# Patient Record
Sex: FEMALE | Race: WHITE | NOT HISPANIC OR LATINO | Employment: OTHER | URBAN - METROPOLITAN AREA
[De-identification: names, ages, dates, MRNs, and addresses within clinical notes are randomized per-mention and may not be internally consistent; named-entity substitution may affect disease eponyms.]

---

## 2017-01-10 ENCOUNTER — OFFICE VISIT (OUTPATIENT)
Dept: NEUROSURGERY | Facility: CLINIC | Age: 72
End: 2017-01-10

## 2017-01-10 VITALS
HEART RATE: 88 BPM | RESPIRATION RATE: 16 BRPM | BODY MASS INDEX: 31.18 KG/M2 | WEIGHT: 176 LBS | SYSTOLIC BLOOD PRESSURE: 160 MMHG | DIASTOLIC BLOOD PRESSURE: 80 MMHG

## 2017-01-10 DIAGNOSIS — M48.04 THORACIC SPINAL STENOSIS: Primary | ICD-10-CM

## 2017-01-10 PROCEDURE — 99213 OFFICE O/P EST LOW 20 MIN: CPT | Performed by: NEUROLOGICAL SURGERY

## 2017-01-10 NOTE — PROGRESS NOTES
Subjective   Patient ID: Michelle Espinoza is a 71 y.o. female is here today for follow-up of lower extremity weakness. She underwent further evaluation by neurology (Dr Eliezer Crow) and is scheduled for EMG/NCV on 1-19-17. She is accompanied by her .    History of Present Illness   Patient presents for follow- up of thoracic stenosis, gait disturbance and weakness. Since her last visit, she has been evaluated with neurology at , Dr. Crow. A EMG/NCV and labs were ordered. Her EMG is on 1/19/17. She feels that her imbalance may be worsening as she finds more reliance on her walker. She denies any associated falls. She denies neck, back, and leg pain. No swallowing issues.    The following portions of the patient's history were reviewed and updated as appropriate: allergies, current medications and problem list.    Review of Systems   HENT: Negative for trouble swallowing.    Genitourinary: Negative for difficulty urinating and enuresis.   Musculoskeletal: Positive for arthralgias and gait problem.   Neurological: Positive for weakness and numbness.       Objective   Physical Exam   Constitutional: She is oriented to person, place, and time. She appears well-developed and well-nourished.   Pulmonary/Chest: Effort normal.   Musculoskeletal:        Cervical back: She exhibits decreased range of motion (rotation).   Neurological: She is alert and oriented to person, place, and time. She has an abnormal Romberg Test. Tandem gait test: not tested due to safety concerns.   Reflex Scores:       Tricep reflexes are 1+ on the right side and 1+ on the left side.       Bicep reflexes are 2+ on the right side and 2+ on the left side.       Brachioradialis reflexes are 3+ on the right side and 2+ on the left side.       Patellar reflexes are 2+ on the right side and 3+ on the left side.       Achilles reflexes are 2+ on the right side and 2+ on the left side.  Skin: Skin is warm and dry.   Psychiatric: She has a normal mood  and affect. Her speech is normal and behavior is normal. Judgment and thought content normal.   Vitals reviewed.    Neurologic Exam     Mental Status   Oriented to person, place, and time.   Follows 3 step commands.   Attention: normal. Concentration: normal.   Speech: speech is normal   Level of consciousness: alert  Knowledge: good.   Normal comprehension.     Motor Exam   Muscle bulk: decreased (thenar/hypothenar bilaterally; intraosseous bilaterally)  Overall muscle tone: normal    Strength   Right deltoid: 5/5  Left deltoid: 5/5  Right biceps: 5/5  Left biceps: 5/5  Right triceps: 5/5  Left triceps: 5/5  Right wrist flexion: 5/5  Left wrist flexion: 5/5  Right wrist extension: 5/5  Left wrist extension: 5/5  Right interossei: 5/5  Left interossei: 4/5  Right iliopsoas: 4/5  Left iliopsoas: 4/5  Right quadriceps: 4/5  Left quadriceps: 4/5  Right hamstrin/5  Left hamstrin/5  Right anterior tibial: 5/5  Left anterior tibial: 5/5  Right gastroc: 5/5  Left gastroc: 5/5    Gait, Coordination, and Reflexes     Gait  Gait: wide-based (uses rolling walker; slow steps with prolonged heel strike and seems to drag right foot)    Coordination   Romberg: positive  Tandem gait test: not tested due to safety concerns.    Tremor   Resting tremor: absent  Intention tremor: absent  Action tremor: absent    Reflexes   Right brachioradialis: 3+  Left brachioradialis: 2+  Right biceps: 2+  Left biceps: 2+  Right triceps: 1+  Left triceps: 1+  Right patellar: 2+  Left patellar: 3+  Right achilles: 2+  Left achilles: 2+  Right Haley: present  Left Haley: absent  Right ankle clonus: absent  Left ankle clonus: absent      Assessment/Plan   Independent Review of Radiographic Studies:    none new  Medical Decision Making:    The patient returns to the office today.  She was evaluated at the [facility] by Dr. Crow.  There is some question of motor neuron disease and he has ordered further testing.    Interim she  is walking with a walker which she was not using routinely previously.  She has difficulty in getting her foot to raise as described above.    The patient has a T7 8 thoracic disc protrusion with spinal cord compression.  Distally there is disc osteophyte complex and facet hypertrophy at T10 11 with compression of the spinal cord.  The primary question from a surgical standpoint is whether or not she would improve if we were to go ahead and decompress her thoracic spinal cord.    This unfortunate woman has been through a lot have spinal surgery.  Until recently she has stabilized.  With a new progressive problem that appears to me to be somewhat myelopathic and without evidence of compression of the nerve roots in the lower spine the only surgical option would be to decompress this area.    The risks, benefits, and alternatives of decompression and fusion were explained in detail to the patient. The alternative is not to perform an operative procedure. The benefit should be, though is not guaranteed, primarily a potential reduction in the neurosensory and/or motor dysfunction; secondarily, a possible reduction in back pain. The risks include, but are not limited to, the possibility of death, infection, stroke, bleeding, blindness, paralysis, blindness, lack of improvement in the patient's pain syndrome, worsening of the pain syndrome, meningitis, osteomyelitis, recurrent disc herniation, need for further operative intervention, recurrence of the pain syndrome, sexual dysfunction, incontinence, inability to urinate without catheterization, inability to have a normal bowel movement, epidural scarring resulting in chronic back pain, leakage of cerebral spinal fluid at the time of surgery or after recovery from surgery requiring further operative intervention, lack of bony fusion requiring further surgery, instrumentation breakdown or pull out, adjacent level spinal degeneration, spinal instability. I also explained the  realistic expectations as they pertain to the procedure. The patient voiced understanding of these risks, benefits, alternatives, and realistic expectations.    After a complete physical exam, the patient has been informed of the consequences, benefits, appropriate us, and office policies regarding the medication being prescribed. A BETITO check will be made on-line, and will be repeated if prescription is renewed after a 90 day period. The patient agrees to adhering to the medication regimen as prescribed.    The patient has been advised that we will manage post-operative pain for 1 month. If further narcotic medication is needed beyond that period, a referral back to the primary care physician or to a pain management specialist will be made. If the patient cancels or fails to show for scheduled follow-up visits or the pain management referral,  further narcotic prescriptions from this practice may cease.    Plan: If the patient decides to proceed with surgical intervention this would be comprised of T7 8 transpedicular approach and removal of the T7 central disc protrusion compressive of the thoracic spinal cord with postoperative thoracic instrumentation and a T10 11 simple significant decompression.      Michelle was seen today for extremity weakness.    Diagnoses and all orders for this visit:    T7 and T10-11 Thoracic spinal stenosis    Return if symptoms worsen or fail to improve.

## 2017-01-10 NOTE — MR AVS SNAPSHOT
Michelle Espinoza   1/10/2017 2:30 PM   Office Visit    Dept Phone:  [phone]   Encounter #:  [id number]    Provider:  Clement Tierney MD   Department:  [facility] ADV NEUROSURGERY                Your Full Care Plan              Your Updated Medication List          This list is accurate as of: 1/10/17  3:27 PM.  Always use your most recent med list.                aspirin 81 MG EC tablet       baclofen 20 MG tablet   Commonly known as:  LIORESAL   Take 1 tablet by mouth Daily.       BILBERRY PLUS LUTEIN 5-1000 MCG-MG capsule       CALCIUM 1200 PO       naproxen sodium 220 MG tablet   Commonly known as:  ALEVE       oxybutynin 5 MG tablet   Commonly known as:  DITROPAN   Take 1 tablet by mouth 3 (Three) Times a Day.       PA FISH OIL/KRILL PO       PROBIOTIC PO       vitamin C 500 MG tablet   Commonly known as:  ASCORBIC ACID       vitamin D3 5000 UNITS capsule capsule               You Were Diagnosed With        Codes Comments    Thoracic spinal stenosis    -  Primary ICD-10-CM: M48.04  ICD-9-CM: 724.01       Instructions     None    Patient Instructions History      Upcoming Appointments     Visit Type Date Time Department    OFFICE VISIT 1/10/2017  2:30 PM [facility]UR[facility]    NEW PATIENT 1/31/2017  2:00 PM [facility]    WELLNESS 10/26/2017 11:15 AM [facility] ALEJANDRO    MAMMO NUSZ 10/26/2017 11:00 AM [facility] SHERMAN      [employer] Signup     Our records indicate that you have an active [employer] account.    You can view your After Visit Summary by going to [employer] and logging in with your [employer] username and password.  If you don't have a [employer] username and password but a parent or guardian has access to your record, the parent or guardian should login with their own [employer] username and password and access your record to view the After Visit Summary.    If you have questions, you can email [email or web address] or call  [phone] to talk to our MyChart staff.  Remember, MyChart is NOT to be used for urgent needs.  For medical emergencies, dial 911.               Other Info from Your Visit           Your Appointments     Jan 31, 2017  2:00 PM EST   New Patient with Johnnie Soni MD   [facility] NEUROLOGY (--)    [address]  [facility] [address] [phone]           Bring all previous medical records and films, along with current medications and insurance information.            Oct 26, 2017 11:00 AM EDT   MAMMO SCREEN PIWH with DAVIDSON PIWH DU MAMMO   [facility] OB GYN (--)    [address] [facility] [address]              No lotion, deodorant, or powders, under the arms or on the breast the day of exam            Oct 26, 2017 11:15 AM EDT   Wellness with Mahamed Massey MD   [facility] (--)    [address] [facility] [address] [phone]              Allergies     Other  Nausea And Vomiting    The mercury in Scallops    Penicillins Allergy Hives    Latex Allergy Rash    Nickel Allergy Rash      Reason for Visit     Extremity Weakness           Vital Signs     Blood Pressure Pulse Respirations Weight Last Menstrual Period Body Mass Index    160/80 (BP Location: Right arm, Patient Position: Sitting) 88 16 176 lb (79.8 kg) (LMP Unknown) 31.18 kg/m2    Smoking Status                   Former Smoker           Problems and Diagnoses Noted     Thoracic spinal stenosis

## 2017-01-10 NOTE — LETTER
January 10, 2017     BHAKTI Rasheed  [address] [facility] [address]    Patient: Michelle Espinoza   YOB: 1945   Date of Visit: 1/10/2017       Dear BHAKTI Tyler:    Michelle Espinoza was in my office today. Below is a copy of my note.    If you have questions, please do not hesitate to call me. I look forward to following Michelle along with you.         Sincerely,        Clement Tierney MD        CC: Jose Luis Nelson Jr., MD iMlly Barton MD    Subjective   Patient ID: Michelle Espinoza is a 71 y.o. female is here today for follow-up of lower extremity weakness. She underwent further evaluation by neurology (Dr Eliezer Crow) and is scheduled for EMG/NCV on 1-19-17. She is accompanied by her .    History of Present Illness   Patient presents for follow- up of thoracic stenosis, gait disturbance and weakness. Since her last visit, she has been evaluated with neurology at , Dr. Crow. A EMG/NCV and labs were ordered. Her EMG is on 1/19/17. She feels that her imbalance may be worsening as she finds more reliance on her walker. She denies any associated falls. She denies neck, back, and leg pain. No swallowing issues.    The following portions of the patient's history were reviewed and updated as appropriate: allergies, current medications and problem list.    Review of Systems   HENT: Negative for trouble swallowing.    Genitourinary: Negative for difficulty urinating and enuresis.   Musculoskeletal: Positive for arthralgias and gait problem.   Neurological: Positive for weakness and numbness.       Objective   Physical Exam   Constitutional: She is oriented to person, place, and time. She appears well-developed and well-nourished.   Pulmonary/Chest: Effort normal.   Musculoskeletal:        Cervical back: She exhibits decreased range of motion (rotation).   Neurological: She is alert and oriented to person, place, and time. She has an abnormal Romberg Test. Tandem gait  test: not tested due to safety concerns.   Reflex Scores:       Tricep reflexes are 1+ on the right side and 1+ on the left side.       Bicep reflexes are 2+ on the right side and 2+ on the left side.       Brachioradialis reflexes are 3+ on the right side and 2+ on the left side.       Patellar reflexes are 2+ on the right side and 3+ on the left side.       Achilles reflexes are 2+ on the right side and 2+ on the left side.  Skin: Skin is warm and dry.   Psychiatric: She has a normal mood and affect. Her speech is normal and behavior is normal. Judgment and thought content normal.   Vitals reviewed.    Neurologic Exam     Mental Status   Oriented to person, place, and time.   Follows 3 step commands.   Attention: normal. Concentration: normal.   Speech: speech is normal   Level of consciousness: alert  Knowledge: good.   Normal comprehension.     Motor Exam   Muscle bulk: decreased (thenar/hypothenar bilaterally; intraosseous bilaterally)  Overall muscle tone: normal    Strength   Right deltoid: 5/5  Left deltoid: 5/5  Right biceps: 5/5  Left biceps: 5/5  Right triceps: 5/5  Left triceps: 5/5  Right wrist flexion: 5/5  Left wrist flexion: 5/5  Right wrist extension: 5/5  Left wrist extension: 5/5  Right interossei: 5/5  Left interossei: 4/5  Right iliopsoas: 4/5  Left iliopsoas: 4/5  Right quadriceps: 4/5  Left quadriceps: 4/5  Right hamstrin/5  Left hamstrin/5  Right anterior tibial: 5/5  Left anterior tibial: 5/5  Right gastroc: 5/5  Left gastroc: 5/5    Gait, Coordination, and Reflexes     Gait  Gait: wide-based (uses rolling walker; slow steps with prolonged heel strike and seems to drag right foot)    Coordination   Romberg: positive  Tandem gait test: not tested due to safety concerns.    Tremor   Resting tremor: absent  Intention tremor: absent  Action tremor: absent    Reflexes   Right brachioradialis: 3+  Left brachioradialis: 2+  Right biceps: 2+  Left biceps: 2+  Right triceps: 1+  Left triceps:  1+  Right patellar: 2+  Left patellar: 3+  Right achilles: 2+  Left achilles: 2+  Right Haley: present  Left Haley: absent  Right ankle clonus: absent  Left ankle clonus: absent      Assessment/Plan   Independent Review of Radiographic Studies:    none new  Medical Decision Making:    The patient returns to the office today.  She was evaluated at the [facility] by Dr. Crow.  There is some question of motor neuron disease and he has ordered further testing.    Interim she is walking with a walker which she was not using routinely previously.  She has difficulty in getting her foot to raise as described above.    The patient has a T7 8 thoracic disc protrusion with spinal cord compression.  Distally there is disc osteophyte complex and facet hypertrophy at T10 11 with compression of the spinal cord.  The primary question from a surgical standpoint is whether or not she would improve if we were to go ahead and decompress her thoracic spinal cord.    This unfortunate woman has been through a lot have spinal surgery.  Until recently she has stabilized.  With a new progressive problem that appears to me to be somewhat myelopathic and without evidence of compression of the nerve roots in the lower spine the only surgical option would be to decompress this area.    The risks, benefits, and alternatives of decompression and fusion were explained in detail to the patient. The alternative is not to perform an operative procedure. The benefit should be, though is not guaranteed, primarily a potential reduction in the neurosensory and/or motor dysfunction; secondarily, a possible reduction in back pain. The risks include, but are not limited to, the possibility of death, infection, stroke, bleeding, blindness, paralysis, blindness, lack of improvement in the patient's pain syndrome, worsening of the pain syndrome, meningitis, osteomyelitis, recurrent disc herniation, need for further operative intervention,  recurrence of the pain syndrome, sexual dysfunction, incontinence, inability to urinate without catheterization, inability to have a normal bowel movement, epidural scarring resulting in chronic back pain, leakage of cerebral spinal fluid at the time of surgery or after recovery from surgery requiring further operative intervention, lack of bony fusion requiring further surgery, instrumentation breakdown or pull out, adjacent level spinal degeneration, spinal instability. I also explained the realistic expectations as they pertain to the procedure. The patient voiced understanding of these risks, benefits, alternatives, and realistic expectations.    After a complete physical exam, the patient has been informed of the consequences, benefits, appropriate us, and office policies regarding the medication being prescribed. A BETITO check will be made on-line, and will be repeated if prescription is renewed after a 90 day period. The patient agrees to adhering to the medication regimen as prescribed.    The patient has been advised that we will manage post-operative pain for 1 month. If further narcotic medication is needed beyond that period, a referral back to the primary care physician or to a pain management specialist will be made. If the patient cancels or fails to show for scheduled follow-up visits or the pain management referral,  further narcotic prescriptions from this practice may cease.    Plan: If the patient decides to proceed with surgical intervention this would be comprised of T7 8 transpedicular approach and removal of the T7 central disc protrusion compressive of the thoracic spinal cord with postoperative thoracic instrumentation and a T10 11 simple significant decompression.      Michelle was seen today for extremity weakness.    Diagnoses and all orders for this visit:    T7 and T10-11 Thoracic spinal stenosis    Return if symptoms worsen or fail to improve.

## 2017-01-18 ENCOUNTER — TELEPHONE (OUTPATIENT)
Dept: NEUROSURGERY | Facility: CLINIC | Age: 72
End: 2017-01-18

## 2017-01-18 NOTE — TELEPHONE ENCOUNTER
----- Message from Patricia Gale sent at 1/18/2017 10:18 AM EST -----  Please turn this into a note: patient called and has decided for now she is going to wait rather than proceed with surgery. Thanks.

## 2017-01-30 RX ORDER — OXYBUTYNIN CHLORIDE 5 MG/1
5 TABLET ORAL 3 TIMES DAILY
Qty: 90 TABLET | Refills: 0 | Status: SHIPPED | OUTPATIENT
Start: 2017-01-30 | End: 2017-03-04 | Stop reason: SDUPTHER

## 2017-01-31 ENCOUNTER — LAB (OUTPATIENT)
Dept: LAB | Facility: HOSPITAL | Age: 72
End: 2017-01-31
Attending: PSYCHIATRY & NEUROLOGY

## 2017-01-31 ENCOUNTER — OFFICE VISIT (OUTPATIENT)
Dept: NEUROLOGY | Facility: CLINIC | Age: 72
End: 2017-01-31

## 2017-01-31 ENCOUNTER — TRANSCRIBE ORDERS (OUTPATIENT)
Dept: NEUROLOGY | Facility: CLINIC | Age: 72
End: 2017-01-31

## 2017-01-31 VITALS
DIASTOLIC BLOOD PRESSURE: 92 MMHG | BODY MASS INDEX: 31.18 KG/M2 | SYSTOLIC BLOOD PRESSURE: 130 MMHG | WEIGHT: 176 LBS | HEIGHT: 63 IN

## 2017-01-31 DIAGNOSIS — M51.34 DEGENERATIVE DISC DISEASE, THORACIC: ICD-10-CM

## 2017-01-31 DIAGNOSIS — G62.89 OTHER POLYNEUROPATHY: Primary | ICD-10-CM

## 2017-01-31 DIAGNOSIS — M51.36 DEGENERATIVE DISC DISEASE, LUMBAR: ICD-10-CM

## 2017-01-31 DIAGNOSIS — E08.40 DIABETES MELLITUS DUE TO UNDERLYING CONDITION WITH DIABETIC NEUROPATHY, WITHOUT LONG-TERM CURRENT USE OF INSULIN (HCC): ICD-10-CM

## 2017-01-31 DIAGNOSIS — M62.81 MUSCLE WEAKNESS (GENERALIZED): ICD-10-CM

## 2017-01-31 DIAGNOSIS — M62.81 MUSCLE WEAKNESS (GENERALIZED): Primary | ICD-10-CM

## 2017-01-31 DIAGNOSIS — M50.30 DEGENERATIVE DISC DISEASE, CERVICAL: ICD-10-CM

## 2017-01-31 LAB
ERYTHROCYTE [SEDIMENTATION RATE] IN BLOOD: 9 MM/HR (ref 0–30)
FOLATE SERPL-MCNC: 16.77 NG/ML (ref 4.78–24.2)
HBA1C MFR BLD: 6.26 % (ref 4.8–5.6)
T4 FREE SERPL-MCNC: 1.04 NG/DL (ref 0.93–1.7)
TSH SERPL DL<=0.05 MIU/L-ACNC: 1.29 MIU/ML (ref 0.27–4.2)
VIT B12 BLD-MCNC: 634 PG/ML (ref 211–946)

## 2017-01-31 PROCEDURE — 83655 ASSAY OF LEAD: CPT

## 2017-01-31 PROCEDURE — 99205 OFFICE O/P NEW HI 60 MIN: CPT | Performed by: PSYCHIATRY & NEUROLOGY

## 2017-01-31 PROCEDURE — 82595 ASSAY OF CRYOGLOBULIN: CPT

## 2017-01-31 PROCEDURE — 36415 COLL VENOUS BLD VENIPUNCTURE: CPT

## 2017-01-31 PROCEDURE — 84155 ASSAY OF PROTEIN SERUM: CPT

## 2017-01-31 PROCEDURE — 86592 SYPHILIS TEST NON-TREP QUAL: CPT

## 2017-01-31 PROCEDURE — 84439 ASSAY OF FREE THYROXINE: CPT

## 2017-01-31 PROCEDURE — 83825 ASSAY OF MERCURY: CPT

## 2017-01-31 PROCEDURE — 83036 HEMOGLOBIN GLYCOSYLATED A1C: CPT

## 2017-01-31 PROCEDURE — 82746 ASSAY OF FOLIC ACID SERUM: CPT

## 2017-01-31 PROCEDURE — 82175 ASSAY OF ARSENIC: CPT

## 2017-01-31 PROCEDURE — 82607 VITAMIN B-12: CPT

## 2017-01-31 PROCEDURE — 86334 IMMUNOFIX E-PHORESIS SERUM: CPT

## 2017-01-31 PROCEDURE — 84443 ASSAY THYROID STIM HORMONE: CPT

## 2017-01-31 PROCEDURE — 84165 PROTEIN E-PHORESIS SERUM: CPT

## 2017-01-31 PROCEDURE — 85652 RBC SED RATE AUTOMATED: CPT
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
## 2017-01-31 NOTE — MR AVS SNAPSHOT
[facility] NEUROLOGY  [phone]                    Michelle Espinoza   1/31/2017 2:00 PM   Office Visit    Dept Phone:  [phone]   Encounter #:  [id number]    Provider:  Johnnie Soni MD   Department:  [facility] NEUROLOGY                Your Full Care Plan              Your Updated Medication List          This list is accurate as of: 1/31/17  3:28 PM.  Always use your most recent med list.                aspirin 81 MG EC tablet       baclofen 20 MG tablet   Commonly known as:  LIORESAL   Take 1 tablet by mouth Daily.       BILBERRY PLUS LUTEIN 5-1000 MCG-MG capsule       CALCIUM 1200 PO       naproxen sodium 220 MG tablet   Commonly known as:  ALEVE       oxybutynin 5 MG tablet   Commonly known as:  DITROPAN   Take 1 tablet by mouth 3 (Three) Times a Day.       PA FISH OIL/KRILL PO       PROBIOTIC PO       vitamin C 500 MG tablet   Commonly known as:  ASCORBIC ACID       vitamin D3 5000 UNITS capsule capsule               Instructions     None    Patient Instructions History      Upcoming Appointments     Visit Type Date Time Department    NEW PATIENT 1/31/2017  2:00 PM [facility] [place] [facility]    OFFICE VISIT 2/3/2017 10:30 AM [facility]    FOLLOW UP 3/13/2017  3:00 PM [facility] [place]    WELLNESS 10/26/2017 11:15 AM [facility] ALEJANDRO    MAMMO NUSZ 10/26/2017 11:00 AM [facility] SHERMAN [place] Signup     Our records indicate that you have an active [employer] account.    You can view your After Visit Summary by going to [employer] and logging in with your [employer] username and password.  If you don't have a [employer] username and password but a parent or guardian has access to your record, the parent or guardian should login with their own [employer] username and password and access your record to view the After Visit Summary.    If you have questions, you can email [email or web address] [email or web address] or call [phone] to talk to our  "MyChart staff.  Remember, MyChart is NOT to be used for urgent needs.  For medical emergencies, dial 911.               Other Info from Your Visit           Your Appointments     Feb 03, 2017 10:30 AM EST   Office Visit with BHAKTI Rasheed   [facility] FAMILY MEDICINE (--)    [address] [id number] [phone]           Arrive 15 minutes prior to appointment.            Mar 13, 2017  3:00 PM EDT   Follow Up with RATNA Candelario   [facility] NEUROLOGY (--)    [address]  [facility] [address] [phone]           Arrive 15 minutes prior to appointment.            Oct 26, 2017 11:00 AM EDT   MAMMO SCREEN PIWH with DAVIDSON DHILLON MAMMO   [facility] OB GYN (--)    [address] [facility] [address]              No lotion, deodorant, or powders, under the arms or on the breast the day of exam            Oct 26, 2017 11:15 AM EDT   Wellness with Mahamed Massey MD   [facility] (--)    [address] [facility] [address] [phone]              Allergies     Other  Nausea And Vomiting    The mercury in Scallops    Penicillins Allergy Hives    Latex Allergy Rash    Nickel Allergy Rash      Reason for Visit     Extremity Weakness           Vital Signs     Blood Pressure Height Weight Last Menstrual Period Body Mass Index Smoking Status    130/92 63\" (160 cm) 176 lb (79.8 kg) (LMP Unknown) 31.18 kg/m2 Former Smoker        "
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
## 2017-02-01 LAB
ALBUMIN SERPL-MCNC: 3.5 G/DL (ref 2.9–4.4)
ALBUMIN/GLOB SERPL: 1 {RATIO} (ref 0.7–1.7)
ALPHA1 GLOB FLD ELPH-MCNC: 0.2 G/DL (ref 0–0.4)
ALPHA2 GLOB SERPL ELPH-MCNC: 0.9 G/DL (ref 0.4–1)
B-GLOBULIN SERPL ELPH-MCNC: 1.2 G/DL (ref 0.7–1.3)
GAMMA GLOB SERPL ELPH-MCNC: 1.2 G/DL (ref 0.4–1.8)
GLOBULIN SER CALC-MCNC: 3.6 G/DL (ref 2.2–3.9)
IGA SERPL-MCNC: 184 MG/DL (ref 87–352)
IGG SERPL-MCNC: 1061 MG/DL (ref 700–1600)
IGM SERPL-MCNC: 62 MG/DL (ref 26–217)
Lab: NORMAL
M-SPIKE: NORMAL G/DL
PROT PATTERN SERPL ELPH-IMP: NORMAL
PROT PATTERN SERPL IFE-IMP: NORMAL
PROT SERPL-MCNC: 7.1 G/DL (ref 6–8.5)
RPR SER QL: NORMAL

## 2017-02-02 LAB
ARSENIC BLD-MCNC: 5 UG/L (ref 2–23)
LEAD BLD-MCNC: 1 UG/DL (ref 0–19)
MERCURY BLD-MCNC: NORMAL UG/L (ref 0–14.9)

## 2017-02-03 ENCOUNTER — OFFICE VISIT (OUTPATIENT)
Dept: FAMILY MEDICINE CLINIC | Facility: CLINIC | Age: 72
End: 2017-02-03

## 2017-02-03 VITALS
HEART RATE: 74 BPM | TEMPERATURE: 98 F | DIASTOLIC BLOOD PRESSURE: 64 MMHG | SYSTOLIC BLOOD PRESSURE: 120 MMHG | HEIGHT: 63 IN | BODY MASS INDEX: 31.25 KG/M2 | WEIGHT: 176.4 LBS | OXYGEN SATURATION: 98 %

## 2017-02-03 DIAGNOSIS — E11.8 TYPE 2 DIABETES MELLITUS WITH COMPLICATION, WITHOUT LONG-TERM CURRENT USE OF INSULIN (HCC): Primary | ICD-10-CM

## 2017-02-03 PROCEDURE — 99213 OFFICE O/P EST LOW 20 MIN: CPT | Performed by: PHYSICIAN ASSISTANT

## 2017-02-03 NOTE — PROGRESS NOTES
Subjective   Michelle Espinoza is a 71 y.o. female here today to discuss elevated fasting blood sugars     History of Present Illness     PATIENT WAS SEEN BY [facility], [place], AND NEUROLOGY. [facility] DID EMG- TOLD CTS LEFT NEEDS REVISION- TOLD IT COULD BE DIABETES CAUSING NEUROPATHY. PATIENT WAS SEEN BY DR PALOMINO- TOLD SURGERY MAY BE NECESSARY AT SOME POINT TO PREVENT WORSENING. HE ADVISED WILL NOT GET BETTER BUT WILL OVER TIME GET WORSE.     INCREASED SYMPTOMS 1 YEAR AGO 12/2016. NEUROLOGY ADVISED THAT COULD BE RELATED TO MEDICATIONS OR MEDICALLY. MOST RECENT A1C 6.26      The following portions of the patient's history were reviewed and updated as appropriate: allergies, current medications, past family history, past medical history, past social history, past surgical history and problem list.    Review of Systems   Constitutional: Negative for diaphoresis, fatigue and fever.   Respiratory: Negative for shortness of breath.    Cardiovascular: Negative for chest pain and palpitations.   Gastrointestinal: Negative for abdominal pain, nausea and vomiting.   Musculoskeletal: Positive for back pain and neck pain.   Neurological: Positive for weakness and light-headedness. Negative for dizziness, syncope and headaches.   Psychiatric/Behavioral: Negative for confusion.       Objective   Physical Exam   Constitutional: She is oriented to person, place, and time. She appears well-developed and well-nourished.   HENT:   Head: Normocephalic and atraumatic.   Right Ear: External ear normal.   Left Ear: External ear normal.   Eyes: Conjunctivae are normal.   Neck: Neck supple.   Cardiovascular: Normal rate, regular rhythm, normal heart sounds and intact distal pulses.  Exam reveals no gallop and no friction rub.    No murmur heard.  Pulmonary/Chest: Effort normal and breath sounds normal. No respiratory distress. She has no wheezes. She has no rales.   Musculoskeletal: She exhibits no edema or deformity.   Neurological: She is alert  and oriented to person, place, and time. Gait (DIFFICULTY RISING FROM CHAIR, AMBULATING- USING WALKER) abnormal.   Skin: Skin is warm and dry.   Psychiatric: She has a normal mood and affect. Her speech is normal and behavior is normal. Judgment and thought content normal. Cognition and memory are normal.   Nursing note and vitals reviewed.      Assessment/Plan   Michelle was seen today for elevated fasting blood sugar and scheduled for carpal tunnel surgery left hand on 2-16-17.    Diagnoses and all orders for this visit:    Type 2 diabetes mellitus with complication, without long-term current use of insulin  -     metFORMIN (GLUCOPHAGE) 500 MG tablet; Take 1 tablet by mouth Every Night.      Patient Instructions   71 YEAR OLD FEMALE WHO PRESENTS TODAY IN FOLLOW UP OF ELEVATED GLUCOSE AND PREDIABETES VS EARLY TYPE 2  DIABETES MELLITUS. I REVIEWED [facility], DR GARDNER, NEUROLOGY, AND DR PALOMINO, NEUROSURGERY, NOTES, TESTING AND LABS. PER [facility], THEY THOUGHT HER NEUROPATHY WAS DIABETES RELATED. HER A1C WAS 6.4% THEN RECHECKED AT 6.26%. I DO NOT FEEL THIS WOULD BE A LIKELY SOURCE FOR NEUROPATHY WITH VIRTUALLY CONTROLLED LEVELS. WHILE IT IS POSSIBLE, I WILL START LOW DOSE METFORMIN AT NIGHT. PATIENT IS AGREEABLE. SHE HAS CONTINUED WEAKNESS IN BILATERAL HANDS AND BILATERAL LEGS AS WELL AS NEUROGENIC BLADDER. SHE WILL KEEP FOLLOW UP WITH SPECIALISTS AS DIRECTED FOR FURTHER RECOMMENDATIONS. FOLLOW UP WITH ME IN 3 MONTHS OR SOONER IF NEEDED.                Answers for HPI/ROS submitted by the patient on 1/27/2017   Neurologic complaint  altered mental status: No  clumsiness: No  focal sensory loss: Yes  focal weakness: Yes  loss of balance: No  memory loss: No  near-syncope: No  slurred speech: No  syncope: No  visual change: No  weakness: Yes  Chronicity: chronic  Onset: more than 1 year ago  Onset quality: gradually  Progression since onset: gradually worsening  Focality: lower extremity  abdominal pain: No  auditory  change: No  aura: No  back pain: Yes  bladder incontinence: Yes  bowel incontinence: No  chest pain: No  confusion: No  diaphoresis: No  dizziness: No  fatigue: No  fever: No  headaches: No  light-headedness: Yes  nausea: No  neck pain: Yes  palpitations: No  shortness of breath: No  vertigo: No  vomiting: No  Treatments tried: nothing

## 2017-02-06 LAB — CRYOGLOB SER QL 1D COLD INC: NORMAL

## 2017-02-11 NOTE — PATIENT INSTRUCTIONS
71 YEAR OLD FEMALE WHO PRESENTS TODAY IN FOLLOW UP OF ELEVATED GLUCOSE AND PREDIABETES VS EARLY TYPE 2  DIABETES MELLITUS. I REVIEWED [facility] NEUR[facility], DR GARDNER, NEUROLOGY, AND DR PALOMINO, NEUROSURGERY, NOTES, TESTING AND LABS. PER [facility], THEY THOUGHT HER NEUROPATHY WAS DIABETES RELATED. HER A1C WAS 6.4% THEN RECHECKED AT 6.26%. I DO NOT FEEL THIS WOULD BE A LIKELY SOURCE FOR NEUROPATHY WITH VIRTUALLY CONTROLLED LEVELS. WHILE IT IS POSSIBLE, I WILL START LOW DOSE METFORMIN AT NIGHT. PATIENT IS AGREEABLE. SHE HAS CONTINUED WEAKNESS IN BILATERAL HANDS AND BILATERAL LEGS AS WELL AS NEUROGENIC BLADDER. SHE WILL KEEP FOLLOW UP WITH SPECIALISTS AS DIRECTED FOR FURTHER RECOMMENDATIONS. FOLLOW UP WITH ME IN 3 MONTHS OR SOONER IF NEEDED.

## 2017-03-06 RX ORDER — OXYBUTYNIN CHLORIDE 5 MG/1
TABLET ORAL
Qty: 90 TABLET | Refills: 0 | Status: SHIPPED | OUTPATIENT
Start: 2017-03-06 | End: 2017-04-21

## 2017-03-13 ENCOUNTER — OFFICE VISIT (OUTPATIENT)
Dept: NEUROLOGY | Facility: CLINIC | Age: 72
End: 2017-03-13

## 2017-03-13 VITALS
SYSTOLIC BLOOD PRESSURE: 138 MMHG | WEIGHT: 176 LBS | OXYGEN SATURATION: 99 % | HEART RATE: 95 BPM | DIASTOLIC BLOOD PRESSURE: 86 MMHG | BODY MASS INDEX: 31.18 KG/M2 | HEIGHT: 63 IN

## 2017-03-13 DIAGNOSIS — G62.9 POLYNEUROPATHY: Primary | ICD-10-CM

## 2017-03-13 DIAGNOSIS — M54.16 LUMBAR RADICULOPATHY: ICD-10-CM

## 2017-03-13 PROCEDURE — 99213 OFFICE O/P EST LOW 20 MIN: CPT | Performed by: NURSE PRACTITIONER

## 2017-03-13 NOTE — PROGRESS NOTES
Subjective:     Patient ID: Michelle Espinoza is a 71 y.o. female presenting for follow up for neuropathy pain and bilateral leg weakness. The patient saw Dr. Soni on January 31, 2017. Details of her history can be found in Dr. Soni's office note. Dr. Soni ordered labs including B12, folate, hemoglobin A1c, ESR, RPR, cryoglobulins, heavy metals, SPE, immunofixation, and thyroid panel. All labs were normal except elevated hemoglobin A1c of 6.26. We obtained her EMG records form Dr. Crow at [facility] and those results showed a moderate to severe radiculopathy at L5 and S1 and a mild to moderate peripheral polyneuropathy in the lower extremities. She denies any tingling or burning pain and states the numbness is most bothersome to her.     Peripheral Neuropathy   Pertinent negatives include no abdominal pain, chest pain, fatigue, headaches, nausea, neck pain, numbness, rash, vomiting or weakness.     The following portions of the patient's history were reviewed and updated as appropriate: allergies, current medications, past family history, past medical history, past social history, past surgical history and problem list.    Review of Systems   Constitutional: Negative for activity change, appetite change and fatigue.   HENT: Negative for facial swelling, hearing loss and trouble swallowing.    Eyes: Negative for photophobia, pain and visual disturbance.   Respiratory: Negative for choking, chest tightness and shortness of breath.    Cardiovascular: Negative for chest pain and leg swelling.   Gastrointestinal: Negative for abdominal pain, nausea and vomiting.   Endocrine: Negative for polydipsia and polyphagia.   Genitourinary: Negative for difficulty urinating, frequency and urgency.   Musculoskeletal: Negative for back pain, gait problem and neck pain.   Skin: Negative for color change, rash and wound.   Allergic/Immunologic: Positive for food allergies. Negative for environmental allergies and immunocompromised state.    Neurological: Negative for dizziness, tremors, syncope, facial asymmetry, speech difficulty, weakness, light-headedness, numbness and headaches.   Hematological: Negative for adenopathy. Does not bruise/bleed easily.   Psychiatric/Behavioral: Negative for agitation, behavioral problems, confusion, decreased concentration, dysphoric mood, hallucinations, self-injury, sleep disturbance and suicidal ideas. The patient is not nervous/anxious and is not hyperactive.         Objective:    Neurologic Exam  Mental status: Awake alert oriented conversant provides a good history without evidence of an affective disorder thought disorder delusions or hallucinations.  HCF: No aphasia or apraxia or dysarthria. Recent and remote memory intact. Knowledge of recent events intact.  Cranial nerves: 2-12 intact  Motor: Tone appears essentially normal. There is significant bilateral intrinsic hand atrophy. There is intrinsic foot atrophy. Power testing reveals prominent weakness of the abductor pollicis brevis, first dorsal interosseous and abductor digiti quinti muscles bilaterally. All other muscles tested in the upper extremities were strong except the left triceps was mildly weak. In the lower extremities there was pretty good power proximally with toe extensor and flexor weakness.  Reflexes: Upper extremities are brisk with positive Haley's bilaterally. In the lower extremities only moderately brisk with downgoing toe signs.  Sensory: Reduced light touch and pinprick median nerve distributions bilaterally. In the lower extremities there is distal reduced sensation. Vibration sense is questionably reduced at the ankles position sense intact in the great toes.  Cerebellar: Finger to nose rapid movements are essentially normal. Heel shin is a little slowed but probably normal.  Gait and Station: The patient ambulates with her rolling walker. It is effortful. There is some steppage with the left foot. Romberg was not  attempted    Physical Exam  Gen.: Obese white female no acute distress  HEENT: Normocephalic no evidence of trauma.Throat negative.  Neck: Supple but reduced range of motion. No thyromegaly. No cervical bruits. Radial pulses were strong and simultaneous.  Heart: Regular rate and rhythm no murmurs    Assessment/Plan:     Michelle was seen today for peripheral neuropathy.    Diagnoses and all orders for this visit:    Polyneuropathy    Lumbar radiculopathy       Results reviewed with the patient. Encouraged blood sugar control to help prevent worsening of neuropathy. She has an appointment to discuss EMG results with Dr. Crow on Thursday. Discussed medication options but at this point only the numbness is bothersome to her. Also discussed physical therapy, specifically aquatic therapy. She will think about this and will call if she would like to pursue this option.     F/u prn.

## 2017-04-21 RX ORDER — OXYBUTYNIN CHLORIDE 5 MG/1
TABLET ORAL
Qty: 90 TABLET | Refills: 0 | Status: SHIPPED | OUTPATIENT
Start: 2017-04-21 | End: 2017-05-30

## 2017-05-01 ENCOUNTER — OFFICE VISIT (OUTPATIENT)
Dept: FAMILY MEDICINE CLINIC | Facility: CLINIC | Age: 72
End: 2017-05-01

## 2017-05-01 VITALS
TEMPERATURE: 98.6 F | SYSTOLIC BLOOD PRESSURE: 138 MMHG | HEIGHT: 63 IN | OXYGEN SATURATION: 97 % | WEIGHT: 173 LBS | HEART RATE: 64 BPM | DIASTOLIC BLOOD PRESSURE: 80 MMHG | BODY MASS INDEX: 30.65 KG/M2

## 2017-05-01 DIAGNOSIS — M85.80 OSTEOPENIA: ICD-10-CM

## 2017-05-01 DIAGNOSIS — R73.01 IMPAIRED FASTING GLUCOSE: ICD-10-CM

## 2017-05-01 DIAGNOSIS — I65.22 CAROTID ATHEROSCLEROSIS, LEFT: ICD-10-CM

## 2017-05-01 DIAGNOSIS — E78.00 HYPERCHOLESTEROLEMIA: ICD-10-CM

## 2017-05-01 DIAGNOSIS — R82.90 ABNORMAL URINALYSIS: ICD-10-CM

## 2017-05-01 DIAGNOSIS — E55.9 VITAMIN D DEFICIENCY: ICD-10-CM

## 2017-05-01 DIAGNOSIS — I87.2 CHRONIC VENOUS INSUFFICIENCY: ICD-10-CM

## 2017-05-01 DIAGNOSIS — H91.90 HEARING LOSS, UNSPECIFIED LATERALITY: ICD-10-CM

## 2017-05-01 DIAGNOSIS — K63.5 BENIGN COLONIC POLYP: ICD-10-CM

## 2017-05-01 DIAGNOSIS — Z00.00 MEDICARE ANNUAL WELLNESS VISIT, SUBSEQUENT: Primary | ICD-10-CM

## 2017-05-01 DIAGNOSIS — E11.8 TYPE 2 DIABETES MELLITUS WITH COMPLICATION, WITHOUT LONG-TERM CURRENT USE OF INSULIN (HCC): ICD-10-CM

## 2017-05-01 DIAGNOSIS — E53.8 B12 DEFICIENCY: ICD-10-CM

## 2017-05-01 LAB
BILIRUB BLD-MCNC: NEGATIVE MG/DL
CLARITY, POC: ABNORMAL
COLOR UR: YELLOW
EXPIRATION DATE: ABNORMAL
GLUCOSE UR STRIP-MCNC: NEGATIVE MG/DL
HBA1C MFR BLD: 6.4 %
KETONES UR QL: NEGATIVE
LEUKOCYTE EST, POC: ABNORMAL
Lab: 706
NITRITE UR-MCNC: POSITIVE MG/ML
PH UR: 7 [PH] (ref 5–8)
PROT UR STRIP-MCNC: ABNORMAL MG/DL
RBC # UR STRIP: ABNORMAL /UL
SP GR UR: 1.02 (ref 1–1.03)
UROBILINOGEN UR QL: NORMAL

## 2017-05-01 PROCEDURE — 81003 URINALYSIS AUTO W/O SCOPE: CPT | Performed by: PHYSICIAN ASSISTANT

## 2017-05-01 PROCEDURE — G0439 PPPS, SUBSEQ VISIT: HCPCS | Performed by: PHYSICIAN ASSISTANT

## 2017-05-01 PROCEDURE — 83036 HEMOGLOBIN GLYCOSYLATED A1C: CPT | Performed by: PHYSICIAN ASSISTANT

## 2017-05-01 PROCEDURE — 99214 OFFICE O/P EST MOD 30 MIN: CPT | Performed by: PHYSICIAN ASSISTANT

## 2017-05-03 LAB
25(OH)D3+25(OH)D2 SERPL-MCNC: 43.6 NG/ML (ref 30–100)
ALBUMIN SERPL-MCNC: 4.4 G/DL (ref 3.6–4.8)
ALBUMIN/GLOB SERPL: 1.4 {RATIO} (ref 1.2–2.2)
ALP SERPL-CCNC: 99 IU/L (ref 39–117)
ALT SERPL-CCNC: 24 IU/L (ref 0–32)
APPEARANCE UR: (no result)
AST SERPL-CCNC: 22 IU/L (ref 0–40)
BACTERIA #/AREA URNS HPF: ABNORMAL /[HPF]
BACTERIA UR CULT: NORMAL
BACTERIA UR CULT: NORMAL
BASOPHILS # BLD AUTO: 0 X10E3/UL (ref 0–0.2)
BASOPHILS NFR BLD AUTO: 0 %
BILIRUB SERPL-MCNC: 0.4 MG/DL (ref 0–1.2)
BILIRUB UR QL STRIP: NEGATIVE
BUN SERPL-MCNC: 27 MG/DL (ref 8–27)
BUN/CREAT SERPL: 41 (ref 12–28)
CALCIUM SERPL-MCNC: 10.5 MG/DL (ref 8.7–10.3)
CHLORIDE SERPL-SCNC: 102 MMOL/L (ref 96–106)
CHOLEST SERPL-MCNC: 242 MG/DL (ref 100–199)
CO2 SERPL-SCNC: 22 MMOL/L (ref 18–29)
COLOR UR: YELLOW
CREAT SERPL-MCNC: 0.66 MG/DL (ref 0.57–1)
EOSINOPHIL # BLD AUTO: 0.2 X10E3/UL (ref 0–0.4)
EOSINOPHIL NFR BLD AUTO: 2 %
EPI CELLS #/AREA URNS HPF: ABNORMAL /HPF
ERYTHROCYTE [DISTWIDTH] IN BLOOD BY AUTOMATED COUNT: 13.2 % (ref 12.3–15.4)
FOLATE SERPL-MCNC: 10.7 NG/ML
GLOBULIN SER CALC-MCNC: 3.2 G/DL (ref 1.5–4.5)
GLUCOSE SERPL-MCNC: 110 MG/DL (ref 65–99)
GLUCOSE UR QL: NEGATIVE
HCT VFR BLD AUTO: 45.6 % (ref 34–46.6)
HDLC SERPL-MCNC: 50 MG/DL
HGB BLD-MCNC: 15.7 G/DL (ref 11.1–15.9)
HGB UR QL STRIP: (no result)
IMM GRANULOCYTES # BLD: 0 X10E3/UL (ref 0–0.1)
IMM GRANULOCYTES NFR BLD: 0 %
KETONES UR QL STRIP: NEGATIVE
LDLC SERPL CALC-MCNC: 135 MG/DL (ref 0–99)
LDLC/HDLC SERPL: 2.7 RATIO UNITS (ref 0–3.2)
LEUKOCYTE ESTERASE UR QL STRIP: (no result)
LYMPHOCYTES # BLD AUTO: 2.3 X10E3/UL (ref 0.7–3.1)
LYMPHOCYTES NFR BLD AUTO: 22 %
MCH RBC QN AUTO: 30.5 PG (ref 26.6–33)
MCHC RBC AUTO-ENTMCNC: 34.4 G/DL (ref 31.5–35.7)
MCV RBC AUTO: 89 FL (ref 79–97)
MICRO URNS: (no result)
MONOCYTES # BLD AUTO: 1 X10E3/UL (ref 0.1–0.9)
MONOCYTES NFR BLD AUTO: 10 %
MUCOUS THREADS URNS QL MICRO: PRESENT
NEUTROPHILS # BLD AUTO: 6.7 X10E3/UL (ref 1.4–7)
NEUTROPHILS NFR BLD AUTO: 66 %
NITRITE UR QL STRIP: NEGATIVE
PH UR STRIP: 7 [PH] (ref 5–7.5)
PLATELET # BLD AUTO: 274 X10E3/UL (ref 150–379)
POTASSIUM SERPL-SCNC: 4.7 MMOL/L (ref 3.5–5.2)
PROT SERPL-MCNC: 7.6 G/DL (ref 6–8.5)
PROT UR QL STRIP: (no result)
RBC # BLD AUTO: 5.15 X10E6/UL (ref 3.77–5.28)
RBC #/AREA URNS HPF: ABNORMAL /HPF
SODIUM SERPL-SCNC: 143 MMOL/L (ref 134–144)
SP GR UR: 1.02 (ref 1–1.03)
TRIGL SERPL-MCNC: 283 MG/DL (ref 0–149)
UROBILINOGEN UR STRIP-MCNC: 0.2 MG/DL (ref 0.2–1)
VIT B12 SERPL-MCNC: 1228 PG/ML (ref 211–946)
VLDLC SERPL CALC-MCNC: 57 MG/DL (ref 5–40)
WBC # BLD AUTO: 10.3 X10E3/UL (ref 3.4–10.8)
WBC #/AREA URNS HPF: >30 /HPF

## 2017-05-09 DIAGNOSIS — E83.52 HYPERCALCEMIA: Primary | ICD-10-CM

## 2017-05-18 LAB
ALBUMIN SERPL-MCNC: 4.5 G/DL (ref 3.6–4.8)
ALBUMIN/GLOB SERPL: 1.6 {RATIO} (ref 1.2–2.2)
ALP SERPL-CCNC: 94 IU/L (ref 39–117)
ALT SERPL-CCNC: 21 IU/L (ref 0–32)
AST SERPL-CCNC: 23 IU/L (ref 0–40)
BILIRUB SERPL-MCNC: 0.5 MG/DL (ref 0–1.2)
BUN SERPL-MCNC: 29 MG/DL (ref 8–27)
BUN/CREAT SERPL: 35 (ref 12–28)
CALCIUM SERPL-MCNC: 10.9 MG/DL (ref 8.7–10.3)
CHLORIDE SERPL-SCNC: 100 MMOL/L (ref 96–106)
CO2 SERPL-SCNC: 23 MMOL/L (ref 18–29)
CREAT SERPL-MCNC: 0.83 MG/DL (ref 0.57–1)
GLOBULIN SER CALC-MCNC: 2.9 G/DL (ref 1.5–4.5)
GLUCOSE SERPL-MCNC: 119 MG/DL (ref 65–99)
POTASSIUM SERPL-SCNC: 4.8 MMOL/L (ref 3.5–5.2)
PROT SERPL-MCNC: 7.4 G/DL (ref 6–8.5)
PTH-INTACT SERPL-MCNC: 43 PG/ML (ref 15–65)
SODIUM SERPL-SCNC: 141 MMOL/L (ref 134–144)

## 2017-05-19 DIAGNOSIS — E83.52 HYPERCALCEMIA: Primary | ICD-10-CM

## 2017-05-22 RX ORDER — BACLOFEN 20 MG/1
TABLET ORAL
Qty: 30 TABLET | Refills: 5 | Status: SHIPPED | OUTPATIENT
Start: 2017-05-22 | End: 2019-03-05

## 2017-05-30 RX ORDER — OXYBUTYNIN CHLORIDE 5 MG/1
TABLET ORAL
Qty: 90 TABLET | Refills: 0 | Status: SHIPPED | OUTPATIENT
Start: 2017-05-30 | End: 2017-07-31 | Stop reason: SDUPTHER

## 2017-05-30 RX ORDER — NYSTATIN 100000 [USP'U]/G
POWDER TOPICAL
Qty: 30 G | Refills: 0 | Status: SHIPPED | OUTPATIENT
Start: 2017-05-30 | End: 2017-09-25 | Stop reason: SDUPTHER

## 2017-06-20 ENCOUNTER — OFFICE VISIT (OUTPATIENT)
Dept: ENDOCRINOLOGY | Age: 72
End: 2017-06-20

## 2017-06-20 VITALS
HEART RATE: 83 BPM | WEIGHT: 174 LBS | OXYGEN SATURATION: 96 % | BODY MASS INDEX: 30.83 KG/M2 | SYSTOLIC BLOOD PRESSURE: 140 MMHG | HEIGHT: 63 IN | DIASTOLIC BLOOD PRESSURE: 82 MMHG

## 2017-06-20 DIAGNOSIS — E83.52 HYPERCALCEMIA: ICD-10-CM

## 2017-06-20 DIAGNOSIS — M85.80 OSTEOPENIA: ICD-10-CM

## 2017-06-20 DIAGNOSIS — N20.0 KIDNEY STONES: ICD-10-CM

## 2017-06-20 DIAGNOSIS — R73.03 PRE-DIABETES: Primary | ICD-10-CM

## 2017-06-20 PROCEDURE — 99205 OFFICE O/P NEW HI 60 MIN: CPT | Performed by: INTERNAL MEDICINE

## 2017-06-20 NOTE — PROGRESS NOTES
Chief Complaint   Patient presents with   • Abnormal Calcium     Hypercalcemia   New Patient Appointment / Hypercalcemia    Michelle Espinoza,72 y.o.  is here for elevated Ca levels. Consulted by Miss. Ji.   Pt ca was noted to be elevated on routine blood work up in May 2017 which was 10.9 mg/dl with normal PTH levels - 43 pg/ml.   hx of kidney stones about 3 years ago and it was her first and last episode, she underwent lithotripsy and stent placement, no hx of fragility fractures, hx of osteopenia based on her DEXA scan - 10/25/2016. No family hx of kidney stones and no family hx of osteoporosis.   Currently on Ca 1000 mg takes 4 tabs per day and on Vitamin D supplementation   C/o occasional abdominal pain in her lower abd, c/o bone pains and joint pain, no increased urination and no increased thirst.   Pt not on HCTZ. Currently on no steroids. Drinks a lot of milk but now drinks about 10 ounces only due to Ca levels being high, eats cheese some time and eats yogurt.     Pre - Dm - On metformin 500 mg po daily at bed time.      I have reviewed the patient's allergies, medicines, past medical hx, family hx and social hx in detail.    Past Medical History:   Diagnosis Date   • Benign colonic polyp    • Cataract     bilateral   • Degenerative disc disease, lumbar    • Hypercholesteremia    • Leg cramping    • Lumbar pain    • Neck pain    • Osteoarthritis    • Pre-diabetes    • Pyelonephritis 06/2014   • Renal stone 06/2014   • Sepsis    • Urinary incontinence    • Vaginal delivery        Family History   Problem Relation Age of Onset   • Stroke Mother    • Heart disease Mother    • Hypertension Mother    • Cervical cancer Mother    • Clotting disorder Father    • Hypertension Father    • Diabetes Father    • Aneurysm Father    • Hypertension Sister    • Diabetes Sister    • Diabetes Sister    • Cervical cancer Maternal Grandmother    • Diabetes Grandchild        Social History     Social History   • Marital status:       Spouse name: N/A   • Number of children: N/A   • Years of education: N/A     Occupational History   • retired      Social History Main Topics   • Smoking status: Former Smoker     Packs/day: 0.50     Years: 15.00     Types: Cigarettes     Quit date: 1992   • Smokeless tobacco: Never Used   • Alcohol use Yes      Comment: Socially.   • Drug use: No   • Sexual activity: Defer     Other Topics Concern   • Not on file     Social History Narrative       Allergies   Allergen Reactions   • Other Nausea And Vomiting     The mercury in [place]   • Penicillins Hives   • Latex Rash   • Nickel Rash         Current Outpatient Prescriptions:   •  baclofen (LIORESAL) 20 MG tablet, TAKE 1 TABLET BY MOUTH EVERY DAY, Disp: 30 tablet, Rfl: 5  •  Calcium Carbonate-Vit D-Min (CALCIUM 1200 PO), Take  by mouth Daily., Disp: , Rfl:   •  Cholecalciferol (VITAMIN D3) 5000 UNITS capsule capsule, Take 5,000 Units by mouth Daily., Disp: , Rfl:   •  Fish Oil-Krill Oil (PA FISH OIL/KRILL PO), Take  by mouth Daily., Disp: , Rfl:   •  Lutein-Bilberry (BILBERRY PLUS LUTEIN) 5-1000 MCG-MG capsule, Take  by mouth Daily., Disp: , Rfl:   •  metFORMIN (GLUCOPHAGE) 500 MG tablet, Take 1 tablet by mouth Every Night., Disp: 30 tablet, Rfl: 2  •  naproxen sodium (ALEVE) 220 MG tablet, Take 220 mg by mouth 2 (Two) Times a Day As Needed for mild pain (1-3)., Disp: , Rfl:   •  nystatin (MYCOSTATIN) 883346 UNIT/GM powder, APPLY TO AFFECTED AREA 2 TO 3 TIMES PER DAY AS NEEDED, Disp: 30 g, Rfl: 0  •  oxybutynin (DITROPAN) 5 MG tablet, TAKE 1 TABLET BY MOUTH THREE TIMES DAILY, Disp: 90 tablet, Rfl: 0  •  Probiotic Product (PROBIOTIC PO), Take  by mouth Daily., Disp: , Rfl:   •  vitamin C (ASCORBIC ACID) 500 MG tablet, Take 500 mg by mouth Daily. With D3, Disp: , Rfl:     Review of Systems   Constitutional: Negative for fever.   HENT: Negative for facial swelling, nosebleeds, trouble swallowing and voice change.    Eyes: Negative for pain and redness.  "  Respiratory: Negative for shortness of breath and wheezing.    Cardiovascular: Negative for palpitations and leg swelling.   Gastrointestinal: Negative for abdominal pain, diarrhea and vomiting.   Endocrine: Negative for polydipsia and polyuria.   Genitourinary: Negative for decreased urine volume and frequency.   Musculoskeletal: Negative for joint swelling and neck pain.   Skin: Negative for rash.   Allergic/Immunologic: Negative for immunocompromised state.   Neurological: Negative for seizures and facial asymmetry.   Hematological: Does not bruise/bleed easily.   Psychiatric/Behavioral: Negative for agitation and confusion.       Objective:    /82  Pulse 83  Ht 63\" (160 cm)  Wt 174 lb (78.9 kg)  LMP  (LMP Unknown)  SpO2 96%  BMI 30.82 kg/m2    Physical Exam   Constitutional: She is oriented to person, place, and time. She appears well-nourished.   HENT:   Head: Normocephalic and atraumatic.   Good dental hygiene   Eyes: Conjunctivae and EOM are normal. No scleral icterus.   Neck: Normal range of motion. Neck supple. No thyromegaly present.   No nodules felt   Cardiovascular: Normal rate and normal heart sounds.  Exam reveals no friction rub.    No murmur heard.  Pulmonary/Chest: Effort normal and breath sounds normal. No stridor. She has no wheezes. She has no rales.   Abdominal: Soft. Bowel sounds are normal. She exhibits no distension. There is no tenderness.   Central obesity   Musculoskeletal: She exhibits edema and deformity. She exhibits no tenderness.   Arthritic changes   Lymphadenopathy:     She has no cervical adenopathy.   Neurological: She is alert and oriented to person, place, and time.   Uses walker   Skin: Skin is warm and dry. She is not diaphoretic.   Psychiatric: She has a normal mood and affect.   Vitals reviewed.      Results Review:    I reviewed the patient's new clinical results.    Orders Only on 05/09/2017   Component Date Value Ref Range Status   • Glucose 05/17/2017 119* " 65 - 99 mg/dL Final   • BUN 05/17/2017 29* 8 - 27 mg/dL Final   • Creatinine 05/17/2017 0.83  0.57 - 1.00 mg/dL Final   • eGFR Non  Am 05/17/2017 72  >59 mL/min/1.73 Final   • eGFR African Am 05/17/2017 83  >59 mL/min/1.73 Final   • BUN/Creatinine Ratio 05/17/2017 35* 12 - 28 Final   • Sodium 05/17/2017 141  134 - 144 mmol/L Final   • Potassium 05/17/2017 4.8  3.5 - 5.2 mmol/L Final   • Chloride 05/17/2017 100  96 - 106 mmol/L Final   • Total CO2 05/17/2017 23  18 - 29 mmol/L Final   • Calcium 05/17/2017 10.9* 8.7 - 10.3 mg/dL Final   • Total Protein 05/17/2017 7.4  6.0 - 8.5 g/dL Final   • Albumin 05/17/2017 4.5  3.6 - 4.8 g/dL Final   • Globulin 05/17/2017 2.9  1.5 - 4.5 g/dL Final   • A/G Ratio 05/17/2017 1.6  1.2 - 2.2 Final   • Total Bilirubin 05/17/2017 0.5  0.0 - 1.2 mg/dL Final   • Alkaline Phosphatase 05/17/2017 94  39 - 117 IU/L Final   • AST (SGOT) 05/17/2017 23  0 - 40 IU/L Final   • ALT (SGPT) 05/17/2017 21  0 - 32 IU/L Final   • PTH, Intact 05/17/2017 43  15 - 65 pg/mL Final       Michelle was seen today for abnormal calcium.    Diagnoses and all orders for this visit:    Pre-diabetes  -     Calcium  -     Magnesium  -     Vitamin D 25 Hydroxy  -     PTH, Intact & Calcium  -     Protein Electrophoresis, 24 Hr Urine  -     Protein Elec + Interp, Serum  -     Phosphorus  -     Hemoglobin A1c; Future  -     Comprehensive Metabolic Panel; Future  -     Phosphorus; Future  -     PTH, Intact & Calcium; Future  -     Vitamin D 25 Hydroxy; Future  -     Calcium; Future  -     Calcium, Ionized; Future    Hypercalcemia  -     Calcium  -     Magnesium  -     Vitamin D 25 Hydroxy  -     PTH, Intact & Calcium  -     Protein Electrophoresis, 24 Hr Urine  -     Protein Elec + Interp, Serum  -     Phosphorus  -     Hemoglobin A1c; Future  -     Comprehensive Metabolic Panel; Future  -     Phosphorus; Future  -     PTH, Intact & Calcium; Future  -     Vitamin D 25 Hydroxy; Future  -     Calcium; Future  -      Calcium, Ionized; Future    Kidney stones  -     Calcium  -     Magnesium  -     Vitamin D 25 Hydroxy  -     PTH, Intact & Calcium  -     Protein Electrophoresis, 24 Hr Urine  -     Protein Elec + Interp, Serum  -     Phosphorus  -     Hemoglobin A1c; Future  -     Comprehensive Metabolic Panel; Future  -     Phosphorus; Future  -     PTH, Intact & Calcium; Future  -     Vitamin D 25 Hydroxy; Future  -     Calcium; Future  -     Calcium, Ionized; Future    Osteopenia  -     Calcium  -     Magnesium  -     Vitamin D 25 Hydroxy  -     PTH, Intact & Calcium  -     Protein Electrophoresis, 24 Hr Urine  -     Protein Elec + Interp, Serum  -     Phosphorus  -     Hemoglobin A1c; Future  -     Comprehensive Metabolic Panel; Future  -     Phosphorus; Future  -     PTH, Intact & Calcium; Future  -     Vitamin D 25 Hydroxy; Future  -     Calcium; Future  -     Calcium, Ionized; Future    Hypercalcemia  Will repeat calcium, PTH intact, vitamin D 25-hydroxy, phosphorus levels.  Discussed with the patient that her elevated calcium level could be due to her increased Ca intake.  Advised the patient to decrease the calcium intake to one tablet at bedtime.  Not doing 24 hr urine calcium level due to her history of self catheterizing herself.  Discussed with the patient that based on her age and the fact that she does not have abnormal kidney function, no recurrent kidney stones, no history of osteoporosis, abnormality of calcium is not 1 above normal value she would not be a candidate for parathyroidectomy.  For now we will continue to monitor her calcium levels.    Borderline diabetes  Based on the current HbA1c patient is right at the cutoff to become a diabetic.  Discussed with the patient about increasing the dose of metformin.  She was not willing to do that in this visit.  Advised the patient that during next visit if her HbA1c gets any worse would definitely have to increase the dose of her medication.    If any of this  blood workup is abnormal would consider performing 24 hour urine collection.     Thank you for asking me to see your patient, Michelle Espinoza in consultation.    31 minutes out of 60 minutes face to face spent in counseling the patient extensively on treatment plan and pathogenesis of hypercalcemia    Bethanie Montano MD  06/20/17

## 2017-06-21 LAB
25(OH)D3+25(OH)D2 SERPL-MCNC: 50 NG/ML (ref 30–100)
ALBUMIN SERPL ELPH-MCNC: 3.7 G/DL (ref 2.9–4.4)
ALBUMIN/GLOB SERPL: 1.1 {RATIO} (ref 0.7–1.7)
ALPHA1 GLOB SERPL ELPH-MCNC: 0.2 G/DL (ref 0–0.4)
ALPHA2 GLOB SERPL ELPH-MCNC: 0.9 G/DL (ref 0.4–1)
B-GLOBULIN SERPL ELPH-MCNC: 1.1 G/DL (ref 0.7–1.3)
CALCIUM SERPL-MCNC: 10.1 MG/DL (ref 8.7–10.3)
GAMMA GLOB SERPL ELPH-MCNC: 1.2 G/DL (ref 0.4–1.8)
GLOBULIN SER CALC-MCNC: 3.4 G/DL (ref 2.2–3.9)
INTACT PTH: NORMAL
Lab: NORMAL
M PROTEIN SERPL ELPH-MCNC: NORMAL G/DL
MAGNESIUM SERPL-MCNC: 2.3 MG/DL (ref 1.6–2.4)
PHOSPHATE SERPL-MCNC: 3 MG/DL (ref 2.5–4.5)
PROT PATTERN SERPL ELPH-IMP: NORMAL
PROT SERPL-MCNC: 7.1 G/DL (ref 6–8.5)
PTH-INTACT SERPL-MCNC: 38 PG/ML (ref 15–65)

## 2017-07-17 ENCOUNTER — TELEPHONE (OUTPATIENT)
Dept: FAMILY MEDICINE CLINIC | Facility: CLINIC | Age: 72
End: 2017-07-17

## 2017-07-17 RX ORDER — OXYBUTYNIN CHLORIDE 5 MG/1
TABLET ORAL
Qty: 90 TABLET | Refills: 0 | Status: SHIPPED | OUTPATIENT
Start: 2017-07-17 | End: 2017-07-31 | Stop reason: SDUPTHER

## 2017-07-17 NOTE — TELEPHONE ENCOUNTER
Patient states she needs the One Touch Ultra Strips refilled that she tests once a week okay to refill?

## 2017-07-17 NOTE — TELEPHONE ENCOUNTER
PLEASE CALL [phone] PATIENT IN REGARDS TO MEDICATIONS. PATIENT REQUESTED A REFILL FROM THE PHARMACY AND HAS NOT HEARD ANYTHING BACK AND NEEDS TEST STRIPS.    THANKS

## 2017-07-28 DIAGNOSIS — E11.8 TYPE 2 DIABETES MELLITUS WITH COMPLICATION, WITHOUT LONG-TERM CURRENT USE OF INSULIN (HCC): ICD-10-CM

## 2017-07-31 ENCOUNTER — OFFICE VISIT (OUTPATIENT)
Dept: FAMILY MEDICINE CLINIC | Facility: CLINIC | Age: 72
End: 2017-07-31

## 2017-07-31 VITALS
OXYGEN SATURATION: 98 % | WEIGHT: 174 LBS | SYSTOLIC BLOOD PRESSURE: 132 MMHG | HEART RATE: 79 BPM | TEMPERATURE: 98 F | BODY MASS INDEX: 30.83 KG/M2 | DIASTOLIC BLOOD PRESSURE: 78 MMHG | HEIGHT: 63 IN

## 2017-07-31 DIAGNOSIS — R73.01 IMPAIRED FASTING GLUCOSE: ICD-10-CM

## 2017-07-31 DIAGNOSIS — E78.00 HYPERCHOLESTEROLEMIA: ICD-10-CM

## 2017-07-31 DIAGNOSIS — N31.2 ATONIC NEUROGENIC BLADDER: Primary | ICD-10-CM

## 2017-07-31 DIAGNOSIS — E11.8 TYPE 2 DIABETES MELLITUS WITH COMPLICATION, WITHOUT LONG-TERM CURRENT USE OF INSULIN (HCC): ICD-10-CM

## 2017-07-31 LAB
ALBUMIN SERPL-MCNC: 4.2 G/DL (ref 3.5–5.2)
ALBUMIN/GLOB SERPL: 1.4 G/DL
ALP SERPL-CCNC: 98 U/L (ref 39–117)
ALT SERPL-CCNC: 33 U/L (ref 1–33)
AST SERPL-CCNC: 22 U/L (ref 1–32)
BILIRUB SERPL-MCNC: 0.4 MG/DL (ref 0.1–1.2)
BUN SERPL-MCNC: 23 MG/DL (ref 8–23)
BUN/CREAT SERPL: 28.4 (ref 7–25)
CALCIUM SERPL-MCNC: 10 MG/DL (ref 8.6–10.5)
CHLORIDE SERPL-SCNC: 102 MMOL/L (ref 98–107)
CHOLEST SERPL-MCNC: 243 MG/DL (ref 0–200)
CK SERPL-CCNC: 232 U/L (ref 20–180)
CO2 SERPL-SCNC: 22.9 MMOL/L (ref 22–29)
CREAT SERPL-MCNC: 0.81 MG/DL (ref 0.57–1)
GLOBULIN SER CALC-MCNC: 3.1 GM/DL
GLUCOSE SERPL-MCNC: 123 MG/DL (ref 65–99)
HDLC SERPL-MCNC: 54 MG/DL (ref 40–60)
LDLC SERPL CALC-MCNC: 149 MG/DL (ref 0–100)
LDLC/HDLC SERPL: 2.75 {RATIO}
POTASSIUM SERPL-SCNC: 5.1 MMOL/L (ref 3.5–5.2)
PROT SERPL-MCNC: 7.3 G/DL (ref 6–8.5)
SODIUM SERPL-SCNC: 141 MMOL/L (ref 136–145)
TRIGL SERPL-MCNC: 202 MG/DL (ref 0–150)
VLDLC SERPL CALC-MCNC: 40.4 MG/DL (ref 5–40)

## 2017-07-31 PROCEDURE — 99214 OFFICE O/P EST MOD 30 MIN: CPT | Performed by: PHYSICIAN ASSISTANT

## 2017-07-31 RX ORDER — OXYBUTYNIN CHLORIDE 5 MG/1
5 TABLET ORAL 3 TIMES DAILY
Qty: 270 TABLET | Refills: 1 | Status: SHIPPED | OUTPATIENT
Start: 2017-07-31 | End: 2017-12-08 | Stop reason: HOSPADM

## 2017-07-31 NOTE — PATIENT INSTRUCTIONS
"72 YEAR OLD FEMALE WHO PRESENTS TODAY IN FOLLOW UP OF DIABETES/ PREDIABETES, HYPERCALCEMIA, HYPERLIPIDEMIA, SPINAL STENOSIS AND WEAKNESS, AND ALL SPECIALIST APPTS. PER ENDOCRINOLOGY, NO CHANGES WITH METFORMIN. HYPERCALCEMIA MAY HAVE BEEN DUE TO EXCESSIVE SUPPLEMENTATION. SHE HAS DECREASED SUPPLEMENT. PATIENT HAS FOLLOW UP WITH ENDOCRINOLOGY IN September AND WILL HAVE A1C WITH FOLLOW UP. SHE HAD RECHECK LABS FOR CALCIUM AT June APPT. PER NOTE, NO 24 HOUR URINE CA NEEDED BUT SHE REPORTS SHE WAS ADVISED TO RETURN 24 HOUR URINE CA WITH NEXT APPT. SHE REPORTS FAILURE OF STATINS IN THE PAST AND FEAR OF REGRESSION OF STRENGTH, :MUSCLE DAMAGE\", WITH STATIN. I DISCUSSED BENEFITS AND RISKS OF STATINS VS ZETIA AND GUIDELINES FOR TREATMENT. I WILL RECHECK CHOLESTEROL TODAY AND MAKE FURTHER RECOMMENDATIONS REGARDING MEDICATIONS PENDING RESULTS.     PATIENT ALSO CONTINUES WITH PHYSICAL THERAPY FOR STRENGTHENING AND HAS MADE SLOW PROGRESS. SHE HAD RECENT FOLLOW UP WITH NEUROMUSCULAR WHO WILL SEE HER IN FOLLOW UP IN January. PATIENT TO CONTINUE PHYSICAL THERAPY AND HOME EXERCISES.     FOLLOW UP IN 3-6 MONTHS PENDING LABS AND MEDICATIONS.    "

## 2017-07-31 NOTE — PROGRESS NOTES
Subjective   Michelle Espinoza is a 72 y.o. female seen today for medication check for diabetes     History of Present Illness     SEEN BY ENDOCRINOLOGY AND WAS TOLD THAT THOUGHT WAS FROM CALCIUM SUPPLEMENTATION. DID ADDITIONAL LABS AND CALCIUM WAS NORMAL. REPORTS THEY DID ADVISE 24 HOUR URINE CALCIUM- NOTE REPORTS DIDN'T BUT STATES SHE WAS TOLD AFTER APPT.     METFORMIN- TAKING ONLY ONCE DAILY. REPORTS HAS BEEN CHECKING ONCE WEEKLY- STATES FASTING 115-130. THIS AM, . REPORTS WAS ALSO VERY CAREFUL OVER THE WEEKEND. NOT EATING MUCH. DIDN'T EAT MUCH YESTERDAY. [facility] [employer] [employer] [facility] [place] DIDN'T GET TO EAT AS REGULARLY. NORMALLY EATING TWICE DAILY WITH SNACK BETWEEN. STATES CHANGED TO PROTEIN SHAKE IN THE AM- VEGETABLE AND WHEY PROTEIN SHAKES.     FASTING THIS AM- WATER.     NEUROMUSCULAR- SEEN 10 DAYS AGO AND HE REVIEWED PHYSICAL THERAPY NOTES. SLOW BUT IS SEEING IMPROVEMENTS. GOING TO PT ONCE WEEKLY UNTIL January AND INCREASE AGAIN IN January. WILL FOLLOW UP WITH NEUROMUSCULAR IN January TO REWRITE ORDERS.     The following portions of the patient's history were reviewed and updated as appropriate: allergies, current medications, past family history, past medical history, past social history, past surgical history and problem list.    Review of Systems   All other systems reviewed and are negative.      Objective   Physical Exam   Constitutional: She is oriented to person, place, and time. She appears well-developed and well-nourished.   HENT:   Head: Normocephalic and atraumatic.   Right Ear: External ear normal.   Left Ear: External ear normal.   Eyes: Conjunctivae are normal.   Neck: Neck supple.   Cardiovascular: Normal rate, regular rhythm, normal heart sounds and intact distal pulses.  Exam reveals no gallop and no friction rub.    No murmur heard.  Pulmonary/Chest: Effort normal and breath sounds normal. No respiratory distress. She has no wheezes. She has no rales.   Musculoskeletal: She  "exhibits no edema or deformity.   Neurological: She is alert and oriented to person, place, and time. Gait (AMBULATES WITH WALKER. DIFFICULTY RISING FROM CHAIR. GAIT APPEARS STRONGER WITH WALKER. ) abnormal.   Skin: Skin is warm and dry.   Psychiatric: She has a normal mood and affect. Her speech is normal and behavior is normal. Judgment and thought content normal. Cognition and memory are normal.   Nursing note and vitals reviewed.      Assessment/Plan   Michelle was seen today for medication check.    Diagnoses and all orders for this visit:    Atonic neurogenic bladder  -     oxybutynin (DITROPAN) 5 MG tablet; Take 1 tablet by mouth 3 (Three) Times a Day.  -     Comprehensive Metabolic Panel  -     CK  -     Lipid Panel With LDL / HDL Ratio    Type 2 diabetes mellitus with complication, without long-term current use of insulin  -     metFORMIN (GLUCOPHAGE) 500 MG tablet; Take 1 tablet by mouth Every Night.  -     Comprehensive Metabolic Panel  -     CK  -     Lipid Panel With LDL / HDL Ratio    Hypercholesterolemia  -     Comprehensive Metabolic Panel  -     CK  -     Lipid Panel With LDL / HDL Ratio    Impaired fasting glucose  -     Comprehensive Metabolic Panel  -     CK  -     Lipid Panel With LDL / HDL Ratio      Patient Instructions   72 YEAR OLD FEMALE WHO PRESENTS TODAY IN FOLLOW UP OF DIABETES/ PREDIABETES, HYPERCALCEMIA, HYPERLIPIDEMIA, SPINAL STENOSIS AND WEAKNESS, AND ALL SPECIALIST APPTS. PER ENDOCRINOLOGY, NO CHANGES WITH METFORMIN. HYPERCALCEMIA MAY HAVE BEEN DUE TO EXCESSIVE SUPPLEMENTATION. SHE HAS DECREASED SUPPLEMENT. PATIENT HAS FOLLOW UP WITH ENDOCRINOLOGY IN September AND WILL HAVE A1C WITH FOLLOW UP. SHE HAD RECHECK LABS FOR CALCIUM AT June APPT. PER NOTE, NO 24 HOUR URINE CA NEEDED BUT SHE REPORTS SHE WAS ADVISED TO RETURN 24 HOUR URINE CA WITH NEXT APPT. SHE REPORTS FAILURE OF STATINS IN THE PAST AND FEAR OF REGRESSION OF STRENGTH, :MUSCLE DAMAGE\", WITH STATIN. I DISCUSSED BENEFITS AND " RISKS OF STATINS VS ZETIA AND GUIDELINES FOR TREATMENT. I WILL RECHECK CHOLESTEROL TODAY AND MAKE FURTHER RECOMMENDATIONS REGARDING MEDICATIONS PENDING RESULTS.     PATIENT ALSO CONTINUES WITH PHYSICAL THERAPY FOR STRENGTHENING AND HAS MADE SLOW PROGRESS. SHE HAD RECENT FOLLOW UP WITH NEUROMUSCULAR WHO WILL SEE HER IN FOLLOW UP IN January. PATIENT TO CONTINUE PHYSICAL THERAPY AND HOME EXERCISES.     FOLLOW UP IN 3-6 MONTHS PENDING LABS AND MEDICATIONS.

## 2017-08-03 RX ORDER — EZETIMIBE 10 MG/1
10 TABLET ORAL DAILY
Qty: 30 TABLET | Refills: 2 | Status: SHIPPED | OUTPATIENT
Start: 2017-08-03 | End: 2018-04-19

## 2017-08-07 ENCOUNTER — TELEPHONE (OUTPATIENT)
Dept: FAMILY MEDICINE CLINIC | Facility: CLINIC | Age: 72
End: 2017-08-07

## 2017-08-07 NOTE — TELEPHONE ENCOUNTER
Patient states she doesn't want to start a statin wants to wait till her appointment in 2 months that she is aware of the risks

## 2017-08-07 NOTE — TELEPHONE ENCOUNTER
Patient called today in regards to her Zetia 10 mg tablet. She was supposed to be taking 1 tablet daily. She went to the pharmacy to pick this up and it was going to cost $159. She would like to know if there is something else that you could send in for her that would be less expensive?

## 2017-08-07 NOTE — TELEPHONE ENCOUNTER
THERE IS NOTHING ELSE IN THIS CATEGORY. SHE HAS BEEN RESISTANT TO STARTING STATIN DUE TO MUSCLE CONCERNS. STATINS WOULD BE CHEAPER.

## 2017-08-28 DIAGNOSIS — E11.8 TYPE 2 DIABETES MELLITUS WITH COMPLICATION, WITHOUT LONG-TERM CURRENT USE OF INSULIN (HCC): ICD-10-CM

## 2017-08-28 RX ORDER — OXYBUTYNIN CHLORIDE 5 MG/1
TABLET ORAL
Qty: 270 TABLET | Refills: 0 | Status: SHIPPED | OUTPATIENT
Start: 2017-08-28 | End: 2017-10-20 | Stop reason: SDUPTHER

## 2017-09-08 ENCOUNTER — LAB (OUTPATIENT)
Dept: ENDOCRINOLOGY | Age: 72
End: 2017-09-08

## 2017-09-08 DIAGNOSIS — N20.0 KIDNEY STONES: ICD-10-CM

## 2017-09-08 DIAGNOSIS — R73.03 PRE-DIABETES: ICD-10-CM

## 2017-09-08 DIAGNOSIS — M85.80 OSTEOPENIA: ICD-10-CM

## 2017-09-08 DIAGNOSIS — E83.52 HYPERCALCEMIA: ICD-10-CM

## 2017-09-09 LAB
25(OH)D3+25(OH)D2 SERPL-MCNC: 53.7 NG/ML (ref 30–100)
ALBUMIN SERPL-MCNC: 4.6 G/DL (ref 3.5–5.2)
ALBUMIN/GLOB SERPL: 1.5 G/DL
ALP SERPL-CCNC: 93 U/L (ref 39–117)
ALT SERPL-CCNC: 30 U/L (ref 1–33)
AST SERPL-CCNC: 27 U/L (ref 1–32)
BILIRUB SERPL-MCNC: 0.5 MG/DL (ref 0.1–1.2)
BUN SERPL-MCNC: 23 MG/DL (ref 8–23)
BUN/CREAT SERPL: 32.9 (ref 7–25)
CA-I SERPL ISE-MCNC: 5.8 MG/DL (ref 4.5–5.6)
CALCIUM SERPL-MCNC: 10.4 MG/DL (ref 8.6–10.5)
CHLORIDE SERPL-SCNC: 101 MMOL/L (ref 98–107)
CO2 SERPL-SCNC: 25.1 MMOL/L (ref 22–29)
CREAT SERPL-MCNC: 0.7 MG/DL (ref 0.57–1)
GLOBULIN SER CALC-MCNC: 3.1 GM/DL
GLUCOSE SERPL-MCNC: 127 MG/DL (ref 65–99)
HBA1C MFR BLD: 6.23 % (ref 4.8–5.6)
INTACT PTH: NORMAL
PHOSPHATE SERPL-MCNC: 3 MG/DL (ref 2.5–4.5)
POTASSIUM SERPL-SCNC: 5.1 MMOL/L (ref 3.5–5.2)
PROT SERPL-MCNC: 7.7 G/DL (ref 6–8.5)
PTH-INTACT SERPL-MCNC: 50 PG/ML (ref 15–65)
SODIUM SERPL-SCNC: 141 MMOL/L (ref 136–145)

## 2017-09-25 RX ORDER — NYSTATIN 100000 [USP'U]/G
POWDER TOPICAL
Qty: 30 G | Refills: 0 | Status: SHIPPED | OUTPATIENT
Start: 2017-09-25 | End: 2021-04-01 | Stop reason: HOSPADM

## 2017-10-20 ENCOUNTER — OFFICE VISIT (OUTPATIENT)
Dept: ENDOCRINOLOGY | Age: 72
End: 2017-10-20

## 2017-10-20 VITALS
SYSTOLIC BLOOD PRESSURE: 128 MMHG | WEIGHT: 175 LBS | BODY MASS INDEX: 31.01 KG/M2 | HEART RATE: 98 BPM | OXYGEN SATURATION: 97 % | DIASTOLIC BLOOD PRESSURE: 78 MMHG | HEIGHT: 63 IN

## 2017-10-20 DIAGNOSIS — E11.8 TYPE 2 DIABETES MELLITUS WITH COMPLICATION, WITHOUT LONG-TERM CURRENT USE OF INSULIN (HCC): ICD-10-CM

## 2017-10-20 DIAGNOSIS — M85.80 OSTEOPENIA, UNSPECIFIED LOCATION: ICD-10-CM

## 2017-10-20 DIAGNOSIS — E83.52 HYPERCALCEMIA: ICD-10-CM

## 2017-10-20 DIAGNOSIS — R73.03 PRE-DIABETES: Primary | ICD-10-CM

## 2017-10-20 PROCEDURE — 99213 OFFICE O/P EST LOW 20 MIN: CPT | Performed by: INTERNAL MEDICINE

## 2017-10-20 NOTE — PROGRESS NOTES
72 y.o.    Patient Care Team:  BHAKTI Rasheed as PCP - General (Family Medicine)  Jeffry Bhagat MD as PCP - Claims Attributed  Jose Luis Nelson Jr., MD as Consulting Physician (Urology)  Bon Gardiner MD as Consulting Physician (Hand Surgery)  Milly Crow MD as Consulting Physician (Neurology)  RATNA Candelario as Nurse Practitioner (Neurology)  Johnnie Soni MD as Consulting Physician (Neurology)  Mahamed Massey MD as Consulting Physician (Obstetrics and Gynecology)  Clement Tierney MD as Surgeon (Neurosurgery)  Donna Olsen MA as Medical Assistant    Chief Complaint:    3 Month Follow Up/ Pre Diabetes  Subjective     HPI  Michelle Alexis,72 y.o.  is here for elevated Ca levels. Consulted by Do Garrison.   Pt ca was noted to be elevated on routine blood work up in May 2017 which was 10.9 mg/dl with normal PTH levels - 43 pg/ml. the patient was noted to have elevated calcium levels only in May 2017 and her repeat calcium levels since then have been within normal limits.  Patient also has been noted to have normal parathyroid levels consistently.  She had only one episode of kidney stones (that was 3 years ago and that was her only 1 episode book for that she had to undergo lithotripsy and stent placement, she has history of osteopenia based on her DEXA scan from 10/25/2016.  No family history of kidney stones or family history of osteoporosis.  She is currently on calcium 500 milligrams takes 2 tablet a day and on vitamin D supplementation.  During last visit we decreased the dose of her calcium supplementation to 2 tablet a day from 4 tablets a day.   No complaints of abdominal pain, no complaints of bone or joint pain, no increased urination or thirst.  She has not and hydrochlorothiazide.  Not on any steroids.  She drinks about 10 ounces of milk daily, eats cheese and yogurt occasionally.    Pre diabetes  On metformin 500 mg daily at bedtime.    During last visit pt didn't want to do her 24 hr  urine calcium levels due to her self catheterizing but she got done today and brought urine collection with her.     Interval History      The following portions of the patient's history were reviewed and updated as appropriate: allergies, current medications, past family history, past medical history, past social history, past surgical history and problem list.    Past Medical History:   Diagnosis Date   • Benign colonic polyp    • Cataract     bilateral   • Degenerative disc disease, lumbar    • Hypercholesteremia    • Leg cramping    • Lumbar pain    • Neck pain    • Osteoarthritis    • Pre-diabetes    • Pyelonephritis 06/2014   • Renal stone 06/2014   • Sepsis    • Urinary incontinence    • Vaginal delivery      Family History   Problem Relation Age of Onset   • Stroke Mother    • Heart disease Mother    • Hypertension Mother    • Cervical cancer Mother    • Clotting disorder Father    • Hypertension Father    • Diabetes Father    • Aneurysm Father    • Hypertension Sister    • Diabetes Sister    • Diabetes Sister    • Cervical cancer Maternal Grandmother    • Diabetes Grandchild      Social History     Social History   • Marital status:      Spouse name: N/A   • Number of children: N/A   • Years of education: N/A     Occupational History   • retired      Social History Main Topics   • Smoking status: Former Smoker     Packs/day: 0.50     Years: 15.00     Types: Cigarettes     Quit date: 1992   • Smokeless tobacco: Never Used   • Alcohol use Yes      Comment: Socially.   • Drug use: No   • Sexual activity: Defer     Other Topics Concern   • Not on file     Social History Narrative     Allergies   Allergen Reactions   • Other Nausea And Vomiting     The mercury in [place]   • Penicillins Hives   • Latex Rash   • Nickel Rash       Current Outpatient Prescriptions:   •  baclofen (LIORESAL) 20 MG tablet, TAKE 1 TABLET BY MOUTH EVERY DAY, Disp: 30 tablet, Rfl: 5  •  Calcium Carbonate-Vit D-Min (CALCIUM 1200  PO), Take  by mouth Daily., Disp: , Rfl:   •  Cholecalciferol (VITAMIN D3) 5000 UNITS capsule capsule, Take 5,000 Units by mouth Daily., Disp: , Rfl:   •  Fish Oil-Krill Oil (PA FISH OIL/KRILL PO), Take  by mouth Daily., Disp: , Rfl:   •  glucose blood test strip, Test once weekly DX: R 73.03 One Touch Ultra Test Strips, Disp: 50 each, Rfl: 12  •  Lutein-Bilberry (BILBERRY PLUS LUTEIN) 5-1000 MCG-MG capsule, Take  by mouth Daily., Disp: , Rfl:   •  metFORMIN (GLUCOPHAGE) 500 MG tablet, Take 1 tablet by mouth 2 (Two) Times a Day With Meals., Disp: 30 tablet, Rfl: 2  •  naproxen sodium (ALEVE) 220 MG tablet, Take 220 mg by mouth 2 (Two) Times a Day As Needed for mild pain (1-3)., Disp: , Rfl:   •  NYSTATIN 722406 UNIT/GM powder, APPLY TO AFFECTED AREA 2 TO 3 TIMES PER DAY AS NEEDED, Disp: 30 g, Rfl: 0  •  oxybutynin (DITROPAN) 5 MG tablet, Take 1 tablet by mouth 3 (Three) Times a Day., Disp: 270 tablet, Rfl: 1  •  Probiotic Product (PROBIOTIC PO), Take  by mouth Daily., Disp: , Rfl:   •  TURMERIC PO, Take  by mouth., Disp: , Rfl:   •  vitamin C (ASCORBIC ACID) 500 MG tablet, Take 500 mg by mouth Daily. With D3, Disp: , Rfl:   •  ezetimibe (ZETIA) 10 MG tablet, Take 1 tablet by mouth Daily., Disp: 30 tablet, Rfl: 2        Review of Systems   Constitutional: Negative for fever.   HENT: Negative for facial swelling, nosebleeds, trouble swallowing and voice change.    Eyes: Negative for pain and redness.   Respiratory: Negative for shortness of breath and wheezing.    Cardiovascular: Negative for palpitations and leg swelling.   Gastrointestinal: Negative for abdominal pain, diarrhea and vomiting.   Endocrine: Negative for polydipsia and polyuria.   Genitourinary: Negative for decreased urine volume and frequency.   Musculoskeletal: Positive for joint swelling. Negative for neck pain.   Skin: Negative for rash.   Allergic/Immunologic: Negative for immunocompromised state.   Neurological: Negative for dizziness, seizures,  "facial asymmetry, light-headedness and headaches.   Hematological: Does not bruise/bleed easily.   Psychiatric/Behavioral: Negative for agitation and confusion.       Objective       Vitals:    10/20/17 1108   BP: 128/78   Pulse: 98   SpO2: 97%   Weight: 175 lb (79.4 kg)   Height: 63\" (160 cm)     Body mass index is 31 kg/(m^2).      Physical Exam   Gen exam - alert and oriented x 3,  not in distress.   HEENT - Mild acanthosis nigricans. Thyroid palpable. Some skin tags  Resp - Clear to auscultation.   CVS - S1,S2 heard and no murmurs.   Abd - Non tender, BS heard.   Ext - No edema and intact pin prick and proprioception. Some stupor    Results Review:     I reviewed the patient's new clinical results.    Medical records reviewed  Summary: done      Lab on 09/08/2017   Component Date Value Ref Range Status   • Hemoglobin A1C 09/08/2017 6.23* 4.80 - 5.60 % Final    Comment: Hemoglobin A1C Ranges:  Increased Risk for Diabetes  5.7% to 6.4%  Diabetes                     >= 6.5%  Diabetic Goal                < 7.0%     • Glucose 09/08/2017 127* 65 - 99 mg/dL Final   • BUN 09/08/2017 23  8 - 23 mg/dL Final   • Creatinine 09/08/2017 0.70  0.57 - 1.00 mg/dL Final   • eGFR Non  Am 09/08/2017 83  >60 mL/min/1.73 Final   • eGFR African Am 09/08/2017 100  >60 mL/min/1.73 Final   • BUN/Creatinine Ratio 09/08/2017 32.9* 7.0 - 25.0 Final   • Sodium 09/08/2017 141  136 - 145 mmol/L Final   • Potassium 09/08/2017 5.1  3.5 - 5.2 mmol/L Final   • Chloride 09/08/2017 101  98 - 107 mmol/L Final   • Total CO2 09/08/2017 25.1  22.0 - 29.0 mmol/L Final   • Calcium 09/08/2017 10.4  8.6 - 10.5 mg/dL Final   • Total Protein 09/08/2017 7.7  6.0 - 8.5 g/dL Final   • Albumin 09/08/2017 4.60  3.50 - 5.20 g/dL Final   • Globulin 09/08/2017 3.1  gm/dL Final   • A/G Ratio 09/08/2017 1.5  g/dL Final   • Total Bilirubin 09/08/2017 0.5  0.1 - 1.2 mg/dL Final   • Alkaline Phosphatase 09/08/2017 93  39 - 117 U/L Final   • AST (SGOT) 09/08/2017 27 "  1 - 32 U/L Final   • ALT (SGPT) 09/08/2017 30  1 - 33 U/L Final   • Phosphorus 09/08/2017 3.0  2.5 - 4.5 mg/dL Final   • PTH, Intact 09/08/2017 50  15 - 65 pg/mL Final   • PTH, Intact 09/08/2017 Comment   Final    Comment: Interpretation                 Intact PTH    Calcium                                  (pg/mL)      (mg/dL)  Normal                          15 - 65     8.6 - 10.2  Primary Hyperparathyroidism         >65          >10.2  Secondary Hyperparathyroidism       >65          <10.2  Non-Parathyroid Hypercalcemia       <65          >10.2  Hypoparathyroidism                  <15          < 8.6  Non-Parathyroid Hypocalcemia    15 - 65          < 8.6     • 25 Hydroxy, Vitamin D 09/08/2017 53.7  30.0 - 100.0 ng/mL Final    Comment: Reference Range for Total Vitamin D 25(OH)  Deficiency    <20.0 ng/mL  Insufficiency 21-29 ng/mL  Sufficiency    ng/mL  Toxicity      >100 ng/ml        • Ionized Calcium 09/08/2017 5.8* 4.5 - 5.6 mg/dL Final     Lab Results   Component Value Date    HGBA1C 6.23 (H) 09/08/2017    HGBA1C 6.4 05/01/2017    HGBA1C 6.26 (H) 01/31/2017     Lab Results   Component Value Date    CREATININE 0.70 09/08/2017     Imaging Results (most recent)     None                Assessment and Plan:    Michelle was seen today for diabetes.    Diagnoses and all orders for this visit:    Pre-diabetes    Type 2 diabetes mellitus with complication, without long-term current use of insulin  -     metFORMIN (GLUCOPHAGE) 500 MG tablet; Take 1 tablet by mouth 2 (Two) Times a Day With Meals.  -     Comprehensive Metabolic Panel; Future  -     Hemoglobin A1c; Future  -     Lipid Panel; Future  -     TSH; Future    Hypercalcemia    Osteopenia, unspecified location      Osteopenia  Continue calcium and vitamin D supplementation.  Managed by OB/GYN    Hypercalcemia could be related to her calcium supplementation.  Calcium levels have stabilized at this point.  Pending 24-hour urine calcium levels.    Pre -  diabetes  Increase metformin to 500 mg twice daily.    13 minutes out of 20 minutes face to face spent in counseling the patient extensively on the above clinical conditions and treatment

## 2017-10-23 LAB
ALBUMIN 24H MFR UR ELPH: 49.9 %
ALPHA1 GLOB 24H MFR UR ELPH: 2.3 %
ALPHA2 GLOB 24H MFR UR ELPH: 10.3 %
B-GLOBULIN 24H MFR UR ELPH: 23 %
GAMMA GLOB 24H MFR UR ELPH: 14.5 %
Lab: ABNORMAL
M PROTEIN 24H MFR UR ELPH: ABNORMAL %
PROT 24H UR-MRATE: 612 MG/24 HR (ref 30–150)
PROT UR-MCNC: 20.4 MG/DL

## 2017-11-07 ENCOUNTER — OFFICE VISIT (OUTPATIENT)
Dept: FAMILY MEDICINE CLINIC | Facility: CLINIC | Age: 72
End: 2017-11-07

## 2017-11-07 VITALS
SYSTOLIC BLOOD PRESSURE: 140 MMHG | HEIGHT: 63 IN | WEIGHT: 174.6 LBS | HEART RATE: 92 BPM | OXYGEN SATURATION: 98 % | TEMPERATURE: 98.3 F | DIASTOLIC BLOOD PRESSURE: 76 MMHG | BODY MASS INDEX: 30.94 KG/M2

## 2017-11-07 DIAGNOSIS — E78.00 HYPERCHOLESTEROLEMIA: Primary | ICD-10-CM

## 2017-11-07 LAB
CHOLEST SERPL-MCNC: 227 MG/DL (ref 0–200)
HDLC SERPL-MCNC: 55 MG/DL (ref 40–60)
LDLC SERPL CALC-MCNC: 133 MG/DL (ref 0–100)
LDLC/HDLC SERPL: 2.43 {RATIO}
TRIGL SERPL-MCNC: 193 MG/DL (ref 0–150)
VLDLC SERPL CALC-MCNC: 38.6 MG/DL (ref 5–40)

## 2017-11-07 PROCEDURE — 99213 OFFICE O/P EST LOW 20 MIN: CPT | Performed by: PHYSICIAN ASSISTANT

## 2017-11-07 NOTE — PROGRESS NOTES
Subjective   Michelle Espinoza is a 72 y.o. female seen today for medication check for hyperlipidemia, Vitamin B 12 and D deficiency     History of Present Illness     HYPERCALCEMIA - HAD ADDITIONAL TESTING AND DETERMINED THAT WAS TAKING TOO MUCH CALCIUM. SHE INCREASED METFORMIN TO TWICE DAILY- PATIENT DID NOT INCREASE TO BID AS DISCUSSED. PATIENT INCREASED TO BID AFTER SEEING ENDOCRINOLOGY. FOLLOW UP WITH ENDOCRINOLOGY IN 4/2018.     DID NOT TAKE ZETIA DUE TO COST.     PT ENDED A COUPLE WEEKS AGO. HAD SEEN DR ARMENDARIZ AT [facility] AND ASKED TO RESTART IN 1/2018. SEEING HIM BACK 1/8/18 AND HE WILL WRITE ORDER FOR PT AT THAT VISIT. HAS HOME EXERCISES BUT CANNOT DO AS EFFECTIVELY AND NOT AS INTENSE. IS UNABLE TO DO THE SAME EXERCISES AT HOME THAT SHE DID AT PT FACILITY. STATES HAS MADE PROGRESS BUT HAS HAD SOME REGRESSION SINCE RUNNING OUT OF PT VISITS.     NO NEW ISSUES.     The following portions of the patient's history were reviewed and updated as appropriate: allergies, current medications, past family history, past medical history, past social history, past surgical history and problem list.    Review of Systems   All other systems reviewed and are negative.      Objective   Physical Exam   Constitutional: She is oriented to person, place, and time. She appears well-developed and well-nourished.   HENT:   Head: Normocephalic and atraumatic.   Right Ear: External ear normal.   Left Ear: External ear normal.   Nose: Nose normal.   Eyes: Conjunctivae and lids are normal.   Neck: Neck supple. Carotid bruit is not present.   Cardiovascular: Normal rate, regular rhythm, normal heart sounds and intact distal pulses.  Exam reveals no gallop and no friction rub.    No murmur heard.  Pulmonary/Chest: Effort normal and breath sounds normal. No respiratory distress. She has no wheezes. She has no rhonchi. She has no rales.   Musculoskeletal: She exhibits no edema or deformity.   Neurological: She is alert and oriented to person,  place, and time. Gait (AMBULATES WITH WALKER) abnormal.   Skin: Skin is warm and dry.   Psychiatric: She has a normal mood and affect. Her speech is normal and behavior is normal. Judgment and thought content normal. Cognition and memory are normal.   Nursing note and vitals reviewed.      Assessment/Plan   Michelle was seen today for medication check.    Diagnoses and all orders for this visit:    Hypercholesterolemia  -     Lipid Panel With LDL / HDL Ratio        Patient Instructions   72 YEAR OLD FEMALE WHO PRESENTS TODAY IN FOLLOW UP OF HYPERLIPIDEMIA, HYPERCALCEMIA WITH VITAMIN D DEFICIENCY, DIABETES, AND B12 DEFICIENCY. PATIENT HAS BEEN RESISTANT TO STATIN DUE TO CONCERNS FOR MUSCLE WEAKNESS OR PAIN. SHE DID NOT TAKE ZETIA DUE TO COST. I WILL CHECK LABS TODAY AND MAKE FURTHER RECOMMENDATIONS FOR LIPIDS. PATIENT WAS SEEN BY ENDOCRINOLOGY AND DETERMINED ELEVATED CALCIUM LEVELS WERE FROM TAKING TOO MUCH CALCIUM SUPPLEMENT. CALCIUM LEVELS RETURNED TO NORMAL. PATIENT HAD LABS WITH ENDOCRINOLOGY. SHE WAS TO INCREASE METFORMIN TO BID DOSING AFTER LAST LABS WITH ME. SHE DID NOT INCREASE METFORMIN, HOWEVER, ENDOCRINOLOGY INCREASED AT [facility]. PATIENT IS NOW TAKING TWICE DAILY. THEY WILL CONTINUE TO MONITOR AND TREAT. PATIENT HAS HAD B12 DEFICIENCY, HOWEVER, LAST LABS FOR B12 WERE OK. I WILL RECHECK WITH NEXT LABS IF NOT ALREADY RECHECKED WITH NEUROLOGY. PATIENT HAS FOLLOW UP WITH NEUROMUSCULAR AND WILL BE REFERRED BACK TO PHYSICAL THERAPY AT THAT POINT. SHE IS OUT OF VISITS FOR THIS YEAR AND PT ENDED LAST WEEK. SHE HAS ALREADY SEEN SOME REGRESSION OF PROGRESS. SHE WILL WORK HARD AT HOME PROGRAM. FOLLOW UP PENDING LIPID RESULTS.

## 2017-11-14 NOTE — PATIENT INSTRUCTIONS
72 YEAR OLD FEMALE WHO PRESENTS TODAY IN FOLLOW UP OF HYPERLIPIDEMIA, HYPERCALCEMIA WITH VITAMIN D DEFICIENCY, DIABETES, AND B12 DEFICIENCY. PATIENT HAS BEEN RESISTANT TO STATIN DUE TO CONCERNS FOR MUSCLE WEAKNESS OR PAIN. SHE DID NOT TAKE ZETIA DUE TO COST. I WILL CHECK LABS TODAY AND MAKE FURTHER RECOMMENDATIONS FOR LIPIDS. PATIENT WAS SEEN BY ENDOCRINOLOGY AND DETERMINED ELEVATED CALCIUM LEVELS WERE FROM TAKING TOO MUCH CALCIUM SUPPLEMENT. CALCIUM LEVELS RETURNED TO NORMAL. PATIENT HAD LABS WITH ENDOCRINOLOGY. SHE WAS TO INCREASE METFORMIN TO BID DOSING AFTER LAST LABS WITH ME. SHE DID NOT INCREASE METFORMIN, HOWEVER, ENDOCRINOLOGY INCREASED AT [facility]. PATIENT IS NOW TAKING TWICE DAILY. THEY WILL CONTINUE TO MONITOR AND TREAT. PATIENT HAS HAD B12 DEFICIENCY, HOWEVER, LAST LABS FOR B12 WERE OK. I WILL RECHECK WITH NEXT LABS IF NOT ALREADY RECHECKED WITH NEUROLOGY. PATIENT HAS FOLLOW UP WITH NEUROMUSCULAR AND WILL BE REFERRED BACK TO PHYSICAL THERAPY AT THAT POINT. SHE IS OUT OF VISITS FOR THIS YEAR AND PT ENDED LAST WEEK. SHE HAS ALREADY SEEN SOME REGRESSION OF PROGRESS. SHE WILL WORK HARD AT HOME PROGRAM. FOLLOW UP PENDING LIPID RESULTS.

## 2017-11-20 ENCOUNTER — OFFICE VISIT (OUTPATIENT)
Dept: FAMILY MEDICINE CLINIC | Facility: CLINIC | Age: 72
End: 2017-11-20

## 2017-11-20 VITALS
HEART RATE: 102 BPM | DIASTOLIC BLOOD PRESSURE: 72 MMHG | WEIGHT: 176 LBS | OXYGEN SATURATION: 95 % | TEMPERATURE: 98.6 F | HEIGHT: 63 IN | BODY MASS INDEX: 31.18 KG/M2 | SYSTOLIC BLOOD PRESSURE: 128 MMHG

## 2017-11-20 DIAGNOSIS — H10.89 OTHER CONJUNCTIVITIS OF LEFT EYE: ICD-10-CM

## 2017-11-20 DIAGNOSIS — J06.9 ACUTE URI: Primary | ICD-10-CM

## 2017-11-20 DIAGNOSIS — J40 BRONCHITIS: ICD-10-CM

## 2017-11-20 PROCEDURE — 99213 OFFICE O/P EST LOW 20 MIN: CPT | Performed by: PHYSICIAN ASSISTANT

## 2017-11-20 RX ORDER — LORATADINE 10 MG/1
10 TABLET ORAL DAILY
Qty: 30 TABLET | Refills: 2 | Status: SHIPPED | OUTPATIENT
Start: 2017-11-20 | End: 2018-03-12

## 2017-11-20 RX ORDER — AZITHROMYCIN 250 MG/1
TABLET, FILM COATED ORAL
Qty: 6 TABLET | Refills: 0 | Status: SHIPPED | OUTPATIENT
Start: 2017-11-20 | End: 2017-12-08 | Stop reason: HOSPADM

## 2017-11-20 RX ORDER — GUAIFENESIN AND DEXTROMETHORPHAN HYDROBROMIDE 600; 30 MG/1; MG/1
1 TABLET, EXTENDED RELEASE ORAL 2 TIMES DAILY
Qty: 45 TABLET | Refills: 0 | Status: SHIPPED | OUTPATIENT
Start: 2017-11-20 | End: 2018-03-12

## 2017-11-20 RX ORDER — CIPROFLOXACIN HYDROCHLORIDE 3.5 MG/ML
2 SOLUTION/ DROPS TOPICAL 4 TIMES DAILY
Qty: 2.5 ML | Refills: 0 | Status: SHIPPED | OUTPATIENT
Start: 2017-11-20 | End: 2017-12-08 | Stop reason: HOSPADM

## 2017-11-20 RX ORDER — TRIAMCINOLONE ACETONIDE 55 UG/1
SPRAY, METERED NASAL
Qty: 1 BOTTLE | Refills: 11 | Status: SHIPPED | OUTPATIENT
Start: 2017-11-20 | End: 2018-03-12

## 2017-11-20 NOTE — PROGRESS NOTES
Subjective   Michelle Espinoza is a 72 y.o. female seen today due to left eye drainage started on Sunday, coughing for 1 week     History of Present Illness     STARTED 11/13 WITH HOARSENESS AND COUGHING. DRY COUGH AT FIRST THEN DEVELOPED INTO PRODUCTIVE - GRAYISH IN COLOR. PATIENT DOES HAVE SOME NASAL DRAINAGE. LEFT EYE STARTED DRAINING ON Saturday, WORSENED Sunday. ITCHY, PURULENT DRAINAGE, PAIN TO THE TOUCH, BLURRY VISION ONLY WITH D/C - NO BLURRED OR VISION ABNORMALITIES OTHERWISE. FELT FEVERISH STARTING 11/14 - DIDN'T CHECK TEMPERATURE. LAST NIGHT, RAN OUT OF DELSYM COUGH/COLD, AND WHEN TOOK DEEP BREATH, WOULD TRIGGER COUGHING SPELLS. PATIENT DOES HAVE SORE THROAT BUT ATTRIBUTES IT TO COUGHING. ALSO HAS HEADACHE. NO CP/SOA. NO EAR PAIN, RIGHT EYE DRAINAGE/PAIN, RASH. DENIES N/V/D, ABDOMINAL PAIN.    The following portions of the patient's history were reviewed and updated as appropriate: allergies, current medications, past family history, past medical history, past social history, past surgical history and problem list.    Review of Systems   HENT: Negative for sinus pain and sinus pressure.    Eyes: Positive for discharge, redness (left side ) and itching.   Respiratory: Positive for cough.    All other systems reviewed and are negative.      Objective   Physical Exam   Constitutional: She is oriented to person, place, and time. Vital signs are normal. She appears well-developed and well-nourished.   HENT:   Head: Normocephalic and atraumatic.   Right Ear: Tympanic membrane, external ear and ear canal normal.   Left Ear: Tympanic membrane, external ear and ear canal normal.   Nose: Nose normal.   Mouth/Throat: Uvula is midline, oropharynx is clear and moist and mucous membranes are normal.   Eyes: Conjunctivae are normal.   Neck: Neck supple.   Cardiovascular: Normal rate, regular rhythm and normal heart sounds.  Exam reveals no gallop and no friction rub.    No murmur heard.  Pulmonary/Chest: Effort normal and  breath sounds normal. She has no wheezes. She has no rhonchi. She has no rales.   Neurological: She is alert and oriented to person, place, and time.   Skin: Skin is warm and dry.   Psychiatric: She has a normal mood and affect. Her speech is normal and behavior is normal. Judgment and thought content normal. Cognition and memory are normal.   Nursing note and vitals reviewed.      Assessment/Plan   Michelle was seen today for left eye drainage and cough.    Diagnoses and all orders for this visit:    Acute URI  -     azithromycin (ZITHROMAX Z-ROSIE) 250 MG tablet; Take 2 tablets the first day, then 1 tablet daily for 4 days.  -     guaifenesin-dextromethorphan (MUCINEX DM)  MG tablet sustained-release 12 hour tablet; Take 1 tablet by mouth 2 (Two) Times a Day.  -     loratadine (CLARITIN) 10 MG tablet; Take 1 tablet by mouth Daily.  -     Triamcinolone Acetonide (NASACORT AQ) 55 MCG/ACT nasal inhaler; 2 SPRAYS IN EACH NOSTRIL ONCE DAILY  -     ciprofloxacin (CILOXAN) 0.3 % ophthalmic solution; Administer 2 drops into the left eye 4 (Four) Times a Day.    Bronchitis  -     azithromycin (ZITHROMAX Z-ROSIE) 250 MG tablet; Take 2 tablets the first day, then 1 tablet daily for 4 days.  -     guaifenesin-dextromethorphan (MUCINEX DM)  MG tablet sustained-release 12 hour tablet; Take 1 tablet by mouth 2 (Two) Times a Day.  -     loratadine (CLARITIN) 10 MG tablet; Take 1 tablet by mouth Daily.  -     Triamcinolone Acetonide (NASACORT AQ) 55 MCG/ACT nasal inhaler; 2 SPRAYS IN EACH NOSTRIL ONCE DAILY  -     ciprofloxacin (CILOXAN) 0.3 % ophthalmic solution; Administer 2 drops into the left eye 4 (Four) Times a Day.    Other conjunctivitis of left eye  -     azithromycin (ZITHROMAX Z-ROSIE) 250 MG tablet; Take 2 tablets the first day, then 1 tablet daily for 4 days.  -     guaifenesin-dextromethorphan (MUCINEX DM)  MG tablet sustained-release 12 hour tablet; Take 1 tablet by mouth 2 (Two) Times a Day.  -      loratadine (CLARITIN) 10 MG tablet; Take 1 tablet by mouth Daily.  -     Triamcinolone Acetonide (NASACORT AQ) 55 MCG/ACT nasal inhaler; 2 SPRAYS IN EACH NOSTRIL ONCE DAILY  -     ciprofloxacin (CILOXAN) 0.3 % ophthalmic solution; Administer 2 drops into the left eye 4 (Four) Times a Day.      Patient Instructions   72 YEAR OLD FEMALE WHO PRESENTS TODAY WITH 1 WEEK HISTORY OF URI WITH COUGH, CONGESTION, SORE THROAT, AND THE LAST COUPLE DAYS WITH CONJUNCTIVITIS. PATIENT WITH BENIGN EXAM. SHE DOES HAVE SOME DECREASED COUGH. I WILL HAVE HER TAKE MUCINEX DM TWICE DAILY, CLARITIN 10 MG ONCE DAILY, NASACORT 2 SPRAYS IN EACH NOSTRIL ONCE DAILY, AND WILL COVER WITH ZPACK AND CIPRO OP DROPS. TO BE SEEN ASAP IF WORSENING, NEW SYMPTOMS, OR NO RESOLUTION OF SYMPTOMS. OTHERWISE, TO BE SEEN IF NO IMPROVEMENT.

## 2017-11-27 NOTE — PATIENT INSTRUCTIONS
72 YEAR OLD FEMALE WHO PRESENTS TODAY WITH 1 WEEK HISTORY OF URI WITH COUGH, CONGESTION, SORE THROAT, AND THE LAST COUPLE DAYS WITH CONJUNCTIVITIS. PATIENT WITH BENIGN EXAM. SHE DOES HAVE SOME DECREASED COUGH. I WILL HAVE HER TAKE MUCINEX DM TWICE DAILY, CLARITIN 10 MG ONCE DAILY, NASACORT 2 SPRAYS IN EACH NOSTRIL ONCE DAILY, AND WILL COVER WITH ZPACK AND CIPRO OP DROPS. TO BE SEEN ASAP IF WORSENING, NEW SYMPTOMS, OR NO RESOLUTION OF SYMPTOMS. OTHERWISE, TO BE SEEN IF NO IMPROVEMENT.

## 2017-12-05 ENCOUNTER — HOSPITAL ENCOUNTER (INPATIENT)
Facility: HOSPITAL | Age: 72
LOS: 3 days | Discharge: NURSING, REHAB OR LONG-TERM CARE | End: 2017-12-08
Attending: EMERGENCY MEDICINE | Admitting: INTERNAL MEDICINE

## 2017-12-05 ENCOUNTER — APPOINTMENT (OUTPATIENT)
Dept: GENERAL RADIOLOGY | Facility: HOSPITAL | Age: 72
End: 2017-12-05

## 2017-12-05 DIAGNOSIS — N28.9 RENAL INSUFFICIENCY: ICD-10-CM

## 2017-12-05 DIAGNOSIS — R53.1 WEAKNESS: Primary | ICD-10-CM
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
## 2017-12-05 DIAGNOSIS — N39.0 ACUTE UTI: ICD-10-CM

## 2017-12-05 LAB
ALBUMIN SERPL-MCNC: 3 G/DL (ref 3.5–5.2)
ALBUMIN/GLOB SERPL: 0.7 G/DL
ALP SERPL-CCNC: 220 U/L (ref 39–117)
ALT SERPL W P-5'-P-CCNC: 35 U/L (ref 1–33)
ANION GAP SERPL CALCULATED.3IONS-SCNC: 12.2 MMOL/L
AST SERPL-CCNC: 32 U/L (ref 1–32)
BACTERIA UR QL AUTO: ABNORMAL /HPF
BASOPHILS # BLD AUTO: 0.01 10*3/MM3 (ref 0–0.2)
BASOPHILS NFR BLD AUTO: 0.1 % (ref 0–1.5)
BILIRUB SERPL-MCNC: 0.5 MG/DL (ref 0.1–1.2)
BILIRUB UR QL STRIP: NEGATIVE
BUN BLD-MCNC: 41 MG/DL (ref 8–23)
BUN/CREAT SERPL: 28.3 (ref 7–25)
CALCIUM SPEC-SCNC: 11.1 MG/DL (ref 8.6–10.5)
CHLORIDE SERPL-SCNC: 95 MMOL/L (ref 98–107)
CLARITY UR: ABNORMAL
CO2 SERPL-SCNC: 24.8 MMOL/L (ref 22–29)
COLOR UR: YELLOW
CREAT BLD-MCNC: 1.45 MG/DL (ref 0.57–1)
DEPRECATED RDW RBC AUTO: 40.3 FL (ref 37–54)
EOSINOPHIL # BLD AUTO: 0.04 10*3/MM3 (ref 0–0.7)
EOSINOPHIL NFR BLD AUTO: 0.3 % (ref 0.3–6.2)
ERYTHROCYTE [DISTWIDTH] IN BLOOD BY AUTOMATED COUNT: 12.5 % (ref 11.7–13)
GFR SERPL CREATININE-BSD FRML MDRD: 36 ML/MIN/1.73
GLOBULIN UR ELPH-MCNC: 4.4 GM/DL
GLUCOSE BLD-MCNC: 149 MG/DL (ref 65–99)
GLUCOSE UR STRIP-MCNC: NEGATIVE MG/DL
HCT VFR BLD AUTO: 39.7 % (ref 35.6–45.5)
HGB BLD-MCNC: 13.2 G/DL (ref 11.9–15.5)
HGB UR QL STRIP.AUTO: ABNORMAL
HYALINE CASTS UR QL AUTO: ABNORMAL /LPF
IMM GRANULOCYTES # BLD: 0.04 10*3/MM3 (ref 0–0.03)
IMM GRANULOCYTES NFR BLD: 0.3 % (ref 0–0.5)
KETONES UR QL STRIP: NEGATIVE
LEUKOCYTE ESTERASE UR QL STRIP.AUTO: ABNORMAL
LYMPHOCYTES # BLD AUTO: 0.56 10*3/MM3 (ref 0.9–4.8)
LYMPHOCYTES NFR BLD AUTO: 4.2 % (ref 19.6–45.3)
MCH RBC QN AUTO: 29.7 PG (ref 26.9–32)
MCHC RBC AUTO-ENTMCNC: 33.2 G/DL (ref 32.4–36.3)
MCV RBC AUTO: 89.4 FL (ref 80.5–98.2)
MONOCYTES # BLD AUTO: 1.19 10*3/MM3 (ref 0.2–1.2)
MONOCYTES NFR BLD AUTO: 9 % (ref 5–12)
NEUTROPHILS # BLD AUTO: 11.44 10*3/MM3 (ref 1.9–8.1)
NEUTROPHILS NFR BLD AUTO: 86.1 % (ref 42.7–76)
NITRITE UR QL STRIP: POSITIVE
PH UR STRIP.AUTO: 5.5 [PH] (ref 5–8)
PLATELET # BLD AUTO: 236 10*3/MM3 (ref 140–500)
PMV BLD AUTO: 10.9 FL (ref 6–12)
POTASSIUM BLD-SCNC: 3.9 MMOL/L (ref 3.5–5.2)
PROT SERPL-MCNC: 7.4 G/DL (ref 6–8.5)
PROT UR QL STRIP: ABNORMAL
RBC # BLD AUTO: 4.44 10*6/MM3 (ref 3.9–5.2)
RBC # UR: ABNORMAL /HPF
REF LAB TEST METHOD: ABNORMAL
SODIUM BLD-SCNC: 132 MMOL/L (ref 136–145)
SP GR UR STRIP: 1.01 (ref 1–1.03)
SQUAMOUS #/AREA URNS HPF: ABNORMAL /HPF
UROBILINOGEN UR QL STRIP: ABNORMAL
WBC NRBC COR # BLD: 13.28 10*3/MM3 (ref 4.5–10.7)
WBC UR QL AUTO: ABNORMAL /HPF

## 2017-12-05 PROCEDURE — 25010000002 CEFTRIAXONE PER 250 MG: Performed by: EMERGENCY MEDICINE

## 2017-12-05 PROCEDURE — 85025 COMPLETE CBC W/AUTO DIFF WBC: CPT | Performed by: EMERGENCY MEDICINE

## 2017-12-05 PROCEDURE — 80053 COMPREHEN METABOLIC PANEL: CPT | Performed by: EMERGENCY MEDICINE

## 2017-12-05 PROCEDURE — 99284 EMERGENCY DEPT VISIT MOD MDM: CPT

## 2017-12-05 PROCEDURE — 87086 URINE CULTURE/COLONY COUNT: CPT | Performed by: EMERGENCY MEDICINE

## 2017-12-05 PROCEDURE — 36415 COLL VENOUS BLD VENIPUNCTURE: CPT | Performed by: EMERGENCY MEDICINE

## 2017-12-05 PROCEDURE — 87186 SC STD MICRODIL/AGAR DIL: CPT | Performed by: EMERGENCY MEDICINE

## 2017-12-05 PROCEDURE — 71020 HC CHEST PA AND LATERAL: CPT

## 2017-12-05 PROCEDURE — 81001 URINALYSIS AUTO W/SCOPE: CPT | Performed by: EMERGENCY MEDICINE

## 2017-12-05 RX ORDER — SODIUM CHLORIDE 9 MG/ML
75 INJECTION, SOLUTION INTRAVENOUS CONTINUOUS
Status: DISCONTINUED | OUTPATIENT
Start: 2017-12-05 | End: 2017-12-08 | Stop reason: HOSPADM

## 2017-12-05 RX ORDER — SODIUM CHLORIDE 0.9 % (FLUSH) 0.9 %
10 SYRINGE (ML) INJECTION AS NEEDED
Status: DISCONTINUED | OUTPATIENT
Start: 2017-12-05 | End: 2017-12-08 | Stop reason: HOSPADM

## 2017-12-05 RX ORDER — CEFTRIAXONE SODIUM 1 G/50ML
1 INJECTION, SOLUTION INTRAVENOUS ONCE
Status: COMPLETED | OUTPATIENT
Start: 2017-12-05 | End: 2017-12-05

## 2017-12-05 RX ADMIN — SODIUM CHLORIDE 1000 ML: 9 INJECTION, SOLUTION INTRAVENOUS at 23:00

## 2017-12-05 RX ADMIN — CEFTRIAXONE SODIUM 1 G: 1 INJECTION, SOLUTION INTRAVENOUS at 23:02

## 2017-12-05 RX ADMIN — SODIUM CHLORIDE 125 ML/HR: 9 INJECTION, SOLUTION INTRAVENOUS at 23:02

## 2017-12-06 PROBLEM — N17.9 AKI (ACUTE KIDNEY INJURY) (HCC): Status: ACTIVE | Noted: 2017-12-06

## 2017-12-06 PROBLEM — N18.9 CKD (CHRONIC KIDNEY DISEASE): Status: ACTIVE | Noted: 2017-12-06

## 2017-12-06 LAB
ALBUMIN SERPL-MCNC: 2.5 G/DL (ref 3.5–5.2)
ALBUMIN/GLOB SERPL: 0.6 G/DL
ALP SERPL-CCNC: 223 U/L (ref 39–117)
ALT SERPL W P-5'-P-CCNC: 32 U/L (ref 1–33)
ANION GAP SERPL CALCULATED.3IONS-SCNC: 10.5 MMOL/L
AST SERPL-CCNC: 34 U/L (ref 1–32)
BILIRUB SERPL-MCNC: 0.4 MG/DL (ref 0.1–1.2)
BUN BLD-MCNC: 38 MG/DL (ref 8–23)
BUN/CREAT SERPL: 28.8 (ref 7–25)
CALCIUM SPEC-SCNC: 9.9 MG/DL (ref 8.6–10.5)
CHLORIDE SERPL-SCNC: 99 MMOL/L (ref 98–107)
CO2 SERPL-SCNC: 25.5 MMOL/L (ref 22–29)
CREAT BLD-MCNC: 1.32 MG/DL (ref 0.57–1)
DEPRECATED RDW RBC AUTO: 41.4 FL (ref 37–54)
ERYTHROCYTE [DISTWIDTH] IN BLOOD BY AUTOMATED COUNT: 12.8 % (ref 11.7–13)
GFR SERPL CREATININE-BSD FRML MDRD: 40 ML/MIN/1.73
GLOBULIN UR ELPH-MCNC: 4.2 GM/DL
GLUCOSE BLD-MCNC: 147 MG/DL (ref 65–99)
GLUCOSE BLDC GLUCOMTR-MCNC: 138 MG/DL (ref 70–130)
GLUCOSE BLDC GLUCOMTR-MCNC: 142 MG/DL (ref 70–130)
GLUCOSE BLDC GLUCOMTR-MCNC: 174 MG/DL (ref 70–130)
GLUCOSE BLDC GLUCOMTR-MCNC: 174 MG/DL (ref 70–130)
HCT VFR BLD AUTO: 36.5 % (ref 35.6–45.5)
HGB BLD-MCNC: 12.1 G/DL (ref 11.9–15.5)
MCH RBC QN AUTO: 29.6 PG (ref 26.9–32)
MCHC RBC AUTO-ENTMCNC: 33.2 G/DL (ref 32.4–36.3)
MCV RBC AUTO: 89.2 FL (ref 80.5–98.2)
PLATELET # BLD AUTO: 237 10*3/MM3 (ref 140–500)
PMV BLD AUTO: 11.3 FL (ref 6–12)
POTASSIUM BLD-SCNC: 3.5 MMOL/L (ref 3.5–5.2)
PROT SERPL-MCNC: 6.7 G/DL (ref 6–8.5)
RBC # BLD AUTO: 4.09 10*6/MM3 (ref 3.9–5.2)
SODIUM BLD-SCNC: 135 MMOL/L (ref 136–145)
WBC NRBC COR # BLD: 11.69 10*3/MM3 (ref 4.5–10.7)

## 2017-12-06 PROCEDURE — 25010000002 HEPARIN (PORCINE) PER 1000 UNITS: Performed by: INTERNAL MEDICINE

## 2017-12-06 PROCEDURE — 82962 GLUCOSE BLOOD TEST: CPT

## 2017-12-06 PROCEDURE — 25010000002 CEFTRIAXONE PER 250 MG: Performed by: INTERNAL MEDICINE

## 2017-12-06 PROCEDURE — 80053 COMPREHEN METABOLIC PANEL: CPT | Performed by: INTERNAL MEDICINE

## 2017-12-06 PROCEDURE — 85027 COMPLETE CBC AUTOMATED: CPT | Performed by: INTERNAL MEDICINE

## 2017-12-06 PROCEDURE — 97162 PT EVAL MOD COMPLEX 30 MIN: CPT

## 2017-12-06 PROCEDURE — 63710000001 INSULIN ASPART PER 5 UNITS: Performed by: INTERNAL MEDICINE

## 2017-12-06 PROCEDURE — 97110 THERAPEUTIC EXERCISES: CPT

## 2017-12-06 RX ORDER — DEXTROSE MONOHYDRATE 25 G/50ML
25 INJECTION, SOLUTION INTRAVENOUS
Status: DISCONTINUED | OUTPATIENT
Start: 2017-12-06 | End: 2017-12-08 | Stop reason: HOSPADM

## 2017-12-06 RX ORDER — ONDANSETRON 2 MG/ML
4 INJECTION INTRAMUSCULAR; INTRAVENOUS EVERY 6 HOURS PRN
Status: DISCONTINUED | OUTPATIENT
Start: 2017-12-06 | End: 2017-12-08 | Stop reason: HOSPADM

## 2017-12-06 RX ORDER — SODIUM CHLORIDE 0.9 % (FLUSH) 0.9 %
1-10 SYRINGE (ML) INJECTION AS NEEDED
Status: DISCONTINUED | OUTPATIENT
Start: 2017-12-06 | End: 2017-12-08 | Stop reason: HOSPADM

## 2017-12-06 RX ORDER — CEFTRIAXONE SODIUM 1 G/50ML
1 INJECTION, SOLUTION INTRAVENOUS EVERY 24 HOURS
Status: DISCONTINUED | OUTPATIENT
Start: 2017-12-06 | End: 2017-12-08 | Stop reason: HOSPADM

## 2017-12-06 RX ORDER — ASCORBIC ACID 500 MG
500 TABLET ORAL DAILY
Status: DISCONTINUED | OUTPATIENT
Start: 2017-12-06 | End: 2017-12-08 | Stop reason: HOSPADM

## 2017-12-06 RX ORDER — NYSTATIN 100000 [USP'U]/G
POWDER TOPICAL EVERY 8 HOURS SCHEDULED
Status: DISCONTINUED | OUTPATIENT
Start: 2017-12-06 | End: 2017-12-08 | Stop reason: HOSPADM

## 2017-12-06 RX ORDER — BACLOFEN 10 MG/1
20 TABLET ORAL NIGHTLY
Status: DISCONTINUED | OUTPATIENT
Start: 2017-12-06 | End: 2017-12-08 | Stop reason: HOSPADM

## 2017-12-06 RX ORDER — L.ACID,PARA/B.BIFIDUM/S.THERM 8B CELL
1 CAPSULE ORAL DAILY
Status: DISCONTINUED | OUTPATIENT
Start: 2017-12-06 | End: 2017-12-08 | Stop reason: HOSPADM

## 2017-12-06 RX ORDER — BACLOFEN 10 MG/1
20 TABLET ORAL DAILY
Status: DISCONTINUED | OUTPATIENT
Start: 2017-12-06 | End: 2017-12-06

## 2017-12-06 RX ORDER — OXYBUTYNIN CHLORIDE 5 MG/1
5 TABLET ORAL 3 TIMES DAILY
Status: DISCONTINUED | OUTPATIENT
Start: 2017-12-06 | End: 2017-12-07

## 2017-12-06 RX ORDER — HEPARIN SODIUM 5000 [USP'U]/ML
5000 INJECTION, SOLUTION INTRAVENOUS; SUBCUTANEOUS EVERY 12 HOURS SCHEDULED
Status: DISCONTINUED | OUTPATIENT
Start: 2017-12-06 | End: 2017-12-06

## 2017-12-06 RX ORDER — ACETAMINOPHEN 325 MG/1
650 TABLET ORAL EVERY 4 HOURS PRN
Status: DISCONTINUED | OUTPATIENT
Start: 2017-12-06 | End: 2017-12-08 | Stop reason: HOSPADM

## 2017-12-06 RX ORDER — NICOTINE POLACRILEX 4 MG
15 LOZENGE BUCCAL
Status: DISCONTINUED | OUTPATIENT
Start: 2017-12-06 | End: 2017-12-08 | Stop reason: HOSPADM

## 2017-12-06 RX ADMIN — ENOXAPARIN SODIUM 40 MG: 40 INJECTION SUBCUTANEOUS at 23:28

## 2017-12-06 RX ADMIN — BACLOFEN 20 MG: 10 TABLET ORAL at 23:25

## 2017-12-06 RX ADMIN — BACLOFEN 20 MG: 10 TABLET ORAL at 03:54

## 2017-12-06 RX ADMIN — OXYCODONE HYDROCHLORIDE AND ACETAMINOPHEN 500 MG: 500 TABLET ORAL at 12:00

## 2017-12-06 RX ADMIN — NYSTATIN: 100000 POWDER TOPICAL at 22:11

## 2017-12-06 RX ADMIN — OXYBUTYNIN CHLORIDE 5 MG: 5 TABLET ORAL at 23:25

## 2017-12-06 RX ADMIN — NYSTATIN: 100000 POWDER TOPICAL at 06:16

## 2017-12-06 RX ADMIN — HEPARIN SODIUM 5000 UNITS: 5000 INJECTION, SOLUTION INTRAVENOUS; SUBCUTANEOUS at 08:37

## 2017-12-06 RX ADMIN — SODIUM CHLORIDE 75 ML/HR: 9 INJECTION, SOLUTION INTRAVENOUS at 23:48

## 2017-12-06 RX ADMIN — SODIUM CHLORIDE 125 ML/HR: 9 INJECTION, SOLUTION INTRAVENOUS at 07:31

## 2017-12-06 RX ADMIN — Medication 1 CAPSULE: at 12:00

## 2017-12-06 RX ADMIN — CEFTRIAXONE SODIUM 1 G: 1 INJECTION, SOLUTION INTRAVENOUS at 23:28

## 2017-12-06 RX ADMIN — ACETAMINOPHEN 650 MG: 325 TABLET ORAL at 02:51

## 2017-12-06 RX ADMIN — OXYBUTYNIN CHLORIDE 5 MG: 5 TABLET ORAL at 11:59

## 2017-12-06 RX ADMIN — INSULIN ASPART 2 UNITS: 100 INJECTION, SOLUTION INTRAVENOUS; SUBCUTANEOUS at 22:11

## 2017-12-06 RX ADMIN — INSULIN ASPART 2 UNITS: 100 INJECTION, SOLUTION INTRAVENOUS; SUBCUTANEOUS at 17:40

## 2017-12-06 RX ADMIN — SODIUM CHLORIDE 125 ML/HR: 9 INJECTION, SOLUTION INTRAVENOUS at 15:42

## 2017-12-06 NOTE — PROGRESS NOTES
"Continued Stay Note  [facility]     Patient Name: Michelle Espinoza  MRN: [id number]  Today's Date: 12/6/2017    Admit Date: 12/5/2017          Discharge Plan       12/06/17 1230    Case Management/Social Work Plan    Plan Pt plans home w/o needs    Additional Comments After PT did evaluation I met with pt, I advised the therapist told me she is going to recommend rehab, pt said \"she did not tell me that\", pt again declines sub-acute rehab, she said she thinks she will be fine at home, she said the biggest problem she has is going up and down the steps at home, she said she has 3 steps to get into her home and the bed and bath is on the 2nd level of her home and her  assist her in getting to the 2nd level. Pt said she drives and provides transportaion for her grandson and plans to return home at discharge and does not anticipate any needs.               Discharge Codes     None            Dominique Vinson RN    "

## 2017-12-06 NOTE — THERAPY EVALUATION
Acute Care - Physical Therapy Initial Evaluation  [facility]     Patient Name: Michelle Espinoza  : 1945  MRN: [id number]  Today's Date: 2017   Onset of Illness/Injury or Date of Surgery Date: 17            Admit Date: 2017     Visit Dx:    ICD-10-CM ICD-9-CM   1. Weakness R53.1 780.79   2. Fall, initial encounter W19.XXXA E888.9   3. Acute UTI N39.0 599.0   4. Renal insufficiency N28.9 593.9     Patient Active Problem List   Diagnosis   • Abnormal weight gain   • Arm pain   • Arthritis   • Atonic neurogenic bladder   • Benign colonic polyp   • Carotid atherosclerosis   • Fatigue   • Hypercalcemia   • Hypercholesterolemia   • Impaired fasting glucose   • Weakness of lower extremity   • Nephrolithiasis   • Spinal stenosis   • Urinary tract infection   • Chronic venous insufficiency   • B12 deficiency   • Vitamin D deficiency   • Degenerative disc disease, lumbar   • Osteopenia   • T7 and T10-11 Thoracic spinal stenosis   • Weakness   • CKD (chronic kidney disease)   • MINERVA (acute kidney injury)     Past Medical History:   Diagnosis Date   • Benign colonic polyp    • Cataract     bilateral   • Degenerative disc disease, lumbar    • Hypercholesteremia    • Leg cramping    • Lumbar pain    • Neck pain    • Osteoarthritis    • Pre-diabetes    • Pyelonephritis 2014   • Renal stone 2014   • Sepsis    • Urinary incontinence    • Vaginal delivery      Past Surgical History:   Procedure Laterality Date   • APPENDECTOMY     • BACK SURGERY     1998            2004 inés placement     Dr Weston   • CARPAL TUNNEL RELEASE Bilateral    • CERVICAL SPINE SURGERY  1986 1994 2005    C3-4  C6-7    Dr Tierney   •  SECTION      x 2   • ORTHOPEDIC SURGERY      cervical X3 fusion 6-7 and lumbar X4          PT ASSESSMENT (last 72 hours)      PT Evaluation       17 1136 17 0851    Rehab Evaluation    Document Type evaluation  -AR     Subjective Information agree to therapy  -AR      Patient Effort, Rehab Treatment good  -AR     Symptoms Noted During/After Treatment fatigue  -AR     General Information    Patient Profile Review yes  -AR     Onset of Illness/Injury or Date of Surgery Date 12/05/17  -AR     Precautions/Limitations fall precautions  -AR     Prior Level of Function min assist:   RW in home, power scooter for longer distances  -AR     Equipment Currently Used at Home walker, rolling;cane, straight  -AR cane, straight;walker, rolling;wheelchair, motorized  -AW    Barriers to Rehab none identified  -AR     Living Environment    Lives With spouse  -AR spouse  -AW    Living Arrangements house  -AR house  -AW    Home Accessibility stairs to enter home  -AR no concerns  -AW    Number of Stairs to Enter Home 2  -AR     Stair Railings at Home  inside, present at both sides  -AW    Type of Financial/Environmental Concern  none  -AW    Transportation Available  car;family or friend will provide  -AW    Clinical Impression    Patient/Family Goals Statement DChome  -AR     Criteria for Skilled Therapeutic Interventions Met yes  -AR     Pathology/Pathophysiology Noted (Describe Specifically for Each System) musculoskeletal  -AR     Impairments Found (describe specific impairments) aerobic capacity/endurance;gait, locomotion, and balance  -AR     Rehab Potential good, to achieve stated therapy goals  -AR     Pain Assessment    Pain Assessment No/denies pain  -AR     Cognitive Assessment/Intervention    Current Cognitive/Communication Assessment functional  -AR     Orientation Status oriented x 4  -AR     Follows Commands/Answers Questions 100% of the time  -AR     Personal Safety Interventions fall prevention program maintained;gait belt;muscle strengthening facilitated  -AR     ROM (Range of Motion)    General ROM --   B LE WFL  -AR     MMT (Manual Muscle Testing)    General MMT Assessment --   R LE grossly +3/5, LLE 3/5  -AR     Bed Mobility, Assessment/Treatment    Bed Mobility, Assistive  Device bed rails;head of bed elevated  -AR     Bed Mob, Supine to Sit, [place] minimum assist (75% patient effort)  -AR     Bed Mob, Sit to Supine, [place] not tested  -AR     Transfer Assessment/Treatment    Transfers, Sit-Stand [place] minimum assist (75% patient effort);moderate assist (50% patient effort);2 person assist required  -AR     Transfers, Stand-Sit [place] minimum assist (75% patient effort);2 person assist required  -AR     Transfers, Sit-Stand-Sit, Assist Device rolling walker  -AR     Gait Assessment/Treatment    Gait, [place] Level minimum assist (75% patient effort);2 person assist required  -AR     Gait, Assistive Device rolling walker  -AR     Gait, Distance (Feet) 2  -AR     Gait, Gait Deviations bryn decreased;decreased heel strike;step length decreased  -AR     Gait, Safety Issues step length decreased;weight-shifting ability decreased  -AR     Gait, Impairments strength decreased  -AR     Gait, Comment no knee buckling episode  -AR     Therapy Exercises    Bilateral Lower Extremities ankle pumps/circles;heel slides;hip abduction/adduction;LAQ  -AR     Positioning and Restraints    Pre-Treatment Position in bed   no alarm upon arrival  -AR     Post Treatment Position chair  -AR     In Chair notified nsg;reclined;sitting;call light within reach;encouraged to call for assist  -AR       12/06/17 0027       General Information    Equipment Currently Used at Home cane, straight;walker, rolling  -SG     Living Environment    Lives With spouse  -SG     Living Arrangements house  -SG     Home Accessibility no concerns  -SG     Stair Railings at Home inside, present at both sides  -SG     Type of Financial/Environmental Concern none  -SG     Transportation Available car;family or friend will provide  -SG       User Key  (r) = Recorded By, (t) = Taken By, (c) = Cosigned By    Initials Name Provider Type    SG Julisa Sears, GRAHAM Registered Nurse    AR Peg Quintana, PT  Physical Therapist    DEEPA Vinson, RN Case Manager          Physical Therapy Education     Title: PT OT SLP Therapies (Active)     Topic: Physical Therapy (Active)     Point: Mobility training (Active)    Learning Progress Summary    Learner Readiness Method Response Comment Documented by Status   Patient Acceptance E NR  AR 12/06/17 1144 Active               Point: Home exercise program (Active)    Learning Progress Summary    Learner Readiness Method Response Comment Documented by Status   Patient Acceptance E NR  AR 12/06/17 1144 Active               Point: Body mechanics (Active)    Learning Progress Summary    Learner Readiness Method Response Comment Documented by Status   Patient Acceptance E NR  AR 12/06/17 1144 Active               Point: Precautions (Active)    Learning Progress Summary    Learner Readiness Method Response Comment Documented by Status   Patient Acceptance E NR  AR 12/06/17 1144 Active                      User Key     Initials Effective Dates Name Provider Type Discipline    AR 06/27/16 -  Peg Quintana PT Physical Therapist PT                PT Recommendation and Plan  Anticipated Equipment Needs At Discharge:  (no equipment needs )  Anticipated Discharge Disposition: skilled nursing facility (currently recommend DC to [facility])  Planned Therapy Interventions: balance training, bed mobility training, gait training, home exercise program, patient/family education, ROM (Range of Motion), stair training, strengthening  PT Frequency: daily  Plan of Care Review  Plan Of Care Reviewed With: patient  Outcome Summary/Follow up Plan: Pt admitted 12/5 w/ increased B LE weakness and several falls at home.  Pt reports normally using cane but last 2 wks started using RW in home, power scooter for longer distances. Today pt demonstrates declined sitting balance, strength-especially in LLE but able to take few steps in room w/ min A x2 and RW.  No knee buckling but distance limited by B  LE weakness.  Pt may benefit from DC to [facility] -pending progress.            IP PT Goals       12/06/17 1145          Bed Mobility PT LTG    Bed Mobility PT LTG, Date Established 12/06/17  -AR      Bed Mobility PT LTG, Time to Achieve 1 wk  -AR      Bed Mobility PT LTG, Activity Type supine to sit/sit to supine  -AR      Bed Mobility PT LTG, [place] Level supervision required  -AR      Transfer Training PT LTG    Transfer Training PT LTG, Date Established 12/06/17  -AR      Transfer Training PT LTG, Time to Achieve 1 wk  -AR      Transfer Training PT LTG, Activity Type sit to stand/stand to sit;bed to chair /chair to bed  -AR      Transfer Training PT LTG, [place] Level contact guard assist  -AR      Transfer Training PT LTG, Assist Device walker, rolling  -AR      Gait Training PT LTG    Gait Training Goal PT LTG, Date Established 12/06/17  -AR      Gait Training Goal PT LTG, Time to Achieve 1 wk  -AR      Gait Training Goal PT LTG, [place] Level contact guard assist  -AR      Gait Training Goal PT LTG, Assist Device walker, rolling  -AR      Gait Training Goal PT LTG, Distance to Achieve 50  -AR        User Key  (r) = Recorded By, (t) = Taken By, (c) = Cosigned By    Initials Name Provider Type    AR Peg Quintana PT Physical Therapist                Outcome Measures       12/06/17 1100          How much help from another person do you currently need...    Turning from your back to your side while in flat bed without using bedrails? 3  -AR      Moving from lying on back to sitting on the side of a flat bed without bedrails? 3  -AR      Moving to and from a bed to a chair (including a wheelchair)? 2  -AR      Standing up from a chair using your arms (e.g., wheelchair, bedside chair)? 2  -AR      Climbing 3-5 steps with a railing? 1  -AR      To walk in hospital room? 2  -AR      AM-PAC 6 Clicks Score 13  -AR      Functional Assessment    Outcome Measure Options AM-PAC 6 Clicks Basic Mobility (PT)   -AR        User Key  (r) = Recorded By, (t) = Taken By, (c) = Cosigned By    Initials Name Provider Type    AR Peg Quintana PT Physical Therapist           Time Calculation:         PT Charges       12/06/17 1148          Time Calculation    Start Time 1121  -AR      Stop Time 1148  -AR      Time Calculation (min) 27 min  -AR      PT Received On 12/06/17  -AR      PT - Next Appointment 12/07/17  -AR      PT Goal Re-Cert Due Date 12/13/17  -AR        User Key  (r) = Recorded By, (t) = Taken By, (c) = Cosigned By    Initials Name Provider Type    DARRIN Quintana PT Physical Therapist          Therapy Charges for Today     Code Description Service Date Service Provider Modifiers Qty    65927366890 HC PT EVAL MOD COMPLEXITY 2 12/6/2017 Peg Quintana, PT GP 1    01113639829 HC PT THER PROC EA 15 MIN 12/6/2017 Peg Quintana, PT GP 1    22630480516 HC PT THER SUPP EA 15 MIN 12/6/2017 Peg Quintana PT GP 1          PT G-Codes  Outcome Measure Options: AM-PAC 6 Clicks Basic Mobility (PT)      Peg Quintana PT  12/6/2017

## 2017-12-06 NOTE — PROGRESS NOTES
"Discharge Planning Assessment  [facility]     Patient Name: Michelle Espinoza  MRN: [id number]  Today's Date: 12/6/2017    Admit Date: 12/5/2017          Discharge Needs Assessment       12/06/17 0851    Living Environment    Lives With spouse    Living Arrangements house    Home Accessibility no concerns    Stair Railings at Home inside, present at both sides    Type of Financial/Environmental Concern none    Transportation Available car;family or friend will provide    Living Environment    Provides Primary Care For no one, unable/limited ability to care for self    Quality Of Family Relationships supportive    Able to Return to Prior Living Arrangements other (see comments)   Pt said she plans home w/o needs, PT eval is pending    Discharge Needs Assessment    Concerns To Be Addressed denies needs/concerns at this time    Readmission Within The Last 30 Days no previous admission in last 30 days    Anticipated Changes Related to Illness none    Equipment Currently Used at Home cane, straight;walker, rolling;wheelchair, motorized    Equipment Needed After Discharge none            Discharge Plan       12/06/17 0846    Case Management/Social Work Plan    Additional Comments Pt confirmed the address and PCP are correct, she said she has changed her pharmacy from [facility] to [facility] in [place] (EPIC updated), pt said she lives with her  Singh but he works for [employer] so she list her dtr Janelle as emergency contact due to she is easier to reach. Pt said she is IADL, ambulates with a cane but also has a walker and scooter she uses when needed.  Pt said she has been to a SNF for rehab years ago and can't recall the facility and also had home health in 2012 and can't recall the agency. Pt said she can afford her Rx. Pt said she plans to return home at [place]ar[place] and does not think she will have any discharge needs, she said \"things just got out of hand the last few days\".  I advised I will check back after she " works with PT to see if home w/o needs remains her plan.       12/06/17 0805    Case Management/Social Work Plan    Plan Pt plans home w/o needs        Discharge Placement     No information found                Demographic Summary       12/06/17 0850    Referral Information    Admission Type inpatient    Arrived From admitted as an inpatient;home or self-care    Referral Source admission list    Record Reviewed medical record    Contact Information    Permission Granted to Share Information With family/designee            Functional Status       12/06/17 0850    Functional Status Current    Ambulation 3-->assistive equipment and person    Transferring 3-->assistive equipment and person    Toileting 3-->assistive equipment and person    Bathing 0-->independent    Dressing 0-->independent    Eating 0-->independent    Communication 0-->understands/communicates without difficulty    Swallowing (if score 2 or more for any item, consult Rehab Services) 0-->swallows foods/liquids without difficulty    Change in Functional Status Since Onset of Current Illness/Injury yes    Functional Status Prior    Ambulation 1-->assistive equipment    Transferring 1-->assistive equipment    Toileting 1-->assistive equipment    Bathing 0-->independent    Dressing 0-->independent    Eating 0-->independent    Communication 0-->understands/communicates without difficulty    Swallowing 0-->swallows foods/liquids without difficulty         Patient Forms       12/06/17 0852    Patient Forms    Provider Choice List Delivered    Delivered to Patient    Method of delivery In person          Dominique Vinson RN

## 2017-12-06 NOTE — ED NOTES
Pt  states pt fell 3 days ago - this information was not relayed to this RN or the MD by the patient.  Dr Mcclendon updated.     Hazel Rivas, GRAHAM  12/05/17 4101

## 2017-12-06 NOTE — ED PROVIDER NOTES
EMERGENCY DEPARTMENT ENCOUNTER    CHIEF COMPLAINT  Chief Complaint: bilateral leg weakness  History given by: pt  History limited by: nothing  Room Number: 15/15  PMD: BHAKTI Rasheed  Neurosurgeon; Dr. Chapman    HPI:  Pt is a 72 y.o. female who presents complaining of bilateral leg weakness which began one year ago but significantly worsened 4 days ago. The pt states that she has had trouble supporting her own weight. The pt states that 4 days ago she slipped out of her rocking chair b/c she was unable to support her weight and was unable to pick herself up. The pt's  was able to help her up. The pt denies injury as a result of this slip. The pt states that over the last year she has had multiple test performed to ascertain the root cause of her leg weakness. The pt states that all autoimmune deficiencies and focal muscular problems have been ruled out, including MS, ALS, states she has had MRI and myelogram with no surgical problems. The pt went to physical therapy for strength training which helped her. The pt states that she quit going to PT after her benefits ran out 2 months ago.The pt states that she has previously had weakness in her legs after having back surgery. The pt states that she has had incontinence since her back surgery. The pt currently self caths BID to empty her bladder but does go to urinate several times daily and only has stress incontinence. The pt normally ambulates using a walker or cane. The pt also c/o chills and states that she had some dischare from her left eye and cough about a week ago which have improved. The pt denies CP, SOA, N/V, abdominal pain, fever, diarrhea, wounds, fever, and rash.        Duration:  One year but worsening over the last 4 days  Onset: gradual  Timing: constant  Location: BLE  Radiation: none  Quality: weakness  Intensity/Severity: moderate  Progression: unchanged  Associated Symptoms: none  Aggravating Factors: none  Alleviating Factors:  none  Previous Episodes: The pt has a history of BLE weakness  Treatment before arrival: none    PAST MEDICAL HISTORY  Active Ambulatory Problems     Diagnosis Date Noted   • Abnormal weight gain 2016   • Arm pain 2016   • Arthritis 2016   • Atonic neurogenic bladder 2016   • Benign colonic polyp 2016   • Carotid atherosclerosis 2016   • Fatigue 2016   • Hypercalcemia 2016   • Hypercholesterolemia 2016   • Impaired fasting glucose 2016   • Weakness of lower extremity 2016   • Nephrolithiasis 2016   • Spinal stenosis 2016   • Urinary tract infection 2016   • Chronic venous insufficiency 2016   • B12 deficiency 2016   • Vitamin D deficiency 2016   • Degenerative disc disease, lumbar 2016   • Osteopenia 10/25/2016   • T7 and T10-11 Thoracic spinal stenosis 2016     Resolved Ambulatory Problems     Diagnosis Date Noted   • No Resolved Ambulatory Problems     Past Medical History:   Diagnosis Date   • Benign colonic polyp    • Cataract    • Degenerative disc disease, lumbar    • Hypercholesteremia    • Leg cramping    • Lumbar pain    • Neck pain    • Osteoarthritis    • Pre-diabetes    • Pyelonephritis 2014   • Renal stone 2014   • Sepsis    • Urinary incontinence    • Vaginal delivery        PAST SURGICAL HISTORY  Past Surgical History:   Procedure Laterality Date   • APPENDECTOMY     • BACK SURGERY     1998     2003 inés placement     Dr Weston   • CARPAL TUNNEL RELEASE Bilateral    • CERVICAL SPINE SURGERY  1986    C3-4  C6-7    Dr Tierney   •  SECTION      x 2   • ORTHOPEDIC SURGERY      cervical X3 fusion 6-7 and lumbar X4       FAMILY HISTORY  Family History   Problem Relation Age of Onset   • Stroke Mother    • Heart disease Mother    • Hypertension Mother    • Cervical cancer Mother    • Clotting disorder Father    • Hypertension Father    • Diabetes Father    •  Aneurysm Father    • Hypertension Sister    • Diabetes Sister    • Diabetes Sister    • Cervical cancer Maternal Grandmother    • Diabetes Grandchild        SOCIAL HISTORY  Social History     Social History   • Marital status:      Spouse name: N/A   • Number of children: N/A   • Years of education: N/A     Occupational History   • retired      Social History Main Topics   • Smoking status: Former Smoker     Packs/day: 0.50     Years: 15.00     Types: Cigarettes     Quit date: 1992   • Smokeless tobacco: Never Used   • Alcohol use Yes      Comment: Socially.   • Drug use: No   • Sexual activity: Defer     Other Topics Concern   • Not on file     Social History Narrative       ALLERGIES  Other; Penicillins; Latex; and Nickel    REVIEW OF SYSTEMS  Review of Systems   Constitutional: Negative for chills and fever.   HENT: Negative for rhinorrhea and sore throat.    Eyes: Negative for visual disturbance.   Respiratory: Negative for cough and shortness of breath.    Cardiovascular: Negative for chest pain, palpitations and leg swelling.   Gastrointestinal: Negative for abdominal pain, diarrhea and vomiting.   Endocrine: Negative.    Genitourinary: Negative for decreased urine volume, dysuria and frequency.   Musculoskeletal: Negative for neck pain.   Skin: Negative for rash.   Neurological: Positive for weakness (BLE). Negative for syncope and headaches.   Psychiatric/Behavioral: Negative.    All other systems reviewed and are negative.      PHYSICAL EXAM  ED Triage Vitals   Temp Heart Rate Resp BP SpO2   12/05/17 1923 12/05/17 1923 12/05/17 1927 12/05/17 1927 12/05/17 1923   97.6 °F (36.4 °C) 103 15 127/66 97 %      Temp src Heart Rate Source Patient Position BP Location FiO2 (%)   12/05/17 1923 12/05/17 1923 -- -- --   Tympanic Monitor          Physical Exam   Constitutional: She is oriented to person, place, and time.  Non-toxic appearance. She appears distressed (mild).   Nontoxic appearing   HENT:   Head:  Normocephalic and atraumatic.   Eyes: EOM are normal.   Neck: Normal range of motion. Neck supple.   Cardiovascular: Normal rate, regular rhythm, normal heart sounds and intact distal pulses.    No murmur heard.  Pulses:       Posterior tibial pulses are 2+ on the right side, and 2+ on the left side.   Pulmonary/Chest: Effort normal and breath sounds normal. No respiratory distress. She has no wheezes. She has no rales.   Abdominal: Soft. Bowel sounds are normal. There is no tenderness. There is no rebound, no guarding and no CVA tenderness.   Musculoskeletal: Normal range of motion. She exhibits no edema.   No vertebral tenderness   Neurological: She is alert and oriented to person, place, and time.   Reflex Scores:       Patellar reflexes are 1+ on the right side and 1+ on the left side.       Achilles reflexes are 1+ on the right side and 1+ on the left side.  Weakness to BLE, altered sensation BLE equal   Skin: Skin is warm and dry.   Psychiatric: Affect normal.   Nursing note and vitals reviewed.      LAB RESULTS  Lab Results (last 24 hours)     Procedure Component Value Units Date/Time    CBC & Differential [[id number]] Collected:  12/05/17 2027    Specimen:  Blood Updated:  12/05/17 2038    Narrative:       The following orders were created for panel order CBC & Differential.  Procedure                               Abnormality         Status                     ---------                               -----------         ------                     CBC Auto Differential[[id number]]        Abnormal            Final result                 Please view results for these tests on the individual orders.    Comprehensive Metabolic Panel [[id number]]  (Abnormal) Collected:  12/05/17 2027    Specimen:  Blood Updated:  12/05/17 2106     Glucose 149 (H) mg/dL      BUN 41 (H) mg/dL      Creatinine 1.45 (H) mg/dL      Sodium 132 (L) mmol/L      Potassium 3.9 mmol/L      Chloride 95 (L) mmol/L      CO2 24.8 mmol/L      Calcium  11.1 (H) mg/dL      Total Protein 7.4 g/dL      Albumin 3.00 (L) g/dL      ALT (SGPT) 35 (H) U/L      AST (SGOT) 32 U/L      Alkaline Phosphatase 220 (H) U/L      Total Bilirubin 0.5 mg/dL      eGFR Non African Amer 36 (L) mL/min/1.73      Globulin 4.4 gm/dL      A/G Ratio 0.7 g/dL      BUN/Creatinine Ratio 28.3 (H)     Anion Gap 12.2 mmol/L     CBC Auto Differential [[id number]]  (Abnormal) Collected:  12/05/17 2027    Specimen:  Blood Updated:  12/05/17 2038     WBC 13.28 (H) 10*3/mm3      RBC 4.44 10*6/mm3      Hemoglobin 13.2 g/dL      Hematocrit 39.7 %      MCV 89.4 fL      MCH 29.7 pg      MCHC 33.2 g/dL      RDW 12.5 %      RDW-SD 40.3 fl      MPV 10.9 fL      Platelets 236 10*3/mm3      Neutrophil % 86.1 (H) %      Lymphocyte % 4.2 (L) %      Monocyte % 9.0 %      Eosinophil % 0.3 %      Basophil % 0.1 %      Immature Grans % 0.3 %      Neutrophils, Absolute 11.44 (H) 10*3/mm3      Lymphocytes, Absolute 0.56 (L) 10*3/mm3      Monocytes, Absolute 1.19 10*3/mm3      Eosinophils, Absolute 0.04 10*3/mm3      Basophils, Absolute 0.01 10*3/mm3      Immature Grans, Absolute 0.04 (H) 10*3/mm3     Urinalysis With / Culture If Indicated - Urine, Clean Catch [[id number]]  (Abnormal) Collected:  12/05/17 2218    Specimen:  Urine from Urine, Clean Catch Updated:  12/05/17 2229     Color, UA Yellow     Appearance, UA Cloudy (A)     pH, UA 5.5     Specific Gravity, UA 1.014     Glucose, UA Negative     Ketones, UA Negative     Bilirubin, UA Negative     Blood, UA Large (3+) (A)     Protein,  mg/dL (2+) (A)     Leuk Esterase, UA Large (3+) (A)     Nitrite, UA Positive (A)     Urobilinogen, UA 0.2 E.U./dL    Urinalysis, Microscopic Only - Urine, Clean Catch [[id number]]  (Abnormal) Collected:  12/05/17 2218    Specimen:  Urine from Urine, Clean Catch Updated:  12/05/17 2231     RBC, UA 6-12 (A) /HPF      WBC, UA Too Numerous to Count (A) /HPF      Bacteria, UA 4+ (A) /HPF      Squamous Epithelial Cells, UA 0-2 /HPF       Hyaline Casts, UA 0-2 /LPF      Methodology Automated Microscopy    Urine Culture - Urine, Urine, Clean Catch [[id number]] Collected:  12/05/17 2218    Specimen:  Urine from Urine, Clean Catch Updated:  12/05/17 2228          I ordered the above labs and reviewed the results    RADIOLOGY  XR Chest 2 View    (Results Pending)  1. Evidence of prior granulomatous disease, no active airspace disease  appreciated.     This report was finalized on 12/5/2017 10:57 PM by Avery Ramos MD.        I ordered the above noted radiological studies. Interpreted by radiologist.Reviewed by me in PACS.       PROCEDURES  Procedures      PROGRESS AND CONSULTS  ED Course     1941: Ordered labs for further evaluation.     2200: Ordered XR chest for further evaluation.     2237: Rechecked pt, she was resting. Discussed lab results which showed an UTI. Discussed signs of renal insufficiencies. The pt states that penicillins give her hives. Discussed plan to give the pt IVF and antibiotics. Discussed plan to admit pt. She agrees with and understands plan to admit. All questions were addressed at this time.     2244: Placed call to A. Ordered IVF. Ordered rocephin for UTI.     2302: Discussed pt's case with Dr. Auguste ([facility]).    MEDICAL DECISION MAKING  Results were reviewed/discussed with the patient and they were also made aware of online access. Pt also made aware that some labs, such as cultures, will not be resulted during ER visit and follow up with PMD is necessary.     MDM  Number of Diagnoses or Management Options  Acute UTI:   Fall, initial encounter:   Renal insufficiency:   Weakness:      Amount and/or Complexity of Data Reviewed  Clinical lab tests: ordered and reviewed (BUN: 41, Creatinine: 1.45, RBC, UA: 6-12, WBC UA: to numerous to count, bacteria, UA: 4+)  Tests in the radiology section of CPT®: ordered and reviewed (XR chest: Evidence of prior granulomatous disease, no active airspace disease  appreciated.)  Decide to  obtain previous medical records or to obtain history from someone other than the patient: yes  Review and summarize past medical records: yes  Discuss the patient with other providers: yes (Dr. Auguste ([facility]    Patient Progress  Patient progress: stable         DIAGNOSIS  Final diagnoses:   Weakness   Fall, initial encounter   Acute UTI   Renal insufficiency       DISPOSITION  ADMISSION    Discussed treatment plan and reason for admission with pt/family and admitting physician.  Pt/family voiced understanding of the plan for admission for further testing/treatment as needed.         Latest Documented Vital Signs:  As of 11:04 PM  BP- 130/78 HR- 66 Temp- 97.6 °F (36.4 °C) (Tympanic) O2 sat- 96%    --  Documentation assistance provided by scribSylvie Lugo for Dr. Mcclendon.  Information recorded by the scribe was done at my direction and has been verified and validated by me.       Kristen Lugo  12/05/17 [id number]       Misty Mcclendon MD  12/06/17 1296

## 2017-12-06 NOTE — H&P
Internal medicine history and physical    INTERNAL MEDICINE   [facility]       Patient Identification:  Name: Michelle Espinoza  Age: 72 y.o.  Sex: female  :  1945  MRN: [id number]                   Primary Care Physician: BHAKTI Rasheed                                   Chief Complaint:  Progressive weakness of lower extremities to the point it unable to take steps to get in the house after a visit to ophthalmologist today.    History of Present Illness:   Patient is a 72-year-old female who has chronic degenerative disc disease of the lumbar spine and has had surgeries on it for the last procedure performed bilateral mid spine Center in .  According to her she did well for 4 years afterwards and then last year started having progressive weakness of the lower extremities.  She has been extensively evaluated by neurology and neurosurgery service and eventually the recommendation was made for physical therapy to try to preserve her declining function.  Patient says that she was participating in physical therapy and also has issues with neurogenic bladder for which she performs straight catheterization was on a needed basis.  About a month or so ago she started to decline progressively despite doing the physical therapy and had to use walker and assistance to move around in the house.  About a week ago she started feeling poorly decreased appetite fever and chills and lack of energy.  She was somehow managing  her mobility with the help of walker but can tell that she is declining.  Today she went for an ophthalmology visit was barely able to get into the car and after the visit she came home and then she try to get out of the car to take a step to get inside the house she couldn't do it this resulted in her coming to the emergency room for further evaluation.  Patient denies any new changes in her medications.  Workup in the emergency room didn't reveal abnormal urinalysis consistent  with urinary tract infection.  Patient was also noted to have slightly elevated creatinine and elevated calcium.  Patient has received IV Rocephin in the emergency room and is being admitted for further care.      Past Medical History:  Past Medical History:   Diagnosis Date   • Benign colonic polyp    • Cataract     bilateral   • Degenerative disc disease, lumbar    • Hypercholesteremia    • Leg cramping    • Lumbar pain    • Neck pain    • Osteoarthritis    • Pre-diabetes    • Pyelonephritis 2014   • Renal stone 2014   • Sepsis    • Urinary incontinence    • Vaginal delivery      Past Surgical History:  Past Surgical History:   Procedure Laterality Date   • APPENDECTOMY     • BACK SURGERY  1988 inés placement     Dr Weston   • CARPAL TUNNEL RELEASE Bilateral    • CERVICAL SPINE SURGERY  1986    C3-4  C6-7    Dr Tierney   •  SECTION      x 2   • ORTHOPEDIC SURGERY      cervical X3 fusion 6-7 and lumbar X4      Home Meds:  Prescriptions Prior to Admission   Medication Sig Dispense Refill Last Dose   • baclofen (LIORESAL) 20 MG tablet TAKE 1 TABLET BY MOUTH EVERY DAY 30 tablet 5 Taking   • Calcium Carbonate-Vit D-Min (CALCIUM 1200 PO) Take  by mouth Daily.   Taking   • Cholecalciferol (VITAMIN D3) 5000 UNITS capsule capsule Take 5,000 Units by mouth Daily.   Taking   • Fish Oil-Krill Oil (PA FISH OIL/KRILL PO) Take  by mouth Daily.   Taking   • glucose blood test strip Test once weekly DX: R 73.03 One Touch Ultra Test Strips 50 each 12 Taking   • Lutein-Bilberry (BILBERRY PLUS LUTEIN) 5-1000 MCG-MG capsule Take  by mouth Daily.   Taking   • metFORMIN (GLUCOPHAGE) 500 MG tablet Take 1 tablet by mouth 2 (Two) Times a Day With Meals. 30 tablet 2 Taking   • naproxen sodium (ALEVE) 220 MG tablet Take 220 mg by mouth 2 (Two) Times a Day As Needed for mild pain (1-3).   Taking   • NYSTATIN 208055 UNIT/GM powder APPLY TO AFFECTED AREA 2 TO 3 TIMES PER DAY AS NEEDED 30 g 0  Taking   • oxybutynin (DITROPAN) 5 MG tablet Take 1 tablet by mouth 3 (Three) Times a Day. 270 tablet 1 Taking   • Probiotic Product (PROBIOTIC PO) Take  by mouth Daily.   Taking   • TURMERIC PO Take  by mouth.   Taking   • vitamin C (ASCORBIC ACID) 500 MG tablet Take 500 mg by mouth Daily. With D3   Taking   • azithromycin (ZITHROMAX Z-ROSIE) 250 MG tablet Take 2 tablets the first day, then 1 tablet daily for 4 days. 6 tablet 0    • ciprofloxacin (CILOXAN) 0.3 % ophthalmic solution Administer 2 drops into the left eye 4 (Four) Times a Day. 2.5 mL 0    • ezetimibe (ZETIA) 10 MG tablet Take 1 tablet by mouth Daily. 30 tablet 2 Not Taking   • guaifenesin-dextromethorphan (MUCINEX DM)  MG tablet sustained-release 12 hour tablet Take 1 tablet by mouth 2 (Two) Times a Day. 45 tablet 0    • loratadine (CLARITIN) 10 MG tablet Take 1 tablet by mouth Daily. 30 tablet 2    • Triamcinolone Acetonide (NASACORT AQ) 55 MCG/ACT nasal inhaler 2 SPRAYS IN EACH NOSTRIL ONCE DAILY 1 bottle 11      Current Meds:     Current Facility-Administered Medications:   •  Insert peripheral IV, , , Once **AND** sodium chloride 0.9 % flush 10 mL, 10 mL, Intravenous, PRN, Misty Mcclendon MD  •  sodium chloride 0.9 % infusion, 125 mL/hr, Intravenous, Continuous, Misty Mcclendon MD, Last Rate: 125 mL/hr at 12/05/17 2302, 125 mL/hr at 12/05/17 2302  Allergies:  Allergies   Allergen Reactions   • Other Nausea And Vomiting     The mercury in [place]   • Penicillins Hives   • Latex Rash   • Nickel Rash     Social History:   Social History   Substance Use Topics   • Smoking status: Former Smoker     Packs/day: 0.50     Years: 15.00     Types: Cigarettes     Quit date: 1992   • Smokeless tobacco: Never Used   • Alcohol use Yes      Comment: Socially.      Family History:  Family History   Problem Relation Age of Onset   • Stroke Mother    • Heart disease Mother    • Hypertension Mother    • Cervical cancer Mother    • Clotting disorder Father    •  "Hypertension Father    • Diabetes Father    • Aneurysm Father    • Hypertension Sister    • Diabetes Sister    • Diabetes Sister    • Cervical cancer Maternal Grandmother    • Diabetes Grandchild           Review of Systems  See history of present illness and past medical history.  Patient denies headache, dizziness, syncope, falls, trauma, change in vision, change in hearing, change in taste, changes in weight, changes in appetite, focal weakness, numbness, or paresthesia.  Patient denies chest pain, palpitations, dyspnea, orthopnea, PND, cough, sinus pressure, rhinorrhea, epistaxis, hemoptysis, nausea, vomiting,hematemesis, diarrhea, constipation or hematchezia.  Denies cold or heat intolerance, polydipsia, polyuria, polyphagia. Denies hematuria, pyuria, dysuria, hesitancy, frequency or urgency. Denies consumption of raw and under cooked meats foods or change in water source.  Denies fever, chills, sweats, night sweats.  Denies missing any routine medications. Remainder of ROS is negative.      Vitals:   /85 (BP Location: Right arm, Patient Position: Lying)  Pulse 86  Temp 97 °F (36.1 °C) (Oral)   Resp 16  Ht 160 cm (63\")  Wt 83.6 kg (184 lb 4.8 oz)  LMP  (LMP Unknown)  SpO2 98%  BMI 32.65 kg/m2  I/O:   Intake/Output Summary (Last 24 hours) at 12/06/17 0226  Last data filed at 12/05/17 2330   Gross per 24 hour   Intake               50 ml   Output                0 ml   Net               50 ml     Exam:  General Appearance:    Alert, cooperative, no distress, appears stated ageDoes not appear to be toxic or septic.     Head:    Normocephalic, without obvious abnormality, atraumatic   Eyes:    PERRL, conjunctiva/corneas clear, EOM's intact, both eyes   Ears:    Normal external ear canals, both ears   Nose:   Nares normal, septum midline, mucosa normal, no drainage    or sinus tenderness   Throat:   Lips, tongue, gums normal; oral mucosa pink and moist   Neck:   Supple, symmetrical, trachea midline, no " adenopathy;     thyroid:  no enlargement/tenderness/nodules; no carotid    bruit or JVD   Back:     Symmetric, no curvature, ROM normal, no CVA tenderness   Lungs:     Clear to auscultation bilaterally, respirations unlabored   Chest Wall:    No tenderness or deformity    Heart:    Regular rate and rhythm, S1 and S2 normal, no murmur, rub   or gallop   Abdomen:     Soft, non-tender, bowel sounds active all four quadrants,     no masses, no hepatomegaly, no splenomegaly   Extremities:   Extremities normal, atraumatic, no cyanosis or edema   Pulses:   Pulses palpable in all extremities; symmetric all extremities   Skin:   Skin color normal, Skin is warm and dry,  no rashes or palpable lesions   Neurologic:   CNII-XII intact,Hyperreflexic lower extremities with myoclonus.  Significant weakness of the lower extremities.       Data Review:      I reviewed the patient's new clinical results.    Results from last 7 days  Lab Units 12/05/17 2027   WBC 10*3/mm3 13.28*   HEMOGLOBIN g/dL 13.2   PLATELETS 10*3/mm3 236       Results from last 7 days  Lab Units 12/05/17 2027   SODIUM mmol/L 132*   POTASSIUM mmol/L 3.9   CHLORIDE mmol/L 95*   CO2 mmol/L 24.8   BUN mg/dL 41*   CREATININE mg/dL 1.45*   CALCIUM mg/dL 11.1*   GLUCOSE mg/dL 149*     Urinalysis noted  Chest x-ray did not show any acute disease.  Assessment:  Active Hospital Problems (** Indicates Principal Problem)    Diagnosis Date Noted   • **Weakness of lower extremity [R29.898] 07/19/2016   • CKD (chronic kidney disease) [N18.9] 12/06/2017   • MINERVA (acute kidney injury) [N17.9] 12/06/2017   • Weakness [R53.1] 12/05/2017   • Urinary tract infection [N39.0] 07/19/2016     Description: Prophylactic nitrofurantoin for recurrent urinary tract infections since 2011. 2013 discontinued secondary to potential lung issues. Patient does self-catheterization each evening.  UTI February 2015, 6/15, 7/15, 8/15.     • Nephrolithiasis [N20.0] 07/19/2016     Description: 2014  nephrolithiasis treatment with lithotrypsy and stents, Dr. Nelson. Patient also tells me that she has septic.     • Fatigue [R53.83] 07/19/2016   • Atonic neurogenic bladder [N31.2] 07/19/2016     Description: Sterile self-catheterization each evening.     • Arthritis [M19.90] 07/19/2016     Description: degeneratve arthritis multiple joints.     • Hypercalcemia [E83.52] 07/19/2016     Description: June 2015 ionized calcium 5.8 with inappropriate parathyroid hormone at 21. 34 hour urine calcium 232. Prior history of kidney stones. Difficult bone density screening because of severe degenerative joint disease. Recommend repeat parathyroid hormone and ionized calcium 12/15.        Resolved Hospital Problems    Diagnosis Date Noted Date Resolved   No resolved problems to display.       Plan:  1-progressive weakness and deterioration in functional capacity and lack of strength significantly worse over the period of 1 week with associated chills decreased appetite and abnormal urinalysis in the context of self catheterization for urinary bladder - this is likely due to urinary tract infection.  Plan continue with bladder scan and as needed straight catheter, follow up on the urine culture and empiric Rocephin with plans to adjust antibiotics based on the culture results.  2-hypercalcemia multifactorial plan hydration, hold her calcium replacement and vitamin D supplementation, and repeat BMP to see how these interventions translate into normalization of calcium and if the hypercalcemia persists check PTH with calcium to rule out hyperparathyroidism - she has been ongoing since June 2015  3-dehydration plan IV fluids  4-acute kidney injury on chronic kidney disease with history of nephrolithiasis - continue with IV hydration and monitor progression of renal function.  If her renal function does not improve consider CT scan of the chest without contrast renal protocol or ultrasound the kidneys to rule out obstructive  uropathy contributing to renal insufficiency that she is manifesting.  5-hyperglycemia.  Check hemoglobin A1c, Accu-Cheks and sliding scale coverage if needed  6-progressive weakness in lower extremities since last year despite extensive workup and treatment plan including physical therapy with symptoms started 4 years after her extensive back surgery at [facility] likely due to cord compression and radiculopathy and cauda equina process.  7-see orders    Ben Auguste MD   12/6/2017  2:26 AM  Much of this encounter note is an electronic transcription/translation of spoken language to printed text. The electronic translation of spoken language may permit erroneous, or at times, nonsensical words or phrases to be inadvertently transcribed; Although I have reviewed the note for such errors, some may still exist

## 2017-12-06 NOTE — ED NOTES
Pt to room 15 via wheelchair for c/o numbness to bilateral legs since Saturday.  Pt has history of multiple back surgeries.  Pt has neurogenic bladder as a result of her back issues and self-caths twice daily.  Pt has good distal pulses, feet are cold, but pt was wearing slip-on shoes with no socks.  Pt able to move all extremities with no difficulty, but states she is unable to stand on her own because of the weakness and numbness. Pt is alert and oriented X4, PERRLA, respirations are even and unlabored, chest rise and fall is equal in expansion.  Pt does not appear to be in distress at this time.  Bed in low position, call light within reach, side rails up X2.      Hazel Rivas RN  12/05/17 2050

## 2017-12-06 NOTE — PLAN OF CARE
Problem: Patient Care Overview (Adult)  Goal: Plan of Care Review    12/06/17 1145   Coping/Psychosocial Response Interventions   Plan Of Care Reviewed With patient   Outcome Evaluation   Outcome Summary/Follow up Plan Pt admitted 12/5 w/ increased B LE weakness and several falls at home. Pt reports normally using cane but last 2 wks started using RW in home, power scooter for longer distances. Today pt demonstrates declined sitting balance, strength-especially in LLE but able to take few steps in room w/ min A x2 and RW. No knee buckling but distance limited by B LE weakness. Pt may benefit from DC to [facility] -pending progress.          Problem: Inpatient Physical Therapy  Goal: Bed Mobility Goal LTG- PT    12/06/17 1145   Bed Mobility PT LTG   Bed Mobility PT LTG, Date Established 12/06/17   Bed Mobility PT LTG, Time to Achieve 1 wk   Bed Mobility PT LTG, Activity Type supine to sit/sit to supine   Bed Mobility PT LTG, [place] Level supervision required       Goal: Transfer Training Goal 1 LTG- PT    12/06/17 1145   Transfer Training PT LTG   Transfer Training PT LTG, Date Established 12/06/17   Transfer Training PT LTG, Time to Achieve 1 wk   Transfer Training PT LTG, Activity Type sit to stand/stand to sit;bed to chair /chair to bed   Transfer Training PT LTG, [place] Level contact guard assist   Transfer Training PT LTG, Assist Device walker, rolling       Goal: Gait Training Goal LTG- PT    12/06/17 1145   Gait Training PT LTG   Gait Training Goal PT LTG, Date Established 12/06/17   Gait Training Goal PT LTG, Time to Achieve 1 wk   Gait Training Goal PT LTG, [place] Level contact guard assist   Gait Training Goal PT LTG, Assist Device walker, rolling   Gait Training Goal PT LTG, Distance to Achieve 50

## 2017-12-06 NOTE — PLAN OF CARE
Problem: Patient Care Overview (Adult)  Goal: Plan of Care Review  Outcome: Ongoing (interventions implemented as appropriate)    12/06/17 0444   Coping/Psychosocial Response Interventions   Plan Of Care Reviewed With patient   Patient Care Overview   Progress no change   Outcome Evaluation   Outcome Summary/Follow up Plan admitted from ER c/o increasing weakness of BLE, unable to bear weight Hx of falling 3 days ago, seen by Dr Auguste care plan initiated, on falls precaution, medicated for back pain, PT consulted, assisted to bedpan, may straight cath if bladder scan is more than 200 ml, for further care         12/06/17 0444   Coping/Psychosocial Response Interventions   Plan Of Care Reviewed With patient   Patient Care Overview   Progress no change   Outcome Evaluation   Outcome Summary/Follow up Plan admitted from ER c/o increasing weakness of BLE, unable to bear weight Hx of falling 3 days ago, seen by Dr Auguste care plan initiated, on falls precaution, medicated for back pain, PT consulted, assisted to bedpan, hx of neurogenic bladder, self caths at home, may straight cath if bladder scan is more than 200 ml, for further care       Goal: Adult Individualization and Mutuality  Outcome: Ongoing (interventions implemented as appropriate)  Goal: Discharge Needs Assessment  Outcome: Ongoing (interventions implemented as appropriate)    Problem: Fall Risk (Adult)  Goal: Identify Related Risk Factors and Signs and Symptoms  Outcome: Outcome(s) achieved Date Met:  12/06/17  Goal: Absence of Falls  Outcome: Ongoing (interventions implemented as appropriate)    Problem: Pain, Acute (Adult)  Goal: Identify Related Risk Factors and Signs and Symptoms  Outcome: Outcome(s) achieved Date Met:  12/06/17  Goal: Acceptable Pain Control/Comfort Level  Outcome: Ongoing (interventions implemented as appropriate)

## 2017-12-07 LAB
ANION GAP SERPL CALCULATED.3IONS-SCNC: 12 MMOL/L
BASOPHILS # BLD AUTO: 0.01 10*3/MM3 (ref 0–0.2)
BASOPHILS NFR BLD AUTO: 0.1 % (ref 0–1.5)
BUN BLD-MCNC: 29 MG/DL (ref 8–23)
BUN/CREAT SERPL: 24.2 (ref 7–25)
CALCIUM SPEC-SCNC: 10.2 MG/DL (ref 8.6–10.5)
CHLORIDE SERPL-SCNC: 102 MMOL/L (ref 98–107)
CO2 SERPL-SCNC: 24 MMOL/L (ref 22–29)
CREAT BLD-MCNC: 1.2 MG/DL (ref 0.57–1)
DEPRECATED RDW RBC AUTO: 43.4 FL (ref 37–54)
EOSINOPHIL # BLD AUTO: 0.12 10*3/MM3 (ref 0–0.7)
EOSINOPHIL NFR BLD AUTO: 1 % (ref 0.3–6.2)
ERYTHROCYTE [DISTWIDTH] IN BLOOD BY AUTOMATED COUNT: 13.2 % (ref 11.7–13)
GFR SERPL CREATININE-BSD FRML MDRD: 44 ML/MIN/1.73
GLUCOSE BLD-MCNC: 129 MG/DL (ref 65–99)
GLUCOSE BLDC GLUCOMTR-MCNC: 128 MG/DL (ref 70–130)
GLUCOSE BLDC GLUCOMTR-MCNC: 134 MG/DL (ref 70–130)
GLUCOSE BLDC GLUCOMTR-MCNC: 152 MG/DL (ref 70–130)
GLUCOSE BLDC GLUCOMTR-MCNC: 171 MG/DL (ref 70–130)
HCT VFR BLD AUTO: 38.8 % (ref 35.6–45.5)
HGB BLD-MCNC: 12.9 G/DL (ref 11.9–15.5)
IMM GRANULOCYTES # BLD: 0.07 10*3/MM3 (ref 0–0.03)
IMM GRANULOCYTES NFR BLD: 0.6 % (ref 0–0.5)
LYMPHOCYTES # BLD AUTO: 0.93 10*3/MM3 (ref 0.9–4.8)
LYMPHOCYTES NFR BLD AUTO: 7.7 % (ref 19.6–45.3)
MCH RBC QN AUTO: 29.7 PG (ref 26.9–32)
MCHC RBC AUTO-ENTMCNC: 33.2 G/DL (ref 32.4–36.3)
MCV RBC AUTO: 89.4 FL (ref 80.5–98.2)
MONOCYTES # BLD AUTO: 1.64 10*3/MM3 (ref 0.2–1.2)
MONOCYTES NFR BLD AUTO: 13.5 % (ref 5–12)
NEUTROPHILS # BLD AUTO: 9.37 10*3/MM3 (ref 1.9–8.1)
NEUTROPHILS NFR BLD AUTO: 77.1 % (ref 42.7–76)
PLATELET # BLD AUTO: 263 10*3/MM3 (ref 140–500)
PMV BLD AUTO: 10.6 FL (ref 6–12)
POTASSIUM BLD-SCNC: 4.2 MMOL/L (ref 3.5–5.2)
RBC # BLD AUTO: 4.34 10*6/MM3 (ref 3.9–5.2)
SODIUM BLD-SCNC: 138 MMOL/L (ref 136–145)
WBC NRBC COR # BLD: 12.14 10*3/MM3 (ref 4.5–10.7)

## 2017-12-07 PROCEDURE — 85025 COMPLETE CBC W/AUTO DIFF WBC: CPT | Performed by: INTERNAL MEDICINE

## 2017-12-07 PROCEDURE — 80048 BASIC METABOLIC PNL TOTAL CA: CPT | Performed by: INTERNAL MEDICINE

## 2017-12-07 PROCEDURE — 82962 GLUCOSE BLOOD TEST: CPT

## 2017-12-07 PROCEDURE — 25010000002 ENOXAPARIN PER 10 MG: Performed by: INTERNAL MEDICINE

## 2017-12-07 PROCEDURE — 25010000002 CEFTRIAXONE PER 250 MG: Performed by: INTERNAL MEDICINE

## 2017-12-07 PROCEDURE — 97110 THERAPEUTIC EXERCISES: CPT

## 2017-12-07 RX ORDER — OXYBUTYNIN CHLORIDE 5 MG/1
10 TABLET ORAL 3 TIMES DAILY
Status: DISCONTINUED | OUTPATIENT
Start: 2017-12-07 | End: 2017-12-08 | Stop reason: HOSPADM

## 2017-12-07 RX ADMIN — OXYBUTYNIN CHLORIDE 10 MG: 5 TABLET ORAL at 20:25

## 2017-12-07 RX ADMIN — NYSTATIN: 100000 POWDER TOPICAL at 06:34

## 2017-12-07 RX ADMIN — NYSTATIN: 100000 POWDER TOPICAL at 13:10

## 2017-12-07 RX ADMIN — ENOXAPARIN SODIUM 40 MG: 40 INJECTION SUBCUTANEOUS at 23:15

## 2017-12-07 RX ADMIN — OXYBUTYNIN CHLORIDE 5 MG: 5 TABLET ORAL at 09:21

## 2017-12-07 RX ADMIN — OXYBUTYNIN CHLORIDE 10 MG: 5 TABLET ORAL at 15:41

## 2017-12-07 RX ADMIN — SODIUM CHLORIDE 75 ML/HR: 9 INJECTION, SOLUTION INTRAVENOUS at 12:26

## 2017-12-07 RX ADMIN — BACLOFEN 20 MG: 10 TABLET ORAL at 20:25

## 2017-12-07 RX ADMIN — Medication 1 CAPSULE: at 09:21

## 2017-12-07 RX ADMIN — NYSTATIN: 100000 POWDER TOPICAL at 21:30

## 2017-12-07 RX ADMIN — CEFTRIAXONE SODIUM 1 G: 1 INJECTION, SOLUTION INTRAVENOUS at 23:15

## 2017-12-07 RX ADMIN — OXYCODONE HYDROCHLORIDE AND ACETAMINOPHEN 500 MG: 500 TABLET ORAL at 09:21

## 2017-12-07 NOTE — PLAN OF CARE
Problem: Patient Care Overview (Adult)  Goal: Plan of Care Review  Outcome: Ongoing (interventions implemented as appropriate)    12/07/17 0502   Coping/Psychosocial Response Interventions   Plan Of Care Reviewed With patient   Patient Care Overview   Progress no change   Outcome Evaluation   Outcome Summary/Follow up Plan afebrile, has not c/o back pains, was able to pivot from bed to bedside commode with help, c/o frequency in urination, in and out cath done as eeded       Goal: Adult Individualization and Mutuality  Outcome: Ongoing (interventions implemented as appropriate)  Goal: Discharge Needs Assessment  Outcome: Ongoing (interventions implemented as appropriate)    Problem: Fall Risk (Adult)  Goal: Absence of Falls  Outcome: Ongoing (interventions implemented as appropriate)    Problem: Pain, Acute (Adult)  Goal: Acceptable Pain Control/Comfort Level  Outcome: Ongoing (interventions implemented as appropriate)    Problem: Pressure Ulcer Risk (Govind Scale) (Adult,Obstetrics,Pediatric)  Goal: Identify Related Risk Factors and Signs and Symptoms  Outcome: Outcome(s) achieved Date Met:  12/07/17  Goal: Skin Integrity  Outcome: Ongoing (interventions implemented as appropriate)

## 2017-12-07 NOTE — DISCHARGE PLACEMENT REQUEST
"Michelle Espinoza (72 y.o. Female)     Date of Birth Social Security Number Address Home Phone MRN    1945  [id number] [facility] [phone] [id number]    [place] Marital Status          None        Admission Date Admission Type Admitting Provider Attending Provider Department, Room/Bed    12/5/17 Emergency Ben Auguste MD Schmitt, Christopher E, MD [address], 90/[address]    Discharge Date Discharge Disposition Discharge Destination                      Attending Provider: Edin Rutherford MD     Allergies:  Other, Penicillins, Latex, Nickel    Isolation:  None   Infection:  None   Code Status:  FULL    Ht:  160 cm (63\")   Wt:  83.6 kg (184 lb 4.8 oz)    Admission Cmt:  None   Principal Problem:  Weakness of lower extremity [R29.898]                 Active Insurance as of 12/5/2017     Primary Coverage     Payor Plan Insurance Group Employer/Plan Group    MEDICARE MEDICARE A & B      Payor Plan Address Payor Plan Phone Number Effective From Effective To    [address] [phone] 10/1/2007     [place] [address]       Subscriber Name Subscriber Birth Date Member ID       MICHELLE ESPINOZA 1945 [id number]           Secondary Coverage     Payor Plan Insurance Group Employer/Plan Group    [employer] C[employer]NA MEDICARE SUPPLEMENT SOLUTIONS PLAN G     Payor Plan Address Payor Plan Phone Number Effective From Effective To    [address] [address]  12/5/2017     [place] [address]       Subscriber Name Subscriber Birth Date Member ID       MICHELLE ESPINOZA 1945 [id number]                 Emergency Contacts      (Rel.) Home Phone Work Phone Mobile Phone    Janelle Espinoza (Daughter) -- -- [phone]              "

## 2017-12-07 NOTE — PROGRESS NOTES
"  Name: Michelle Espionza  ADMIT: 2017   Age/Sex: 72 y.o.female LOS:  LOS: 2 days    :    1945     ROOM: [facility]   MRN:    [id number]    PCP:    BHAKTI Rasheed     Subjective   Feels a little better but still weak and requiring assistance to get out of bed    Objective   Vital Signs  Temp:  [97.3 °F (36.3 °C)-101 °F (38.3 °C)] 97.3 °F (36.3 °C)  Heart Rate:  [77-94] 77  Resp:  [16-18] 18  BP: (115-165)/(63-94) 115/63  Body mass index is 32.65 kg/(m^2).    Objective:  General Appearance:  Comfortable and well-appearing.    Vital signs: (most recent): Blood pressure 115/63, pulse 77, temperature 97.3 °F (36.3 °C), temperature source Oral, resp. rate 18, height 160 cm (63\"), weight 83.6 kg (184 lb 4.8 oz), SpO2 94 %.  Vital signs are normal.    HEENT: Normal HEENT exam.    Lungs:  Normal effort.  Breath sounds clear to auscultation.    Heart: Normal rate.  Regular rhythm.    Abdomen: Abdomen is soft and non-distended.  Bowel sounds are normal.   There is no abdominal tenderness.     Extremities: Normal range of motion.  There is no dependent edema.    Neurological: Patient is alert and oriented to person, place and time.    Skin:  Warm and dry.  No rash.             Results Review:       I reviewed the patient's new clinical results.    Results from last 7 days  Lab Units 17  0549 1744 17   WBC 10*3/mm3 12.14* 11.69* 13.28*   HEMOGLOBIN g/dL 12.9 12.1 13.2   PLATELETS 10*3/mm3 263 237 236     Results from last 7 days  Lab Units 17  0548 1744 17   SODIUM mmol/L 138 135* 132*   POTASSIUM mmol/L 4.2 3.5 3.9   CHLORIDE mmol/L 102 99 95*   CO2 mmol/L 24.0 25.5 24.8   BUN mg/dL 29* 38* 41*   CREATININE mg/dL 1.20* 1.32* 1.45*   GLUCOSE mg/dL 129* 147* 149*   Estimated Creatinine Clearance: 44.7 mL/min (by C-G formula based on Cr of 1.2).  Results from last 7 days  Lab Units 17  0548 17  0644 17   CALCIUM mg/dL 10.2 9.9 11.1* "   ALBUMIN g/dL  --  2.50* 3.00*       RADIOLOGY  12/7/2017  Pending      baclofen 20 mg Oral Nightly   ceftriaxone 1 g Intravenous Q24H   enoxaparin 40 mg Subcutaneous Q24H   insulin aspart 0-9 Units Subcutaneous 4x Daily With Meals & Nightly   lactobacillus acidophilus 1 capsule Oral Daily   nystatin  Topical Q8H   oxybutynin 5 mg Oral TID   vitamin C 500 mg Oral Daily       sodium chloride 75 mL/hr Last Rate: 75 mL/hr (12/06/17 2423)   Diet Regular; Cardiac, Consistent Carbohydrate, Renal      Assessment/Plan   Assessment:   Principal Problem:    Weakness of lower extremity  Active Problems:    Arthritis    Atonic neurogenic bladder    Fatigue    Hypercalcemia    Nephrolithiasis    Urinary tract infection    Weakness    CKD (chronic kidney disease)    MINERVA (acute kidney injury)        Plan:   1. UTI  - cont rocephin for now until UCx finalizes  - sepsis present on admission  - febrile overnight  - BCx not drawn on admission. If spike another fever will get BCx    2. MINERVA  - baseline looks to be around 0.8   - coming down with IVF  - recheck in am    3. LE Weakness  - could be worsened by UTI but she will likely need rehab at discharge    4. DM2  - metformin held for #2  - ok right now on only SSI    5. Hypercalcemia  - came down with IVF alone but with an Albumin of 2.5 would still be elevated  - holding home Ca supplements  - check PTH      Disposition  1-2 days.      Edin Rutherford MD  [facility]  12/07/17  8:31 AM

## 2017-12-07 NOTE — PROGRESS NOTES
Continued Stay Note  [facility]     Patient Name: Michelle Espinoza  MRN: [id number]  Today's Date: 12/7/2017    Admit Date: 12/5/2017          Discharge Plan       12/07/17 1025    Case Management/Social Work Plan    Plan Short term rehab at [facility]    Additional Comments Pt updated that per Monica at [facility] they have a rehab bed available (private room / private bath).    Coco with [facility] updated that bed available at another facility.      12/07/17 0852    Case Management/Social Work Plan    Plan     Additional Comments               Discharge Codes     None        Expected Discharge Date and Time     Expected Discharge Date Expected Discharge Time    Dec 8, 2017             Dominique Vinson RN

## 2017-12-07 NOTE — THERAPY TREATMENT NOTE
Acute Care - Physical Therapy Treatment Note  [facility]     Patient Name: Michelle Espinoza  : 1945  MRN: [id number]  Today's Date: 2017  Onset of Illness/Injury or Date of Surgery Date: 17          Admit Date: 2017    Visit Dx:    ICD-10-CM ICD-9-CM   1. Weakness R53.1 780.79   2. Fall, initial encounter W19.XXXA E888.9   3. Acute UTI N39.0 599.0   4. Renal insufficiency N28.9 593.9     Patient Active Problem List   Diagnosis   • Abnormal weight gain   • Arm pain   • Arthritis   • Atonic neurogenic bladder   • Benign colonic polyp   • Carotid atherosclerosis   • Fatigue   • Hypercalcemia   • Hypercholesterolemia   • Impaired fasting glucose   • Weakness of lower extremity   • Nephrolithiasis   • Spinal stenosis   • Urinary tract infection   • Chronic venous insufficiency   • B12 deficiency   • Vitamin D deficiency   • Degenerative disc disease, lumbar   • Osteopenia   • T7 and T10-11 Thoracic spinal stenosis   • Weakness   • CKD (chronic kidney disease)   • MINERVA (acute kidney injury)               Adult Rehabilitation Note       17 1146          Rehab Assessment/Intervention    Discipline physical therapist  -AR      Document Type therapy note (daily note)  -AR      Subjective Information agree to therapy  -AR      Patient Effort, Rehab Treatment good  -AR      Symptoms Noted During/After Treatment fatigue  -AR      Precautions/Limitations fall precautions  -AR      Recorded by [AR] Peg Quintana, PT      Pain Assessment    Pain Assessment No/denies pain  -AR      Recorded by [AR] Peg Quintana PT      Cognitive Assessment/Intervention    Current Cognitive/Communication Assessment functional  -AR      Orientation Status oriented x 4  -AR      Recorded by [AR] Peg Quintana PT      Bed Mobility, Assessment/Treatment    Bed Mob, Supine to Sit, [place] not tested  -AR      Bed Mob, Sit to Supine, [place] not tested  -AR      Recorded by [AR] Peg Quintana, PT       Transfer Assessment/Treatment    Transfers, Sit-Stand [place] minimum assist (75% patient effort)  -AR      Transfers, Stand-Sit [place] minimum assist (75% patient effort)  -AR      Transfers, Sit-Stand-Sit, Assist Device rolling walker  -AR      Recorded by [AR] Peg Quintana PT      Gait Assessment/Treatment    Gait, [place] Level minimum assist (75% patient effort)  -AR      Gait, Assistive Device rolling walker  -AR      Gait, Distance (Feet) 10  -AR      Gait, Gait Pattern Analysis swing-through gait  -AR      Gait, Gait Deviations bryn decreased;decreased heel strike;forward flexed posture  -AR      Gait, Safety Issues step length decreased;weight-shifting ability decreased  -AR      Gait, Impairments strength decreased;impaired balance  -AR      Gait, Comment limited by fatigue and weakness  -AR      Recorded by [AR] Peg Quintaan PT      Therapy Exercises    Bilateral Lower Extremities ankle pumps/circles;LAQ;10 reps;AROM:  -AR      Recorded by [AR] Peg Quintana PT      Positioning and Restraints    Pre-Treatment Position sitting in chair/recliner  -AR      Post Treatment Position chair  -AR      In Chair sitting;call light within reach;encouraged to call for assist;exit alarm on;with family/caregiver  -AR      Recorded by [AR] Peg Quintana PT        User Key  (r) = Recorded By, (t) = Taken By, (c) = Cosigned By    Initials Name Effective Dates    DARRIN Quintana PT 06/27/16 -                 IP PT Goals       12/06/17 1145          Bed Mobility PT LTG    Bed Mobility PT LTG, Date Established 12/06/17  -AR      Bed Mobility PT LTG, Time to Achieve 1 wk  -AR      Bed Mobility PT LTG, Activity Type supine to sit/sit to supine  -AR      Bed Mobility PT LTG, [place] Level supervision required  -AR      Transfer Training PT LTG    Transfer Training PT LTG, Date Established 12/06/17  -AR      Transfer Training PT LTG, Time to Achieve 1 wk  -AR      Transfer Training PT  LTG, Activity Type sit to stand/stand to sit;bed to chair /chair to bed  -AR      Transfer Training PT LTG, [place] Level contact guard assist  -AR      Transfer Training PT LTG, Assist Device walker, rolling  -AR      Gait Training PT LTG    Gait Training Goal PT LTG, Date Established 12/06/17  -AR      Gait Training Goal PT LTG, Time to Achieve 1 wk  -AR      Gait Training Goal PT LTG, [place] Level contact guard assist  -AR      Gait Training Goal PT LTG, Assist Device walker, rolling  -AR      Gait Training Goal PT LTG, Distance to Achieve 50  -AR        User Key  (r) = Recorded By, (t) = Taken By, (c) = Cosigned By    Initials Name Provider Type    AR Peg Quintana PT Physical Therapist          Physical Therapy Education     Title: PT OT SLP Therapies (Active)     Topic: Physical Therapy (Active)     Point: Mobility training (Active)    Learning Progress Summary    Learner Readiness Method Response Comment Documented by Status   Patient Acceptance E NR  AR 12/07/17 1314 Active    Acceptance E NR  AR 12/06/17 1144 Active               Point: Home exercise program (Active)    Learning Progress Summary    Learner Readiness Method Response Comment Documented by Status   Patient Acceptance E NR  AR 12/07/17 1314 Active    Acceptance E NR  AR 12/06/17 1144 Active               Point: Body mechanics (Active)    Learning Progress Summary    Learner Readiness Method Response Comment Documented by Status   Patient Acceptance E NR  AR 12/07/17 1314 Active    Acceptance E NR  AR 12/06/17 1144 Active               Point: Precautions (Active)    Learning Progress Summary    Learner Readiness Method Response Comment Documented by Status   Patient Acceptance E NR  AR 12/07/17 1314 Active    Acceptance E NR  AR 12/06/17 1144 Active                      User Key     Initials Effective Dates Name Provider Type Discipline    AR 06/27/16 -  Peg Quintana PT Physical Therapist PT                    PT Recommendation  and Plan  Anticipated Equipment Needs At Discharge:  (no equipment needs )  Anticipated Discharge Disposition: skilled nursing facility (currently recommend DC to [facility])  Planned Therapy Interventions: balance training, bed mobility training, gait training, home exercise program, patient/family education, ROM (Range of Motion), stair training, strengthening  PT Frequency: daily  Plan of Care Review  Plan Of Care Reviewed With: patient  Outcome Summary/Follow up Plan: Improved activity tolerance and independence w/ mobility during PT.  Able to ambulate 10' w/ min A and RW, gait pattern slow shuffling limited by fatigue and weakness.  Discussed recommendation for DC to [facility].             Outcome Measures       12/07/17 1300 12/06/17 1100       How much help from another person do you currently need...    Turning from your back to your side while in flat bed without using bedrails? 3  -AR 3  -AR     Moving from lying on back to sitting on the side of a flat bed without bedrails? 3  -AR 3  -AR     Moving to and from a bed to a chair (including a wheelchair)? 3  -AR 2  -AR     Standing up from a chair using your arms (e.g., wheelchair, bedside chair)? 3  -AR 2  -AR     Climbing 3-5 steps with a railing? 1  -AR 1  -AR     To walk in hospital room? 3  -AR 2  -AR     AM-PAC 6 Clicks Score 16  -AR 13  -AR     Functional Assessment    Outcome Measure Options  AM-PAC 6 Clicks Basic Mobility (PT)  -AR       User Key  (r) = Recorded By, (t) = Taken By, (c) = Cosigned By    Initials Name Provider Type    DARRIN Quintana PT Physical Therapist           Time Calculation:         PT Charges       12/07/17 1315          Time Calculation    Start Time 1146  -AR      Stop Time 1201  -AR      Time Calculation (min) 15 min  -AR      PT Received On 12/07/17  -AR      PT - Next Appointment 12/08/17  -AR        User Key  (r) = Recorded By, (t) = Taken By, (c) = Cosigned By    Initials Name Provider Type    DARRIN Quintana PT Physical  Therapist          Therapy Charges for Today     Code Description Service Date Service Provider Modifiers Qty    71899218954 HC PT EVAL MOD COMPLEXITY 2 12/6/2017 Peg Quintana, PT GP 1    96919235966 HC PT THER PROC EA 15 MIN 12/6/2017 Peg Quintana, PT GP 1    15517297077 HC PT THER SUPP EA 15 MIN 12/6/2017 Peg Quintana, PT GP 1    68181540626 HC PT THER PROC EA 15 MIN 12/7/2017 Peg Quintana, PT GP 1    64455987764 HC PT THER SUPP EA 15 MIN 12/7/2017 Peg Quintana, PT GP 1          PT G-Codes  Outcome Measure Options: AM-PAC 6 Clicks Basic Mobility (PT)    Peg Quintana PT  12/7/2017

## 2017-12-07 NOTE — PROGRESS NOTES
Continued Stay Note  [facility]     Patient Name: Michelle Espinoza  MRN: [id number]  Today's Date: 12/7/2017    Admit Date: 12/5/2017          Discharge Plan       12/07/17 0852    Case Management/Social Work Plan    Plan Short term rehab at [facility], [facility] is backup    Additional Comments Pt now agreeable to rehab, she said it is all she can do to get up to the chair with help. Pt selected [facility] with [facility] as backup, pt said she wants a private room / private bath.   Monica with [facility] said bed is available.               Discharge Codes     None            Dominique Vinson RN

## 2017-12-07 NOTE — PLAN OF CARE
Problem: Patient Care Overview (Adult)  Goal: Plan of Care Review  Outcome: Ongoing (interventions implemented as appropriate)    12/07/17 7778   Coping/Psychosocial Response Interventions   Plan Of Care Reviewed With patient   Patient Care Overview   Progress no change   Outcome Evaluation   Outcome Summary/Follow up Plan BP elevated throughout shift. Up with assist to BSC. Fall precautions maintained. Voiding large amounts of urine w/o straight caths. No c/o pain. No c/o nausea. Tolerating regular diet. Plan is to be D/C'd to rehab in AM.  at bedside. Accuchecks completed ACHS. Pt refusing insulin. Ranging from 128-132.        Goal: Adult Individualization and Mutuality  Outcome: Ongoing (interventions implemented as appropriate)  Goal: Discharge Needs Assessment  Outcome: Ongoing (interventions implemented as appropriate)    Problem: Fall Risk (Adult)  Goal: Absence of Falls  Outcome: Ongoing (interventions implemented as appropriate)    Problem: Pain, Acute (Adult)  Goal: Acceptable Pain Control/Comfort Level  Outcome: Ongoing (interventions implemented as appropriate)    Problem: Pressure Ulcer Risk (Govind Scale) (Adult,Obstetrics,Pediatric)  Goal: Skin Integrity  Outcome: Ongoing (interventions implemented as appropriate)

## 2017-12-07 NOTE — PLAN OF CARE
Problem: Patient Care Overview (Adult)  Goal: Plan of Care Review    12/07/17 1314   Coping/Psychosocial Response Interventions   Plan Of Care Reviewed With patient   Outcome Evaluation   Outcome Summary/Follow up Plan Improved activity tolerance and independence w/ mobility during PT. Able to ambulate 10' w/ min A and RW, gait pattern slow shuffling limited by fatigue and weakness. Discussed recommendation for DC to [facility].

## 2017-12-08 VITALS
DIASTOLIC BLOOD PRESSURE: 98 MMHG | HEIGHT: 63 IN | TEMPERATURE: 97.6 F | BODY MASS INDEX: 32.66 KG/M2 | OXYGEN SATURATION: 94 % | RESPIRATION RATE: 18 BRPM | WEIGHT: 184.3 LBS | HEART RATE: 75 BPM | SYSTOLIC BLOOD PRESSURE: 182 MMHG

## 2017-12-08 LAB
ANION GAP SERPL CALCULATED.3IONS-SCNC: 11.3 MMOL/L
BACTERIA SPEC AEROBE CULT: ABNORMAL
BACTERIA SPEC AEROBE CULT: ABNORMAL
BASOPHILS # BLD AUTO: 0.02 10*3/MM3 (ref 0–0.2)
BASOPHILS NFR BLD AUTO: 0.2 % (ref 0–1.5)
BUN BLD-MCNC: 20 MG/DL (ref 8–23)
BUN/CREAT SERPL: 23 (ref 7–25)
CALCIUM SPEC-SCNC: 10.3 MG/DL (ref 8.6–10.5)
CHLORIDE SERPL-SCNC: 107 MMOL/L (ref 98–107)
CO2 SERPL-SCNC: 23.7 MMOL/L (ref 22–29)
CREAT BLD-MCNC: 0.87 MG/DL (ref 0.57–1)
DEPRECATED RDW RBC AUTO: 43.3 FL (ref 37–54)
EOSINOPHIL # BLD AUTO: 0.17 10*3/MM3 (ref 0–0.7)
EOSINOPHIL NFR BLD AUTO: 1.6 % (ref 0.3–6.2)
ERYTHROCYTE [DISTWIDTH] IN BLOOD BY AUTOMATED COUNT: 13.2 % (ref 11.7–13)
GFR SERPL CREATININE-BSD FRML MDRD: 64 ML/MIN/1.73
GLUCOSE BLD-MCNC: 135 MG/DL (ref 65–99)
GLUCOSE BLDC GLUCOMTR-MCNC: 124 MG/DL (ref 70–130)
GLUCOSE BLDC GLUCOMTR-MCNC: 146 MG/DL (ref 70–130)
HCT VFR BLD AUTO: 39.4 % (ref 35.6–45.5)
HGB BLD-MCNC: 13.1 G/DL (ref 11.9–15.5)
IMM GRANULOCYTES # BLD: 0.11 10*3/MM3 (ref 0–0.03)
IMM GRANULOCYTES NFR BLD: 1 % (ref 0–0.5)
LYMPHOCYTES # BLD AUTO: 1.28 10*3/MM3 (ref 0.9–4.8)
LYMPHOCYTES NFR BLD AUTO: 12.1 % (ref 19.6–45.3)
MCH RBC QN AUTO: 29.6 PG (ref 26.9–32)
MCHC RBC AUTO-ENTMCNC: 33.2 G/DL (ref 32.4–36.3)
MCV RBC AUTO: 88.9 FL (ref 80.5–98.2)
MONOCYTES # BLD AUTO: 1.74 10*3/MM3 (ref 0.2–1.2)
MONOCYTES NFR BLD AUTO: 16.4 % (ref 5–12)
NEUTROPHILS # BLD AUTO: 7.28 10*3/MM3 (ref 1.9–8.1)
NEUTROPHILS NFR BLD AUTO: 68.7 % (ref 42.7–76)
PLATELET # BLD AUTO: 283 10*3/MM3 (ref 140–500)
PMV BLD AUTO: 10.8 FL (ref 6–12)
POTASSIUM BLD-SCNC: 3.8 MMOL/L (ref 3.5–5.2)
PTH-INTACT SERPL-MCNC: 40.3 PG/ML (ref 15–65)
RBC # BLD AUTO: 4.43 10*6/MM3 (ref 3.9–5.2)
SODIUM BLD-SCNC: 142 MMOL/L (ref 136–145)
WBC NRBC COR # BLD: 10.6 10*3/MM3 (ref 4.5–10.7)

## 2017-12-08 PROCEDURE — 82962 GLUCOSE BLOOD TEST: CPT

## 2017-12-08 PROCEDURE — 83970 ASSAY OF PARATHORMONE: CPT | Performed by: INTERNAL MEDICINE

## 2017-12-08 PROCEDURE — 80048 BASIC METABOLIC PNL TOTAL CA: CPT | Performed by: INTERNAL MEDICINE

## 2017-12-08 PROCEDURE — 85025 COMPLETE CBC W/AUTO DIFF WBC: CPT | Performed by: INTERNAL MEDICINE

## 2017-12-08 RX ORDER — AMLODIPINE BESYLATE 5 MG/1
5 TABLET ORAL
Status: DISCONTINUED | OUTPATIENT
Start: 2017-12-08 | End: 2017-12-08 | Stop reason: HOSPADM

## 2017-12-08 RX ORDER — CEPHALEXIN 500 MG/1
500 CAPSULE ORAL 2 TIMES DAILY
Qty: 14 CAPSULE | Refills: 0
Start: 2017-12-08 | End: 2017-12-15

## 2017-12-08 RX ORDER — AMLODIPINE BESYLATE 5 MG/1
5 TABLET ORAL
Start: 2017-12-08 | End: 2018-11-27

## 2017-12-08 RX ADMIN — NYSTATIN: 100000 POWDER TOPICAL at 06:17

## 2017-12-08 RX ADMIN — AMLODIPINE BESYLATE 5 MG: 5 TABLET ORAL at 13:03

## 2017-12-08 RX ADMIN — OXYCODONE HYDROCHLORIDE AND ACETAMINOPHEN 500 MG: 500 TABLET ORAL at 08:32

## 2017-12-08 RX ADMIN — Medication 1 CAPSULE: at 08:32

## 2017-12-08 RX ADMIN — SODIUM CHLORIDE 75 ML/HR: 9 INJECTION, SOLUTION INTRAVENOUS at 10:15

## 2017-12-08 RX ADMIN — OXYBUTYNIN CHLORIDE 10 MG: 5 TABLET ORAL at 08:32

## 2017-12-08 RX ADMIN — SODIUM CHLORIDE 75 ML/HR: 9 INJECTION, SOLUTION INTRAVENOUS at 00:34

## 2017-12-08 NOTE — DISCHARGE SUMMARY
PHYSICIAN DISCHARGE SUMMARY                                                                        [facility]    Patient Identification:  Name: Michelle Espinoza  Age: 72 y.o.  Sex: female  :  1945  MRN: [id number]  Primary Care Physician: BHAKTI Rasheed    Admit date: 2017  Discharge date and time:2017  Discharged Condition: good    Discharge Diagnoses:  Active Hospital Problems (** Indicates Principal Problem)    Diagnosis Date Noted   • **Weakness of lower extremity [R29.898] 2016   • CKD (chronic kidney disease) [N18.9] 2017   • MINERVA (acute kidney injury) [N17.9] 2017   • Weakness [R53.1] 2017   • Urinary tract infection [N39.0] 2016   • Nephrolithiasis [N20.0] 2016   • Fatigue [R53.83] 2016   • Atonic neurogenic bladder [N31.2] 2016   • Arthritis [M19.90] 2016   • Hypercalcemia [E83.52] 2016      Resolved Hospital Problems    Diagnosis Date Noted Date Resolved   No resolved problems to display.      Patient Active Problem List   Diagnosis Code   • Abnormal weight gain R63.5   • Arm pain M79.603   • Arthritis M19.90   • Atonic neurogenic bladder N31.2   • Benign colonic polyp K63.5   • Carotid atherosclerosis I65.29   • Fatigue R53.83   • Hypercalcemia E83.52   • Hypercholesterolemia E78.00   • Impaired fasting glucose R73.01   • Weakness of lower extremity R29.898   • Nephrolithiasis N20.0   • Spinal stenosis M48.00   • Urinary tract infection N39.0   • Chronic venous insufficiency I87.2   • B12 deficiency E53.8   • Vitamin D deficiency E55.9   • Degenerative disc disease, lumbar M51.36   • Osteopenia M85.80   • T7 and T10-11 Thoracic spinal stenosis M48.04   • Weakness R53.1   • CKD (chronic kidney disease) N18.9   • MINERVA (acute kidney injury) N17.9       PMHX:   Past Medical History:   Diagnosis Date   • Benign colonic polyp    • Cataract      bilateral   • Degenerative disc disease, lumbar    • Hypercholesteremia    • Leg cramping    • Lumbar pain    • Neck pain    • Osteoarthritis    • Pre-diabetes    • Pyelonephritis 2014   • Renal stone 2014   • Sepsis    • Urinary incontinence    • Vaginal delivery      PSHX:   Past Surgical History:   Procedure Laterality Date   • APPENDECTOMY     • BACK SURGERY  1988       2004 inés placement     Dr Weston   • CARPAL TUNNEL RELEASE Bilateral    • CERVICAL SPINE SURGERY  1986 2005    C3-4  C6-7    Dr Tierney   •  SECTION      x 2   • ORTHOPEDIC SURGERY      cervical X3 fusion 6-7 and lumbar X4       Hospital Course: Michelle Espinoza  is a 72-year-old female who has chronic degenerative disc disease of the lumbar spine and has had surgeries on it for the last procedure performed bilateral mid spine Center in . According to her she did well for 4 years afterwards and then last year started having progressive weakness of the lower extremities. She has been extensively evaluated by neurology and neurosurgery service and eventually the recommendation was made for physical therapy to try to preserve her declining function. Patient says that she was participating in physical therapy and also has issues with neurogenic bladder for which she performs straight catheterization was on a needed basis. About a month or so ago she started to decline progressively despite doing the physical therapy and had to use walker and assistance to move around in the house. About a week ago she started feeling poorly decreased appetite fever and chills and lack of energy. She was somehow managing her mobility with the help of walker but can tell that she is declining. Today she went for an ophthalmology visit was barely able to get into the car and after the visit she came home and then she try to get out of the car to take a step to get inside the house she couldn't do it this resulted in her coming to  the emergency room for further evaluation. Patient denies any new changes in her medications. Workup in the emergency room didn't reveal abnormal urinalysis consistent with urinary tract infection. Patient was also noted to have slightly elevated creatinine and elevated calcium. Patient has received IV Rocephin in the emergency room and was  admitted for further care.        The patient was treated with IV antibiotics for urinary tract infection was still very weak.  The patient is going to go to skilled nursing facility for some rehabilitation for strengthening prior to going home.  She'll continue to do in and out catheter twice daily as needed for urine retention and finish another 7 days of oral antibiotics for UTI.  She'll follow-up with her primary care provider after released from rehabilitation.    Consults:     Consults     Date and Time Order Name Status Description    12/5/2017 2244 [facility] (on-call MD unless specified) Completed           Results from last 7 days  Lab Units 12/08/17  0545   WBC 10*3/mm3 10.60   HEMOGLOBIN g/dL 13.1   HEMATOCRIT % 39.4   PLATELETS 10*3/mm3 283       Results from last 7 days  Lab Units 12/08/17  0545   SODIUM mmol/L 142   POTASSIUM mmol/L 3.8   CHLORIDE mmol/L 107   CO2 mmol/L 23.7   BUN mg/dL 20   CREATININE mg/dL 0.87   GLUCOSE mg/dL 135*   CALCIUM mg/dL 10.3     Significant Diagnostic Studies:   Lab Results   Component Value Date    WBC 10.60 12/08/2017    HGB 13.1 12/08/2017    HCT 39.4 12/08/2017     12/08/2017     Lab Results   Component Value Date     12/08/2017    K 3.8 12/08/2017     12/08/2017    CO2 23.7 12/08/2017    BUN 20 12/08/2017    CREATININE 0.87 12/08/2017    GLUCOSE 135 (H) 12/08/2017     Lab Results   Component Value Date    CALCIUM 10.3 12/08/2017     Lab Results   Component Value Date    AST 34 (H) 12/06/2017    ALT 32 12/06/2017    ALKPHOS 223 (H) 12/06/2017     No results found for: APTT, INR  Lab Results   Component Value Date     COLORU Yellow 12/05/2017    CLARITYU Cloudy (A) 12/05/2017    PHUR 5.5 12/05/2017    GLUCOSEU Negative 12/05/2017    KETONESU Negative 12/05/2017    BLOODU Large (3+) (A) 12/05/2017    LEUKOCYTESUR Large (3+) (A) 12/05/2017    BILIRUBINUR Negative 12/05/2017    UROBILINOGEN 0.2 E.U./dL 12/05/2017    RBCUA 6-12 (A) 12/05/2017    WBCUA Too Numerous to Count (A) 12/05/2017    BACTERIA 4+ (A) 12/05/2017     No results found for: TROPONINT, TROPONINI, BNP  No components found for: HGBA1C;2  No components found for: TSH;2  Imaging Results (all)     Procedure Component Value Units Date/Time    XR Chest 2 View [[id number]] Collected:  12/05/17 2256     Updated:  12/05/17 2300    Narrative:       TWO-VIEW CHEST X-RAY     CLINICAL HISTORY: eval for pneumonia     COMPARISON: 06/10/2014     FINDINGS: PA and lateral views of the chest were obtained. The lungs are  well expanded and appear clear. There is evidence of prior granulomatous  disease. Heart size and pulmonary vascularity are normal. There are no  pleural effusions.  There is moderate mid thoracic dextroscoliosis with  multilevel spurring. Arthritic changes are present in the shoulders,  right greater than left. Prominent loop of colon noted beneath the right  hemidiaphragm. There is mild thoracic aortic ectasia. Generalized  osteopenia           Impression:       1. Evidence of prior granulomatous disease, no active airspace disease  appreciated.     This report was finalized on 12/5/2017 10:57 PM by Avery Ramos MD.           Lab Results (last 7 days)     Procedure Component Value Units Date/Time    CBC & Differential [[id number]] Collected:  12/05/17 2027    Specimen:  Blood Updated:  12/05/17 2038    Narrative:       The following orders were created for panel order CBC & Differential.  Procedure                               Abnormality         Status                     ---------                               -----------         ------                     CBC  Auto Differential[[id number]]        Abnormal            Final result                 Please view results for these tests on the individual orders.    CBC Auto Differential [[id number]]  (Abnormal) Collected:  12/05/17 2027    Specimen:  Blood Updated:  12/05/17 2038     WBC 13.28 (H) 10*3/mm3      RBC 4.44 10*6/mm3      Hemoglobin 13.2 g/dL      Hematocrit 39.7 %      MCV 89.4 fL      MCH 29.7 pg      MCHC 33.2 g/dL      RDW 12.5 %      RDW-SD 40.3 fl      MPV 10.9 fL      Platelets 236 10*3/mm3      Neutrophil % 86.1 (H) %      Lymphocyte % 4.2 (L) %      Monocyte % 9.0 %      Eosinophil % 0.3 %      Basophil % 0.1 %      Immature Grans % 0.3 %      Neutrophils, Absolute 11.44 (H) 10*3/mm3      Lymphocytes, Absolute 0.56 (L) 10*3/mm3      Monocytes, Absolute 1.19 10*3/mm3      Eosinophils, Absolute 0.04 10*3/mm3      Basophils, Absolute 0.01 10*3/mm3      Immature Grans, Absolute 0.04 (H) 10*3/mm3     Comprehensive Metabolic Panel [[id number]]  (Abnormal) Collected:  12/05/17 2027    Specimen:  Blood Updated:  12/05/17 2106     Glucose 149 (H) mg/dL      BUN 41 (H) mg/dL      Creatinine 1.45 (H) mg/dL      Sodium 132 (L) mmol/L      Potassium 3.9 mmol/L      Chloride 95 (L) mmol/L      CO2 24.8 mmol/L      Calcium 11.1 (H) mg/dL      Total Protein 7.4 g/dL      Albumin 3.00 (L) g/dL      ALT (SGPT) 35 (H) U/L      AST (SGOT) 32 U/L      Alkaline Phosphatase 220 (H) U/L      Total Bilirubin 0.5 mg/dL      eGFR Non African Amer 36 (L) mL/min/1.73      Globulin 4.4 gm/dL      A/G Ratio 0.7 g/dL      BUN/Creatinine Ratio 28.3 (H)     Anion Gap 12.2 mmol/L     Urinalysis With / Culture If Indicated - Urine, Clean Catch [[id number]]  (Abnormal) Collected:  12/05/17 2218    Specimen:  Urine from Urine, Clean Catch Updated:  12/05/17 2229     Color, UA Yellow     Appearance, UA Cloudy (A)     pH, UA 5.5     Specific Gravity, UA 1.014     Glucose, UA Negative     Ketones, UA Negative     Bilirubin, UA Negative     Blood, UA  Large (3+) (A)     Protein,  mg/dL (2+) (A)     Leuk Esterase, UA Large (3+) (A)     Nitrite, UA Positive (A)     Urobilinogen, UA 0.2 E.U./dL    Urinalysis, Microscopic Only - Urine, Clean Catch [[id number]]  (Abnormal) Collected:  12/05/17 2218    Specimen:  Urine from Urine, Clean Catch Updated:  12/05/17 2231     RBC, UA 6-12 (A) /HPF      WBC, UA Too Numerous to Count (A) /HPF      Bacteria, UA 4+ (A) /HPF      Squamous Epithelial Cells, UA 0-2 /HPF      Hyaline Casts, UA 0-2 /LPF      Methodology Automated Microscopy    POC Glucose Fingerstick [[id number]]  (Abnormal) Collected:  12/06/17 0557    Specimen:  Blood Updated:  12/06/17 0607     Glucose 138 (H) mg/dL     Narrative:       Meter: [id number] : [id number] Donna BARROS    CBC (No Diff) [[id number]]  (Abnormal) Collected:  12/06/17 0644    Specimen:  Blood Updated:  12/06/17 0739     WBC 11.69 (H) 10*3/mm3      RBC 4.09 10*6/mm3      Hemoglobin 12.1 g/dL      Hematocrit 36.5 %      MCV 89.2 fL      MCH 29.6 pg      MCHC 33.2 g/dL      RDW 12.8 %      RDW-SD 41.4 fl      MPV 11.3 fL      Platelets 237 10*3/mm3     Comprehensive Metabolic Panel [[id number]]  (Abnormal) Collected:  12/06/17 0644    Specimen:  Blood Updated:  12/06/17 0804     Glucose 147 (H) mg/dL      BUN 38 (H) mg/dL      Creatinine 1.32 (H) mg/dL      Sodium 135 (L) mmol/L      Potassium 3.5 mmol/L      Chloride 99 mmol/L      CO2 25.5 mmol/L      Calcium 9.9 mg/dL      Total Protein 6.7 g/dL      Albumin 2.50 (L) g/dL      ALT (SGPT) 32 U/L      AST (SGOT) 34 (H) U/L      Alkaline Phosphatase 223 (H) U/L      Total Bilirubin 0.4 mg/dL      eGFR Non African Amer 40 (L) mL/min/1.73      Globulin 4.2 gm/dL      A/G Ratio 0.6 g/dL      BUN/Creatinine Ratio 28.8 (H)     Anion Gap 10.5 mmol/L     POC Glucose Fingerstick [[id number]]  (Abnormal) Collected:  12/06/17 1133    Specimen:  Blood Updated:  12/06/17 1135     Glucose 142 (H) mg/dL     Narrative:       Meter: [id number]  : [id number] Hari Ayoubinda    POC Glucose Fingerstick [[id number]]  (Abnormal) Collected:  12/06/17 1608    Specimen:  Blood Updated:  12/06/17 1610     Glucose 174 (H) mg/dL     Narrative:       Meter: [id number] : [id number] Hari Patricia    POC Glucose Fingerstick [[id number]]  (Abnormal) Collected:  12/06/17 2108    Specimen:  Blood Updated:  12/06/17 2109     Glucose 174 (H) mg/dL     Narrative:       Meter: [id number] : [id number] Explorys    POC Glucose Fingerstick [[id number]]  (Normal) Collected:  12/07/17 0554    Specimen:  Blood Updated:  12/07/17 0556     Glucose 128 mg/dL     Narrative:       Meter: [id number] : [id number] MedGRCya    CBC & Differential [[id number]] Collected:  12/07/17 0549    Specimen:  Blood Updated:  12/07/17 0642    Narrative:       The following orders were created for panel order CBC & Differential.  Procedure                               Abnormality         Status                     ---------                               -----------         ------                     CBC Auto Differential[[id number]]        Abnormal            Final result                 Please view results for these tests on the individual orders.    CBC Auto Differential [[id number]]  (Abnormal) Collected:  12/07/17 0549    Specimen:  Blood Updated:  12/07/17 0642     WBC 12.14 (H) 10*3/mm3      RBC 4.34 10*6/mm3      Hemoglobin 12.9 g/dL      Hematocrit 38.8 %      MCV 89.4 fL      MCH 29.7 pg      MCHC 33.2 g/dL      RDW 13.2 (H) %      RDW-SD 43.4 fl      MPV 10.6 fL      Platelets 263 10*3/mm3      Neutrophil % 77.1 (H) %      Lymphocyte % 7.7 (L) %      Monocyte % 13.5 (H) %      Eosinophil % 1.0 %      Basophil % 0.1 %      Immature Grans % 0.6 (H) %      Neutrophils, Absolute 9.37 (H) 10*3/mm3      Lymphocytes, Absolute 0.93 10*3/mm3      Monocytes, Absolute 1.64 (H) 10*3/mm3      Eosinophils, Absolute 0.12 10*3/mm3      Basophils, Absolute 0.01 10*3/mm3      Immature  Grans, Absolute 0.07 (H) 10*3/mm3     Basic Metabolic Panel [[id number]]  (Abnormal) Collected:  12/07/17 0548    Specimen:  Blood Updated:  12/07/17 0701     Glucose 129 (H) mg/dL      BUN 29 (H) mg/dL      Creatinine 1.20 (H) mg/dL      Sodium 138 mmol/L      Potassium 4.2 mmol/L      Chloride 102 mmol/L      CO2 24.0 mmol/L      Calcium 10.2 mg/dL      eGFR Non African Amer 44 (L) mL/min/1.73      BUN/Creatinine Ratio 24.2     Anion Gap 12.0 mmol/L     Narrative:       GFR Normal >60  Chronic Kidney Disease <60  Kidney Failure <15    POC Glucose Fingerstick [[id number]]  (Abnormal) Collected:  12/07/17 1116    Specimen:  Blood Updated:  12/07/17 1127     Glucose 152 (H) mg/dL     Narrative:       Meter: [id number] : [id number] Gayla Marilou C.    POC Glucose Fingerstick [[id number]]  (Abnormal) Collected:  12/07/17 1630    Specimen:  Blood Updated:  12/07/17 1649     Glucose 134 (H) mg/dL     Narrative:       Meter: [id number] : [id number] Gayla Marilou C.    POC Glucose Fingerstick [[id number]]  (Abnormal) Collected:  12/07/17 2047    Specimen:  Blood Updated:  12/07/17 2049     Glucose 171 (H) mg/dL     Narrative:       Meter: [id number] : [id number] Rivera Cecca CNA    POC Glucose Fingerstick [[id number]]  (Normal) Collected:  12/08/17 0552    Specimen:  Blood Updated:  12/08/17 0556     Glucose 124 mg/dL     Narrative:       Meter: [id number] : [id number] Rivera Cecca CNA    CBC & Differential [[id number]] Collected:  12/08/17 0545    Specimen:  Blood Updated:  12/08/17 0637    Narrative:       The following orders were created for panel order CBC & Differential.  Procedure                               Abnormality         Status                     ---------                               -----------         ------                     CBC Auto Differential[[id number]]        Abnormal            Final result                 Please view results for these tests on the individual orders.    CBC  Auto Differential [[id number]]  (Abnormal) Collected:  12/08/17 0545    Specimen:  Blood Updated:  12/08/17 0637     WBC 10.60 10*3/mm3      RBC 4.43 10*6/mm3      Hemoglobin 13.1 g/dL      Hematocrit 39.4 %      MCV 88.9 fL      MCH 29.6 pg      MCHC 33.2 g/dL      RDW 13.2 (H) %      RDW-SD 43.3 fl      MPV 10.8 fL      Platelets 283 10*3/mm3      Neutrophil % 68.7 %      Lymphocyte % 12.1 (L) %      Monocyte % 16.4 (H) %      Eosinophil % 1.6 %      Basophil % 0.2 %      Immature Grans % 1.0 (H) %      Neutrophils, Absolute 7.28 10*3/mm3      Lymphocytes, Absolute 1.28 10*3/mm3      Monocytes, Absolute 1.74 (H) 10*3/mm3      Eosinophils, Absolute 0.17 10*3/mm3      Basophils, Absolute 0.02 10*3/mm3      Immature Grans, Absolute 0.11 (H) 10*3/mm3     Basic Metabolic Panel [[id number]]  (Abnormal) Collected:  12/08/17 0545    Specimen:  Blood Updated:  12/08/17 0654     Glucose 135 (H) mg/dL      BUN 20 mg/dL      Creatinine 0.87 mg/dL      Sodium 142 mmol/L      Potassium 3.8 mmol/L      Chloride 107 mmol/L      CO2 23.7 mmol/L      Calcium 10.3 mg/dL      eGFR Non African Amer 64 mL/min/1.73      BUN/Creatinine Ratio 23.0     Anion Gap 11.3 mmol/L     Narrative:       GFR Normal >60  Chronic Kidney Disease <60  Kidney Failure <15    Urine Culture - Urine, Urine, Clean Catch [[id number]]  (Abnormal)  (Susceptibility) Collected:  12/05/17 2218    Specimen:  Urine from Urine, Clean Catch Updated:  12/08/17 0858     Urine Culture --      >100,000 CFU/mL Escherichia coli (A)    Susceptibility      Escherichia coli     ZACKERY     Ampicillin <=2 ug/ml Susceptible     Ampicillin + Sulbactam <=2 ug/ml Susceptible     Cefazolin <=4 ug/ml Susceptible     Cefepime <=1 ug/ml Susceptible     Ceftriaxone <=1 ug/ml Susceptible     Ciprofloxacin <=0.25 ug/ml Susceptible     Ertapenem <=0.5 ug/ml Susceptible     Gentamicin <=1 ug/ml Susceptible     Levofloxacin <=0.12 ug/ml Susceptible     Nitrofurantoin <=16 ug/ml Susceptible      "Piperacillin + Tazobactam <=4 ug/ml Susceptible     Tetracycline <=1 ug/ml Susceptible     Trimethoprim + Sulfamethoxazole <=20 ug/ml Susceptible                    PTH, Intact [[id number]]  (Normal) Collected:  12/08/17 0545    Specimen:  Blood Updated:  12/08/17 0938     PTH, Intact 40.3 pg/mL         BP (!) 182/98 (BP Location: Right arm, Patient Position: Lying)  Pulse 75  Temp 97.6 °F (36.4 °C) (Oral)   Resp 18  Ht 160 cm (63\")  Wt 83.6 kg (184 lb 4.8 oz)  LMP  (LMP Unknown)  SpO2 94%  BMI 32.65 kg/m2    Discharge Exam:  General Appearance:    Alert, cooperative, no distress                          Head:    Normocephalic, without obvious abnormality, atraumatic                          Eyes:                            Throat:   Lips, tongue, gums normal                          Neck:   Supple, symmetrical, trachea midline, no JVD                        Lungs:     Clear to auscultation bilaterally, respirations unlabored                Chest Wall:    No tenderness or deformity                        Heart:    Regular rate and rhythm, S1 and S2 normal, no murmur,no  Rub or gallop                  Abdomen:     Soft, non-tender, bowel sounds active, no masses, no  organomegaly                  Extremities:   Extremities normal, atraumatic, no cyanosis or edema                             Skin:   Skin is warm and dry,  no rashes or palpable lesions                  Neurologic:   no focal deficits noted     Disposition:  Skilled nursing facility    Patient Instructions:    Michelle Espinoza   Home Medication Instructions ROMARIO:[id number]    Printed on:12/08/17 1231   Medication Information                      amLODIPine (NORVASC) 5 MG tablet  Take 1 tablet by mouth Daily.             baclofen (LIORESAL) 20 MG tablet  TAKE 1 TABLET BY MOUTH EVERY DAY             Calcium Carbonate-Vit D-Min (CALCIUM 1200 PO)  Take  by mouth Daily.             cephalexin (KEFLEX) 500 MG capsule  Take 1 capsule by mouth 2 (Two) " Times a Day for 7 days.             Cholecalciferol (VITAMIN D3) 5000 UNITS capsule capsule  Take 5,000 Units by mouth Daily.             ezetimibe (ZETIA) 10 MG tablet  Take 1 tablet by mouth Daily.             Fish Oil-Krill Oil (PA FISH OIL/KRILL PO)  Take  by mouth Daily.             glucose blood test strip  Test once weekly DX: R 73.03 One Touch Ultra Test Strips             guaifenesin-dextromethorphan (MUCINEX DM)  MG tablet sustained-release 12 hour tablet  Take 1 tablet by mouth 2 (Two) Times a Day.             loratadine (CLARITIN) 10 MG tablet  Take 1 tablet by mouth Daily.             Lutein-Bilberry (BILBERRY PLUS LUTEIN) 5-1000 MCG-MG capsule  Take  by mouth Daily.             metFORMIN (GLUCOPHAGE) 500 MG tablet  Take 1 tablet by mouth 2 (Two) Times a Day With Meals.             NYSTATIN 253536 UNIT/GM powder  APPLY TO AFFECTED AREA 2 TO 3 TIMES PER DAY AS NEEDED             Probiotic Product (PROBIOTIC PO)  Take  by mouth Daily.             Triamcinolone Acetonide (NASACORT AQ) 55 MCG/ACT nasal inhaler  2 SPRAYS IN EACH NOSTRIL ONCE DAILY             TURMERIC PO  Take  by mouth.             vitamin C (ASCORBIC ACID) 500 MG tablet  Take 500 mg by mouth Daily. With D3               Future Appointments  Date Time Provider Department Center   12/14/2017 10:15 AM Mahamed Massey MD [facility] None   12/14/2017 10:45 AM DAVID[facility] JACQUIE MARRUFOO [facility] None   4/6/2018 10:00 AM [facility] None   4/20/2018 10:40 AM Bethanie Montano MD [facility] None   5/16/2018 10:00 AM BHAKTI Rasheed [facility] None     Follow-up Information     Follow up with BHAKTI Rasheed .    Specialty:  Family Medicine    Why:  after released from rehab    Contact information:    [address] [facility] [address] [phone]          Follow up with Jeffry Bhagat MD .    Specialty:  Ophthalmology    Contact information:    [address]  [facility] [id number] [phone]           Follow up with [facility] .    Specialty:  Skilled Nursing Facility    Contact information:    [address]  [facility] [address] [phone]        Discharge Order     Start     Ordered    12/08/17 1058  Discharge patient  Once     Expected Discharge Date:  12/08/17    Discharge Disposition:  Skilled Nursing Facility (DC - External)        12/08/17 1058          Total time spent discharging patient including evaluation,post hospitalization follow up,  medication and post hospitalization instructions and education total time exceeds 30 minutes.    Signed:  Jose Maria Singh MD  12/8/2017  12:31 PM

## 2017-12-08 NOTE — PROGRESS NOTES
Case Management/Social Work    Patient Name:  Michelle Espinoza  YOB: 1945  MRN: [id number]  Admit Date:  12/5/2017    IMM Letter reviewed with pt, she signed and a copy was provided to her.  I advised pt I was not able to reach her dtr due to recording said I had called a non working number. Pt said that is true due to she is having some financial trouble but I can notify her via email.  I advised I have notified her  and ask if he could let their dtr know.    Electronically signed by:  Dominique Vinson RN  12/08/17 11:55 AM

## 2017-12-08 NOTE — PLAN OF CARE
Problem: Patient Care Overview (Adult)  Goal: Plan of Care Review  Outcome: Ongoing (interventions implemented as appropriate)    12/08/17 0349   Coping/Psychosocial Response Interventions   Plan Of Care Reviewed With patient   Patient Care Overview   Progress no change   Outcome Evaluation   Outcome Summary/Follow up Plan Elevated BP. Up with assist to BSC with poor weight bearing. St. Catherization done for urinary retention. -Insulin refused.        Goal: Adult Individualization and Mutuality  Outcome: Ongoing (interventions implemented as appropriate)  Goal: Discharge Needs Assessment  Outcome: Ongoing (interventions implemented as appropriate)    Problem: Fall Risk (Adult)  Goal: Absence of Falls  Outcome: Ongoing (interventions implemented as appropriate)    Problem: Pain, Acute (Adult)  Goal: Acceptable Pain Control/Comfort Level  Outcome: Ongoing (interventions implemented as appropriate)    Problem: Pressure Ulcer Risk (Govind Scale) (Adult,Obstetrics,Pediatric)  Goal: Skin Integrity  Outcome: Ongoing (interventions implemented as appropriate)

## 2017-12-08 NOTE — PROGRESS NOTES
Continued Stay Note  [facility]     Patient Name: Michelle Espinoza  MRN: [id number]  Today's Date: 12/8/2017    Admit Date: 12/5/2017          Discharge Plan       12/08/17 1146    Case Management/Social Work Plan    Additional Comments Pt with a discharge order, she said her  will transport her to [facility], I spoke with pt's  Singh [phone] by phone, he said he will be here within the hour, the number for pt's adán Luis, according to a recording is a non working  number.  Monica with [facility] notified of discharge and transport time.  Discharge clinical faxed to [facility].               Discharge Codes       12/08/17 1144    Discharge Codes    Discharge Codes 03  Discharged/transferred to skilled nursing facility (SNF) with Medicare certification in anticipation of skilled care        Expected Discharge Date and Time     Expected Discharge Date Expected Discharge Time    Dec 8, 2017             Dominique Vinson RN

## 2017-12-11 NOTE — PROGRESS NOTES
Case Management Discharge Note    Final Note: The patient was discharged to a skilled bed at [facility] and was transported by family.  SIMRAN Negrete    Discharge Placement     Facility/Agency Request Status Selected? Address Phone Number Fax Number    [facility] Accepted    Yes [address] [address] [phone]    [facility] Declined   1st choice has a bed     [address] [address] [phone]        Other: Other (Private auto)    Discharge Codes: 03  Discharged/transferred to skilled nursing facility (SNF) with Medicare certification in anticipation of skilled care

## 2017-12-12 ENCOUNTER — EPISODE CHANGES (OUTPATIENT)
Dept: CASE MANAGEMENT | Facility: OTHER | Age: 72
End: 2017-12-12

## 2017-12-18 ENCOUNTER — PATIENT OUTREACH (OUTPATIENT)
Dept: CASE MANAGEMENT | Facility: OTHER | Age: 72
End: 2017-12-18

## 2017-12-21 RX ORDER — OXYBUTYNIN CHLORIDE 5 MG/1
5 TABLET ORAL 3 TIMES DAILY
Qty: 90 TABLET | Refills: 0 | Status: SHIPPED | OUTPATIENT
Start: 2017-12-21 | End: 2018-01-31 | Stop reason: SDUPTHER

## 2017-12-26 ENCOUNTER — PATIENT OUTREACH (OUTPATIENT)
Dept: CASE MANAGEMENT | Facility: OTHER | Age: 72
End: 2017-12-26

## 2017-12-27 ENCOUNTER — PATIENT OUTREACH (OUTPATIENT)
Dept: CASE MANAGEMENT | Facility: OTHER | Age: 72
End: 2017-12-27

## 2017-12-28 ENCOUNTER — PATIENT OUTREACH (OUTPATIENT)
Dept: CASE MANAGEMENT | Facility: OTHER | Age: 72
End: 2017-12-28

## 2018-01-02 ENCOUNTER — PATIENT OUTREACH (OUTPATIENT)
Dept: CASE MANAGEMENT | Facility: OTHER | Age: 73
End: 2018-01-02

## 2018-01-08 ENCOUNTER — PATIENT OUTREACH (OUTPATIENT)
Dept: CASE MANAGEMENT | Facility: OTHER | Age: 73
End: 2018-01-08

## 2018-01-15 ENCOUNTER — PATIENT OUTREACH (OUTPATIENT)
Dept: CASE MANAGEMENT | Facility: OTHER | Age: 73
End: 2018-01-15

## 2018-01-15 NOTE — OUTREACH NOTE
"Pt. Was discharged from [facility] 1/9/18 with [facility]. She reports good progress in recovery of her strength. She plans to see PCP \"soon\" Explained role of Care Advisor and contact information given to patient. Reviewed Gaps in Care and need to schedule Annual Wellness Visit.No other questions or concerns voiced at this time.  "

## 2018-01-18 ENCOUNTER — EPISODE CHANGES (OUTPATIENT)
Dept: CASE MANAGEMENT | Facility: OTHER | Age: 73
End: 2018-01-18

## 2018-01-29 ENCOUNTER — EPISODE CHANGES (OUTPATIENT)
Dept: CASE MANAGEMENT | Facility: OTHER | Age: 73
End: 2018-01-29

## 2018-01-31 RX ORDER — OXYBUTYNIN CHLORIDE 5 MG/1
TABLET ORAL
Qty: 90 TABLET | Refills: 0 | Status: SHIPPED | OUTPATIENT
Start: 2018-01-31 | End: 2018-03-05 | Stop reason: SDUPTHER

## 2018-02-09 DIAGNOSIS — E11.8 TYPE 2 DIABETES MELLITUS WITH COMPLICATION, WITHOUT LONG-TERM CURRENT USE OF INSULIN (HCC): ICD-10-CM

## 2018-03-05 ENCOUNTER — TELEPHONE (OUTPATIENT)
Dept: FAMILY MEDICINE CLINIC | Facility: CLINIC | Age: 73
End: 2018-03-05

## 2018-03-05 RX ORDER — OXYBUTYNIN CHLORIDE 5 MG/1
TABLET ORAL
Qty: 90 TABLET | Refills: 0 | Status: SHIPPED | OUTPATIENT
Start: 2018-03-05 | End: 2018-04-10 | Stop reason: SDUPTHER

## 2018-03-05 NOTE — TELEPHONE ENCOUNTER
PATIENT IS REQUESTING A 90 DAY SUPPLY FOR OXYBUTYNIN INSTEAD OF A 30 DAY SUPPLY.    PHARMACY IS [facility]ILL [facility]    PLEASE CALL PATIENT WITH ANY QUESTIONS [phone]    THANKS!

## 2018-03-09 RX ORDER — ATORVASTATIN CALCIUM 40 MG/1
40 TABLET, FILM COATED ORAL DAILY
Qty: 30 TABLET | Refills: 11 | Status: SHIPPED | OUTPATIENT
Start: 2018-03-09 | End: 2018-04-19

## 2018-03-12 ENCOUNTER — OFFICE VISIT (OUTPATIENT)
Dept: FAMILY MEDICINE CLINIC | Facility: CLINIC | Age: 73
End: 2018-03-12

## 2018-03-12 VITALS
TEMPERATURE: 98.4 F | OXYGEN SATURATION: 94 % | SYSTOLIC BLOOD PRESSURE: 142 MMHG | WEIGHT: 164.6 LBS | DIASTOLIC BLOOD PRESSURE: 80 MMHG | HEART RATE: 100 BPM | HEIGHT: 63 IN | BODY MASS INDEX: 29.16 KG/M2

## 2018-03-12 DIAGNOSIS — J06.9 UPPER RESPIRATORY TRACT INFECTION, UNSPECIFIED TYPE: ICD-10-CM

## 2018-03-12 DIAGNOSIS — J40 BRONCHITIS: Primary | ICD-10-CM

## 2018-03-12 PROCEDURE — 99214 OFFICE O/P EST MOD 30 MIN: CPT | Performed by: PHYSICIAN ASSISTANT

## 2018-03-12 RX ORDER — ALBUTEROL SULFATE 90 UG/1
2 AEROSOL, METERED RESPIRATORY (INHALATION) EVERY 4 HOURS PRN
Qty: 1 INHALER | Refills: 11 | Status: SHIPPED | OUTPATIENT
Start: 2018-03-12 | End: 2018-04-19

## 2018-03-12 RX ORDER — PREDNISONE 10 MG/1
10 TABLET ORAL
COMMUNITY
End: 2018-03-12

## 2018-03-12 RX ORDER — AZITHROMYCIN 250 MG/1
TABLET, FILM COATED ORAL
Qty: 6 TABLET | Refills: 0 | Status: SHIPPED | OUTPATIENT
Start: 2018-03-12 | End: 2018-04-19

## 2018-03-12 RX ORDER — BENZONATATE 100 MG/1
100 CAPSULE ORAL 3 TIMES DAILY PRN
COMMUNITY
End: 2018-03-12

## 2018-03-12 RX ORDER — GUAIFENESIN 200 MG/1
400 TABLET ORAL EVERY 4 HOURS PRN
COMMUNITY
End: 2018-05-16

## 2018-03-12 RX ORDER — BENZONATATE 100 MG/1
100 CAPSULE ORAL 3 TIMES DAILY PRN
Qty: 30 CAPSULE | Refills: 0 | Status: SHIPPED | OUTPATIENT
Start: 2018-03-12 | End: 2018-04-19

## 2018-03-12 RX ORDER — FLUTICASONE PROPIONATE 50 MCG
2 SPRAY, SUSPENSION (ML) NASAL DAILY
COMMUNITY
End: 2018-10-16

## 2018-03-12 NOTE — PROGRESS NOTES
"Subjective   Michelle Espinoza is a 72 y.o. female complaining of cough and sinus congestion, seen at [facility] on 3/6/18 diagnosed with bronchitis, given Tessalon Perles and Prednisone states no improvement, tested for Flu B was negative, A unsure     History of Present Illness     IN December, HAD INCREASING WEAKNESS AND HAD UTI WITH RENAL INSUFFICIENCY. WENT TO HOSPITAL. ADMIT FOR 2 DAYS THEN D/C TO REHAB FACILITY FOR 1 MONTH. WENT FROM NOT TAKING ANY STEP TO WALKING WITH WALKING AND GOING UP AND DOWN 3 STAIRS. SLEPT DOWNSTAIRS IN RECLINER FOR 2 WEEKS. THEN HAD HOME HEALTH AND WAS ABLE TO GO UP AND DOWN STAIRS. WHEN FINISHED WITH THEM, COULD GO UP AND DOWN STAIRS 1 FOOT AT A TIME. THEN HAD OUTPATIENT PT EVALUATION LAST WEEK AND WAS TO START PT OUTPATIENT TODAY BUT HAD TO CANCEL DUE TO FEELING POORLY.     HAD BEEN HOME OVER A MONTH- GRANDSON WAS SICK THEN STARTED WITH URI SYMPTOMS ABOUT 2 1/2 WEEKS AGO. INITIALLY NOTED COUGHING THAT WAS PROGRESSIVELY WORSENING. NOT MUCH HEAD CONGESTION. NO FEVER, V, D, SORE THROAT OR EAR PAIN. THROAT SENSITIVITY WITH COUGHING. DAUGHTER HAD STREP THROAT A MONTH AGO, BUT WAS NOT AROUND HER WHEN SHE WAS SICK. 3/4/18- GOT WORSE WITH WORSENING COUGH, CONGESTION, AND LOST SENSE OF TASTE. THEN 3/6/18, WENT TO [facility]. GIVEN TESSALON THAT FINISHED YESTERDAY AND PREDNISONE X 3 DAYS. STATES WAS TOLD TO STAY AWAY FROM THE DM PRODUCTS. TAKING GUAIFENESIN SINCE YESTERDAY. HAS LOST ABOUT 10 LBS WITH ILLNESS.     REPORTS BRAIN WAS \"SO AFFECTED\" BY ILLNESS.     The following portions of the patient's history were reviewed and updated as appropriate: allergies, current medications, past family history, past medical history, past social history, past surgical history and problem list.    Review of Systems   HENT: Positive for congestion and sinus pressure.    Respiratory: Positive for cough.    Neurological: Positive for light-headedness.   All other systems reviewed and are " negative.      Objective   Physical Exam   Constitutional: She is oriented to person, place, and time. Vital signs are normal. She appears well-developed and well-nourished.   HENT:   Head: Normocephalic and atraumatic.   Right Ear: Tympanic membrane, external ear and ear canal normal.   Left Ear: Tympanic membrane, external ear and ear canal normal.   Nose: Nose normal.   Mouth/Throat: Uvula is midline, oropharynx is clear and moist and mucous membranes are normal.   Eyes: Conjunctivae are normal.   Neck: Neck supple.   Cardiovascular: Normal rate, regular rhythm and normal heart sounds.  Exam reveals no gallop and no friction rub.    No murmur heard.  Pulmonary/Chest: Effort normal. She has wheezes (TRACE END EXPIRATORY WHEEZING THROUGHOUT POSTERIOR LARSON. ). She has no rhonchi. She has no rales.   Neurological: She is alert and oriented to person, place, and time.   Skin: Skin is warm and dry.   Psychiatric: She has a normal mood and affect. Her speech is normal and behavior is normal. Judgment and thought content normal. Cognition and memory are normal.   Nursing note and vitals reviewed.      Assessment/Plan   Michelle was seen today for cough.    Diagnoses and all orders for this visit:    Bronchitis  -     azithromycin (ZITHROMAX Z-ROSIE) 250 MG tablet; Take 2 tablets the first day, then 1 tablet daily for 4 days.  -     albuterol (PROVENTIL HFA;VENTOLIN HFA) 108 (90 Base) MCG/ACT inhaler; Inhale 2 puffs Every 4 (Four) Hours As Needed for Wheezing.  -     Fluticasone Furoate-Vilanterol (BREO ELLIPTA) 100-25 MCG/INH aerosol powder ; 1 PUFF ONCE DAILY THEN RINSE MOUTH  -     benzonatate (TESSALON PERLES) 100 MG capsule; Take 1 capsule by mouth 3 (Three) Times a Day As Needed for Cough.    Upper respiratory tract infection, unspecified type  -     azithromycin (ZITHROMAX Z-ROSIE) 250 MG tablet; Take 2 tablets the first day, then 1 tablet daily for 4 days.  -     albuterol (PROVENTIL HFA;VENTOLIN HFA) 108 (90 Base)  MCG/ACT inhaler; Inhale 2 puffs Every 4 (Four) Hours As Needed for Wheezing.  -     Fluticasone Furoate-Vilanterol (BREO ELLIPTA) 100-25 MCG/INH aerosol powder ; 1 PUFF ONCE DAILY THEN RINSE MOUTH  -     benzonatate (TESSALON PERLES) 100 MG capsule; Take 1 capsule by mouth 3 (Three) Times a Day As Needed for Cough.

## 2018-03-14 NOTE — PATIENT INSTRUCTIONS
72 YEAR OLD FEMALE WHO PRESENTS TODAY IN FOLLOW UP OF BRONCHITIS. PATIENT STARTED WITH ILLNESS 2 1/2 WEEKS AGO WITH COUGHING AND CONGESTION. SHE WENT TO [facility] 3/6/18 AND WAS GIVEN PREDNISONE X 3 DAYS AND TESSALON FOR DIAGNOSIS OF BRONCHITIS. PATIENT HAS CONTINUED TO WORSEN- NO IMPROVEMENT. SHE HAS NORMAL EXAM TODAY. PATIENT TO TAKE EITHER TESSALON AND MUCINEX OR MUCINEX DM AS NEEDED. I WILL GIVE BREO 1 PUFF ONCE DAILY THEN RINSE MOUTH AND ALBUTEROL 2 PUFFS EVERY 6 HOURS AS NEEDED, AND ZPACK AS DIRECTED. SHE WILL NEED CXR IF NO IMPROVEMENT- CAN CALL & I WILL PLACE ORDER TO GO TO IMAGING FACILITY. PATIENT TO BE SEEN ASAP IF WORSENING, CHANGE IN SYMPTOMS, OR NEW SYMPTOMS.     OF NOTE, SHE HAS NOT HAD HOSPITAL FOLLOW UP FOLLOWING HOSPITALIZATION 12/5/17-12/8/17 FOR UTI WITH ACUTE KIDNEY INJURY AND INCREASING WEAKNESS. SHE WAS TREATED INPATIENT THE D/C TO REHAB FACILITY FOR A MONTH. PATIENT WAS THEN RELEASED HOME AND HAD HOME HEALTH PHYSICAL THERAPY (AS SHE WAS COMPLETELY UNABLE TO GO UP HER STAIRS TO GET TO HER BEDROOM WHEN SHE CAME HOME). SHE PROGRESSED WITH HOME HEALTH AND IS JUST RE-STARTING OUTPATIENT PT. PATIENT CONTINUES TO BE MUCH WEAKER THAN HER LAST VISIT HERE. SHE IS USING HER WALKER AND HAD DIFFICULTY WALKING DOWN THE HALLWAY IN THE OFFICE TODAY WITHOUT STOPPING DUE TO WEAKNESS. SHE ALSO HAS DIFFICULTY GETTING INTO HER CAR. I COUNSELED HER AT LENGTH ABOUT MY CONCERNS FOR SAFETY GETTING INTO AND OUT OF HER CAR BEING THIS WEAK, ESPECIALLY IF THERE WAS AN EMERGENCY. PATIENT SHOULD CONSIDER NOT DRIVING UNTIL SHE IS STRONGER AND ABLE TO GET IN AND OUT OF THE CAR EASILY. SHE WILL CONSIDER THIS. CONTINUE PHYSICAL THERAPY. KEEP APPT WITH ME IN MAY 2018 FOR FOLLOW UP OR TO BE SEEN SOONER IF NEEDED.

## 2018-03-15 ENCOUNTER — TELEPHONE (OUTPATIENT)
Dept: ENDOCRINOLOGY | Age: 73
End: 2018-03-15

## 2018-03-15 NOTE — TELEPHONE ENCOUNTER
PATIENT CALLED IN Tuesday STATING THAT SHE RECEIVED AN RX FOR LIPTOR FROM DR RAYMOND FROM [facility] AND SHE WAS NOT GOING TO TAKE THE MEDICATION BECAUSE OF MUSCLE WEAKNESS.   THE RX WAS SENT OVER FROM HER [facility] TO OUR RX POOL WITH DR RAYMOND NAME ON THE RX BUT DR RAYMOND NEVER PRESCRIBE UNTIL IT CAME THOUGHT THE RX POOL . PATIENT STATED THAT HER PCP DID NOT SENT IT IN. SPOKE WITH DR RAYMOND ABOUT RX  SHE STATED THAT SHE DID NOT HAVE TO TAKE THE MEDICATION IF SHE DID NOT WANT TO.  I DO SEE IN HER PCP OFFICE NOTE THAT HER PCP DID WANT HER ON LIPID LOWERING MEDICATION.

## 2018-03-16 ENCOUNTER — TELEPHONE (OUTPATIENT)
Dept: FAMILY MEDICINE CLINIC | Facility: CLINIC | Age: 73
End: 2018-03-16

## 2018-03-16 ENCOUNTER — HOSPITAL ENCOUNTER (OUTPATIENT)
Dept: GENERAL RADIOLOGY | Facility: HOSPITAL | Age: 73
Discharge: HOME | End: 2018-03-16
Admitting: PHYSICIAN ASSISTANT

## 2018-03-16 DIAGNOSIS — R05.9 COUGH: Primary | ICD-10-CM

## 2018-03-16 DIAGNOSIS — J06.9 UPPER RESPIRATORY TRACT INFECTION, UNSPECIFIED TYPE: ICD-10-CM

## 2018-03-16 PROCEDURE — 71046 X-RAY EXAM CHEST 2 VIEWS: CPT

## 2018-03-16 NOTE — TELEPHONE ENCOUNTER
----- Message from Lillie Jenkins sent at 3/16/2018  8:18 AM EDT -----  Regarding: XRAY  Contact: [phone]  Patient called and stated she wanted a chest xray request sent to [facility] at phone [phone]

## 2018-04-06 ENCOUNTER — TELEPHONE (OUTPATIENT)
Dept: ENDOCRINOLOGY | Age: 73
End: 2018-04-06

## 2018-04-06 NOTE — TELEPHONE ENCOUNTER
LEFT DETAILED VOICE MAIL TO LET PATIENT KNOW THAT I FAXED HER LAB ORDER TO THE [facility] WITH THE NUMBER SHE PROVIDED [phone]        ----- Message from Stephie Pozo sent at 4/5/2018 10:36 AM EDT -----  Contact: PATIENT    PATIENT IS REQUESTIN EXTERNAL LABS  LOCATION:   [facility]     FAX NUMBER: [phone]    MESSAGE : PATIENT IS REQUESTING EXTERNAL LAB AND IS LOOKING FOR A CALL BACK FOR WHEN YOU FAX OVER THE LABS   PHONE NUMBER:     [phone]

## 2018-04-10 RX ORDER — OXYBUTYNIN CHLORIDE 5 MG/1
TABLET ORAL
Qty: 90 TABLET | Refills: 0 | Status: SHIPPED | OUTPATIENT
Start: 2018-04-10 | End: 2018-05-21 | Stop reason: SDUPTHER

## 2018-04-11 LAB
ALBUMIN SERPL-MCNC: 4.4 G/DL (ref 3.5–4.8)
ALBUMIN/GLOB SERPL: 1.6 {RATIO} (ref 1.2–2.2)
ALP SERPL-CCNC: 93 IU/L (ref 39–117)
ALT SERPL-CCNC: 14 IU/L (ref 0–32)
AMBIG ABBREV CMP14 DEFAULT: NORMAL
AMBIG ABBREV LP DEFAULT: NORMAL
AST SERPL-CCNC: 16 IU/L (ref 0–40)
BILIRUB SERPL-MCNC: 0.3 MG/DL (ref 0–1.2)
BUN SERPL-MCNC: 26 MG/DL (ref 8–27)
BUN/CREAT SERPL: 30 (ref 12–28)
CALCIUM SERPL-MCNC: 10.8 MG/DL (ref 8.7–10.3)
CHLORIDE SERPL-SCNC: 102 MMOL/L (ref 96–106)
CHOLEST SERPL-MCNC: 215 MG/DL (ref 100–199)
CO2 SERPL-SCNC: 22 MMOL/L (ref 18–29)
CREAT SERPL-MCNC: 0.88 MG/DL (ref 0.57–1)
GFR SERPLBLD CREATININE-BSD FMLA CKD-EPI: 67 ML/MIN/1.73
GFR SERPLBLD CREATININE-BSD FMLA CKD-EPI: 77 ML/MIN/1.73
GLOBULIN SER CALC-MCNC: 2.8 G/DL (ref 1.5–4.5)
GLUCOSE SERPL-MCNC: 127 MG/DL (ref 65–99)
HBA1C MFR BLD: 6.3 % (ref 4.8–5.6)
HDLC SERPL-MCNC: 55 MG/DL
INTERPRETATION: NORMAL
LDLC SERPL CALC-MCNC: 134 MG/DL (ref 0–99)
Lab: NORMAL
POTASSIUM SERPL-SCNC: 5 MMOL/L (ref 3.5–5.2)
PROT SERPL-MCNC: 7.2 G/DL (ref 6–8.5)
SODIUM SERPL-SCNC: 140 MMOL/L (ref 134–144)
TRIGL SERPL-MCNC: 128 MG/DL (ref 0–149)
TSH SERPL DL<=0.005 MIU/L-ACNC: 0.66 UIU/ML (ref 0.45–4.5)
VLDLC SERPL CALC-MCNC: 26 MG/DL (ref 5–40)

## 2018-04-18 ENCOUNTER — RESULTS ENCOUNTER (OUTPATIENT)
Dept: ENDOCRINOLOGY | Age: 73
End: 2018-04-18

## 2018-04-18 DIAGNOSIS — E11.8 TYPE 2 DIABETES MELLITUS WITH COMPLICATION, WITHOUT LONG-TERM CURRENT USE OF INSULIN (HCC): ICD-10-CM

## 2018-04-19 ENCOUNTER — OFFICE VISIT (OUTPATIENT)
Dept: ENDOCRINOLOGY | Age: 73
End: 2018-04-19

## 2018-04-19 VITALS
DIASTOLIC BLOOD PRESSURE: 78 MMHG | SYSTOLIC BLOOD PRESSURE: 120 MMHG | HEART RATE: 93 BPM | WEIGHT: 161 LBS | HEIGHT: 62 IN | BODY MASS INDEX: 29.63 KG/M2 | OXYGEN SATURATION: 95 %

## 2018-04-19 DIAGNOSIS — E78.49 OTHER HYPERLIPIDEMIA: ICD-10-CM

## 2018-04-19 DIAGNOSIS — E83.52 HYPERCALCEMIA: ICD-10-CM

## 2018-04-19 DIAGNOSIS — M85.80 OSTEOPENIA, UNSPECIFIED LOCATION: ICD-10-CM

## 2018-04-19 DIAGNOSIS — R73.03 PRE-DIABETES: Primary | ICD-10-CM

## 2018-04-19 PROCEDURE — 99214 OFFICE O/P EST MOD 30 MIN: CPT | Performed by: INTERNAL MEDICINE

## 2018-04-19 NOTE — PROGRESS NOTES
72 y.o.    Patient Care Team:  BHAKTI Rasheed as PCP - General (Family Medicine)  BHAKTI Rasheed as PCP - Claims Attributed  Jose Luis Nelson Jr., MD as Consulting Physician (Urology)  Bon Gardiner MD as Consulting Physician (Hand Surgery)  Milly Crow MD as Consulting Physician (Neurology)  RATNA Candelario as Nurse Practitioner (Neurology)  Johnnie Soni MD as Consulting Physician (Neurology)  Mahamed Massey MD as Consulting Physician (Obstetrics and Gynecology)  Clement Tierney MD as Surgeon (Neurosurgery)  Donna Olsen MA as Medical Assistant  Katya To RN as Care Coordinator (Population Health)    Chief Complaint:    6 MONTH FOLLOW UP/ PRE-DIABETES  Subjective     HPI    Michelle Espinoza,72 y.o.  is here for elevated Ca levels. Consulted by  [facility].   Pt ca was noted to be elevated on routine blood work up in May 2017 which was 10.9 mg/dl with normal PTH levels - 43 pg/ml. The patient was noted to have elevated calcium levels only in May 2017 and her repeat calcium levels since then have been within normal limits.  Patient also has been noted to have normal parathyroid levels consistently.  However patient's calcium levels in April 2018 was mildly elevated at 10.8 mg/dL.  She resumed taking her calcium supplementation 1 tablet twice daily.  She had only one episode of kidney stones (that was 3 years ago and that was her only 1 episode book for that she had to undergo lithotripsy and stent placement, she has history of osteopenia based on her DEXA scan from 10/25/2016.  No family history of kidney stones or family history of osteoporosis.  During last visit I advised the patient to stop taking the calcium supplementation and to continue vitamin D supplementation for her osteopenia, she however between her hospitalization in December 2017 for urinary tract infection she started herself back on calcium supplementation.  No complaints of abdominal pain, no complaints of bone or joint pain.  Increased urination when she has UTI.   She has not  Been on hydrochlorothiazide.  Not on any steroids.  She drinks about 10 ounces of milk daily, eats cheese and yogurt occasionally.     Pre diabetes  On metformin 500 mg daily bid.      Kidney stones and urinary tract infections managed by urologist.     Hyperlipidemia  Supposed to be on zetia as she was not tolerating Lipitor.  However patient is not taking Zetia as well.    Interval History      The following portions of the patient's history were reviewed and updated as appropriate: allergies, current medications, past family history, past medical history, past social history, past surgical history and problem list.    Past Medical History:   Diagnosis Date   • Benign colonic polyp    • Cataract     bilateral   • Degenerative disc disease, lumbar    • Hypercholesteremia    • Leg cramping    • Lumbar pain    • Neck pain    • Osteoarthritis    • Pre-diabetes    • Pyelonephritis 06/2014   • Renal stone 06/2014   • Sepsis    • Urinary incontinence    • Vaginal delivery      Family History   Problem Relation Age of Onset   • Stroke Mother    • Heart disease Mother    • Hypertension Mother    • Cervical cancer Mother    • Clotting disorder Father    • Hypertension Father    • Diabetes Father    • Aneurysm Father    • Hypertension Sister    • Diabetes Sister    • Diabetes Sister    • Cervical cancer Maternal Grandmother    • Diabetes Grandchild      Social History     Social History   • Marital status:      Spouse name: N/A   • Number of children: N/A   • Years of education: N/A     Occupational History   • retired      Social History Main Topics   • Smoking status: Former Smoker     Packs/day: 0.50     Years: 15.00     Types: Cigarettes     Quit date: 1992   • Smokeless tobacco: Never Used   • Alcohol use Yes      Comment: Socially.   • Drug use: No   • Sexual activity: Defer     Other Topics Concern   • Not on file     Social History Narrative   • No narrative  on file     Allergies   Allergen Reactions   • Other Nausea And Vomiting     The mercury in [place]   • Penicillins Hives   • Latex Rash   • Nickel Rash       Current Outpatient Prescriptions:   •  amLODIPine (NORVASC) 5 MG tablet, Take 1 tablet by mouth Daily., Disp: , Rfl:   •  baclofen (LIORESAL) 20 MG tablet, TAKE 1 TABLET BY MOUTH EVERY DAY, Disp: 30 tablet, Rfl: 5  •  Cholecalciferol (VITAMIN D3) 5000 UNITS capsule capsule, Take 5,000 Units by mouth Daily., Disp: , Rfl:   •  Fish Oil-Krill Oil (PA FISH OIL/KRILL PO), Take  by mouth Daily., Disp: , Rfl:   •  fluticasone (FLONASE) 50 MCG/ACT nasal spray, 2 sprays into each nostril Daily., Disp: , Rfl:   •  Lutein-Bilberry (BILBERRY PLUS LUTEIN) 5-1000 MCG-MG capsule, Take  by mouth Daily., Disp: , Rfl:   •  metFORMIN (GLUCOPHAGE) 500 MG tablet, Take 1 tablet by mouth 2 (Two) Times a Day With Meals., Disp: 60 tablet, Rfl: 6  •  NYSTATIN 828323 UNIT/GM powder, APPLY TO AFFECTED AREA 2 TO 3 TIMES PER DAY AS NEEDED, Disp: 30 g, Rfl: 0  •  oxybutynin (DITROPAN) 5 MG tablet, TAKE ONE TABLET BY MOUTH THREE TIMES A DAY, Disp: 90 tablet, Rfl: 0  •  Probiotic Product (PROBIOTIC PO), Take  by mouth Daily., Disp: , Rfl:   •  TURMERIC PO, Take  by mouth., Disp: , Rfl:   •  vitamin C (ASCORBIC ACID) 500 MG tablet, Take 500 mg by mouth Daily. With D3, Disp: , Rfl:   •  glucose blood test strip, USE TO TEST BLOOD SUGAR 2 TIMES DAILY DX CODE E11.9 One Touch Ultra Test Strips, Disp: 100 each, Rfl: 12  •  guaiFENesin 200 MG tablet, Take 400 mg by mouth Every 4 (Four) Hours As Needed for Cough., Disp: , Rfl:   •  pitavastatin calcium (LIVALO) 2 MG tablet tablet, Take 1 tablet by mouth Every Night., Disp: 30 tablet, Rfl: 11        Review of Systems   Constitutional: Negative for fatigue and fever.   HENT: Negative for facial swelling, nosebleeds, trouble swallowing and voice change.    Eyes: Negative for pain and redness.   Respiratory: Negative for shortness of breath and  "wheezing.    Cardiovascular: Negative for palpitations and leg swelling.   Gastrointestinal: Negative for abdominal pain, diarrhea and vomiting.   Endocrine: Negative for polydipsia and polyuria.   Genitourinary: Negative for decreased urine volume and frequency.   Musculoskeletal: Negative for joint swelling and neck pain.   Skin: Negative for rash.   Allergic/Immunologic: Negative for immunocompromised state.   Neurological: Negative for seizures and facial asymmetry.   Hematological: Does not bruise/bleed easily.   Psychiatric/Behavioral: Negative for agitation and confusion.       Objective       Vitals:    04/19/18 1411   BP: 120/78   Pulse: 93   SpO2: 95%   Weight: 73 kg (161 lb)   Height: 157.5 cm (62\")     Body mass index is 29.45 kg/m².      Physical Exam   Gen exam - alert and oriented x 3.   HEENT -Thyroid palpable.   Resp - Clear to auscultation.   CVS - S1,S2 heard and no murmurs.   Abd - Non tender, BS heard.   Ext - No edema, antalgic gait, uses a walker.     Results Review:     I reviewed the patient's new clinical results.    Medical records reviewed  Summary: done      Orders Only on 04/10/2018   Component Date Value Ref Range Status   • Glucose 04/11/2018 127* 65 - 99 mg/dL Final   • BUN 04/11/2018 26  8 - 27 mg/dL Final   • Creatinine 04/11/2018 0.88  0.57 - 1.00 mg/dL Final   • eGFR Non  Am 04/11/2018 67  >59 mL/min/1.73 Final   • eGFR African Am 04/11/2018 77  >59 mL/min/1.73 Final   • BUN/Creatinine Ratio 04/11/2018 30* 12 - 28 Final   • Sodium 04/11/2018 140  134 - 144 mmol/L Final   • Potassium 04/11/2018 5.0  3.5 - 5.2 mmol/L Final   • Chloride 04/11/2018 102  96 - 106 mmol/L Final   • Total CO2 04/11/2018 22  18 - 29 mmol/L Final   • Calcium 04/11/2018 10.8* 8.7 - 10.3 mg/dL Final   • Total Protein 04/11/2018 7.2  6.0 - 8.5 g/dL Final   • Albumin 04/11/2018 4.4  3.5 - 4.8 g/dL Final   • Globulin 04/11/2018 2.8  1.5 - 4.5 g/dL Final   • A/G Ratio 04/11/2018 1.6  1.2 - 2.2 Final   • " Total Bilirubin 04/11/2018 0.3  0.0 - 1.2 mg/dL Final   • Alkaline Phosphatase 04/11/2018 93  39 - 117 IU/L Final   • AST (SGOT) 04/11/2018 16  0 - 40 IU/L Final   • ALT (SGPT) 04/11/2018 14  0 - 32 IU/L Final   • Total Cholesterol 04/11/2018 215* 100 - 199 mg/dL Final   • Triglycerides 04/11/2018 128  0 - 149 mg/dL Final   • HDL Cholesterol 04/11/2018 55  >39 mg/dL Final   • VLDL Cholesterol 04/11/2018 26  5 - 40 mg/dL Final   • LDL Cholesterol  04/11/2018 134* 0 - 99 mg/dL Final   • Hemoglobin A1C 04/11/2018 6.3* 4.8 - 5.6 % Final    Comment:          Pre-diabetes: 5.7 - 6.4           Diabetes: >6.4           Glycemic control for adults with diabetes: <7.0     • TSH 04/11/2018 0.663  0.450 - 4.500 uIU/mL Final   • Ambike Estrada CMP14 Default 04/11/2018 Comment   Final    Comment: A hand-written panel/profile was received from your office. In  accordance with the [employer] Ambiguous Test Code Policy dated July 2003, we have completed your order by using the closest currently  or formerly recognized AMA panel.  We have assigned Comprehensive  Metabolic Panel (14), Test Code #392332 to this request.  If this  is not the testing you wished to receive on this specimen, please  contact the [employer] Client Inquiry/Technical Services Department  to clarify the test order.  We appreciate your business.     • Delores Estrada LP Default 04/11/2018 Comment   Final    Comment: A hand-written panel/profile was received from your office. In  accordance with the [employer] Ambiguous Test Code Policy dated July 2003, we have completed your order by using the closest currently  or formerly recognized AMA panel.  We have assigned Lipid Panel,  Test Code #849867 to this request. If this is not the testing you  wished to receive on this specimen, please contact the [employer]  Client Inquiry/Technical Services Department to clarify the test  order.  We appreciate your business.     • Interpretation 04/11/2018 Note   Final   • PDF Image  04/11/2018 Not applicable   Final     Lab Results   Component Value Date    HGBA1C 6.3 (H) 04/10/2018    HGBA1C 6.23 (H) 09/08/2017    HGBA1C 6.4 05/01/2017     Lab Results   Component Value Date    CREATININE 0.88 04/10/2018     Imaging Results (most recent)     None                Assessment and Plan:    Michelle was seen today for diabetes.    Diagnoses and all orders for this visit:    Pre-diabetes  -     TSH; Future  -     T4, Free; Future  -     Hemoglobin A1c; Future  -     Basic Metabolic Panel; Future  -     Lipid Panel; Future  -     Vitamin D 25 Hydroxy; Future  -     Vitamin B12 & Folate; Future  -     TSH  -     T4, Free  -     Hemoglobin A1c  -     Basic Metabolic Panel  -     Lipid Panel  -     Vitamin D 25 Hydroxy  -     Vitamin B12 & Folate    Hypercalcemia  -     TSH; Future  -     T4, Free; Future  -     Hemoglobin A1c; Future  -     Basic Metabolic Panel; Future  -     Lipid Panel; Future  -     Vitamin D 25 Hydroxy; Future  -     Vitamin B12 & Folate; Future  -     TSH  -     T4, Free  -     Hemoglobin A1c  -     Basic Metabolic Panel  -     Lipid Panel  -     Vitamin D 25 Hydroxy  -     Vitamin B12 & Folate    Osteopenia, unspecified location  -     TSH; Future  -     T4, Free; Future  -     Hemoglobin A1c; Future  -     Basic Metabolic Panel; Future  -     Lipid Panel; Future  -     Vitamin D 25 Hydroxy; Future  -     Vitamin B12 & Folate; Future  -     TSH  -     T4, Free  -     Hemoglobin A1c  -     Basic Metabolic Panel  -     Lipid Panel  -     Vitamin D 25 Hydroxy  -     Vitamin B12 & Folate    Other hyperlipidemia  -     TSH; Future  -     T4, Free; Future  -     Hemoglobin A1c; Future  -     Basic Metabolic Panel; Future  -     Lipid Panel; Future  -     Vitamin D 25 Hydroxy; Future  -     Vitamin B12 & Folate; Future  -     TSH  -     T4, Free  -     Hemoglobin A1c  -     Basic Metabolic Panel  -     Lipid Panel  -     Vitamin D 25 Hydroxy  -     Vitamin B12 & Folate    Other orders  -      pitavastatin calcium (LIVALO) 2 MG tablet tablet; Take 1 tablet by mouth Every Night.      Osteopenia  Managed by OB/GYN  Continue vitamin D supplementation.    Hypercalcemia  Could be related to her urinary tract infections and dehydration  Parathyroid levels are within normal limits  Advised the patient to stop calcium supplementation.  Even if patient has mild hyperparathyroidism given the low calcium elevations she might not meet the criteria for parathyroidectomy.  24 hour urine calcium levels did not show elevated calcium excretion, phosphorus levels were also within normal limits-rules out primary hyperparathyroidism.    Borderline diabetes  Continue metformin 500 mg twice daily.    Hyperlipidemia  Due to her intolerance to Lipitor will start patient on pitavastatin 20 mg daily at bedtime.    Reviewed Lab results with the patient.       The total face to face time spent 25minutes with the patient,13minutes (greater than 50% of the total time) was spent counseling and coordination of the care on above clinical conditions and treatment plan.

## 2018-04-26 ENCOUNTER — TELEPHONE (OUTPATIENT)
Dept: ENDOCRINOLOGY | Age: 73
End: 2018-04-26

## 2018-04-26 NOTE — TELEPHONE ENCOUNTER
Left voice mail on 4/25/18 for patient to call the office back in regards of the medication        ----- Message from Denisse Schuster sent at 4/25/2018  9:32 AM EDT -----  Contact: PATIENT  PATIENT WAS GIVEN SAMPLE OF pitavastatin calcium (LIVALO) 2 MG tablet tablet AND HAS HAD A ALLERGIC REACTION TO It. PATIENT COULD BARELY GET OF CHAIR AND WAS SHAKY. PATIENT IS STARTING TO FEEL A LITTLE BETTER NOW. HAS STOP TAKING MED.     [facility] [phone]

## 2018-04-26 NOTE — TELEPHONE ENCOUNTER
SPOKE WITH PATIENT ABOUT STOP ING THE LIVALO SINCE SHE HAD A BAD REACTION TO IT AND DR RAYMOND WILL DISCUSS MORE AT HER OFFICE VISIT      ----- Message from Denisse Mane sent at 4/26/2018  9:49 AM EDT -----  Contact: PATIENT  PATIENT STATED THAT SHE HAD A MISSED CALL FROM YOU YESTERDAY, YOU CAN REACH HER @ [phone]

## 2018-05-16 ENCOUNTER — OFFICE VISIT (OUTPATIENT)
Dept: FAMILY MEDICINE CLINIC | Facility: CLINIC | Age: 73
End: 2018-05-16

## 2018-05-16 VITALS
HEIGHT: 62 IN | HEART RATE: 76 BPM | SYSTOLIC BLOOD PRESSURE: 120 MMHG | DIASTOLIC BLOOD PRESSURE: 64 MMHG | BODY MASS INDEX: 28.97 KG/M2 | OXYGEN SATURATION: 96 % | WEIGHT: 157.4 LBS | TEMPERATURE: 97.8 F

## 2018-05-16 DIAGNOSIS — T14.8XXA ABRASION: ICD-10-CM

## 2018-05-16 DIAGNOSIS — R63.4 WEIGHT LOSS: ICD-10-CM

## 2018-05-16 DIAGNOSIS — M25.579 ACUTE ANKLE PAIN, UNSPECIFIED LATERALITY: Primary | ICD-10-CM

## 2018-05-16 DIAGNOSIS — R63.0 DECREASED APPETITE: ICD-10-CM

## 2018-05-16 PROCEDURE — 99213 OFFICE O/P EST LOW 20 MIN: CPT | Performed by: PHYSICIAN ASSISTANT

## 2018-05-16 RX ORDER — SULFAMETHOXAZOLE AND TRIMETHOPRIM 800; 160 MG/1; MG/1
TABLET ORAL
COMMUNITY
Start: 2018-05-12 | End: 2018-07-26

## 2018-05-16 NOTE — PROGRESS NOTES
Subjective   Michelle Espinoza is a 72 y.o. female. Patient seen today for follow-up for hyperlipidemia, Vitamin B 12 and D deficiency , redness on left ankle     History of Present Illness     SEEN BY UROLOGY AND GIVEN REFILLS ON ANTIBIOTICS TO TAKE AS NEEDED FOR UTI. HAS BACTRIM DS FOR THIS.     ABOUT 3 WEEKS AGO, LEFT ANKLE- REPORTS WAS WALKING WITH THE WALKER AND KICKED IT WITH THE OTHER ANKLE. STATES ENTIRE ANKLE GOT RED. PUT BACITRACIN AND BANDAID ON IT. RESTARTED SULFA FOR UTI 2 DAYS AGO.     LAST COLONOSCOPY- 2008- 2 POLYPS THEN 2013- 2 POLYPS- RECHECK IN 5 YEARS. DUE 2018. DR MENDOZA. NOTES DECREASED APPETITE- NOT FEELING AS HUNGRY SINCE OUT OF THE HOSPITAL. ALSO, WHEN SHE EATS, SHE FEELS SICK.     The following portions of the patient's history were reviewed and updated as appropriate: allergies, current medications, past family history, past medical history, past social history, past surgical history and problem list.    Review of Systems   Constitutional: Positive for appetite change and unexpected weight change.   Skin:        Left ankle redness    All other systems reviewed and are negative.      Objective   Physical Exam   Constitutional: She is oriented to person, place, and time. She appears well-developed and well-nourished.   HENT:   Head: Normocephalic and atraumatic.   Right Ear: External ear normal.   Left Ear: External ear normal.   Nose: Nose normal.   Eyes: Conjunctivae and lids are normal.   Neck: Neck supple. Carotid bruit is not present.   Cardiovascular: Normal rate, regular rhythm, normal heart sounds and intact distal pulses.  Exam reveals no gallop and no friction rub.    No murmur heard.  Pulmonary/Chest: Effort normal and breath sounds normal. No respiratory distress. She has no wheezes. She has no rhonchi. She has no rales.   Musculoskeletal: She exhibits no edema or deformity.   Neurological: She is alert and oriented to person, place, and time. Gait normal.   Skin: Skin is warm  and dry.        SMALL ABRASION LEFT ANKLE WITH VERY MILD SURROUNDING ERYTHEMA. NO INDURATION, EDEMA, OR D/C.    Psychiatric: She has a normal mood and affect. Her speech is normal and behavior is normal. Judgment and thought content normal. Cognition and memory are normal.   Nursing note and vitals reviewed.      Assessment/Plan   Michelle was seen today for follow-up and redness on left ankle.    Diagnoses and all orders for this visit:    Acute ankle pain, unspecified laterality    Abrasion    Decreased appetite    Weight loss      Patient Instructions   73 YEAR OLD FEMALE WHO PRESENTS TODAY IN FOLLOW UP OF HYPERLIPIDEMIA, VITAMIN D AND B12 DEFICIENCY. SHE IS FOLLOWING WITH ENDOCRINOLOGY WHO IS MONITORING THESE. KEEP FOLLOW UP WITH SPECIALIST AS DIRECTED. SHE WAS ALSO SEEN BY UROLOGY AND GIVEN BACTRIM TO USE PRN FOR UTI WITH ROUTINE SELF CATH. PATIENT REPORTS STARTED AGAIN 2 DAYS AGO. PATIENT ALSO HAS ABRASION ON LEFT ANKLE. SHE HAS BEEN KEEPING COVERED WITH BACITRACIN. I ADVISED TO CONTINUE. BACTRIM FOR UTI SHOULD ALSO HELP WITH THIS. FOLLOW UP IF NO IMPROVEMENT OR WORSENING.     PATIENT IS DUE FOR REPEAT COLONOSCOPY THIS YEAR. SHE WILL CALL DR MENDOZA FOR FOLLOW UP. TO CALL IF SHE NEEDS REFERRAL.     SINCE SHE IS FOLLOWING WITH SPECIALISTS, SHE SHOULD FOLLOW UP IN ME IN 6 MONTHS. TO BE SEEN SOONER IF NEEDED FOR ANY CONCERNS.

## 2018-05-21 RX ORDER — OXYBUTYNIN CHLORIDE 5 MG/1
TABLET ORAL
Qty: 90 TABLET | Refills: 0 | Status: SHIPPED | OUTPATIENT
Start: 2018-05-21 | End: 2018-07-06 | Stop reason: SDUPTHER

## 2018-05-26 NOTE — PATIENT INSTRUCTIONS
73 YEAR OLD FEMALE WHO PRESENTS TODAY IN FOLLOW UP OF HYPERLIPIDEMIA, VITAMIN D AND B12 DEFICIENCY. SHE IS FOLLOWING WITH ENDOCRINOLOGY WHO IS MONITORING THESE. KEEP FOLLOW UP WITH SPECIALIST AS DIRECTED. SHE WAS ALSO SEEN BY UROLOGY AND GIVEN BACTRIM TO USE PRN FOR UTI WITH ROUTINE SELF CATH. PATIENT REPORTS STARTED AGAIN 2 DAYS AGO. PATIENT ALSO HAS ABRASION ON LEFT ANKLE. SHE HAS BEEN KEEPING COVERED WITH BACITRACIN. I ADVISED TO CONTINUE. BACTRIM FOR UTI SHOULD ALSO HELP WITH THIS. FOLLOW UP IF NO IMPROVEMENT OR WORSENING.     PATIENT IS DUE FOR REPEAT COLONOSCOPY THIS YEAR. SHE WILL CALL DR MENDOZA FOR FOLLOW UP. TO CALL IF SHE NEEDS REFERRAL.     SINCE SHE IS FOLLOWING WITH SPECIALISTS, SHE SHOULD FOLLOW UP IN ME IN 6 MONTHS. TO BE SEEN SOONER IF NEEDED FOR ANY CONCERNS.

## 2018-07-06 ENCOUNTER — TELEPHONE (OUTPATIENT)
Dept: FAMILY MEDICINE CLINIC | Facility: CLINIC | Age: 73
End: 2018-07-06

## 2018-07-06 RX ORDER — OXYBUTYNIN CHLORIDE 5 MG/1
5 TABLET ORAL 3 TIMES DAILY
Qty: 90 TABLET | Refills: 3 | Status: SHIPPED | OUTPATIENT
Start: 2018-07-06 | End: 2018-12-15 | Stop reason: SDUPTHER

## 2018-07-06 NOTE — TELEPHONE ENCOUNTER
----- Message from Lillie Jenkins sent at 7/6/2018  8:17 AM EDT -----  Regarding: REFILL  Contact: [phone]  Please call about a refill problem

## 2018-07-23 VITALS
OXYGEN SATURATION: 92 % | DIASTOLIC BLOOD PRESSURE: 94 MMHG | DIASTOLIC BLOOD PRESSURE: 70 MMHG | SYSTOLIC BLOOD PRESSURE: 135 MMHG | RESPIRATION RATE: 22 BRPM | RESPIRATION RATE: 28 BRPM | RESPIRATION RATE: 26 BRPM | SYSTOLIC BLOOD PRESSURE: 143 MMHG | HEIGHT: 63 IN | SYSTOLIC BLOOD PRESSURE: 120 MMHG | SYSTOLIC BLOOD PRESSURE: 140 MMHG | DIASTOLIC BLOOD PRESSURE: 83 MMHG | HEART RATE: 84 BPM | SYSTOLIC BLOOD PRESSURE: 125 MMHG | HEART RATE: 102 BPM | SYSTOLIC BLOOD PRESSURE: 138 MMHG | OXYGEN SATURATION: 97 % | HEART RATE: 81 BPM | SYSTOLIC BLOOD PRESSURE: 148 MMHG | RESPIRATION RATE: 17 BRPM | SYSTOLIC BLOOD PRESSURE: 123 MMHG | DIASTOLIC BLOOD PRESSURE: 60 MMHG | DIASTOLIC BLOOD PRESSURE: 71 MMHG | DIASTOLIC BLOOD PRESSURE: 85 MMHG | RESPIRATION RATE: 19 BRPM | WEIGHT: 155 LBS | OXYGEN SATURATION: 98 % | TEMPERATURE: 97 F | OXYGEN SATURATION: 95 % | HEART RATE: 72 BPM | TEMPERATURE: 97.8 F | HEART RATE: 74 BPM | HEART RATE: 76 BPM | SYSTOLIC BLOOD PRESSURE: 134 MMHG | DIASTOLIC BLOOD PRESSURE: 78 MMHG | SYSTOLIC BLOOD PRESSURE: 153 MMHG | DIASTOLIC BLOOD PRESSURE: 81 MMHG | RESPIRATION RATE: 25 BRPM | SYSTOLIC BLOOD PRESSURE: 154 MMHG | HEART RATE: 73 BPM | OXYGEN SATURATION: 93 % | SYSTOLIC BLOOD PRESSURE: 142 MMHG | DIASTOLIC BLOOD PRESSURE: 89 MMHG | DIASTOLIC BLOOD PRESSURE: 73 MMHG | RESPIRATION RATE: 16 BRPM | HEART RATE: 77 BPM | HEART RATE: 83 BPM | DIASTOLIC BLOOD PRESSURE: 91 MMHG | HEART RATE: 82 BPM | SYSTOLIC BLOOD PRESSURE: 136 MMHG | HEART RATE: 79 BPM | DIASTOLIC BLOOD PRESSURE: 99 MMHG | HEART RATE: 80 BPM | OXYGEN SATURATION: 89 % | OXYGEN SATURATION: 96 % | OXYGEN SATURATION: 91 %

## 2018-07-26 ENCOUNTER — APPOINTMENT (OUTPATIENT)
Dept: WOMENS IMAGING | Facility: HOSPITAL | Age: 73
End: 2018-07-26

## 2018-07-26 ENCOUNTER — OFFICE VISIT (OUTPATIENT)
Dept: OBSTETRICS AND GYNECOLOGY | Age: 73
End: 2018-07-26

## 2018-07-26 VITALS
WEIGHT: 159 LBS | HEIGHT: 62 IN | BODY MASS INDEX: 29.26 KG/M2 | SYSTOLIC BLOOD PRESSURE: 138 MMHG | DIASTOLIC BLOOD PRESSURE: 84 MMHG

## 2018-07-26 DIAGNOSIS — Z12.4 ROUTINE CERVICAL SMEAR: Primary | ICD-10-CM

## 2018-07-26 DIAGNOSIS — Z11.51 SPECIAL SCREENING EXAMINATION FOR HUMAN PAPILLOMAVIRUS (HPV): ICD-10-CM

## 2018-07-26 PROCEDURE — 77067 SCR MAMMO BI INCL CAD: CPT | Performed by: RADIOLOGY

## 2018-07-26 PROCEDURE — G0101 CA SCREEN;PELVIC/BREAST EXAM: HCPCS | Performed by: OBSTETRICS & GYNECOLOGY

## 2018-07-26 RX ORDER — CLOTRIMAZOLE 1 %
CREAM (GRAM) TOPICAL EVERY 12 HOURS SCHEDULED
Qty: 12 G | Refills: 1 | Status: SHIPPED | OUTPATIENT
Start: 2018-07-26 | End: 2019-03-05

## 2018-07-26 RX ORDER — CIPROFLOXACIN 500 MG/1
TABLET, FILM COATED ORAL
COMMUNITY
Start: 2018-07-20 | End: 2018-08-13

## 2018-07-26 NOTE — PROGRESS NOTES
Subjective     Chief Complaint   Patient presents with   • Gynecologic Exam     AC       History of Present Illness    Michelle Espinoza is a 73 y.o.  who presents for annual exam. The patient has had a difficult year.  She uses a walker and self caths.  She has had frequent UTIs.  She is currently on antibiotics and was hospitalized in December for more severe UTI.  Her bone density was done last year and osteopenia of the left hip was seen.  The levels were not high enough to require medication.  She is trying to exercise as best she can.  She is having a colonoscopy tomorrow.  Pelvic ultrasound was done last year due to some bloating.  Patient is not having vaginal bleeding or pelvic pain.  Patient has had some lower abdominal wall skin fungal infections.  They coincide with her antibiotics and using pads.  She would like some more antifungal cream.  Obstetric History:  OB History      Para Term  AB Living    4 3       3    SAB TAB Ectopic Molar Multiple Live Births              3         Menstrual History:     No LMP recorded (lmp unknown). Patient is postmenopausal.         Current contraception: post menopausal status    History of abnormal Pap smear: no  Received Gardasil immunization: no  Perform regular self breast exam: no  Family history of uterine or ovarian cancer: no  Family History of colon cancer: no  Family history of breast cancer: no    Mammogram: done today.  Colonoscopy: recommended. Planned tomorrow   DEXA: up to date. 2017 - osteopenia left hip Frax normal     Exercise: not active  Calcium/Vitamin D: adequate intake    The following portions of the patient's history were reviewed and updated as appropriate: allergies, current medications, past family history, past medical history, past social history, past surgical history and problem list.    Review of Systems    Review of Systems   Constitutional: Negative for fatigue.   Respiratory: Negative for shortness of breath.   "  Gastrointestinal: Negative for abdominal pain.   Genitourinary: Negative for dysuria.   Neurological: Negative for headaches.   Psychiatric/Behavioral: Negative for dysphoric mood.         Objective   Physical Exam    /84   Ht 157.5 cm (62\")   Wt 72.1 kg (159 lb)   LMP  (LMP Unknown)   BMI 29.08 kg/m²     General:   alert, appears stated age and cooperative   Neck: thyroid normal to palpation   Heart: regular rate and rhythm   Lungs: clear to auscultation bilaterally   Abdomen: soft, non-tender, without masses or organomegaly   Breast: inspection negative, no nipple discharge or bleeding, no masses or nodularity palpable   Vulva: normal, Bartholin's, Urethra, [place]'s normal   Vagina: normal mucosa, normal discharge   Cervix: no cervical motion tenderness and no lesions   Uterus: mobile, non-tender, normal shape and consistency   Adnexa: no mass, fullness, tenderness   Rectal: normal rectal, no masses     Assessment/Plan   Michelle was seen today for gynecologic exam.    Diagnoses and all orders for this visit:    Routine cervical smear  -     IGP, Aptima HPV, Rfx 16 / 18,45    Special screening examination for human papillomavirus (HPV)  -     IGP, Aptima HPV, Rfx 16 / 18,45    Other orders  -     clotrimazole (LOTRIMIN AF) 1 % cream; Apply  topically to the appropriate area as directed Every 12 (Twelve) Hours.      Pap smear was sent off today.  Patient has a family history of cervical cancer.  Yeast cream was also sent in.  All questions answered.  Breast self exam technique reviewed and patient encouraged to perform self-exam monthly.  Discussed healthy lifestyle modifications.  Recommended 30 minutes of aerobic exercise five times per week.  Discussed calcium needs to prevent osteoporosis.                 "

## 2018-07-27 ENCOUNTER — OFFICE (OUTPATIENT)
Dept: URBAN - METROPOLITAN AREA PATHOLOGY 4 | Facility: PATHOLOGY | Age: 73
End: 2018-07-27
Payer: COMMERCIAL

## 2018-07-27 ENCOUNTER — AMBULATORY SURGICAL CENTER (OUTPATIENT)
Dept: URBAN - METROPOLITAN AREA SURGERY 17 | Facility: SURGERY | Age: 73
End: 2018-07-27
Payer: COMMERCIAL

## 2018-07-27 DIAGNOSIS — K63.5 POLYP OF COLON: ICD-10-CM

## 2018-07-27 DIAGNOSIS — D12.5 BENIGN NEOPLASM OF SIGMOID COLON: ICD-10-CM

## 2018-07-27 DIAGNOSIS — D12.3 BENIGN NEOPLASM OF TRANSVERSE COLON: ICD-10-CM

## 2018-07-27 DIAGNOSIS — Z86.010 PERSONAL HISTORY OF COLONIC POLYPS: ICD-10-CM

## 2018-07-27 LAB
GI HISTOLOGY: A. UNSPECIFIED: (no result)
GI HISTOLOGY: B. SELECT: (no result)
GI HISTOLOGY: C. UNSPECIFIED: (no result)
GI HISTOLOGY: PDF REPORT: (no result)

## 2018-07-27 PROCEDURE — 45380 COLONOSCOPY AND BIOPSY: CPT | Mod: 59,PT | Performed by: INTERNAL MEDICINE

## 2018-07-27 PROCEDURE — 45380 COLONOSCOPY AND BIOPSY: CPT | Mod: PT,59 | Performed by: INTERNAL MEDICINE

## 2018-07-27 PROCEDURE — 45385 COLONOSCOPY W/LESION REMOVAL: CPT | Mod: PT | Performed by: INTERNAL MEDICINE

## 2018-07-27 PROCEDURE — 88305 TISSUE EXAM BY PATHOLOGIST: CPT | Performed by: INTERNAL MEDICINE

## 2018-07-27 RX ADMIN — PROPOFOL 50 MG: 10 INJECTION, EMULSION INTRAVENOUS at 13:15

## 2018-07-27 RX ADMIN — PROPOFOL 50 MG: 10 INJECTION, EMULSION INTRAVENOUS at 13:21

## 2018-07-27 RX ADMIN — PROPOFOL 50 MG: 10 INJECTION, EMULSION INTRAVENOUS at 13:09

## 2018-07-27 RX ADMIN — PROPOFOL 50 MG: 10 INJECTION, EMULSION INTRAVENOUS at 13:10

## 2018-07-27 RX ADMIN — PROPOFOL 50 MG: 10 INJECTION, EMULSION INTRAVENOUS at 13:12

## 2018-07-30 ENCOUNTER — TELEPHONE (OUTPATIENT)
Dept: OBSTETRICS AND GYNECOLOGY | Age: 73
End: 2018-07-30

## 2018-07-30 LAB
CYTOLOGIST CVX/VAG CYTO: NORMAL
CYTOLOGY CVX/VAG DOC THIN PREP: NORMAL
DX ICD CODE: NORMAL
HIV 1 & 2 AB SER-IMP: NORMAL
HPV I/H RISK 4 DNA CVX QL PROBE+SIG AMP: NEGATIVE
OTHER STN SPEC: NORMAL
PATH REPORT.FINAL DX SPEC: NORMAL
STAT OF ADQ CVX/VAG CYTO-IMP: NORMAL

## 2018-08-13 ENCOUNTER — OFFICE VISIT (OUTPATIENT)
Dept: FAMILY MEDICINE CLINIC | Facility: CLINIC | Age: 73
End: 2018-08-13

## 2018-08-13 VITALS
TEMPERATURE: 98.4 F | HEART RATE: 92 BPM | HEIGHT: 62 IN | SYSTOLIC BLOOD PRESSURE: 122 MMHG | DIASTOLIC BLOOD PRESSURE: 80 MMHG | BODY MASS INDEX: 28.89 KG/M2 | OXYGEN SATURATION: 96 % | WEIGHT: 157 LBS

## 2018-08-13 DIAGNOSIS — M48.04 THORACIC SPINAL STENOSIS: ICD-10-CM

## 2018-08-13 DIAGNOSIS — M47.22 OSTEOARTHRITIS OF SPINE WITH RADICULOPATHY, CERVICAL REGION: ICD-10-CM

## 2018-08-13 DIAGNOSIS — M47.816 LUMBAR SPONDYLOSIS: ICD-10-CM

## 2018-08-13 DIAGNOSIS — R29.898 WEAKNESS OF BOTH LOWER EXTREMITIES: Primary | ICD-10-CM

## 2018-08-13 DIAGNOSIS — N31.2 ATONIC NEUROGENIC BLADDER: ICD-10-CM

## 2018-08-13 DIAGNOSIS — M48.00 SPINAL STENOSIS, UNSPECIFIED SPINAL REGION: ICD-10-CM

## 2018-08-13 DIAGNOSIS — M51.36 DEGENERATIVE DISC DISEASE, LUMBAR: ICD-10-CM

## 2018-08-13 DIAGNOSIS — R53.1 WEAKNESS: ICD-10-CM

## 2018-08-13 DIAGNOSIS — M19.90 ARTHRITIS: ICD-10-CM

## 2018-08-13 PROCEDURE — 99213 OFFICE O/P EST LOW 20 MIN: CPT | Performed by: PHYSICIAN ASSISTANT

## 2018-08-13 RX ORDER — OFLOXACIN 3 MG/ML
SOLUTION/ DROPS OPHTHALMIC
COMMUNITY
Start: 2018-07-26 | End: 2019-02-26

## 2018-08-13 NOTE — PROGRESS NOTES
Subjective   Michelle Espinoza is a 73 y.o. female. Discuss getting a new scooter and handicap parking permit     History of Present Illness     PATIENT IS IN NEED OF A SCOOTER TO HELP WITH ADL AND MD APPTS/ ANYWHERE SHE HAS TO WALK MORE THAN JUST A SHORT DISTANCE. SHE HAS BEEN WORKING WITH PT TO MAINTAIN SOME STRENGTH AND NOT LOSE STRENGTH/ ABILITY TO WALK, HOWEVER, SHE IS VERY WEAK WITH DIFFICULTY WITH GAIT FROM SPINAL STENOSIS. SHE HAS CONTINUED TO FOLLOW UP WITH NEUROSURGEON AND WITH NEUROMUSCULAR CLINIC FOR SIGNIFICANT DEGENERATION. SHE HAS HAD 3 CERVICAL SPINE SURGERIES, 4 LUMBAR SPINE SURGERIES, AND 3 CARPAL TUNNEL RELEASES. SHE HAS USED A WALKER BUT IS UNABLE TO COMPLETE ADL OR WALK ANY SIGNIFICANT DISTANCE.     The following portions of the patient's history were reviewed and updated as appropriate: allergies, current medications, past family history, past medical history, past social history, past surgical history and problem list.    Review of Systems   All other systems reviewed and are negative.      Objective   Physical Exam   Constitutional: She is oriented to person, place, and time. She appears well-developed and well-nourished.   HENT:   Head: Normocephalic and atraumatic.   Right Ear: External ear normal.   Left Ear: External ear normal.   Nose: Nose normal.   Eyes: Conjunctivae and lids are normal.   Neck: Neck supple. Carotid bruit is not present.   Cardiovascular: Normal rate, regular rhythm, normal heart sounds and intact distal pulses.  Exam reveals no gallop and no friction rub.    No murmur heard.  Pulmonary/Chest: Effort normal and breath sounds normal. No respiratory distress. She has no wheezes. She has no rhonchi. She has no rales.   Musculoskeletal: She exhibits no edema or deformity.   Neurological: She is alert and oriented to person, place, and time. Gait (ABNORMAL WEAK AND STEPPING GAIT WITH WALKER. DIFFICULTY STANDING AND WALKING DESPITE WALKER. ) abnormal.   Skin: Skin is warm and  dry.   Psychiatric: She has a normal mood and affect. Her speech is normal and behavior is normal. Judgment and thought content normal. Cognition and memory are normal.   Nursing note and vitals reviewed.      Assessment/Plan   Michelle was seen today for discuss getting a new scooter.    Diagnoses and all orders for this visit:    Weakness of both lower extremities  -     Motorized Scooter    Arthritis  -     Motorized Scooter    Degenerative disc disease, lumbar  -     Motorized Scooter    Atonic neurogenic bladder  -     Motorized Scooter    Spinal stenosis, unspecified spinal region  -     Motorized Scooter    T7 and T10-11 Thoracic spinal stenosis  -     Motorized Scooter    Weakness  -     Motorized Scooter    Lumbar spondylosis  -     Motorized Scooter    Osteoarthritis of spine with radiculopathy, cervical region  -     Motorized Scooter      Patient Instructions   73 YEAR OLD FEMALE WHO PRESENTS TODAY FOR AN EVALUATION FOR A MOTORIZED SCOOTER. SHE WOULD BENEFIT FOR THIS FOR DOCTOR'S APPTS, ADLS, AND ANY ACTIVITIES THAT REQUIRE MORE THAN A VERY SHORT DISTANCE. SHE IS MOTIVATED TO CONTINUE PHYSICAL THERAPY TO CONTINUE HER STRENGTHENING AND AVOID FURTHER DEGENERATION. SHE IS SEEING NEUROMUSCULAR DEPARTMENT AND HAS SEEN A NEUROSURGEON WHO DO NOT HAVE ANY FURTHER OPTIONS FOR CURATIVE TREATMENTS.

## 2018-08-18 NOTE — PATIENT INSTRUCTIONS
73 YEAR OLD FEMALE WHO PRESENTS TODAY FOR AN EVALUATION FOR A MOTORIZED SCOOTER. SHE WOULD BENEFIT FOR THIS FOR DOCTOR'S APPTS, ADLS, AND ANY ACTIVITIES THAT REQUIRE MORE THAN A VERY SHORT DISTANCE. SHE IS MOTIVATED TO CONTINUE PHYSICAL THERAPY TO CONTINUE HER STRENGTHENING AND AVOID FURTHER DEGENERATION. SHE IS SEEING NEUROMUSCULAR DEPARTMENT AND HAS SEEN A NEUROSURGEON WHO DO NOT HAVE ANY FURTHER OPTIONS FOR CURATIVE TREATMENTS.

## 2018-09-14 DIAGNOSIS — E11.8 TYPE 2 DIABETES MELLITUS WITH COMPLICATION, WITHOUT LONG-TERM CURRENT USE OF INSULIN (HCC): ICD-10-CM

## 2018-10-03 DIAGNOSIS — R73.01 IMPAIRED FASTING GLUCOSE: ICD-10-CM

## 2018-10-03 DIAGNOSIS — E53.8 B12 DEFICIENCY: ICD-10-CM

## 2018-10-03 DIAGNOSIS — E78.49 OTHER HYPERLIPIDEMIA: ICD-10-CM

## 2018-10-03 DIAGNOSIS — R53.83 OTHER FATIGUE: ICD-10-CM

## 2018-10-03 DIAGNOSIS — E55.9 VITAMIN D DEFICIENCY: ICD-10-CM

## 2018-10-03 DIAGNOSIS — R63.5 ABNORMAL WEIGHT GAIN: Primary | ICD-10-CM

## 2018-10-05 LAB
25(OH)D3+25(OH)D2 SERPL-MCNC: 53.7 NG/ML (ref 30–100)
BUN SERPL-MCNC: 26 MG/DL (ref 8–27)
BUN/CREAT SERPL: 27 (ref 12–28)
CALCIUM SERPL-MCNC: 11.1 MG/DL (ref 8.7–10.3)
CHLORIDE SERPL-SCNC: 103 MMOL/L (ref 96–106)
CHOLEST SERPL-MCNC: 168 MG/DL (ref 100–199)
CO2 SERPL-SCNC: 20 MMOL/L (ref 20–29)
CREAT SERPL-MCNC: 0.97 MG/DL (ref 0.57–1)
FOLATE SERPL-MCNC: 15.7 NG/ML
GLUCOSE SERPL-MCNC: 128 MG/DL (ref 65–99)
HBA1C MFR BLD: 6 % (ref 4.8–5.6)
HDLC SERPL-MCNC: 54 MG/DL
INTERPRETATION: NORMAL
INTERPRETATION: NORMAL
LDLC SERPL CALC-MCNC: 86 MG/DL (ref 0–99)
Lab: NORMAL
POTASSIUM SERPL-SCNC: 4.7 MMOL/L (ref 3.5–5.2)
SODIUM SERPL-SCNC: 141 MMOL/L (ref 134–144)
T4 FREE SERPL-MCNC: 1.08 NG/DL (ref 0.82–1.77)
TRIGL SERPL-MCNC: 139 MG/DL (ref 0–149)
TSH SERPL DL<=0.005 MIU/L-ACNC: 0.67 UIU/ML (ref 0.45–4.5)
VIT B12 SERPL-MCNC: 960 PG/ML (ref 232–1245)
VLDLC SERPL CALC-MCNC: 28 MG/DL (ref 5–40)

## 2018-10-08 ENCOUNTER — RESULTS ENCOUNTER (OUTPATIENT)
Dept: ENDOCRINOLOGY | Age: 73
End: 2018-10-08

## 2018-10-08 DIAGNOSIS — R73.01 IMPAIRED FASTING GLUCOSE: ICD-10-CM

## 2018-10-08 DIAGNOSIS — E55.9 VITAMIN D DEFICIENCY: ICD-10-CM

## 2018-10-08 DIAGNOSIS — R53.83 OTHER FATIGUE: ICD-10-CM

## 2018-10-08 DIAGNOSIS — E78.49 OTHER HYPERLIPIDEMIA: ICD-10-CM

## 2018-10-08 DIAGNOSIS — E53.8 B12 DEFICIENCY: ICD-10-CM

## 2018-10-08 DIAGNOSIS — R63.5 ABNORMAL WEIGHT GAIN: ICD-10-CM

## 2018-10-10 ENCOUNTER — RESULTS ENCOUNTER (OUTPATIENT)
Dept: ENDOCRINOLOGY | Age: 73
End: 2018-10-10

## 2018-10-10 DIAGNOSIS — E78.49 OTHER HYPERLIPIDEMIA: ICD-10-CM

## 2018-10-10 DIAGNOSIS — E55.9 VITAMIN D DEFICIENCY: ICD-10-CM

## 2018-10-10 DIAGNOSIS — R73.01 IMPAIRED FASTING GLUCOSE: ICD-10-CM

## 2018-10-10 DIAGNOSIS — R63.5 ABNORMAL WEIGHT GAIN: ICD-10-CM

## 2018-10-10 DIAGNOSIS — E53.8 B12 DEFICIENCY: ICD-10-CM

## 2018-10-10 DIAGNOSIS — R53.83 OTHER FATIGUE: ICD-10-CM

## 2018-10-16 ENCOUNTER — OFFICE VISIT (OUTPATIENT)
Dept: ENDOCRINOLOGY | Age: 73
End: 2018-10-16

## 2018-10-16 VITALS
DIASTOLIC BLOOD PRESSURE: 72 MMHG | WEIGHT: 154 LBS | BODY MASS INDEX: 28.34 KG/M2 | OXYGEN SATURATION: 99 % | HEIGHT: 62 IN | HEART RATE: 92 BPM | SYSTOLIC BLOOD PRESSURE: 112 MMHG

## 2018-10-16 DIAGNOSIS — N20.0 KIDNEY STONES: ICD-10-CM

## 2018-10-16 DIAGNOSIS — E78.2 MIXED HYPERLIPIDEMIA: ICD-10-CM

## 2018-10-16 DIAGNOSIS — R73.03 PRE-DIABETES: ICD-10-CM

## 2018-10-16 DIAGNOSIS — E83.52 HYPERCALCEMIA: Primary | ICD-10-CM

## 2018-10-16 DIAGNOSIS — R73.03 PREDIABETES: ICD-10-CM

## 2018-10-16 DIAGNOSIS — M85.89 OSTEOPENIA OF MULTIPLE SITES: ICD-10-CM

## 2018-10-16 PROCEDURE — 99214 OFFICE O/P EST MOD 30 MIN: CPT | Performed by: INTERNAL MEDICINE

## 2018-10-16 RX ORDER — MAGNESIUM 30 MG
90 TABLET ORAL DAILY
COMMUNITY

## 2018-10-16 NOTE — PROGRESS NOTES
73 y.o.    Patient Care Team:  Lillie Ji PA as PCP - General (Family Medicine)  Jeffry Bhagat MD as PCP - [facility]  Jose Luis Nelson Jr., MD as Consulting Physician (Urology)  Bon Gardiner MD as Consulting Physician (Hand Surgery)  Milly Crow MD as Consulting Physician (Neurology)  Sacha Garcia APRN as Nurse Practitioner (Neurology)  Johnnie Soni MD as Consulting Physician (Neurology)  Mahamed Massey MD as Consulting Physician (Obstetrics and Gynecology)  Clement Tierney MD as Surgeon (Neurosurgery)  Donna Olsen MA as Medical Assistant  Yousuf, Katya, RN as Care Coordinator (Population Health)    Chief Complaint:    6 MONTH FOLLOW UP/ PRE-DIABETES  Subjective     HPI     73 year old white female is here for the follow up of elevated calcium levels.  Patient's calcium levels were noted to be elevated on routine blood work up in May 2017.  Her calcium levels have been ranging between 10.5-10.9 mg/dL.  With the latest being her highest-11.1 mg/dL range.  Patient always had normal parathyroid levels.  During her last appointment patient was instructed to stop taking the calcium supplementation but her calcium levels are still elevated.  She also has history of osteopenia based on her bone density scan from 10/25/2016.  No history of fragility fractures.  History of kidney stones which are being monitored by the urologist.  Last attack was 3 years ago and she had to undergo lithotripsy and stent placement at that time.  She is currently on magnesium and vitamin D supplementation.  No family history of kidney stones or family history of osteoporosis.  No complaints of abdominal pain, no complaints of bone or joint pain. Increased urination when she has UTI.  Complains of fatigue at times.  She has not  Been on hydrochlorothiazide.  Not on any steroids.  She drinks about 10 ounces of milk daily, eats cheese and yogurt occasionally.     Pre diabetes  On metformin 500 mg daily bid.       Kidney stones and urinary tract infections managed by urologist.   She did have a recent UTI but no recent kidney stones.      Hyperlipidemia - not on any medications at this time.     Reviewed primary care physician's/consulting physician documentation and lab results :     Interval History      The following portions of the patient's history were reviewed and updated as appropriate: allergies, current medications, past family history, past medical history, past social history, past surgical history and problem list.    Past Medical History:   Diagnosis Date   • Benign colonic polyp    • Cataract     bilateral   • Degenerative disc disease, lumbar    • Hypercholesteremia    • Leg cramping    • Lumbar pain    • Neck pain    • Osteoarthritis    • Pre-diabetes    • Pyelonephritis 06/2014   • Renal stone 06/2014   • Sepsis (CMS/HCC)    • Urinary incontinence    • Vaginal delivery      Family History   Problem Relation Age of Onset   • Stroke Mother    • Heart disease Mother    • Hypertension Mother    • Clotting disorder Father    • Hypertension Father    • Diabetes Father    • Aneurysm Father    • Hypertension Sister    • Diabetes Sister    • Cervical cancer Sister    • Diabetes Sister    • Cervical cancer Maternal Grandmother 72   • Diabetes Grandchild      Social History     Social History   • Marital status:      Spouse name: N/A   • Number of children: N/A   • Years of education: N/A     Occupational History   • retired      Social History Main Topics   • Smoking status: Former Smoker     Packs/day: 0.50     Years: 15.00     Types: Cigarettes     Quit date: 1992   • Smokeless tobacco: Never Used   • Alcohol use Yes      Comment: Socially.   • Drug use: No   • Sexual activity: Defer     Other Topics Concern   • Not on file     Social History Narrative   • No narrative on file     Allergies   Allergen Reactions   • Other Nausea And Vomiting     The mercury in [place]   • Penicillins Hives   • Latex Rash    • Nickel Rash       Current Outpatient Prescriptions:   •  baclofen (LIORESAL) 20 MG tablet, TAKE 1 TABLET BY MOUTH EVERY DAY, Disp: 30 tablet, Rfl: 5  •  Cholecalciferol (VITAMIN D3) 5000 UNITS capsule capsule, Take 5,000 Units by mouth Daily., Disp: , Rfl:   •  clotrimazole (LOTRIMIN AF) 1 % cream, Apply  topically to the appropriate area as directed Every 12 (Twelve) Hours., Disp: 12 g, Rfl: 1  •  D-MANNOSE PO, Take  by mouth Daily., Disp: , Rfl:   •  Fish Oil-Krill Oil (PA FISH OIL/KRILL PO), Take  by mouth Daily., Disp: , Rfl:   •  glucose blood test strip, USE TO TEST BLOOD SUGAR 2 TIMES DAILY DX CODE E11.9 One Touch Ultra Test Strips, Disp: 100 each, Rfl: 12  •  Lutein-Bilberry (BILBERRY PLUS LUTEIN) 5-1000 MCG-MG capsule, Take  by mouth Daily., Disp: , Rfl:   •  magnesium 30 MG tablet, Take 30 mg by mouth 2 (Two) Times a Day., Disp: , Rfl:   •  metFORMIN (GLUCOPHAGE) 500 MG tablet, TAKE ONE TABLET BY MOUTH TWICE A DAY WITH MEALS, Disp: 180 tablet, Rfl: 3  •  NYSTATIN [id number] UNIT/GM powder, APPLY TO AFFECTED AREA 2 TO 3 TIMES PER DAY AS NEEDED, Disp: 30 g, Rfl: 0  •  ofloxacin (OCUFLOX) 0.3 % ophthalmic solution, , Disp: , Rfl:   •  oxybutynin (DITROPAN) 5 MG tablet, Take 1 tablet by mouth 3 (Three) Times a Day., Disp: 90 tablet, Rfl: 3  •  Probiotic Product (PROBIOTIC PO), Take  by mouth Daily., Disp: , Rfl:   •  TURMERIC PO, Take  by mouth., Disp: , Rfl:   •  vitamin C (ASCORBIC ACID) 500 MG tablet, Take 500 mg by mouth Daily. With D3, Disp: , Rfl:   •  amLODIPine (NORVASC) 5 MG tablet, Take 1 tablet by mouth Daily., Disp: , Rfl:         Review of Systems   Constitutional: Negative for appetite change, fatigue and fever.   HENT: Negative for trouble swallowing.    Eyes: Negative for visual disturbance.   Respiratory: Negative for shortness of breath.    Cardiovascular: Negative for palpitations and leg swelling.   Gastrointestinal: Negative for abdominal pain and vomiting.   Endocrine: Negative for  "polydipsia and polyuria.   Musculoskeletal: Positive for arthralgias. Negative for joint swelling and neck pain.   Skin: Negative for rash.   Neurological: Negative for weakness and numbness.   Psychiatric/Behavioral: Negative for behavioral problems.       Objective       Vitals:    10/16/18 1421   BP: 112/72   Pulse: 92   SpO2: 99%   Weight: 69.9 kg (154 lb)   Height: 157.5 cm (62\")     Body mass index is 28.17 kg/m².      Physical Exam   Constitutional: She is oriented to person, place, and time. She appears well-nourished.   Obese     HENT:   Head: Normocephalic and atraumatic.   Wide neck   Eyes: Conjunctivae and EOM are normal. No scleral icterus.   Neck: Normal range of motion. Neck supple. No thyromegaly present.   Acanthosis nigricans   Cardiovascular: Normal rate and normal heart sounds.    Pulmonary/Chest: Effort normal and breath sounds normal. No stridor. She has no wheezes.   Abdominal: Soft. Bowel sounds are normal. She exhibits no distension. There is no tenderness.   Central obesity   Musculoskeletal: She exhibits deformity. She exhibits no edema or tenderness.   Neurological: She is alert and oriented to person, place, and time.   Uses a walker   Skin: Skin is warm and dry. She is not diaphoretic.   Psychiatric: She has a normal mood and affect.   Vitals reviewed.    Results Review:     I reviewed the patient's new clinical results and mentioned them above in HPI and in plan as well.    Medical records reviewed  Summary: done      Office Visit on 07/26/2018   Component Date Value Ref Range Status   • Diagnosis 07/26/2018 Comment   Final    Comment: NEGATIVE FOR INTRAEPITHELIAL LESION AND MALIGNANCY.  CELLULAR CHANGES ASSOCIATED WITH INFLAMMATION ARE PRESENT.     • Specimen adequacy: 07/26/2018 Comment   Final    Comment: Satisfactory for evaluation.  Endocervical and/or squamous metaplastic  cells (endocervical component) are present.     • Clinician Provided ICD-10: 07/26/2018 Comment   Final    " Comment: Z12.4  Z11.51     • Performed by: 07/26/2018 Comment   Final    Renee Grimes, Cytotechnologist ([facility])   • . 07/26/2018 .   Final   • Note: 07/26/2018 Comment   Final    Comment: The Pap smear is a screening test designed to aid in the detection of  premalignant and malignant conditions of the uterine cervix.  It is not a  diagnostic procedure and should not be used as the sole means of detecting  cervical cancer.  Both false-positive and false-negative reports do occur.     • Method: 07/26/2018 Comment   Final    Comment: This liquid based ThinPrep(R) pap test was screened with the  use of an image guided system.     • HPV Aptima 07/26/2018 Negative  Negative Final    Comment: This test detects fourteen high-risk HPV types (16/18/31/33/35/39/45/  51/52/56/58/59/66/68) without differentiation.       Lab Results   Component Value Date    HGBA1C 6.0 (H) 10/04/2018    HGBA1C 6.3 (H) 04/10/2018    HGBA1C 6.23 (H) 09/08/2017     Lab Results   Component Value Date    CREATININE 0.97 10/04/2018     Imaging Results (most recent)     None                Assessment and Plan:    Michelle was seen today for diabetes.    Diagnoses and all orders for this visit:    Hypercalcemia  -     Comprehensive Metabolic Panel; Future  -     Hemoglobin A1c; Future  -     Vitamin B12 & Folate; Future  -     Vitamin D 25 Hydroxy; Future  -     Lipid Panel; Future  -     TSH; Future  -     T4, Free; Future  -     NM Parathyroid Scan w SPECT; Future    Pre-diabetes    Kidney stones    Osteopenia of multiple sites    Prediabetes   -     Hemoglobin A1c; Future    Mixed hyperlipidemia   -     Lipid Panel; Future        Hypercalcemia-levels are worse  Will consider performing parathyroid scan  Discussed with the patient about performing bone density scan, patient would like to get it done through her gynecologist.  Patient will send us the report of her bone density scan.  Based on her parathyroid scan would consider sending the patient to  "ENT.    Osteopenia  Based on the bone density scan information might consider starting patient on bisphosphonate's.    Pre-diabetes  Decently controlled on metformin.    Hyperlipidemia  Levels have significantly improved on diet control.    Reviewed Lab results with the patient.             Bethanie Montano MD  10/16/18    EMR Dragon / transcription disclaimer:     \"Dictated utilizing Dragon dictation\".  "

## 2018-10-31 DIAGNOSIS — M85.80 OSTEOPENIA, UNSPECIFIED LOCATION: Primary | ICD-10-CM

## 2018-11-01 DIAGNOSIS — N95.1 SYMPTOMATIC MENOPAUSAL OR FEMALE CLIMACTERIC STATES: Primary | ICD-10-CM

## 2018-11-27 ENCOUNTER — OFFICE VISIT (OUTPATIENT)
Dept: FAMILY MEDICINE CLINIC | Facility: CLINIC | Age: 73
End: 2018-11-27

## 2018-11-27 VITALS
TEMPERATURE: 99.2 F | HEART RATE: 57 BPM | BODY MASS INDEX: 28.52 KG/M2 | SYSTOLIC BLOOD PRESSURE: 120 MMHG | HEIGHT: 62 IN | WEIGHT: 155 LBS | DIASTOLIC BLOOD PRESSURE: 70 MMHG | OXYGEN SATURATION: 98 %

## 2018-11-27 DIAGNOSIS — R53.1 WEAKNESS: ICD-10-CM

## 2018-11-27 DIAGNOSIS — R06.89 ABNORMAL BREATH SOUNDS: ICD-10-CM

## 2018-11-27 DIAGNOSIS — E11.59 TYPE 2 DIABETES MELLITUS WITH OTHER CIRCULATORY COMPLICATIONS (HCC): ICD-10-CM

## 2018-11-27 DIAGNOSIS — R23.8 ABNORMAL FOOT COLOR: ICD-10-CM

## 2018-11-27 DIAGNOSIS — I65.23 BILATERAL CAROTID ARTERY STENOSIS: ICD-10-CM

## 2018-11-27 DIAGNOSIS — E53.8 B12 DEFICIENCY: ICD-10-CM

## 2018-11-27 DIAGNOSIS — E78.00 HYPERCHOLESTEROLEMIA: ICD-10-CM

## 2018-11-27 DIAGNOSIS — R73.01 IMPAIRED FASTING GLUCOSE: ICD-10-CM

## 2018-11-27 DIAGNOSIS — E55.9 VITAMIN D DEFICIENCY: ICD-10-CM

## 2018-11-27 DIAGNOSIS — Z00.00 MEDICARE ANNUAL WELLNESS VISIT, SUBSEQUENT: Primary | ICD-10-CM

## 2018-11-27 DIAGNOSIS — N18.2 STAGE 2 CHRONIC KIDNEY DISEASE: ICD-10-CM

## 2018-11-27 DIAGNOSIS — E83.52 HYPERCALCEMIA: ICD-10-CM

## 2018-11-27 LAB
ALBUMIN SERPL-MCNC: 4 G/DL (ref 3.5–5.2)
ALBUMIN/GLOB SERPL: 1.1 G/DL
ALP SERPL-CCNC: 92 U/L (ref 39–117)
ALT SERPL-CCNC: 12 U/L (ref 1–33)
AST SERPL-CCNC: 10 U/L (ref 1–32)
BASOPHILS # BLD AUTO: 0.02 10*3/MM3 (ref 0–0.2)
BASOPHILS NFR BLD AUTO: 0.1 % (ref 0–1.5)
BILIRUB SERPL-MCNC: 0.6 MG/DL (ref 0.1–1.2)
BUN SERPL-MCNC: 21 MG/DL (ref 8–23)
BUN/CREAT SERPL: 19.1 (ref 7–25)
CALCIUM SERPL-MCNC: 11 MG/DL (ref 8.6–10.5)
CHLORIDE SERPL-SCNC: 100 MMOL/L (ref 98–107)
CO2 SERPL-SCNC: 25.5 MMOL/L (ref 22–29)
CREAT SERPL-MCNC: 1.1 MG/DL (ref 0.57–1)
EOSINOPHIL # BLD AUTO: 0.03 10*3/MM3 (ref 0–0.7)
EOSINOPHIL NFR BLD AUTO: 0.1 % (ref 0.3–6.2)
ERYTHROCYTE [DISTWIDTH] IN BLOOD BY AUTOMATED COUNT: 14 % (ref 11.7–13)
GLOBULIN SER CALC-MCNC: 3.5 GM/DL
GLUCOSE SERPL-MCNC: 134 MG/DL (ref 65–99)
HCT VFR BLD AUTO: 40.9 % (ref 35.6–45.5)
HGB BLD-MCNC: 13.5 G/DL (ref 11.9–15.5)
IMM GRANULOCYTES # BLD: 0.07 10*3/MM3 (ref 0–0.03)
IMM GRANULOCYTES NFR BLD: 0.3 % (ref 0–0.5)
LYMPHOCYTES # BLD AUTO: 2.01 10*3/MM3 (ref 0.9–4.8)
LYMPHOCYTES NFR BLD AUTO: 9.1 % (ref 19.6–45.3)
MCH RBC QN AUTO: 28.8 PG (ref 26.9–32)
MCHC RBC AUTO-ENTMCNC: 33 G/DL (ref 32.4–36.3)
MCV RBC AUTO: 87.2 FL (ref 80.5–98.2)
MONOCYTES # BLD AUTO: 1.86 10*3/MM3 (ref 0.2–1.2)
MONOCYTES NFR BLD AUTO: 8.4 % (ref 5–12)
NEUTROPHILS # BLD AUTO: 18.25 10*3/MM3 (ref 1.9–8.1)
NEUTROPHILS NFR BLD AUTO: 82.3 % (ref 42.7–76)
PLATELET # BLD AUTO: 316 10*3/MM3 (ref 140–500)
POTASSIUM SERPL-SCNC: 4.5 MMOL/L (ref 3.5–5.2)
PROT SERPL-MCNC: 7.5 G/DL (ref 6–8.5)
RBC # BLD AUTO: 4.69 10*6/MM3 (ref 3.9–5.2)
SODIUM SERPL-SCNC: 140 MMOL/L (ref 136–145)
WBC # BLD AUTO: 22.17 10*3/MM3 (ref 4.5–10.7)

## 2018-11-27 PROCEDURE — 71046 X-RAY EXAM CHEST 2 VIEWS: CPT | Performed by: PHYSICIAN ASSISTANT

## 2018-11-27 PROCEDURE — 99214 OFFICE O/P EST MOD 30 MIN: CPT | Performed by: PHYSICIAN ASSISTANT

## 2018-11-27 PROCEDURE — G0439 PPPS, SUBSEQ VISIT: HCPCS | Performed by: PHYSICIAN ASSISTANT

## 2018-11-27 RX ORDER — CIPROFLOXACIN 500 MG/1
TABLET, FILM COATED ORAL
COMMUNITY
Start: 2018-11-16 | End: 2019-03-05

## 2018-11-28 DIAGNOSIS — D72.828 OTHER ELEVATED WHITE BLOOD CELL (WBC) COUNT: Primary | ICD-10-CM

## 2018-11-30 LAB
BASOPHILS # BLD AUTO: 0 X10E3/UL (ref 0–0.2)
BASOPHILS NFR BLD AUTO: 0 %
EOSINOPHIL # BLD AUTO: 0.1 X10E3/UL (ref 0–0.4)
EOSINOPHIL NFR BLD AUTO: 1 %
ERYTHROCYTE [DISTWIDTH] IN BLOOD BY AUTOMATED COUNT: 13.9 % (ref 12.3–15.4)
HCT VFR BLD AUTO: 38.2 % (ref 34–46.6)
HGB BLD-MCNC: 12.6 G/DL (ref 11.1–15.9)
IMM GRANULOCYTES # BLD: 0 X10E3/UL (ref 0–0.1)
IMM GRANULOCYTES NFR BLD: 0 %
LYMPHOCYTES # BLD AUTO: 2 X10E3/UL (ref 0.7–3.1)
LYMPHOCYTES NFR BLD AUTO: 19 %
MCH RBC QN AUTO: 28.3 PG (ref 26.6–33)
MCHC RBC AUTO-ENTMCNC: 33 G/DL (ref 31.5–35.7)
MCV RBC AUTO: 86 FL (ref 79–97)
MONOCYTES # BLD AUTO: 1 X10E3/UL (ref 0.1–0.9)
MONOCYTES NFR BLD AUTO: 10 %
NEUTROPHILS # BLD AUTO: 7.4 X10E3/UL (ref 1.4–7)
NEUTROPHILS NFR BLD AUTO: 70 %
PLATELET # BLD AUTO: 352 X10E3/UL (ref 150–379)
RBC # BLD AUTO: 4.45 X10E6/UL (ref 3.77–5.28)
WBC # BLD AUTO: 10.6 X10E3/UL (ref 3.4–10.8)

## 2018-12-04 ENCOUNTER — PROCEDURE VISIT (OUTPATIENT)
Dept: OBSTETRICS AND GYNECOLOGY | Age: 73
End: 2018-12-04

## 2018-12-04 DIAGNOSIS — N95.1 SYMPTOMATIC MENOPAUSAL OR FEMALE CLIMACTERIC STATES: ICD-10-CM

## 2018-12-04 DIAGNOSIS — Z78.0 POST-MENOPAUSAL: Primary | ICD-10-CM

## 2018-12-04 PROCEDURE — 77080 DXA BONE DENSITY AXIAL: CPT | Performed by: OBSTETRICS & GYNECOLOGY

## 2018-12-07 ENCOUNTER — HOSPITAL ENCOUNTER (OUTPATIENT)
Dept: CARDIOLOGY | Facility: HOSPITAL | Age: 73
Discharge: HOME | End: 2018-12-07
Admitting: PHYSICIAN ASSISTANT

## 2018-12-07 ENCOUNTER — HOSPITAL ENCOUNTER (OUTPATIENT)
Dept: CARDIOLOGY | Facility: HOSPITAL | Age: 73
Discharge: HOME | End: 2018-12-07

## 2018-12-07 LAB
BH CV LOWER ARTERIAL LEFT ABI RATIO: 1.2
BH CV LOWER ARTERIAL LEFT DORSALIS PEDIS SYS MAX: 136 MMHG
BH CV LOWER ARTERIAL LEFT GREAT TOE SYS MAX: 111 MMHG
BH CV LOWER ARTERIAL LEFT POST TIBIAL SYS MAX: 149 MMHG
BH CV LOWER ARTERIAL LEFT TBI RATIO: 0.88
BH CV LOWER ARTERIAL RIGHT ABI RATIO: 1.1
BH CV LOWER ARTERIAL RIGHT DORSALIS PEDIS SYS MAX: 133 MMHG
BH CV LOWER ARTERIAL RIGHT GREAT TOE SYS MAX: 135 MMHG
BH CV LOWER ARTERIAL RIGHT POST TIBIAL SYS MAX: 139 MMHG
BH CV LOWER ARTERIAL RIGHT TBI RATIO: 1.1
BH CV XLRA MEAS LEFT CCA RATIO VEL: 40.5 CM/SEC
BH CV XLRA MEAS LEFT DIST CCA EDV: 7.9 CM/SEC
BH CV XLRA MEAS LEFT DIST CCA PSV: 40.5 CM/SEC
BH CV XLRA MEAS LEFT DIST ICA EDV: -24.4 CM/SEC
BH CV XLRA MEAS LEFT DIST ICA PSV: -70.3 CM/SEC
BH CV XLRA MEAS LEFT ICA RATIO VEL: -70.3 CM/SEC
BH CV XLRA MEAS LEFT ICA/CCA RATIO: -1.7
BH CV XLRA MEAS LEFT MID ICA EDV: -23.2 CM/SEC
BH CV XLRA MEAS LEFT MID ICA PSV: -58.9 CM/SEC
BH CV XLRA MEAS LEFT PROX CCA EDV: 17 CM/SEC
BH CV XLRA MEAS LEFT PROX CCA PSV: 80.9 CM/SEC
BH CV XLRA MEAS LEFT PROX ECA EDV: 7.1 CM/SEC
BH CV XLRA MEAS LEFT PROX ECA PSV: 57.4 CM/SEC
BH CV XLRA MEAS LEFT PROX ICA EDV: 14.5 CM/SEC
BH CV XLRA MEAS LEFT PROX ICA PSV: 47.1 CM/SEC
BH CV XLRA MEAS LEFT PROX SCLA PSV: 148 CM/SEC
BH CV XLRA MEAS LEFT VERTEBRAL A EDV: 27.1 CM/SEC
BH CV XLRA MEAS LEFT VERTEBRAL A PSV: 63.6 CM/SEC
BH CV XLRA MEAS RIGHT CCA RATIO VEL: 52.6 CM/SEC
BH CV XLRA MEAS RIGHT DIST CCA EDV: 13.4 CM/SEC
BH CV XLRA MEAS RIGHT DIST CCA PSV: 52.6 CM/SEC
BH CV XLRA MEAS RIGHT DIST ICA EDV: -32.8 CM/SEC
BH CV XLRA MEAS RIGHT DIST ICA PSV: -92.6 CM/SEC
BH CV XLRA MEAS RIGHT ICA RATIO VEL: -98.2 CM/SEC
BH CV XLRA MEAS RIGHT ICA/CCA RATIO: -1.9
BH CV XLRA MEAS RIGHT MID ICA EDV: 21.6 CM/SEC
BH CV XLRA MEAS RIGHT MID ICA PSV: 55 CM/SEC
BH CV XLRA MEAS RIGHT PROX CCA EDV: 10.2 CM/SEC
BH CV XLRA MEAS RIGHT PROX CCA PSV: 52.3 CM/SEC
BH CV XLRA MEAS RIGHT PROX ECA EDV: 11.7 CM/SEC
BH CV XLRA MEAS RIGHT PROX ECA PSV: 90.3 CM/SEC
BH CV XLRA MEAS RIGHT PROX ICA EDV: -22.8 CM/SEC
BH CV XLRA MEAS RIGHT PROX ICA PSV: -98.2 CM/SEC
BH CV XLRA MEAS RIGHT PROX SCLA PSV: 100 CM/SEC
BH CV XLRA MEAS RIGHT VERTEBRAL A EDV: 12.2 CM/SEC
BH CV XLRA MEAS RIGHT VERTEBRAL A PSV: 50.7 CM/SEC
LEFT ARM BP: 125 MMHG
RIGHT ARM BP: 126 MMHG
UPPER ARTERIAL LEFT ARM BRACHIAL SYS MAX: 125 MMHG
UPPER ARTERIAL RIGHT ARM BRACHIAL SYS MAX: 126 MMHG

## 2018-12-07 PROCEDURE — 93922 UPR/L XTREMITY ART 2 LEVELS: CPT

## 2018-12-07 PROCEDURE — 93880 EXTRACRANIAL BILAT STUDY: CPT

## 2018-12-11 ENCOUNTER — TELEPHONE (OUTPATIENT)
Dept: OBSTETRICS AND GYNECOLOGY | Age: 73
End: 2018-12-11

## 2018-12-11 NOTE — TELEPHONE ENCOUNTER
The result is in the chart with Dr Massey documentation that she left a message for the pt.  She is osteopenic and Dr Massey recommends pt c/w exercise and calcium supplementation

## 2018-12-12 RX ORDER — DIAZEPAM 10 MG/1
TABLET ORAL
Qty: 1 TABLET | Refills: 0 | Status: SHIPPED | OUTPATIENT
Start: 2018-12-12 | End: 2019-02-26

## 2018-12-13 ENCOUNTER — TELEPHONE (OUTPATIENT)
Dept: ENDOCRINOLOGY | Age: 73
End: 2018-12-13

## 2018-12-13 NOTE — TELEPHONE ENCOUNTER
MEDICATION SENT TO PHARMACY     ----- Message from Rebecca Singh sent at 12/11/2018 12:45 PM EST -----  Contact: Patient  Michelle has a parathyroid scan scheduled for this Friday at 7:30 am and says she is extremely claustrophobic and wont be able to do the scan without valium. She said when she had a cat scan they gave her 10mg  Patient wants to know if we can prescribe one to her for the scan     Pt # - [phone]  [facility] pharmacy - [phone]

## 2018-12-17 RX ORDER — OXYBUTYNIN CHLORIDE 5 MG/1
TABLET ORAL
Qty: 90 TABLET | Refills: 2 | Status: SHIPPED | OUTPATIENT
Start: 2018-12-17 | End: 2019-03-05

## 2019-01-14 ENCOUNTER — RESULTS ENCOUNTER (OUTPATIENT)
Dept: ENDOCRINOLOGY | Age: 74
End: 2019-01-14

## 2019-01-14 DIAGNOSIS — E83.52 HYPERCALCEMIA: ICD-10-CM

## 2019-01-14 DIAGNOSIS — R73.03 PREDIABETES: ICD-10-CM

## 2019-01-14 DIAGNOSIS — E78.2 MIXED HYPERLIPIDEMIA: ICD-10-CM

## 2019-01-17 ENCOUNTER — TELEPHONE (OUTPATIENT)
Dept: ENDOCRINOLOGY | Age: 74
End: 2019-01-17

## 2019-01-17 NOTE — TELEPHONE ENCOUNTER
FAXED ORDER    ----- Message from Yashira Haddad sent at 1/16/2019 12:18 PM EST -----  Contact: patient  Patient asked for lab order to be faxed to [facility] on [address] ph. [phone]. I looked up the fax when patient did not know and it is [phone]

## 2019-01-19 LAB
25(OH)D3+25(OH)D2 SERPL-MCNC: 50 NG/ML (ref 30–100)
ALBUMIN SERPL-MCNC: 4.2 G/DL (ref 3.5–4.8)
ALBUMIN/GLOB SERPL: 1.2 {RATIO} (ref 1.2–2.2)
ALP SERPL-CCNC: 94 IU/L (ref 39–117)
ALT SERPL-CCNC: 9 IU/L (ref 0–32)
AST SERPL-CCNC: 11 IU/L (ref 0–40)
BILIRUB SERPL-MCNC: 0.3 MG/DL (ref 0–1.2)
BUN SERPL-MCNC: 31 MG/DL (ref 8–27)
BUN/CREAT SERPL: 20 (ref 12–28)
CALCIUM SERPL-MCNC: 11.3 MG/DL (ref 8.7–10.3)
CHLORIDE SERPL-SCNC: 100 MMOL/L (ref 96–106)
CHOLEST SERPL-MCNC: 178 MG/DL (ref 100–199)
CO2 SERPL-SCNC: 21 MMOL/L (ref 20–29)
CREAT SERPL-MCNC: 1.53 MG/DL (ref 0.57–1)
FOLATE SERPL-MCNC: 14.3 NG/ML
GLOBULIN SER CALC-MCNC: 3.5 G/DL (ref 1.5–4.5)
GLUCOSE SERPL-MCNC: 130 MG/DL (ref 65–99)
HBA1C MFR BLD: 6.5 % (ref 4.8–5.6)
HDLC SERPL-MCNC: 63 MG/DL
INTERPRETATION: NORMAL
INTERPRETATION: NORMAL
LDLC SERPL CALC-MCNC: 89 MG/DL (ref 0–99)
Lab: NORMAL
Lab: NORMAL
POTASSIUM SERPL-SCNC: 5 MMOL/L (ref 3.5–5.2)
PROT SERPL-MCNC: 7.7 G/DL (ref 6–8.5)
SODIUM SERPL-SCNC: 137 MMOL/L (ref 134–144)
T4 FREE SERPL-MCNC: 1.38 NG/DL (ref 0.82–1.77)
TRIGL SERPL-MCNC: 132 MG/DL (ref 0–149)
TSH SERPL DL<=0.005 MIU/L-ACNC: 0.93 UIU/ML (ref 0.45–4.5)
VIT B12 SERPL-MCNC: 819 PG/ML (ref 232–1245)
VLDLC SERPL CALC-MCNC: 26 MG/DL (ref 5–40)

## 2019-01-24 ENCOUNTER — OFFICE VISIT (OUTPATIENT)
Dept: ENDOCRINOLOGY | Age: 74
End: 2019-01-24

## 2019-01-24 VITALS
SYSTOLIC BLOOD PRESSURE: 140 MMHG | HEIGHT: 62 IN | OXYGEN SATURATION: 98 % | WEIGHT: 155 LBS | HEART RATE: 82 BPM | DIASTOLIC BLOOD PRESSURE: 86 MMHG | BODY MASS INDEX: 28.52 KG/M2

## 2019-01-24 DIAGNOSIS — E21.3 HYPERPARATHYROIDISM (HCC): ICD-10-CM

## 2019-01-24 DIAGNOSIS — I65.22 STENOSIS OF LEFT CAROTID ARTERY: ICD-10-CM

## 2019-01-24 DIAGNOSIS — R73.03 PREDIABETES: ICD-10-CM

## 2019-01-24 DIAGNOSIS — E83.52 HYPERCALCEMIA: Primary | ICD-10-CM

## 2019-01-24 DIAGNOSIS — E78.2 MIXED HYPERLIPIDEMIA: ICD-10-CM

## 2019-01-24 PROCEDURE — 99215 OFFICE O/P EST HI 40 MIN: CPT | Performed by: INTERNAL MEDICINE

## 2019-01-24 NOTE — PROGRESS NOTES
73 y.o.    Patient Care Team:  Lillie Ji PA as PCP - General (Family Medicine)  Jose Luis Nelson Jr., MD as Consulting Physician (Urology)  Bon Gardiner MD as Consulting Physician (Hand Surgery)  Milly Crow MD as Consulting Physician (Neurology)  Sacha Garcia APRN as Nurse Practitioner (Neurology)  Johnnie Soni MD as Consulting Physician (Neurology)  Mahamed Massey MD as Consulting Physician (Obstetrics and Gynecology)  Clement Tierney MD as Surgeon (Neurosurgery)  Donna Olsen MA as Medical Assistant  Mahamed Massey MD as Consulting Physician (Obstetrics and Gynecology)  Milly Crow MD as Consulting Physician (Neurology)  Jeffry Bhagat MD as Consulting Physician (Ophthalmology)    Chief Complaint:    FOLLOW UP APPOINTMENT/ HYPERCALCEMIA  Subjective     HPI    73 year old white female is here for the follow up of elevated calcium levels.  Patient's calcium levels were noted to be elevated on routine blood work up in May 2017.  Her calcium levels have been ranging between 10.5-10.9 mg/dL.  With the latest being her highest-11.1 mg/dL range.  Patient always had normal parathyroid levels.    Today in the office patient has been noted to have worsening calcium levels.  She did undergo parathyroid scan at [facility] which showed left parathyroid adenoma but did not show increased metabolic activity in the gland.  She does have history of kidney stones which is being monitored by the urologist.  Her last attack of kidney stone was about 2-3 years ago.  She had to undergo lithotripsy and stent placement.  She has frequent urinary tract infection.  Recently cleared of one to 2 weeks ago.  She does have history of osteopenia which has been recently done in December 2018. No history of fragility fractures.  Today in the office she does complain of fatigue and muscle weakness, no stomach pain, no bone or joint aches, occasional complaints of memory issues.  She has not  Been on  hydrochlorothiazide.  Not on any steroids.  She drinks about 10 ounces of milk daily, eats cheese and yogurt occasionally.     Pre diabetes  On metformin 500 mg daily bid.      Hyperlipidemia - not on any medications at this time.   She recently had a carotid ultrasound by her primary care which showed an 80% stenosis in her left carotid artery.  I do not have the report of this.    Reviewed primary care physician's/consulting physician documentation and lab results :     Interval History      The following portions of the patient's history were reviewed and updated as appropriate: allergies, current medications, past family history, past medical history, past social history, past surgical history and problem list.    Past Medical History:   Diagnosis Date   • Benign colonic polyp    • Cataract     bilateral   • Degenerative disc disease, lumbar    • Hypercholesteremia    • Leg cramping    • Lumbar pain    • Neck pain    • Osteoarthritis    • Pre-diabetes    • Pyelonephritis 06/2014   • Renal stone 06/2014   • Sepsis (CMS/[facility])    • Urinary incontinence    • Vaginal delivery      Family History   Problem Relation Age of Onset   • Stroke Mother    • Heart disease Mother    • Hypertension Mother    • Clotting disorder Father    • Hypertension Father    • Diabetes Father    • Aneurysm Father    • Hypertension Sister    • Diabetes Sister    • Cervical cancer Sister    • Diabetes Sister    • Cervical cancer Maternal Grandmother 72   • Diabetes Grandchild      Social History     Socioeconomic History   • Marital status:      Spouse name: Not on file   • Number of children: Not on file   • Years of education: Not on file   • Highest education level: Not on file   Social Needs   • Financial resource strain: Not on file   • Food insecurity - worry: Not on file   • Food insecurity - inability: Not on file   • Transportation needs - medical: Not on file   • Transportation needs - non-medical: Not on file   Occupational  History   • Occupation: retired   Tobacco Use   • Smoking status: Former Smoker     Packs/day: 0.50     Years: 15.00     Pack years: 7.50     Types: Cigarettes     Last attempt to quit:      Years since quittin.0   • Smokeless tobacco: Never Used   Substance and Sexual Activity   • Alcohol use: Yes     Comment: Socially.   • Drug use: No   • Sexual activity: Defer   Other Topics Concern   • Not on file   Social History Narrative    Last mammogram 2018      Allergies   Allergen Reactions   • Other Nausea And Vomiting     The mercury in [place]   • Penicillins Hives   • Latex Rash   • Nickel Rash       Current Outpatient Medications:   •  baclofen (LIORESAL) 20 MG tablet, TAKE 1 TABLET BY MOUTH EVERY DAY, Disp: 30 tablet, Rfl: 5  •  Cholecalciferol (VITAMIN D3) 5000 UNITS capsule capsule, Take 5,000 Units by mouth Daily., Disp: , Rfl:   •  clotrimazole (LOTRIMIN AF) 1 % cream, Apply  topically to the appropriate area as directed Every 12 (Twelve) Hours., Disp: 12 g, Rfl: 1  •  D-MANNOSE PO, Take  by mouth Daily., Disp: , Rfl:   •  Fish Oil-Krill Oil (PA FISH OIL/KRILL PO), Take  by mouth Daily., Disp: , Rfl:   •  glucose blood test strip, USE TO TEST BLOOD SUGAR 2 TIMES DAILY DX CODE E11.9 One Touch Ultra Test Strips, Disp: 100 each, Rfl: 12  •  Lutein-Bilberry (BILBERRY PLUS LUTEIN) 5-1000 MCG-MG capsule, Take  by mouth Daily., Disp: , Rfl:   •  magnesium 30 MG tablet, Take 30 mg by mouth 2 (Two) Times a Day., Disp: , Rfl:   •  NYSTATIN 198514 UNIT/GM powder, APPLY TO AFFECTED AREA 2 TO 3 TIMES PER DAY AS NEEDED, Disp: 30 g, Rfl: 0  •  ofloxacin (OCUFLOX) 0.3 % ophthalmic solution, , Disp: , Rfl:   •  oxybutynin (DITROPAN) 5 MG tablet, TAKE ONE TABLET BY MOUTH THREE TIMES A DAY, Disp: 90 tablet, Rfl: 2  •  Probiotic Product (PROBIOTIC PO), Take  by mouth Daily., Disp: , Rfl:   •  TURMERIC PO, Take  by mouth., Disp: , Rfl:   •  vitamin C (ASCORBIC ACID) 500 MG tablet, Take 500 mg by mouth Daily. With D3,  "Disp: , Rfl:   •  ciprofloxacin (CIPRO) 500 MG tablet, , Disp: , Rfl:   •  diazePAM (VALIUM) 10 MG tablet, 10 MG X1 ONLY, Disp: 1 tablet, Rfl: 0  •  linagliptin (TRADJENTA) 5 MG tablet tablet, Take 1 tablet by mouth Daily., Disp: 30 tablet, Rfl: 11        Review of Systems   Constitutional: Positive for fatigue. Negative for appetite change and fever.   Eyes: Negative for visual disturbance.   Respiratory: Negative for shortness of breath.    Cardiovascular: Positive for leg swelling. Negative for palpitations.   Gastrointestinal: Negative for abdominal pain and vomiting.   Endocrine: Negative for polydipsia and polyuria.   Musculoskeletal: Negative for joint swelling and neck pain.   Skin: Negative for rash.   Neurological: Positive for weakness (LEFT HAND). Negative for numbness.   Psychiatric/Behavioral: Negative for behavioral problems.       Objective       Vitals:    01/24/19 1055   BP: 140/86   Pulse: 82   SpO2: 98%   Weight: 70.3 kg (155 lb)   Height: 157.5 cm (62\")     Body mass index is 28.35 kg/m².      Physical Exam   Constitutional: She is oriented to person, place, and time. She appears well-nourished.   HENT:   Head: Normocephalic and atraumatic.   Eyes: Conjunctivae and EOM are normal. No scleral icterus.   Neck: Normal range of motion. Neck supple. No thyromegaly present.   Cardiovascular: Normal rate and normal heart sounds. Exam reveals no friction rub.   No murmur heard.  Pulmonary/Chest: Effort normal and breath sounds normal. No stridor. She has no wheezes. She has no rales.   Abdominal: Soft. Bowel sounds are normal. She exhibits no distension. There is no tenderness.   Musculoskeletal: She exhibits tenderness and deformity. She exhibits no edema.   Lymphadenopathy:     She has no cervical adenopathy.   Neurological: She is alert and oriented to person, place, and time. She exhibits abnormal muscle tone.   Skin: Skin is warm and dry. She is not diaphoretic.   Psychiatric: She has a normal mood " and affect.   Vitals reviewed.    Results Review:     I reviewed the patient's new clinical results and mentioned them above in HPI and in plan as well.    Medical records reviewed  Summary:done      Results Encounter on 01/14/2019   Component Date Value Ref Range Status   • Glucose 01/18/2019 130* 65 - 99 mg/dL Final   • BUN 01/18/2019 31* 8 - 27 mg/dL Final   • Creatinine 01/18/2019 1.53* 0.57 - 1.00 mg/dL Final   • eGFR Non African Am 01/18/2019 34* >59 mL/min/1.73 Final   • eGFR African Am 01/18/2019 39* >59 mL/min/1.73 Final   • BUN/Creatinine Ratio 01/18/2019 20  12 - 28 Final   • Sodium 01/18/2019 137  134 - 144 mmol/L Final   • Potassium 01/18/2019 5.0  3.5 - 5.2 mmol/L Final   • Chloride 01/18/2019 100  96 - 106 mmol/L Final   • Total CO2 01/18/2019 21  20 - 29 mmol/L Final   • Calcium 01/18/2019 11.3* 8.7 - 10.3 mg/dL Final   • Total Protein 01/18/2019 7.7  6.0 - 8.5 g/dL Final   • Albumin 01/18/2019 4.2  3.5 - 4.8 g/dL Final   • Globulin 01/18/2019 3.5  1.5 - 4.5 g/dL Final   • A/G Ratio 01/18/2019 1.2  1.2 - 2.2 Final   • Total Bilirubin 01/18/2019 0.3  0.0 - 1.2 mg/dL Final   • Alkaline Phosphatase 01/18/2019 94  39 - 117 IU/L Final   • AST (SGOT) 01/18/2019 11  0 - 40 IU/L Final   • ALT (SGPT) 01/18/2019 9  0 - 32 IU/L Final   • Hemoglobin A1C 01/18/2019 6.5* 4.8 - 5.6 % Final    Comment:          Prediabetes: 5.7 - 6.4           Diabetes: >6.4           Glycemic control for adults with diabetes: <7.0     • Vitamin B-12 01/18/2019 819  232 - 1,245 pg/mL Final   • Folate 01/18/2019 14.3  >3.0 ng/mL Final    Comment: A serum folate concentration of less than 3.1 ng/mL is  considered to represent clinical deficiency.     • 25 Hydroxy, Vitamin D 01/18/2019 50.0  30.0 - 100.0 ng/mL Final    Comment: Vitamin D deficiency has been defined by the [place] of  Med[employer] and an Endocrine Society practice guideline as a  level of serum 25-OH vitamin D less than 20 ng/mL (1,2).  The Endocrine Society went on to  further define vitamin D  insufficiency as a level between 21 and 29 ng/mL (2).  1. IOM ([place] of Medicine). 2010. Dietary reference     intakes for calcium and D. [place]: The     National Academies Press.  2. Hilton MF, Margie CUNHA, Lennox CORONEL, et al.     Evaluation, treatment, and prevention of vitamin D     deficiency: an Endocrine Society clinical practice     guideline. JCEM. 2011 Jul; 96(7):1911-30.     • Total Cholesterol 01/18/2019 178  100 - 199 mg/dL Final   • Triglycerides 01/18/2019 132  0 - 149 mg/dL Final   • HDL Cholesterol 01/18/2019 63  >39 mg/dL Final   • VLDL Cholesterol 01/18/2019 26  5 - 40 mg/dL Final   • LDL Cholesterol  01/18/2019 89  0 - 99 mg/dL Final   • TSH 01/18/2019 0.933  0.450 - 4.500 uIU/mL Final   • Free T4 01/18/2019 1.38  0.82 - 1.77 ng/dL Final   • Interpretation 01/18/2019 Note   Final    Supplemental report is available.   • PDF Image 01/18/2019 Not applicable   Final   • Interpretation 01/18/2019 Note   Final    Comment: -------------------------------  CHRONIC KIDNEY DISEASE:  EGFR, BLOOD PRESSURE, AND PROTEINURIA ASSESSMENT  The overall regression of eGFR with time is not  statistically significant. Current eGFR is 34 mL/min/1.73mE2  corresponding to CKD stage 3b. Multiply eGFR by 1.159 if  patient is . Potassium is within goal and  has not changed significantly, was 4.7 and now is 5.0  mmol/L. Glycemic control (HB A1c: 6.5 %) is within goal.  EGFR, BLOOD PRESSURE, AND PROTEINURIA TREATMENT SUGGESTIONS  -  The current eGFR has fallen by more than 30 percent since  last measurement and should be followed closely. Renal  artery stenosis, obstruction, volume depletion, NSAIDs, ACEI  or ARB, or other medications and contrast agents are common  causes of a fall in eGFR. The current eGFR is significantly  below the expected value and has decreased, was 59 and now  is 34 mL/min/1.73mE2. Consider stopping ACEI or ARB, if in  use. Guidelines  recommend a target blood pressure of 140/90  mmHg or                            less in CKD patients to reduce cardiovascular risk  and CKD progression. A higher blood pressure target may be  appropriate in older individuals to avoid symptomatic  hypotension. Assessment of albuminuria (urine  albumin:creatinine ratio or urine protein:creatinine ratio  preferred) is recommended at least annually in CKD patients  for staging and disease prognosis.  EGFR, BLOOD PRESSURE, AND PROTEINURIA FOLLOW-UP  -  fasting Renal Panel within 1 week; Spot Urine Panel is  recommended by KDOQI guidelines, at least yearly; Hemoglobin  A1C within 6 months;  -  BONE and MINERAL ASSESSMENT  Calcium is above goal and has not changed significantly, was  11.1 and now is 11.3 mg/dL. Carbon Dioxide is below goal and  has not changed significantly, was 20 and now is 21 mmol/L.  Vitamin D is adequate (was 53.7 and now is 50.0 ng/mL).  BONE and MINERAL TREATMENT SUGGESTIONS  -  Interpretations require simultaneous measurements of serum  calcium and phosphorus. If not on alkali, begin sodium  bic                           arbonate, one 650 mg pill 2-3 times daily, otherwise  increase dose. Stop calcium supplements if in use.  BONE and MINERAL FOLLOW-UP  -  25-Hydroxy Vitamin D within 12 months; fasting Renal Panel  within 1 week; fasting PTH with Renal Panel is recommended  by KDOQI guidelines, at least yearly;  -  LIPIDS ASSESSMENT  LDL-C is normal and has not changed significantly, was 86  and now is 89 mg/dL. Triglyceride is normal and has not  changed significantly, was 139 and now is 132 mg/dL. Non-HDL  Cholesterol is normal and has not changed significantly, was  114 and now is 115 mg/dL. HDL-C is high and has risen, was  54 and now is 63 mg/dL.  LIPIDS TREATMENT SUGGESTIONS  -  Therapeutic lifestyle changes are always valuable to  maintain optimal blood lipid status (diet, exercise, weight  management). If at least a 50% LDL reduction from  baseline  has not been achieved, begin or increase statin. Consider  measurement of LDL particle number or Apo B to adjudicate  need for further LDL lowering t                           herapy. If statin cannot be  tolerated or increased, alternatives include use of an  intestinal agent (ezetimibe or bile acid sequestrant) or  niacin.  LIPIDS FOLLOW-UP  -  fasting Lipid Panel within 12 months;  -  ANEMIA ASSESSMENT  Most recent order does not include a CBC Panel or iron  studies.  ANEMIA FOLLOW-UP  -  CBC is recommended by KDOQI guidelines, at least yearly;  -------------------------------  DISCLAIMER  These assessments and treatment suggestions are provided as  a convenience in support of the physician-patient  relationship and are not intended to replace the physician's  clinical judgment. They are derived from national guidelines  in addition to other evidence and expert opinion. The  clinician should consider this information within the  context of clinical opinion and the individual patient.  SEE GUIDANCE FOR CHRONIC KIDNEY DISEASE PROGRAM: Kidney  Disease Improving Global Outcomes (KDIGO) clinical practice  guidelines are at http://kdigo.org/home/guidelines/.  N                           atformerly [facility] Kidney Foundation Kidney Disease Outcomes Quality  Initiative (KDOQI (TM)), with its limitations and  disclaimers, are at www.kidney.org/professionals/KDOQI. This  program is intended for patients who have been diagnosed  with stages 3, 4, or pre-dialysis 5 CKD. It is not intended  for children, pregnant patients, or transplant patients.     • PDF Image 01/18/2019 Not applicable   Final     Lab Results   Component Value Date    HGBA1C 6.5 (H) 01/18/2019    HGBA1C 6.0 (H) 10/04/2018    HGBA1C 6.3 (H) 04/10/2018     Lab Results   Component Value Date    CREATININE 1.53 (H) 01/18/2019     Imaging Results (most recent)     None                Assessment and Plan:    Michelle was seen today for abnormal calcium.    Diagnoses  and all orders for this visit:    Hypercalcemia  -     PTH, Intact & Calcium; Future  -     Vitamin D 25 Hydroxy; Future  -     Basic Metabolic Panel; Future  -     Hemoglobin A1c; Future  -     Lipid Panel; Future  -     TSH; Future  -     Vitamin B12 & Folate; Future  -     T4, Free; Future  -     PTH, Intact & Calcium  -     Ambulatory Referral to ENT (Otolaryngology)    Prediabetes   -     PTH, Intact & Calcium; Future  -     Vitamin D 25 Hydroxy; Future  -     Basic Metabolic Panel; Future  -     Hemoglobin A1c; Future  -     Lipid Panel; Future  -     TSH; Future  -     Vitamin B12 & Folate; Future  -     T4, Free; Future    Mixed hyperlipidemia   -     PTH, Intact & Calcium; Future  -     Vitamin D 25 Hydroxy; Future  -     Basic Metabolic Panel; Future  -     Hemoglobin A1c; Future  -     Lipid Panel; Future  -     TSH; Future  -     Vitamin B12 & Folate; Future  -     T4, Free; Future    Stenosis of left carotid artery  -     Ambulatory Referral to Vascular Surgery    Hyperparathyroidism (CMS/[facility])  -     PTH, Intact & Calcium  -     Ambulatory Referral to ENT (Otolaryngology)    Other orders  -     linagliptin (TRADJENTA) 5 MG tablet tablet; Take 1 tablet by mouth Daily.      Hyperparathyroidism with elevated calcium levels  Will refer the patient to ENT  If ENT is not considering surgery might have to consider starting the patient on Sensipar.    Osteopenia  Continue vitamin D replacement for now  In the future might have to consider Prolia, she is not a candidate for bisphosphonate's due to her renal function.    Type 2 diabetes mellitus  Will stop metformin  Will start patient on Tradjenta 5 mg oral daily due to her renal function.      stenosis of the left carotid artery  Will defer this to the primary care.  However since the primary care advised the patient to talk to me about this will send the referral to the vascular surgeon.  Explained to the patient that she needs to communicate with her primary  "care regarding further details about this issue.    Reviewed Lab results with the patient.       22 min   ( greater than 50% of the time) out of  40 minutes face-to-face spent counseling the patient on treatment for hyperparathyroidism, changes of medications during her renal function, treatment for osteopenia.      Bethanie Montano MD  01/24/19    EMR Dragon / transcription disclaimer:     \"Dictated utilizing Dragon dictation\".  "

## 2019-01-25 LAB
CALCIUM SERPL-MCNC: 11.2 MG/DL (ref 8.7–10.3)
INTACT PTH: ABNORMAL
PTH-INTACT SERPL-MCNC: 67 PG/ML (ref 15–65)

## 2019-01-25 NOTE — PROGRESS NOTES
Mail results to pt, no treatment changes at this time.  Parathyroid levels are noted to be elevated along with her calcium levels.  This is mainly for the ENT surgeon to consider along with the parathyroid scan, to give u a plan.

## 2019-01-28 NOTE — TELEPHONE ENCOUNTER
Spoke with patient  To let her know that Dr Montano changed the tradjenta to Januvia 50 mg daily  Patient voice understanding of the new medication change      ----- Message from Yashira Haddad sent at 1/28/2019  1:29 PM EST -----  Contact: patient  Patient said Tradjenta cost to her is over $300 and she cannot afford and said Dr. Montano was going to appeal because she cannot take Metformin.     [facility] - [phone]  Pt [phone]

## 2019-01-29 ENCOUNTER — TELEPHONE (OUTPATIENT)
Dept: ENDOCRINOLOGY | Age: 74
End: 2019-01-29

## 2019-01-29 RX ORDER — GLIMEPIRIDE 1 MG/1
1 TABLET ORAL 2 TIMES DAILY WITH MEALS
Qty: 30 TABLET | Refills: 11 | Status: SHIPPED | OUTPATIENT
Start: 2019-01-29 | End: 2019-05-07 | Stop reason: SDUPTHER

## 2019-01-29 NOTE — TELEPHONE ENCOUNTER
Spoke with patient to let patient know that she should stop tradjenta  januvia and no metformin  But will start the Amaryl 1 mg  1 tab twice daily  Patient voice understanding              ----- Message from Jessica Verde sent at 1/29/2019  9:10 AM EST -----  Contact: [phone]  brandy is going to be 315  Can not afford    She said you would keep trying to find something affordable     Please call patient

## 2019-02-20 ENCOUNTER — TELEPHONE (OUTPATIENT)
Dept: ENDOCRINOLOGY | Age: 74
End: 2019-02-20

## 2019-02-20 NOTE — TELEPHONE ENCOUNTER
----- Message from Mable Vazquez sent at 2/20/2019  1:55 PM EST -----  Contact: patient  OKAY, ROUTED TO THEIR OFFICE. THEY WILL CONTACT.  ----- Message -----  From: Bonnie Eric MA  Sent: 2/20/2019   1:42 PM  To: Mable Vazquez    ENT IS FOR SECOR, VASCULAR IS IM SURE FOR [facility]  ----- Message -----  From: Mable Vazquez  Sent: 2/20/2019   1:29 PM  To: Bonnie Eric MA    I sent a msg to [facility] asking which doctor Dr. Montano would like to refer to. I have not heard back. If you could find that out from Dr. Montano I will schedule.  Thanks!  ----- Message -----  From: Bonnie Eric MA  Sent: 2/20/2019   1:12 PM  To: Mable Vazquez    REFERRAL IS IN THE CHART, DO YOU SCHEDULE THESE?   ----- Message -----  From: Jessica Verde  Sent: 2/20/2019   9:50 AM  To: Bonnie Eric MA    Patient has been waiting appt vascular surgeon    Test were order by her PA and was told dr montano would send in the order for the appt    Patient states that she has been waiting four weeks for this    CHARTED

## 2019-02-26 ENCOUNTER — OFFICE VISIT (OUTPATIENT)
Dept: FAMILY MEDICINE CLINIC | Facility: CLINIC | Age: 74
End: 2019-02-26

## 2019-02-26 VITALS
WEIGHT: 161.2 LBS | OXYGEN SATURATION: 98 % | DIASTOLIC BLOOD PRESSURE: 84 MMHG | TEMPERATURE: 98.2 F | HEART RATE: 76 BPM | BODY MASS INDEX: 29.66 KG/M2 | SYSTOLIC BLOOD PRESSURE: 142 MMHG | HEIGHT: 62 IN

## 2019-02-26 DIAGNOSIS — M48.00 SPINAL STENOSIS, UNSPECIFIED SPINAL REGION: ICD-10-CM

## 2019-02-26 DIAGNOSIS — R73.01 IMPAIRED FASTING GLUCOSE: ICD-10-CM

## 2019-02-26 DIAGNOSIS — I65.22 ATHEROSCLEROSIS OF LEFT CAROTID ARTERY: ICD-10-CM

## 2019-02-26 DIAGNOSIS — I65.21 STENOSIS OF RIGHT CAROTID ARTERY: ICD-10-CM

## 2019-02-26 DIAGNOSIS — N31.2 ATONIC NEUROGENIC BLADDER: ICD-10-CM

## 2019-02-26 DIAGNOSIS — E53.8 B12 DEFICIENCY: ICD-10-CM

## 2019-02-26 DIAGNOSIS — E78.00 HYPERCHOLESTEROLEMIA: Primary | ICD-10-CM

## 2019-02-26 DIAGNOSIS — D72.828 OTHER ELEVATED WHITE BLOOD CELL (WBC) COUNT: ICD-10-CM

## 2019-02-26 DIAGNOSIS — N18.2 STAGE 2 CHRONIC KIDNEY DISEASE: ICD-10-CM

## 2019-02-26 DIAGNOSIS — E55.9 VITAMIN D DEFICIENCY: ICD-10-CM

## 2019-02-26 PROCEDURE — 99214 OFFICE O/P EST MOD 30 MIN: CPT | Performed by: PHYSICIAN ASSISTANT

## 2019-02-26 NOTE — PROGRESS NOTES
Called to schedule 1mo wcc. Mailbox not set up. Unable to leave voicemail. Subjective   Michelle Espinoza is a 73 y.o. female. Patient here today for medication management for hypercholesterolemia     History of Present Illness     States she was on cholesterol medication in the past and caused muscle weakness. Patient is seeing neuromuscular AE.     Has appt with Dr Mason Prather for hyperparathyroidism- hypercalcemia. She stopped Metformin and tried to put her on Tradjenta and Januvia. States she could not afford medication. Is on Glimepiride. Fasting glucose 114.     Had another significant UTI- makes legs so weak.     The following portions of the patient's history were reviewed and updated as appropriate: allergies, current medications, past family history, past medical history, past social history, past surgical history and problem list.    Review of Systems   All other systems reviewed and are negative.      Objective   Physical Exam   Constitutional: She is oriented to person, place, and time. She appears well-developed and well-nourished.   HENT:   Head: Normocephalic and atraumatic.   Right Ear: External ear normal.   Left Ear: External ear normal.   Nose: Nose normal.   Eyes: Conjunctivae and lids are normal.   Neck: Neck supple. Carotid bruit is not present.   Cardiovascular: Normal rate, regular rhythm, normal heart sounds and intact distal pulses. Exam reveals no gallop and no friction rub.   No murmur heard.  Pulmonary/Chest: Effort normal and breath sounds normal. No respiratory distress. She has no wheezes. She has no rhonchi. She has no rales.   Musculoskeletal: She exhibits no edema or deformity.   Neurological: She is alert and oriented to person, place, and time. Gait abnormal.   Skin: Skin is warm and dry.   Psychiatric: She has a normal mood and affect. Her speech is normal and behavior is normal. Judgment and thought content normal. Cognition and memory are normal.   Nursing note and vitals reviewed.      Assessment/Plan   Michelle was seen today for med  management.    Diagnoses and all orders for this visit:    Hypercholesterolemia  -     pitavastatin calcium (LIVALO) 2 MG tablet tablet; Take 1 tablet by mouth Every Night.    B12 deficiency    Stenosis of right carotid artery  -     pitavastatin calcium (LIVALO) 2 MG tablet tablet; Take 1 tablet by mouth Every Night.    Atherosclerosis of left carotid artery  -     pitavastatin calcium (LIVALO) 2 MG tablet tablet; Take 1 tablet by mouth Every Night.    Vitamin D deficiency    Impaired fasting glucose    Stage 2 chronic kidney disease    Atonic neurogenic bladder    Spinal stenosis, unspecified spinal region    Other elevated white blood cell (WBC) count      Patient Instructions   73 year old female who presents today in follow up of hyperlipidemia, B12, and vitamin D deficiency, hypercalcemia, and prediabetes. Patient will need Hepatitis C screening at follow up. She needs to ensure has had diabetic foot exam and she will need microalbumin.      Patient is following with Endocrinology for lipids, B12, vitamin D, calcium, and prediabetes. She is up to date on appt. Calcium has increased and she underwent a parathyroid scan with SPECT at [facility]. They have referred to ENT. Patient will see them for an evaluation. She also continues with follow up with [facility] neuromuscular clinic and physical therapy. She regresses whenever she is not in aggressive PT. She has to self cath with neurogenic bladder and contacted urology about symptoms. She was just started on Cipro per urology.      Of note, she has persistent color abnormality of her left > right feet. She had normal KARTHIKEYAN and carotid with 1 side with plawue without stenosis and the other mild stenosis. Recheck duplex in 1 year. I discussed with her that with carotid stenosis and hyperlipidemia, she needs to ensure she is taking her ASA 81 mg once daily and controlled BP, lipids, and blood sugars as well as a statin and as much exercise as she can get (she will be  starting PT again). She is agreeable to try Livalo 2 mg at bedtime to see if she can tolerate it as long as it is affordable. Endocrinology had discussed Vascular Surgery referral. If worsening color change in her leg, she should definitely see them. Otherwise, we will try to modify risk factors and modify.

## 2019-03-05 ENCOUNTER — APPOINTMENT (OUTPATIENT)
Dept: PREADMISSION TESTING | Facility: HOSPITAL | Age: 74
End: 2019-03-05

## 2019-03-05 LAB
ANION GAP SERPL CALCULATED.3IONS-SCNC: 12 MMOL/L
BUN BLD-MCNC: 26 MG/DL (ref 8–23)
BUN/CREAT SERPL: 26 (ref 7–25)
CALCIUM SPEC-SCNC: 11.3 MG/DL (ref 8.6–10.5)
CHLORIDE SERPL-SCNC: 107 MMOL/L (ref 98–107)
CO2 SERPL-SCNC: 23 MMOL/L (ref 22–29)
CREAT BLD-MCNC: 1 MG/DL (ref 0.57–1)
DEPRECATED RDW RBC AUTO: 45.4 FL (ref 37–54)
ERYTHROCYTE [DISTWIDTH] IN BLOOD BY AUTOMATED COUNT: 13.8 % (ref 12.3–15.4)
GFR SERPL CREATININE-BSD FRML MDRD: 55 ML/MIN/1.73
GLUCOSE BLD-MCNC: 111 MG/DL (ref 65–99)
HCT VFR BLD AUTO: 42.5 % (ref 34–46.6)
HGB BLD-MCNC: 13.7 G/DL (ref 12–15.9)
MCH RBC QN AUTO: 28.8 PG (ref 26.6–33)
MCHC RBC AUTO-ENTMCNC: 32.2 G/DL (ref 31.5–35.7)
MCV RBC AUTO: 89.3 FL (ref 79–97)
PLATELET # BLD AUTO: 290 10*3/MM3 (ref 140–450)
PMV BLD AUTO: 11.1 FL (ref 6–12)
POTASSIUM BLD-SCNC: 4.7 MMOL/L (ref 3.5–5.2)
RBC # BLD AUTO: 4.76 10*6/MM3 (ref 3.77–5.28)
SODIUM BLD-SCNC: 142 MMOL/L (ref 136–145)
WBC NRBC COR # BLD: 9.57 10*3/MM3 (ref 3.4–10.8)

## 2019-03-05 PROCEDURE — 36415 COLL VENOUS BLD VENIPUNCTURE: CPT

## 2019-03-05 PROCEDURE — 93005 ELECTROCARDIOGRAM TRACING: CPT

## 2019-03-05 PROCEDURE — 85027 COMPLETE CBC AUTOMATED: CPT | Performed by: OTOLARYNGOLOGY

## 2019-03-05 PROCEDURE — 80048 BASIC METABOLIC PNL TOTAL CA: CPT | Performed by: OTOLARYNGOLOGY

## 2019-03-05 PROCEDURE — 93010 ELECTROCARDIOGRAM REPORT: CPT | Performed by: INTERNAL MEDICINE

## 2019-03-05 RX ORDER — BACLOFEN 20 MG/1
20 TABLET ORAL NIGHTLY PRN
COMMUNITY
End: 2019-03-20

## 2019-03-05 RX ORDER — OXYBUTYNIN CHLORIDE 5 MG/1
5 TABLET ORAL 3 TIMES DAILY
COMMUNITY
End: 2019-04-12 | Stop reason: SDUPTHER

## 2019-03-05 NOTE — DISCHARGE INSTRUCTIONS
Take the following medications the morning of surgery with a small sip of water: NONE        General Instructions:  • Do not eat or drink anything after midnight the night before surgery.  • Bring any papers given to you in the doctor’s office.  • Wear clean comfortable clothes and socks.  • Do not wear contact lenses or make-up.  Bring a case for your glasses.   • Bring crutches or walker if applicable.   • Remove all piercings.  Leave jewelry and any other valuables at home.  • The Pre-Admission Testing nurse will instruct you to bring medications if unable to obtain an accurate list in Pre-Admission Testing.            Preventing a Surgical Site Infection:  • For 2 to 3 days before surgery, avoid shaving with a razor because the razor can irritate skin and make it easier to develop an infection.    • Any areas of open skin can increase the risk of a post-operative wound infection by allowing bacteria to enter and travel throughout the body.  Notify your surgeon if you have any skin wounds / rashes even if it is not near the expected surgical site.  The area will need assessed to determine if surgery should be delayed until it is healed.  • The night prior to surgery sleep in a clean bed with clean clothing.  Do not allow pets to sleep with you.  • Shower on the morning of surgery using a fresh bar of anti-bacterial soap (such as Dial) and clean washcloth.  Dry with a clean towel and dress in clean clothing.  • Ask your surgeon if you will be receiving antibiotics prior to surgery.  • Make sure you, your family, and all healthcare providers clean their hands with soap and water or an alcohol based hand  before caring for you or your wound.    Day of surgery: 3/7/2019. [facility]. ARRIVAL TIME TO BE CALLED DAY PRIOR TO SURGERY  Upon arrival, a Pre-op nurse and Anesthesiologist will review your health history, obtain vital signs, and answer questions you may have.  The only belongings needed at this time will be  your home medications and if applicable your C-PAP/BI-PAP machine.  If you are staying overnight your family can leave the rest of your belongings in the car and bring them to your room later.  A Pre-op nurse will start an IV and you may receive medication in preparation for surgery, including something to help you relax.  Your family will be able to see you in the Pre-op area.  While you are in surgery your family should notify the waiting room  if they leave the waiting room area and provide a contact phone number.    Please be aware that surgery does come with discomfort.  We want to make every effort to control your discomfort so please discuss any uncontrolled symptoms with your nurse.   Your doctor will most likely have prescribed pain medications.      If you are going home after surgery you will receive individualized written care instructions before being discharged.  A responsible adult must drive you to and from the hospital on the day of your surgery and stay with you for 24 hours.        You have received a list of surgical assistants for your reference.  If you have any questions please call Pre-Admission Testing at [phone].  Deductibles and co-payments are collected on the day of service. Please be prepared to pay the required co-pay, deductible or deposit on the day of service as defined by your plan.

## 2019-03-06 ENCOUNTER — TELEPHONE (OUTPATIENT)
Dept: FAMILY MEDICINE CLINIC | Facility: CLINIC | Age: 74
End: 2019-03-06

## 2019-03-06 NOTE — TELEPHONE ENCOUNTER
Patient called states she needs something different for her cholesterol the Livalo was prescribed last month but the pharmacy just go the medication in and it is $297 with her insurance

## 2019-03-07 ENCOUNTER — ANESTHESIA EVENT (OUTPATIENT)
Dept: PERIOP | Facility: HOSPITAL | Age: 74
End: 2019-03-07

## 2019-03-07 ENCOUNTER — HOSPITAL ENCOUNTER (OUTPATIENT)
Facility: HOSPITAL | Age: 74
Setting detail: HOSPITAL OUTPATIENT SURGERY
Discharge: HOME | End: 2019-03-07
Attending: OTOLARYNGOLOGY | Admitting: OTOLARYNGOLOGY

## 2019-03-07 ENCOUNTER — ANESTHESIA (OUTPATIENT)
Dept: PERIOP | Facility: HOSPITAL | Age: 74
End: 2019-03-07

## 2019-03-07 VITALS
SYSTOLIC BLOOD PRESSURE: 141 MMHG | RESPIRATION RATE: 16 BRPM | TEMPERATURE: 98.7 F | OXYGEN SATURATION: 97 % | DIASTOLIC BLOOD PRESSURE: 82 MMHG | HEART RATE: 88 BPM

## 2019-03-07 DIAGNOSIS — E21.3 HYPERPARATHYROIDISM (HCC): ICD-10-CM

## 2019-03-07 LAB
GLUCOSE BLDC GLUCOMTR-MCNC: 118 MG/DL (ref 70–130)
PTH-INTACT SERPL-SCNC: 109.4 PG/ML (ref 15–65)
PTH-INTACT SERPL-SCNC: 42.2 PG/ML (ref 15–65)
PTH-INTACT SERPL-SCNC: 52.5 PG/ML (ref 15–65)

## 2019-03-07 PROCEDURE — 25010000002 NEOSTIGMINE PER 0.5 MG: Performed by: NURSE ANESTHETIST, CERTIFIED REGISTERED

## 2019-03-07 PROCEDURE — 25010000002 FENTANYL CITRATE (PF) 100 MCG/2ML SOLUTION: Performed by: NURSE ANESTHETIST, CERTIFIED REGISTERED

## 2019-03-07 PROCEDURE — 83970 ASSAY OF PARATHORMONE: CPT | Performed by: OTOLARYNGOLOGY

## 2019-03-07 PROCEDURE — 88331 PATH CONSLTJ SURG 1 BLK 1SPC: CPT | Performed by: OTOLARYNGOLOGY

## 2019-03-07 PROCEDURE — 25010000002 PROPOFOL 10 MG/ML EMULSION: Performed by: NURSE ANESTHETIST, CERTIFIED REGISTERED

## 2019-03-07 PROCEDURE — 88305 TISSUE EXAM BY PATHOLOGIST: CPT | Performed by: OTOLARYNGOLOGY

## 2019-03-07 PROCEDURE — 25010000002 PHENYLEPHRINE PER 1 ML: Performed by: NURSE ANESTHETIST, CERTIFIED REGISTERED

## 2019-03-07 PROCEDURE — 25010000002 DEXAMETHASONE PER 1 MG: Performed by: NURSE ANESTHETIST, CERTIFIED REGISTERED

## 2019-03-07 PROCEDURE — 25010000002 ONDANSETRON PER 1 MG: Performed by: NURSE ANESTHETIST, CERTIFIED REGISTERED

## 2019-03-07 PROCEDURE — 82962 GLUCOSE BLOOD TEST: CPT

## 2019-03-07 PROCEDURE — 25010000002 HYDROMORPHONE PER 4 MG: Performed by: NURSE ANESTHETIST, CERTIFIED REGISTERED

## 2019-03-07 RX ORDER — ONDANSETRON 2 MG/ML
INJECTION INTRAMUSCULAR; INTRAVENOUS AS NEEDED
Status: DISCONTINUED | OUTPATIENT
Start: 2019-03-07 | End: 2019-03-07 | Stop reason: SURG

## 2019-03-07 RX ORDER — FENTANYL CITRATE 50 UG/ML
50 INJECTION, SOLUTION INTRAMUSCULAR; INTRAVENOUS
Status: DISCONTINUED | OUTPATIENT
Start: 2019-03-07 | End: 2019-03-07 | Stop reason: HOSPADM

## 2019-03-07 RX ORDER — MIDAZOLAM HYDROCHLORIDE 1 MG/ML
1 INJECTION INTRAMUSCULAR; INTRAVENOUS
Status: DISCONTINUED | OUTPATIENT
Start: 2019-03-07 | End: 2019-03-07 | Stop reason: HOSPADM

## 2019-03-07 RX ORDER — OXYCODONE AND ACETAMINOPHEN 7.5; 325 MG/1; MG/1
1 TABLET ORAL ONCE AS NEEDED
Status: DISCONTINUED | OUTPATIENT
Start: 2019-03-07 | End: 2019-03-07 | Stop reason: HOSPADM

## 2019-03-07 RX ORDER — HYDROCODONE BITARTRATE AND ACETAMINOPHEN 7.5; 325 MG/1; MG/1
1 TABLET ORAL ONCE AS NEEDED
Status: COMPLETED | OUTPATIENT
Start: 2019-03-07 | End: 2019-03-07

## 2019-03-07 RX ORDER — DIPHENHYDRAMINE HYDROCHLORIDE 50 MG/ML
12.5 INJECTION INTRAMUSCULAR; INTRAVENOUS
Status: DISCONTINUED | OUTPATIENT
Start: 2019-03-07 | End: 2019-03-07 | Stop reason: HOSPADM

## 2019-03-07 RX ORDER — GLYCOPYRROLATE 0.2 MG/ML
INJECTION INTRAMUSCULAR; INTRAVENOUS AS NEEDED
Status: DISCONTINUED | OUTPATIENT
Start: 2019-03-07 | End: 2019-03-07 | Stop reason: SURG

## 2019-03-07 RX ORDER — HYDROCODONE BITARTRATE AND ACETAMINOPHEN 5; 325 MG/1; MG/1
1 TABLET ORAL ONCE AS NEEDED
Status: DISCONTINUED | OUTPATIENT
Start: 2019-03-07 | End: 2019-03-07 | Stop reason: HOSPADM

## 2019-03-07 RX ORDER — PROPOFOL 10 MG/ML
VIAL (ML) INTRAVENOUS AS NEEDED
Status: DISCONTINUED | OUTPATIENT
Start: 2019-03-07 | End: 2019-03-07 | Stop reason: SURG

## 2019-03-07 RX ORDER — PROMETHAZINE HYDROCHLORIDE 25 MG/ML
12.5 INJECTION, SOLUTION INTRAMUSCULAR; INTRAVENOUS ONCE AS NEEDED
Status: DISCONTINUED | OUTPATIENT
Start: 2019-03-07 | End: 2019-03-07 | Stop reason: HOSPADM

## 2019-03-07 RX ORDER — DEXAMETHASONE SODIUM PHOSPHATE 10 MG/ML
INJECTION INTRAMUSCULAR; INTRAVENOUS AS NEEDED
Status: DISCONTINUED | OUTPATIENT
Start: 2019-03-07 | End: 2019-03-07 | Stop reason: SURG

## 2019-03-07 RX ORDER — SODIUM CHLORIDE, SODIUM LACTATE, POTASSIUM CHLORIDE, CALCIUM CHLORIDE 600; 310; 30; 20 MG/100ML; MG/100ML; MG/100ML; MG/100ML
9 INJECTION, SOLUTION INTRAVENOUS CONTINUOUS
Status: DISCONTINUED | OUTPATIENT
Start: 2019-03-07 | End: 2019-03-07 | Stop reason: HOSPADM

## 2019-03-07 RX ORDER — NALOXONE HCL 0.4 MG/ML
0.2 VIAL (ML) INJECTION AS NEEDED
Status: DISCONTINUED | OUTPATIENT
Start: 2019-03-07 | End: 2019-03-07 | Stop reason: HOSPADM

## 2019-03-07 RX ORDER — HYDROCODONE BITARTRATE AND ACETAMINOPHEN 5; 325 MG/1; MG/1
1-2 TABLET ORAL EVERY 4 HOURS PRN
Qty: 30 TABLET | Refills: 0 | Status: SHIPPED | OUTPATIENT
Start: 2019-03-07 | End: 2019-05-28

## 2019-03-07 RX ORDER — MAGNESIUM HYDROXIDE 1200 MG/15ML
LIQUID ORAL AS NEEDED
Status: DISCONTINUED | OUTPATIENT
Start: 2019-03-07 | End: 2019-03-07 | Stop reason: HOSPADM

## 2019-03-07 RX ORDER — HYDRALAZINE HYDROCHLORIDE 20 MG/ML
5 INJECTION INTRAMUSCULAR; INTRAVENOUS
Status: DISCONTINUED | OUTPATIENT
Start: 2019-03-07 | End: 2019-03-07 | Stop reason: HOSPADM

## 2019-03-07 RX ORDER — LIDOCAINE HYDROCHLORIDE AND EPINEPHRINE 10; 10 MG/ML; UG/ML
INJECTION, SOLUTION INFILTRATION; PERINEURAL AS NEEDED
Status: DISCONTINUED | OUTPATIENT
Start: 2019-03-07 | End: 2019-03-07 | Stop reason: HOSPADM

## 2019-03-07 RX ORDER — LIDOCAINE HYDROCHLORIDE 10 MG/ML
0.5 INJECTION, SOLUTION EPIDURAL; INFILTRATION; INTRACAUDAL; PERINEURAL ONCE AS NEEDED
Status: DISCONTINUED | OUTPATIENT
Start: 2019-03-07 | End: 2019-03-07 | Stop reason: HOSPADM

## 2019-03-07 RX ORDER — FAMOTIDINE 10 MG/ML
20 INJECTION, SOLUTION INTRAVENOUS ONCE
Status: COMPLETED | OUTPATIENT
Start: 2019-03-07 | End: 2019-03-07

## 2019-03-07 RX ORDER — ONDANSETRON HYDROCHLORIDE 4 MG/5ML
8 SOLUTION ORAL 2 TIMES DAILY PRN
Qty: 100 ML | Refills: 0 | Status: SHIPPED | OUTPATIENT
Start: 2019-03-07 | End: 2019-05-28

## 2019-03-07 RX ORDER — SODIUM CHLORIDE 0.9 % (FLUSH) 0.9 %
1-10 SYRINGE (ML) INJECTION AS NEEDED
Status: DISCONTINUED | OUTPATIENT
Start: 2019-03-07 | End: 2019-03-07 | Stop reason: HOSPADM

## 2019-03-07 RX ORDER — PROMETHAZINE HYDROCHLORIDE 25 MG/1
25 SUPPOSITORY RECTAL ONCE AS NEEDED
Status: DISCONTINUED | OUTPATIENT
Start: 2019-03-07 | End: 2019-03-07 | Stop reason: HOSPADM

## 2019-03-07 RX ORDER — HYDROMORPHONE HYDROCHLORIDE 1 MG/ML
0.5 INJECTION, SOLUTION INTRAMUSCULAR; INTRAVENOUS; SUBCUTANEOUS
Status: DISCONTINUED | OUTPATIENT
Start: 2019-03-07 | End: 2019-03-07 | Stop reason: HOSPADM

## 2019-03-07 RX ORDER — LABETALOL HYDROCHLORIDE 5 MG/ML
5 INJECTION, SOLUTION INTRAVENOUS
Status: DISCONTINUED | OUTPATIENT
Start: 2019-03-07 | End: 2019-03-07 | Stop reason: HOSPADM

## 2019-03-07 RX ORDER — ACETAMINOPHEN 650 MG/1
650 SUPPOSITORY RECTAL ONCE AS NEEDED
Status: DISCONTINUED | OUTPATIENT
Start: 2019-03-07 | End: 2019-03-07 | Stop reason: HOSPADM

## 2019-03-07 RX ORDER — FLUMAZENIL 0.1 MG/ML
0.2 INJECTION INTRAVENOUS AS NEEDED
Status: DISCONTINUED | OUTPATIENT
Start: 2019-03-07 | End: 2019-03-07 | Stop reason: HOSPADM

## 2019-03-07 RX ORDER — FENTANYL CITRATE 50 UG/ML
INJECTION, SOLUTION INTRAMUSCULAR; INTRAVENOUS AS NEEDED
Status: DISCONTINUED | OUTPATIENT
Start: 2019-03-07 | End: 2019-03-07 | Stop reason: SURG

## 2019-03-07 RX ORDER — EPHEDRINE SULFATE 50 MG/ML
5 INJECTION, SOLUTION INTRAVENOUS ONCE AS NEEDED
Status: DISCONTINUED | OUTPATIENT
Start: 2019-03-07 | End: 2019-03-07 | Stop reason: HOSPADM

## 2019-03-07 RX ORDER — ONDANSETRON 2 MG/ML
4 INJECTION INTRAMUSCULAR; INTRAVENOUS ONCE AS NEEDED
Status: DISCONTINUED | OUTPATIENT
Start: 2019-03-07 | End: 2019-03-07 | Stop reason: HOSPADM

## 2019-03-07 RX ORDER — LIDOCAINE HYDROCHLORIDE 20 MG/ML
INJECTION, SOLUTION INFILTRATION; PERINEURAL AS NEEDED
Status: DISCONTINUED | OUTPATIENT
Start: 2019-03-07 | End: 2019-03-07 | Stop reason: SURG

## 2019-03-07 RX ORDER — SCOLOPAMINE TRANSDERMAL SYSTEM 1 MG/1
1 PATCH, EXTENDED RELEASE TRANSDERMAL
Status: DISCONTINUED | OUTPATIENT
Start: 2019-03-07 | End: 2019-03-07 | Stop reason: HOSPADM

## 2019-03-07 RX ORDER — DIPHENHYDRAMINE HCL 25 MG
25 CAPSULE ORAL
Status: DISCONTINUED | OUTPATIENT
Start: 2019-03-07 | End: 2019-03-07 | Stop reason: HOSPADM

## 2019-03-07 RX ORDER — PROMETHAZINE HYDROCHLORIDE 25 MG/1
25 TABLET ORAL ONCE AS NEEDED
Status: DISCONTINUED | OUTPATIENT
Start: 2019-03-07 | End: 2019-03-07 | Stop reason: HOSPADM

## 2019-03-07 RX ORDER — BACITRACIN ZINC 500 [USP'U]/G
OINTMENT TOPICAL AS NEEDED
Status: DISCONTINUED | OUTPATIENT
Start: 2019-03-07 | End: 2019-03-07 | Stop reason: HOSPADM

## 2019-03-07 RX ORDER — MIDAZOLAM HYDROCHLORIDE 1 MG/ML
2 INJECTION INTRAMUSCULAR; INTRAVENOUS
Status: DISCONTINUED | OUTPATIENT
Start: 2019-03-07 | End: 2019-03-07 | Stop reason: HOSPADM

## 2019-03-07 RX ORDER — ACETAMINOPHEN 325 MG/1
650 TABLET ORAL ONCE AS NEEDED
Status: DISCONTINUED | OUTPATIENT
Start: 2019-03-07 | End: 2019-03-07 | Stop reason: HOSPADM

## 2019-03-07 RX ADMIN — SODIUM CHLORIDE, POTASSIUM CHLORIDE, SODIUM LACTATE AND CALCIUM CHLORIDE 9 ML/HR: 600; 310; 30; 20 INJECTION, SOLUTION INTRAVENOUS at 06:43

## 2019-03-07 RX ADMIN — SODIUM CHLORIDE, POTASSIUM CHLORIDE, SODIUM LACTATE AND CALCIUM CHLORIDE: 600; 310; 30; 20 INJECTION, SOLUTION INTRAVENOUS at 08:16

## 2019-03-07 RX ADMIN — FENTANYL CITRATE 50 MCG: 50 INJECTION, SOLUTION INTRAMUSCULAR; INTRAVENOUS at 10:18

## 2019-03-07 RX ADMIN — HYDROMORPHONE HYDROCHLORIDE 0.5 MG: 1 INJECTION, SOLUTION INTRAMUSCULAR; INTRAVENOUS; SUBCUTANEOUS at 10:53

## 2019-03-07 RX ADMIN — FENTANYL CITRATE 50 MCG: 50 INJECTION INTRAMUSCULAR; INTRAVENOUS at 08:30

## 2019-03-07 RX ADMIN — FENTANYL CITRATE 50 MCG: 50 INJECTION, SOLUTION INTRAMUSCULAR; INTRAVENOUS at 10:35

## 2019-03-07 RX ADMIN — PROPOFOL 150 MG: 10 INJECTION, EMULSION INTRAVENOUS at 08:16

## 2019-03-07 RX ADMIN — Medication 3 MG: at 09:15

## 2019-03-07 RX ADMIN — ONDANSETRON 4 MG: 2 INJECTION INTRAMUSCULAR; INTRAVENOUS at 09:15

## 2019-03-07 RX ADMIN — GLYCOPYRROLATE 0.4 MG: 0.2 INJECTION INTRAMUSCULAR; INTRAVENOUS at 09:15

## 2019-03-07 RX ADMIN — SODIUM CHLORIDE, POTASSIUM CHLORIDE, SODIUM LACTATE AND CALCIUM CHLORIDE 9 ML/HR: 600; 310; 30; 20 INJECTION, SOLUTION INTRAVENOUS at 09:51

## 2019-03-07 RX ADMIN — DEXAMETHASONE SODIUM PHOSPHATE 4 MG: 10 INJECTION INTRAMUSCULAR; INTRAVENOUS at 09:15

## 2019-03-07 RX ADMIN — HYDROCODONE BITARTRATE AND ACETAMINOPHEN 1 TABLET: 7.5; 325 TABLET ORAL at 11:27

## 2019-03-07 RX ADMIN — FENTANYL CITRATE 50 MCG: 50 INJECTION INTRAMUSCULAR; INTRAVENOUS at 08:16

## 2019-03-07 RX ADMIN — PHENYLEPHRINE HYDROCHLORIDE 100 MCG: 10 INJECTION INTRAVENOUS at 08:25

## 2019-03-07 RX ADMIN — PHENYLEPHRINE HYDROCHLORIDE 100 MCG: 10 INJECTION INTRAVENOUS at 08:18

## 2019-03-07 RX ADMIN — LIDOCAINE HYDROCHLORIDE 60 MG: 20 INJECTION, SOLUTION INFILTRATION; PERINEURAL at 08:16

## 2019-03-07 RX ADMIN — FAMOTIDINE 20 MG: 10 INJECTION INTRAVENOUS at 07:05

## 2019-03-07 NOTE — PERIOPERATIVE NURSING NOTE
Ambulated to be with walker/ hany. Well unable to void.pt. Does self cath. Wants to wait until phase2

## 2019-03-07 NOTE — PERIOPERATIVE NURSING NOTE
CALL PLACED TO LAB AT [id number] AND TECH SAID PTH DRAWS WERE SPINNING AT 0921. CALL RETURNEDAT 0923 AND 1ST PTH DRAW WAS 52.5

## 2019-03-07 NOTE — ANESTHESIA POSTPROCEDURE EVALUATION
Patient: Michelle Espinoza    Procedure Summary     Date:  03/07/19 Room / Location:   [facility] [address] [place] [address] [facility]    Anesthesia Start:  0805 Anesthesia Stop:  0949    Procedure:  PARATHYROIDECTOMY LEFT INFERIOR (Left Neck) Diagnosis:      Surgeon:  Mason Prather MD Provider:  Anibal Menjivar MD    Anesthesia Type:  general ASA Status:  3          Anesthesia Type: general  Last vitals  BP   165/97 (03/07/19 0630)   Temp   36.7 °C (98 °F) (03/07/19 0630)   Pulse   97 (03/07/19 0630)   Resp   16 (03/07/19 0630)     SpO2   98 % (03/07/19 0630)     Post Anesthesia Care and Evaluation    Patient location during evaluation: PACU  Patient participation: complete - patient participated  Level of consciousness: awake and alert  Pain management: adequate  Airway patency: patent  Anesthetic complications: No anesthetic complications    Cardiovascular status: acceptable  Respiratory status: acceptable  Hydration status: acceptable    Comments: -------------------------              03/07/19 0630        -------------------------   BP:         165/97        Pulse:        97          Resp:         16          Temp:   36.7 °C (98 °F)   SpO2:         98%        -------------------------

## 2019-03-07 NOTE — TELEPHONE ENCOUNTER
Please see if she is willing to see the lipid clinic. She can try Lipitor or Crestor prior to seeing the lipid clinic if she has not had AE to one of these and thinks she can be compliant with medication.

## 2019-03-07 NOTE — PERIOPERATIVE NURSING NOTE
PATIENT ASSISTED TO BATHROOM TO STRAIGHT CATH SELF AND DRESS.  SMALL AMT. OF URINE RETURNED PER PATIENT.

## 2019-03-07 NOTE — PERIOPERATIVE NURSING NOTE
DR. CORDOVA HERE TO EVALUATE LEFT HAND.  STATED THAT HE BELIEVED IT WAS THE B/P CUFF.  INSTRUCTED TO GO TO ER IF IT GETS WORSE AND TO HAVE DR. TRACEY CAVANAUGH. TOMORROW.

## 2019-03-07 NOTE — PERIOPERATIVE NURSING NOTE
DR. CALLAWAY OFFICE RETURNED CALL.  INFORMED REYNA OF PT.S LEFT HAND PETECHIA.  STATED THAT SHE WOULD SEND A MESSAGE TO DR. WEBB.

## 2019-03-07 NOTE — BRIEF OP NOTE
PARATHYROIDECTOMY  Progress Note    Michelle Espinoza  3/7/2019    Pre-op Diagnosis:   hyperparathyroidism       Post-Op Diagnosis Codes:   SAME    Procedure/CPT® Codes:      Procedure(s):  PARATHYROIDECTOMY LEFT INFERIOR    Surgeon(s):  Mason Prather MD  Assistant: Joe Carl Sr., MD    Anesthesia: General    Staff:   Circulator: Marisa Thomas RN; Cecile Beth RN  Scrub Person: Jacqueline Inman; Víctor Munoz      Estimated Blood Loss: 10ml    Urine Voided: * No values recorded between 3/7/2019  8:02 AM and 3/7/2019  9:42 AM *    Specimens:                ID Type Source Tests Collected by Time   1 : pth draw rm 31 # [id number] Blood Blood, Venous Line PTH INTRAOPERATIVE Mason Prather MD 3/7/2019 0845   2 : PTH Blood Blood, Venous Line PTH INTRAOPERATIVE Mason Prather MD 3/7/2019 0857   A : left inferior parathyroid rm 31 #[id number] Tissue Parathyroid Gland TISSUE PATHOLOGY EXAM Mason Prather MD 3/7/2019 0844         Drains:   Closed/Suction Drain 1 Left Neck Bulb 10 Fr. (Active)       Findings: enlarged parathyroid noted inferiorly on the left side. Hypercellular on frozen section, PTH levels dropped appropriately.    Complications: none    OPERATIVE REPORT: The patient was taken to the operating room placed in the supine position and placed under general endotracheal anesthesia.  The skin incision was planned in a natural skin crease and injected with 1% lidocaine with epinephrine.  Sterile prep and drape with Hibiclens was performed.  The incision was then incised with a 15 blade scalpel.  This was carried through the subcutaneous fat and platysma.  Subplatysmal flaps were elevated.  The midline raphae was then found and the strap muscles were reflected laterally over the left thyroid lobe only.  Careful dissection on the capsule of the gland was performed.  The Harmonic scalpel was used to take down the blood supply to the thyroid where necessary to allow reflection anteriorly to  inspect the parathyroid glands.  The inferior parathyroid gland, that was suspicious on the sestamibi scan, was identified and dissected free.  The blood supply was taken down and the specimen removed.  The superior parathyroid gland was also inspected.  It did not look to be adenomatous but was slightly larger than average.  The recurrent laryngeal nerve was identified and preserved.    Frozen section revealed a hypercellular parathyroid gland.  Intraoperative PTH testing dropped from 109 to 52 for the 5 minute draw.  Irrigation of the operative site was performed revealing good hemostasis.  #10 round drain was placed exiting through a separate skin incision.  The midline raphae was then reapproximated with 3-0 Vicryl suture.  A 4-0 Vicryl suture was used for platysmal and dermal closure.   5-0 nylon was then used to close the epidermis. Bacitracin was then applied.  At this point the procedure was complete.  The patient was allowed to awake from anesthesia and taken to the recovery room in satisfactory condition.        Mason Prather MD     Date: 3/7/2019  Time: 9:42 AM

## 2019-03-07 NOTE — ANESTHESIA PREPROCEDURE EVALUATION
Anesthesia Evaluation     Patient summary reviewed and Nursing notes reviewed                Airway   Mallampati: III  Neck ROM: limited  Possible difficult intubation  Dental      Pulmonary    (+) a smoker Former, COPD,   Cardiovascular     ECG reviewed  Rhythm: irregular  Rate: normal    (+) PVD, hyperlipidemia,  carotid artery disease      Neuro/Psych  (+) weakness,     GI/Hepatic/Renal/Endo    (+)   renal disease stones and CRI, diabetes mellitus,     Musculoskeletal     Abdominal    Substance History - negative use     OB/GYN negative ob/gyn ROS         Other   (+) arthritis                     Anesthesia Plan    ASA 3     general   (Patient's neck has been surgically fused so may need CMAC)  intravenous induction   Anesthetic plan, all risks, benefits, and alternatives have been provided, discussed and informed consent has been obtained with: patient.

## 2019-03-07 NOTE — TELEPHONE ENCOUNTER
Pt would like to start on the medication first before trying the lipid clinic.   She uses [facility].

## 2019-03-07 NOTE — ANESTHESIA PROCEDURE NOTES
Airway  Urgency: elective    Airway not difficult    General Information and Staff    Patient location during procedure: OR  Anesthesiologist: Anibal Menjivar MD  CRNA: Vika Banuelos CRNA    Indications and Patient Condition  Indications for airway management: airway protection    Preoxygenated: yes  MILS not maintained throughout  Mask difficulty assessment: 1 - vent by mask    Final Airway Details  Final airway type: endotracheal airway      Successful airway: ETT  Cuffed: yes   Successful intubation technique: direct laryngoscopy  Endotracheal tube insertion site: oral  Blade: Aurelia  Blade size: 3  ETT size (mm): 7.0  Cormack-Lehane Classification: grade I - full view of glottis  Placement verified by: chest auscultation and capnometry   Measured from: lips  ETT to lips (cm): 22  Number of attempts at approach: 1    Additional Comments  PreO2 100%, FEO2 >85, SIVI, easy BMV, patient positioned own neck, ETT placed/ confirmed, attraumatic, teeth and lips as preop.

## 2019-03-08 LAB
CYTO UR: NORMAL
LAB AP CASE REPORT: NORMAL
LAB AP DIAGNOSIS COMMENT: NORMAL
Lab: NORMAL
PATH REPORT.FINAL DX SPEC: NORMAL
PATH REPORT.GROSS SPEC: NORMAL

## 2019-03-08 NOTE — TELEPHONE ENCOUNTER
Patient aware, states she has tried Lipitor in the past and had leg weakness but, hasn't tried Crestor is willing to try it

## 2019-03-08 NOTE — TELEPHONE ENCOUNTER
Spoke with patient   Patient is to stop the amaryl 1 mg  Would like patient on the tranjenta pt stated that it is costly and she can not afford it. Let patient know that dr alarcon wanted to try her on januvia 25 mg daily  Patient stated that she still had sample of tradjenta        ----- Message from Denisse Mane sent at 3/8/2019  9:31 AM EST -----  Contact: patient  Patient stated that she is not going to be able to keep taking glimepiride (AMARYL) 1 MG tablet, it has been making her have pain in her knees, and shoulders and she has gained 5 lbs. She keeps eating.     If there is a different script, it must have a generic formulation, due to the name brands being to expensive, patient had a thyroid surgery yesterday. Parathyroidectomy.     [facility] [address] - [address] [facility] AT [address] - [phone]  - [phone] (Phone)  [phone] (Fax)    Dr. Prather performed the surgery.     Best # for patient [phone]

## 2019-03-10 RX ORDER — ROSUVASTATIN CALCIUM 10 MG/1
10 TABLET, COATED ORAL DAILY
Qty: 30 TABLET | Refills: 0 | Status: SHIPPED | OUTPATIENT
Start: 2019-03-10 | End: 2019-05-28

## 2019-03-20 RX ORDER — BACLOFEN 10 MG/1
TABLET ORAL
Qty: 60 TABLET | Refills: 2 | Status: SHIPPED | OUTPATIENT
Start: 2019-03-20 | End: 2020-12-29

## 2019-04-04 ENCOUNTER — TELEPHONE (OUTPATIENT)
Dept: FAMILY MEDICINE CLINIC | Facility: CLINIC | Age: 74
End: 2019-04-04

## 2019-04-04 NOTE — TELEPHONE ENCOUNTER
"Patient wanted to call and let you know she has stopped taking the Crestor that was prescribed to her back on 3/10/2019. She said it has given her the effects of a \"super muscle relaxant\". Her arms and legs feel like jello and has caused increased overactive bladder. She does have a follow up appointment with you on 4/16/2019. Please advise, thanks.   "

## 2019-04-12 RX ORDER — OXYBUTYNIN CHLORIDE 5 MG/1
TABLET ORAL
Qty: 90 TABLET | Refills: 1 | Status: SHIPPED | OUTPATIENT
Start: 2019-04-12 | End: 2019-07-02 | Stop reason: SDUPTHER

## 2019-04-16 ENCOUNTER — OFFICE VISIT (OUTPATIENT)
Dept: FAMILY MEDICINE CLINIC | Facility: CLINIC | Age: 74
End: 2019-04-16

## 2019-04-16 VITALS
HEART RATE: 82 BPM | WEIGHT: 164.6 LBS | OXYGEN SATURATION: 98 % | SYSTOLIC BLOOD PRESSURE: 120 MMHG | HEIGHT: 62 IN | TEMPERATURE: 98.4 F | BODY MASS INDEX: 30.29 KG/M2 | DIASTOLIC BLOOD PRESSURE: 74 MMHG

## 2019-04-16 DIAGNOSIS — E21.3 HYPERPARATHYROIDISM (HCC): Primary | ICD-10-CM

## 2019-04-16 DIAGNOSIS — N18.2 STAGE 2 CHRONIC KIDNEY DISEASE: ICD-10-CM

## 2019-04-16 DIAGNOSIS — D72.828 OTHER ELEVATED WHITE BLOOD CELL (WBC) COUNT: ICD-10-CM

## 2019-04-16 DIAGNOSIS — M48.00 SPINAL STENOSIS, UNSPECIFIED SPINAL REGION: ICD-10-CM

## 2019-04-16 DIAGNOSIS — E78.00 HYPERCHOLESTEROLEMIA: ICD-10-CM

## 2019-04-16 DIAGNOSIS — E55.9 VITAMIN D DEFICIENCY: ICD-10-CM

## 2019-04-16 DIAGNOSIS — E53.8 B12 DEFICIENCY: ICD-10-CM

## 2019-04-16 DIAGNOSIS — R73.01 IMPAIRED FASTING GLUCOSE: ICD-10-CM

## 2019-04-16 DIAGNOSIS — N31.2 ATONIC NEUROGENIC BLADDER: ICD-10-CM

## 2019-04-16 DIAGNOSIS — I65.21 STENOSIS OF RIGHT CAROTID ARTERY: ICD-10-CM

## 2019-04-16 LAB
ALBUMIN SERPL-MCNC: 4.1 G/DL (ref 3.5–5.2)
ALBUMIN/GLOB SERPL: 1.2 G/DL
ALP SERPL-CCNC: 102 U/L (ref 39–117)
ALT SERPL-CCNC: 14 U/L (ref 1–33)
AST SERPL-CCNC: 9 U/L (ref 1–32)
BILIRUB SERPL-MCNC: 0.3 MG/DL (ref 0.2–1.2)
BUN SERPL-MCNC: 27 MG/DL (ref 8–23)
BUN/CREAT SERPL: 21.1 (ref 7–25)
CALCIUM SERPL-MCNC: 10.1 MG/DL (ref 8.6–10.5)
CHLORIDE SERPL-SCNC: 103 MMOL/L (ref 98–107)
CO2 SERPL-SCNC: 26.1 MMOL/L (ref 22–29)
CREAT SERPL-MCNC: 1.28 MG/DL (ref 0.57–1)
GLOBULIN SER CALC-MCNC: 3.4 GM/DL
GLUCOSE SERPL-MCNC: 121 MG/DL (ref 65–99)
POTASSIUM SERPL-SCNC: 5.3 MMOL/L (ref 3.5–5.2)
PROT SERPL-MCNC: 7.5 G/DL (ref 6–8.5)
SODIUM SERPL-SCNC: 141 MMOL/L (ref 136–145)

## 2019-04-16 PROCEDURE — 99214 OFFICE O/P EST MOD 30 MIN: CPT | Performed by: PHYSICIAN ASSISTANT

## 2019-04-16 NOTE — PROGRESS NOTES
Subjective   Michelle Espinoza is a 73 y.o. female. Patient here today for follow-up hypercholesterolemia, couldn't take Crestor due to extreme weakness and decreased strength     History of Present Illness     States she did well with single parathyroidectomy. Had intraoperative decrease in PTH to normal. Has not had labs since her surgery. Follow up with ENT and Endo 5/2019. Did not tolerate Crestor due to increased weakness.     The following portions of the patient's history were reviewed and updated as appropriate: allergies, current medications, past family history, past medical history, past social history, past surgical history and problem list.    Review of Systems   All other systems reviewed and are negative.      Objective   Physical Exam   Constitutional: She is oriented to person, place, and time. She appears well-developed and well-nourished.   HENT:   Head: Normocephalic and atraumatic.   Right Ear: External ear normal.   Left Ear: External ear normal.   Nose: Nose normal.   Eyes: Conjunctivae and lids are normal.   Neck: Neck supple. Carotid bruit is not present.   Cardiovascular: Normal rate, regular rhythm, normal heart sounds and intact distal pulses. Exam reveals no gallop and no friction rub.   No murmur heard.  Pulmonary/Chest: Effort normal and breath sounds normal. No respiratory distress. She has no wheezes. She has no rhonchi. She has no rales.   Musculoskeletal: She exhibits no edema or deformity.   Neurological: She is alert and oriented to person, place, and time. Gait normal.   Skin: Skin is warm and dry.   Psychiatric: She has a normal mood and affect. Her speech is normal and behavior is normal. Judgment and thought content normal. Cognition and memory are normal.   Nursing note and vitals reviewed.      Assessment/Plan   Michelle was seen today for follow-up.    Diagnoses and all orders for this visit:    Hyperparathyroidism (CMS/[facility])  -     Comprehensive Metabolic  Panel    Hypercholesterolemia    B12 deficiency    Stenosis of right carotid artery    Vitamin D deficiency    Impaired fasting glucose    Stage 2 chronic kidney disease    Atonic neurogenic bladder    Spinal stenosis, unspecified spinal region    Other elevated white blood cell (WBC) count      Patient Instructions   73 year old female who presents today in follow up of hyperlipidemia, B12, and vitamin D deficiency, hyperparathyroidism, and prediabetes. She did not tolerate Crestor due to increased muscle weakness and Livalo was too expensive. Patient is following with Endocrinology for lipids, B12, vitamin D, hyperparathyroidism, and prediabetes. She is up to date on appt and will follow up 5/2019. She was found to have hyperparathyroidism and underwent parathyroidectomy. Patient had good intraoperative response. She has not had labs to recheck since surgery.  Patient will follow up with ENT 5/2019 as well. She also continues with follow up with [facility] neuromuscular clinic and physical therapy. She regresses whenever she is not in aggressive PT. She has to self cath with neurogenic bladder and contacted urology about symptoms. She has appt for GYN follow up 8/1/19.    Of note, she has persistent color abnormality of her left > right feet. She had normal KARTHIKEYAN and carotid with 1 side with plawue without stenosis and the other mild stenosis. Recheck duplex in 1 year. I discussed with her that with carotid stenosis and hyperlipidemia, she needs to ensure she is taking her ASA 81 mg once daily and controlled BP, lipids, and blood sugars as well as a statin and as much exercise as she can get (she will be starting PT again). She was agreeable to try Livalo 2 mg at bedtime but it was too expensive and she did not tolerate Crestor due to increased muscle weakness. Endocrinology had discussed Vascular Surgery referral. If worsening color change in her leg, she should definitely see them. She should discuss statin  recommendations with Endocrinology. She may need to see Vascular surgery for an evaluation and treatment recommendations.     Patient will need Hepatitis C screening at follow up. She needs to ensure has had diabetic foot exam and she will need microalbumin. Patient will be due for follow up 8/2019 with me and we will review all specialists and make recommendations.

## 2019-04-22 DIAGNOSIS — N28.9 ABNORMAL RENAL FUNCTION: Primary | ICD-10-CM

## 2019-04-22 DIAGNOSIS — E87.5 HYPERKALEMIA: ICD-10-CM

## 2019-04-22 RX ORDER — ROSUVASTATIN CALCIUM 10 MG/1
TABLET, COATED ORAL
Qty: 30 TABLET | Refills: 0 | OUTPATIENT
Start: 2019-04-22

## 2019-04-23 RX ORDER — ROSUVASTATIN CALCIUM 10 MG/1
TABLET, COATED ORAL
Qty: 30 TABLET | Refills: 0 | OUTPATIENT
Start: 2019-04-23

## 2019-04-24 ENCOUNTER — RESULTS ENCOUNTER (OUTPATIENT)
Dept: ENDOCRINOLOGY | Age: 74
End: 2019-04-24

## 2019-04-24 DIAGNOSIS — R73.03 PREDIABETES: ICD-10-CM

## 2019-04-24 DIAGNOSIS — E83.52 HYPERCALCEMIA: ICD-10-CM

## 2019-04-24 DIAGNOSIS — E78.2 MIXED HYPERLIPIDEMIA: ICD-10-CM

## 2019-04-28 PROBLEM — E21.3 HYPERPARATHYROIDISM: Status: ACTIVE | Noted: 2019-04-28

## 2019-04-28 NOTE — PATIENT INSTRUCTIONS
73 year old female who presents today in follow up of hyperlipidemia, B12, and vitamin D deficiency, hyperparathyroidism, and prediabetes. She did not tolerate Crestor due to increased muscle weakness and Livalo was too expensive. Patient is following with Endocrinology for lipids, B12, vitamin D, hyperparathyroidism, and prediabetes. She is up to date on appt and will follow up 5/2019. She was found to have hyperparathyroidism and underwent parathyroidectomy. Patient had good intraoperative response. She has not had labs to recheck since surgery.  Patient will follow up with ENT 5/2019 as well. She also continues with follow up with [facility] neuromuscular clinic and physical therapy. She regresses whenever she is not in aggressive PT. She has to self cath with neurogenic bladder and contacted urology about symptoms. She has appt for GYN follow up 8/1/19.    Of note, she has persistent color abnormality of her left > right feet. She had normal KARTHIKEYAN and carotid with 1 side with plawue without stenosis and the other mild stenosis. Recheck duplex in 1 year. I discussed with her that with carotid stenosis and hyperlipidemia, she needs to ensure she is taking her ASA 81 mg once daily and controlled BP, lipids, and blood sugars as well as a statin and as much exercise as she can get (she will be starting PT again). She was agreeable to try Livalo 2 mg at bedtime but it was too expensive and she did not tolerate Crestor due to increased muscle weakness. Endocrinology had discussed Vascular Surgery referral. If worsening color change in her leg, she should definitely see them. She should discuss statin recommendations with Endocrinology. She may need to see Vascular surgery for an evaluation and treatment recommendations.     Patient will need Hepatitis C screening at follow up. She needs to ensure has had diabetic foot exam and she will need microalbumin. Patient will be due for follow up 8/2019 with me and we will review all  specialists and make recommendations.

## 2019-05-01 LAB
BUN SERPL-MCNC: 29 MG/DL (ref 8–23)
BUN/CREAT SERPL: 22.8 (ref 7–25)
CALCIUM SERPL-MCNC: 9.9 MG/DL (ref 8.6–10.5)
CHLORIDE SERPL-SCNC: 104 MMOL/L (ref 98–107)
CO2 SERPL-SCNC: 21.3 MMOL/L (ref 22–29)
CREAT SERPL-MCNC: 1.27 MG/DL (ref 0.57–1)
GLUCOSE SERPL-MCNC: 113 MG/DL (ref 65–99)
POTASSIUM SERPL-SCNC: 5 MMOL/L (ref 3.5–5.2)
SODIUM SERPL-SCNC: 140 MMOL/L (ref 136–145)

## 2019-05-06 DIAGNOSIS — N28.9 ABNORMAL RENAL FUNCTION: Primary | ICD-10-CM

## 2019-05-07 RX ORDER — GLIMEPIRIDE 2 MG/1
2 TABLET ORAL 2 TIMES DAILY WITH MEALS
Qty: 60 TABLET | Refills: 11 | Status: SHIPPED | OUTPATIENT
Start: 2019-05-07 | End: 2020-05-04

## 2019-05-18 LAB
25(OH)D3+25(OH)D2 SERPL-MCNC: 51.9 NG/ML (ref 30–100)
BUN SERPL-MCNC: 31 MG/DL (ref 8–23)
BUN/CREAT SERPL: 24.8 (ref 7–25)
CALCIUM SERPL-MCNC: 10.2 MG/DL (ref 8.6–10.5)
CHLORIDE SERPL-SCNC: 104 MMOL/L (ref 98–107)
CHOLEST SERPL-MCNC: 206 MG/DL (ref 0–200)
CO2 SERPL-SCNC: 24 MMOL/L (ref 22–29)
CREAT SERPL-MCNC: 1.25 MG/DL (ref 0.57–1)
FOLATE SERPL-MCNC: 9.75 NG/ML (ref 4.78–24.2)
GLUCOSE SERPL-MCNC: 107 MG/DL (ref 65–99)
HBA1C MFR BLD: 6.6 % (ref 4.8–5.6)
HDLC SERPL-MCNC: 58 MG/DL (ref 40–60)
INTACT PTH: NORMAL
INTERPRETATION: NORMAL
LDLC SERPL CALC-MCNC: 111 MG/DL (ref 0–100)
Lab: NORMAL
POTASSIUM SERPL-SCNC: 5.1 MMOL/L (ref 3.5–5.2)
PTH-INTACT SERPL-MCNC: 35 PG/ML (ref 15–65)
SODIUM SERPL-SCNC: 140 MMOL/L (ref 136–145)
T4 FREE SERPL-MCNC: 0.98 NG/DL (ref 0.93–1.7)
TRIGL SERPL-MCNC: 186 MG/DL (ref 0–150)
TSH SERPL DL<=0.005 MIU/L-ACNC: 1.35 MIU/ML (ref 0.27–4.2)
VIT B12 SERPL-MCNC: 459 PG/ML (ref 211–946)
VLDLC SERPL CALC-MCNC: 37.2 MG/DL

## 2019-05-28 ENCOUNTER — OFFICE VISIT (OUTPATIENT)
Dept: ENDOCRINOLOGY | Age: 74
End: 2019-05-28

## 2019-05-28 VITALS
HEART RATE: 104 BPM | BODY MASS INDEX: 31.1 KG/M2 | WEIGHT: 169 LBS | OXYGEN SATURATION: 97 % | SYSTOLIC BLOOD PRESSURE: 130 MMHG | DIASTOLIC BLOOD PRESSURE: 80 MMHG | HEIGHT: 62 IN

## 2019-05-28 DIAGNOSIS — E83.52 HYPERCALCEMIA: Primary | ICD-10-CM

## 2019-05-28 DIAGNOSIS — E21.3 HYPERPARATHYROIDISM (HCC): ICD-10-CM

## 2019-05-28 DIAGNOSIS — E78.2 MIXED HYPERLIPIDEMIA: ICD-10-CM

## 2019-05-28 DIAGNOSIS — R73.03 PREDIABETES: ICD-10-CM

## 2019-05-28 PROCEDURE — 99214 OFFICE O/P EST MOD 30 MIN: CPT | Performed by: INTERNAL MEDICINE

## 2019-05-28 NOTE — PROGRESS NOTES
74 y.o.    Patient Care Team:  Lillie Ji PA as PCP - General (Family Medicine)  Lillie Ji PA as PCP - Claims Attributed  Jose Luis Nelson Jr., MD as Consulting Physician (Urology)  Bon Gardiner MD as Consulting Physician (Hand Surgery)  Milly Crow MD as Consulting Physician (Neurology)  Sacha Garcia APRN as Nurse Practitioner (Neurology)  Johnnie Soni MD as Consulting Physician (Neurology)  Mahamed Massey MD as Consulting Physician (Obstetrics and Gynecology)  Clement Tierney MD as Surgeon (Neurosurgery)  Donna Olsen MA as Medical Assistant  Mahamed Massey MD as Consulting Physician (Obstetrics and Gynecology)  Milly Crow MD as Consulting Physician (Neurology)  Jeffry Bhagat MD as Consulting Physician (Ophthalmology)    Chief Complaint:    FOLLOW UP/ HYPERCALCEMIA  Subjective     HPI  73 year old white female is here for the follow up of elevated calcium levels.  Patient's calcium levels were noted to be elevated on routine blood work up in May 2017.  Her calcium levels have been ranging between 10.5-10.9 mg/dL.  With the latest being her highest-11.1 mg/dL range.    Status post parathyroid surgery in March 2019.  Patient has recovered from the surgery with no complications.  Calcium level has normalized to 10.1, parathyroid levels-35pg/mL.  Intraoperative parathyroid levels-109 pg/mL  Pathology-left inferior parathyroid adenoma.     No further kidney stone attacks, last attack was 2 to 3 years ago, she still complains of poor energy levels.  Does have a history of osteopenia, no history of fragility fractures.  Last bone density scan was in December 2018.  Does complain of muscle weakness, no joint or bone pains, no complaints of memory issues.     Pre diabetes  On glimepiride 2 mg twice daily with meals.  Patient could not do Tradjenta or Januvia due to the cost issues.  Stopped metformin due to the abnormal renal function.       Hyperlipidemia - not on any medications at  this time.   She recently had a carotid ultrasound by her primary care which showed an 80% stenosis in her left carotid artery.  I do not have the report of this.  Could not tolerate Lipitor or Crestor in the past.        Reviewed primary care physician's/consulting physician documentation and lab results :     Interval History      The following portions of the patient's history were reviewed and updated as appropriate: allergies, current medications, past family history, past medical history, past social history, past surgical history and problem list.    Past Medical History:   Diagnosis Date   • Anesthesia complication     STRONG GAG REFLEX   • Benign colonic polyp    • Cataract     REMOVED   • DDD (degenerative disc disease), cervical    • DDD (degenerative disc disease), lumbosacral    • Diabetes mellitus (CMS/[facility])     TYPE 2   • Frequent UTI    • History of kidney stones 06/2014   • History of pyelonephritis 06/2014   • Hypercalcemia    • Hypercholesteremia    • Leg muscle spasm    • Neuropathy     LEFT FOOT   • Osteoarthritis    • Pyelonephritis 06/2014   • Self-catheterizes urinary bladder     HAS NERVE DAMAGE RELATED TO BACK ISSUES   • Sepsis (CMS/[facility]) 06/2014   • Urinary incontinence    • Weakness     LOWER EXTREMITES RELATED FROM NERVE DAMAGE FROM BACK     Family History   Problem Relation Age of Onset   • Stroke Mother    • Heart disease Mother    • Hypertension Mother    • Clotting disorder Father    • Hypertension Father    • Diabetes Father    • Aneurysm Father    • Hypertension Sister    • Diabetes Sister    • Cervical cancer Sister    • Diabetes Sister    • Cervical cancer Maternal Grandmother 72   • Diabetes Grandchild    • Malig Hyperthermia Neg Hx      Social History     Socioeconomic History   • Marital status:      Spouse name: Not on file   • Number of children: Not on file   • Years of education: Not on file   • Highest education level: Not on file   Occupational History   •  Occupation: retired   Tobacco Use   • Smoking status: Former Smoker     Packs/day: 0.50     Years: 15.00     Pack years: 7.50     Types: Cigarettes     Last attempt to quit:      Years since quittin.4   • Smokeless tobacco: Never Used   Substance and Sexual Activity   • Alcohol use: Yes     Comment: Socially.   • Drug use: No   Social History Narrative    Last mammogram 2018      Allergies   Allergen Reactions   • Other Nausea And Vomiting     The mercury in [place]   • Penicillins Hives   • Latex Rash   • Nickel Rash       Current Outpatient Medications:   •  baclofen (LIORESAL) 10 MG tablet, TAKE TWO TABLETS BY MOUTH DAILY, Disp: 60 tablet, Rfl: 2  •  Cholecalciferol (VITAMIN D3) 5000 UNITS capsule capsule, Take 5,000 Units by mouth Daily., Disp: , Rfl:   •  D-MANNOSE PO, Take 1 tablet by mouth 2 (Two) Times a Day., Disp: , Rfl:   •  glimepiride (AMARYL) 2 MG tablet, Take 1 tablet by mouth 2 (Two) Times a Day With Meals., Disp: 60 tablet, Rfl: 11  •  glucose blood (ONE TOUCH ULTRA TEST) test strip, USE ONE STRIP TO TEST TWICE A DAY, Disp: 100 each, Rfl: 11  •  Lutein-Bilberry (BILBERRY PLUS LUTEIN) 5-1000 MCG-MG capsule, Take  by mouth Daily., Disp: , Rfl:   •  magnesium 30 MG tablet, Take 60 mg by mouth Daily., Disp: , Rfl:   •  NYSTATIN [id number] UNIT/GM powder, APPLY TO AFFECTED AREA 2 TO 3 TIMES PER DAY AS NEEDED, Disp: 30 g, Rfl: 0  •  oxybutynin (DITROPAN) 5 MG tablet, TAKE ONE TABLET BY MOUTH THREE TIMES A DAY, Disp: 90 tablet, Rfl: 1  •  Probiotic Product (PROBIOTIC PO), Take 1 tablet by mouth Daily., Disp: , Rfl:   •  vitamin C (ASCORBIC ACID) 500 MG tablet, Take 500 mg by mouth Daily. With D3, Disp: , Rfl:   •  pitavastatin calcium (LIVALO) 2 MG tablet tablet, Take 1 tablet by mouth Every Night., Disp: 30 tablet, Rfl: 11        Review of Systems   Constitutional: Negative for appetite change, fatigue and fever.   Eyes: Negative for visual disturbance.   Respiratory: Negative for shortness of  "breath.    Cardiovascular: Negative for palpitations and leg swelling.   Gastrointestinal: Negative for abdominal pain and vomiting.   Endocrine: Negative for polydipsia and polyuria.   Musculoskeletal: Negative for joint swelling and neck pain.   Skin: Negative for rash.   Neurological: Negative for weakness and numbness.   Psychiatric/Behavioral: Negative for behavioral problems.       Objective       Vitals:    05/28/19 1313   BP: 130/80   Pulse: 104   SpO2: 97%   Weight: 76.7 kg (169 lb)   Height: 157.5 cm (62\")     Body mass index is 30.91 kg/m².      Physical Exam   Constitutional: She is oriented to person, place, and time. She appears well-nourished.   HENT:   Head: Normocephalic and atraumatic.   Eyes: Conjunctivae and EOM are normal. No scleral icterus.   Neck: Normal range of motion. Neck supple. No thyromegaly present.   Cardiovascular: Normal rate and normal heart sounds. Exam reveals no friction rub.   No murmur heard.  Pulmonary/Chest: Effort normal and breath sounds normal. No stridor. She has no wheezes. She has no rales.   Abdominal: Soft. Bowel sounds are normal. She exhibits no distension. There is no tenderness.   Musculoskeletal: She exhibits deformity. She exhibits no edema or tenderness.   Lymphadenopathy:     She has no cervical adenopathy.   Neurological: She is alert and oriented to person, place, and time.   Uses a walker   Skin: Skin is warm and dry. She is not diaphoretic.   Psychiatric: She has a normal mood and affect.   Vitals reviewed.    Results Review:     I reviewed the patient's new clinical results and mentioned them above in HPI and in plan as well.    Medical records reviewed  Summary: done      Results Encounter on 04/24/2019   Component Date Value Ref Range Status   • PTH, Intact 05/17/2019 35  15 - 65 pg/mL Final   • PTH, Intact 05/17/2019 Comment   Final    Comment: Interpretation                 Intact PTH    Calcium                                  (pg/mL)      " (mg/dL)  Normal                          15 - 65     8.6 - 10.2  Primary Hyperparathyroidism         >65          >10.2  Secondary Hyperparathyroidism       >65          <10.2  Non-Parathyroid Hypercalcemia       <65          >10.2  Hypoparathyroidism                  <15          < 8.6  Non-Parathyroid Hypocalcemia    15 - 65          < 8.6     • 25 Hydroxy, Vitamin D 05/17/2019 51.9  30.0 - 100.0 ng/ml Final    Comment: Reference Range for Total Vitamin D 25(OH)  Deficiency <20.0 ng/mL  Insufficiency 21-29 ng/mL  Sufficiency  ng/mL  Toxicity >100 ng/ml     • Glucose 05/17/2019 107* 65 - 99 mg/dL Final   • BUN 05/17/2019 31* 8 - 23 mg/dL Final   • Creatinine 05/17/2019 1.25* 0.57 - 1.00 mg/dL Final   • eGFR Non African Am 05/17/2019 42* >60 mL/min/1.73 Final    Comment: The MDRD GFR formula is only valid for adults with stable  renal function between ages 18 and 70.     • eGFR  Am 05/17/2019 51* >60 mL/min/1.73 Final   • BUN/Creatinine Ratio 05/17/2019 24.8  7.0 - 25.0 Final   • Sodium 05/17/2019 140  136 - 145 mmol/L Final   • Potassium 05/17/2019 5.1  3.5 - 5.2 mmol/L Final   • Chloride 05/17/2019 104  98 - 107 mmol/L Final   • Total CO2 05/17/2019 24.0  22.0 - 29.0 mmol/L Final   • Calcium 05/17/2019 10.2  8.6 - 10.5 mg/dL Final   • Hemoglobin A1C 05/17/2019 6.60* 4.80 - 5.60 % Final    Comment: Hemoglobin A1C Ranges:  Increased Risk for Diabetes  5.7% to 6.4%  Diabetes                     >= 6.5%  Diabetic Goal                < 7.0%     • Total Cholesterol 05/17/2019 206* 0 - 200 mg/dL Final   • Triglycerides 05/17/2019 186* 0 - 150 mg/dL Final   • HDL Cholesterol 05/17/2019 58  40 - 60 mg/dL Final   • VLDL Cholesterol 05/17/2019 37.2  mg/dL Final   • LDL Cholesterol  05/17/2019 111* 0 - 100 mg/dL Final   • TSH 05/17/2019 1.350  0.270 - 4.200 mIU/mL Final   • Vitamin B-12 05/17/2019 459  211 - 946 pg/mL Final   • Folate 05/17/2019 9.75  4.78 - 24.20 ng/mL Final   • Free T4 05/17/2019 0.98  0.93 -  1.70 ng/dL Final   • Interpretation 05/17/2019 Note   Final    Supplemental report is available.   • PDF Image 05/17/2019 Not applicable   Final     Lab Results   Component Value Date    HGBA1C 6.60 (H) 05/17/2019    HGBA1C 6.5 (H) 01/18/2019    HGBA1C 6.0 (H) 10/04/2018     Lab Results   Component Value Date    CREATININE 1.25 (H) 05/17/2019     Imaging Results (most recent)     None                Assessment and Plan:    Michelle was seen today for abnormal calcium.    Diagnoses and all orders for this visit:    Hypercalcemia  -     Calcium, Ionized; Future  -     PTH, Intact & Calcium; Future  -     Hemoglobin A1c; Future  -     Microalbumin / Creatinine Urine Ratio - Urine, Clean Catch; Future  -     Lipid Panel; Future  -     Basic Metabolic Panel; Future  -     Vitamin B12 & Folate; Future  -     Vitamin D 25 Hydroxy; Future    Mixed hyperlipidemia   -     Calcium, Ionized; Future  -     PTH, Intact & Calcium; Future  -     Hemoglobin A1c; Future  -     Microalbumin / Creatinine Urine Ratio - Urine, Clean Catch; Future  -     Lipid Panel; Future  -     Basic Metabolic Panel; Future  -     Vitamin B12 & Folate; Future  -     Vitamin D 25 Hydroxy; Future    Prediabetes   -     Calcium, Ionized; Future  -     PTH, Intact & Calcium; Future  -     Hemoglobin A1c; Future  -     Microalbumin / Creatinine Urine Ratio - Urine, Clean Catch; Future  -     Lipid Panel; Future  -     Basic Metabolic Panel; Future  -     Vitamin B12 & Folate; Future  -     Vitamin D 25 Hydroxy; Future    Hyperparathyroidism (CMS/HCC)  -     Calcium, Ionized; Future  -     PTH, Intact & Calcium; Future  -     Hemoglobin A1c; Future  -     Microalbumin / Creatinine Urine Ratio - Urine, Clean Catch; Future  -     Lipid Panel; Future  -     Basic Metabolic Panel; Future  -     Vitamin B12 & Folate; Future  -     Vitamin D 25 Hydroxy; Future    Other orders  -     pitavastatin calcium (LIVALO) 2 MG tablet tablet; Take 1 tablet by mouth Every  "Night.      Hyperparathyroidism status post left inferior parathyroidectomy  Would continue to monitor calcium levels  Noted that calcium and parathyroid levels normalized    Type 2 diabetes mellitus-uncontrolled  Due to the financial issues would continue with glimepiride 2 mg twice daily with meals  Noted that blood sugars have significantly improved.    Hyperlipidemia-worse  Discussed about various statin options with the patient  We will consider starting the patient on Livalo 2 mg oral daily at bedtime.    Reviewed Lab results with the patient.             Bethanie Montano MD  05/28/19    EMR Dragon / transcription disclaimer:     \"Dictated utilizing Dragon dictation\".  "

## 2019-05-30 ENCOUNTER — OFFICE VISIT (OUTPATIENT)
Dept: FAMILY MEDICINE CLINIC | Facility: CLINIC | Age: 74
End: 2019-05-30

## 2019-05-30 ENCOUNTER — HOSPITAL ENCOUNTER (OUTPATIENT)
Dept: GENERAL RADIOLOGY | Facility: HOSPITAL | Age: 74
Discharge: HOME | End: 2019-05-30
Admitting: FAMILY MEDICINE

## 2019-05-30 VITALS
SYSTOLIC BLOOD PRESSURE: 148 MMHG | OXYGEN SATURATION: 99 % | WEIGHT: 170.8 LBS | TEMPERATURE: 97.8 F | HEIGHT: 62 IN | HEART RATE: 64 BPM | DIASTOLIC BLOOD PRESSURE: 80 MMHG | BODY MASS INDEX: 31.43 KG/M2 | RESPIRATION RATE: 16 BRPM

## 2019-05-30 DIAGNOSIS — M25.511 ACUTE PAIN OF RIGHT SHOULDER: ICD-10-CM

## 2019-05-30 DIAGNOSIS — M67.40 GANGLION CYST: Primary | ICD-10-CM

## 2019-05-30 DIAGNOSIS — M67.40 GANGLION CYST: ICD-10-CM

## 2019-05-30 PROCEDURE — 99213 OFFICE O/P EST LOW 20 MIN: CPT | Performed by: FAMILY MEDICINE

## 2019-05-30 PROCEDURE — 73030 X-RAY EXAM OF SHOULDER: CPT

## 2019-05-30 NOTE — PROGRESS NOTES
Subjective   Michelle Espinoza is a 74 y.o. female present today for a knot on her right shoulder.      History of Present Illness     Over the last couple of days she is been having more pain in her right shoulder and noticed a knot was forming on the top of the shoulder.  It is mobile to a degree but also very hard but not painful to touch.  However her pain is from her shoulder.  It is not causing her to have any limitations of motion.  She has not tried anything to treat it.  Nothing makes it better or worse.  Pain is achy and nonradiating from the shoulder.  The knot itself is itchy.    The following portions of the patient's history were reviewed and updated as appropriate: allergies, current medications, past family history, past medical history, past social history, past surgical history and problem list.    Review of Systems   Constitutional: Negative for fatigue and fever.   Respiratory: Negative for shortness of breath and wheezing.    Cardiovascular: Negative for chest pain and palpitations.   Gastrointestinal: Negative for constipation and nausea.   Musculoskeletal:        Knot on shoulder.        Objective   Physical Exam   Constitutional: She appears well-developed and well-nourished. No distress.   Cardiovascular: Normal rate, regular rhythm and normal heart sounds. Exam reveals no gallop and no friction rub.   No murmur heard.  Pulmonary/Chest: Effort normal and breath sounds normal. She has no wheezes. She has no rales.   Musculoskeletal:        Right shoulder: She exhibits deformity and pain. She exhibits normal range of motion.        Left shoulder: Normal.   Skin: Skin is warm. Capillary refill takes less than 2 seconds. She is not diaphoretic.        Nursing note and vitals reviewed.      Assessment/Plan   Michelle was seen today for knot on right shoulder..    Diagnoses and all orders for this visit:    Ganglion cyst  -     XR Shoulder 2+ View Right; Future    Acute pain of right shoulder  -      XR Shoulder 2+ View Right; Future      I want to assure that it is a ganglion cyst although this is not a typical place for them.  I will get an x-ray and then if it looks like that could be the issue I will send her over to orthopedics for evaluation.

## 2019-05-30 NOTE — PATIENT INSTRUCTIONS
Shoulder Pain  Many things can cause shoulder pain, including:  · An injury to the area.  · Overuse of the shoulder.  · Arthritis.    The source of the pain can be:  · Inflammation.  · An injury to the shoulder joint.  · An injury to a tendon, ligament, or bone.    Follow these instructions at home:  Take these actions to help with your pain:  · Squeeze a soft ball or a foam pad as much as possible. This helps to keep the shoulder from swelling. It also helps to strengthen the arm.  · Take over-the-counter and prescription medicines only as told by your health care provider.  · If directed, apply ice to the area:  ? Put ice in a plastic bag.  ? Place a towel between your skin and the bag.  ? Leave the ice on for 20 minutes, 2-3 times per day. Stop applying ice if it does not help with the pain.  · If you were given a shoulder sling or immobilizer:  ? Wear it as told.  ? Remove it to shower or bathe.  ? Move your arm as little as possible, but keep your hand moving to prevent swelling.    Contact a health care provider if:  · Your pain gets worse.  · Your pain is not relieved with medicines.  · New pain develops in your arm, hand, or fingers.  Get help right away if:  · Your arm, hand, or fingers:  ? Tingle.  ? Become numb.  ? Become swollen.  ? Become painful.  ? Turn white or blue.  This information is not intended to replace advice given to you by your health care provider. Make sure you discuss any questions you have with your health care provider.  Document Released: 09/27/2006 Document Revised: 08/13/2017 Document Reviewed: 04/11/2016  Elsevier Interactive Patient Education © 2019 [employer]

## 2019-05-31 ENCOUNTER — TELEPHONE (OUTPATIENT)
Dept: FAMILY MEDICINE CLINIC | Facility: CLINIC | Age: 74
End: 2019-05-31

## 2019-05-31 DIAGNOSIS — M25.511 ACUTE PAIN OF RIGHT SHOULDER: Primary | ICD-10-CM

## 2019-05-31 DIAGNOSIS — M67.40 GANGLION CYST: ICD-10-CM

## 2019-05-31 NOTE — TELEPHONE ENCOUNTER
"Your shoulder shows some serious arthritis and degeneration but the \"cyst\" structure does not appear to be a part of the bone.    I am going to send you to an orthopedic docotor to evaluate the cystlike structure and this arthritis.  "

## 2019-07-02 RX ORDER — OXYBUTYNIN CHLORIDE 5 MG/1
TABLET ORAL
Qty: 90 TABLET | Refills: 0 | Status: SHIPPED | OUTPATIENT
Start: 2019-07-02 | End: 2019-08-01

## 2019-07-22 ENCOUNTER — TELEPHONE (OUTPATIENT)
Dept: FAMILY MEDICINE CLINIC | Facility: CLINIC | Age: 74
End: 2019-07-22

## 2019-07-22 NOTE — TELEPHONE ENCOUNTER
Pt wants to know if you are okay with her getting the Zostavax injection? She is going to double check with insurance and see if here or the [facility] will be cheaper.

## 2019-07-23 NOTE — TELEPHONE ENCOUNTER
No Varicella is documented. I verified with pt and she has not.   It looks like she had a Zostavax back in 2015, it was documented in her chart.

## 2019-07-23 NOTE — TELEPHONE ENCOUNTER
If she had Zostavax, she had varicella (chicken pox) in the past. She would not have had Zostavax if she hadn't had chicken pox. Please confirm with her and she can talk with her pharmacy about getting vaccine.

## 2019-08-01 ENCOUNTER — OFFICE VISIT (OUTPATIENT)
Dept: OBSTETRICS AND GYNECOLOGY | Age: 74
End: 2019-08-01

## 2019-08-01 ENCOUNTER — APPOINTMENT (OUTPATIENT)
Dept: WOMENS IMAGING | Facility: HOSPITAL | Age: 74
End: 2019-08-01

## 2019-08-01 ENCOUNTER — PROCEDURE VISIT (OUTPATIENT)
Dept: OBSTETRICS AND GYNECOLOGY | Age: 74
End: 2019-08-01

## 2019-08-01 VITALS
DIASTOLIC BLOOD PRESSURE: 88 MMHG | SYSTOLIC BLOOD PRESSURE: 130 MMHG | HEIGHT: 62 IN | WEIGHT: 173 LBS | BODY MASS INDEX: 31.83 KG/M2

## 2019-08-01 DIAGNOSIS — Z12.31 VISIT FOR SCREENING MAMMOGRAM: Primary | ICD-10-CM

## 2019-08-01 DIAGNOSIS — Z01.419 ENCOUNTER FOR GYNECOLOGICAL EXAMINATION WITHOUT ABNORMAL FINDING: Primary | ICD-10-CM

## 2019-08-01 DIAGNOSIS — Z12.4 ROUTINE CERVICAL SMEAR: ICD-10-CM

## 2019-08-01 PROCEDURE — 77067 SCR MAMMO BI INCL CAD: CPT | Performed by: OBSTETRICS & GYNECOLOGY

## 2019-08-01 PROCEDURE — G0101 CA SCREEN;PELVIC/BREAST EXAM: HCPCS | Performed by: OBSTETRICS & GYNECOLOGY

## 2019-08-01 PROCEDURE — 77067 SCR MAMMO BI INCL CAD: CPT | Performed by: RADIOLOGY

## 2019-08-01 RX ORDER — BIOTIN 5 MG
TABLET ORAL
COMMUNITY
End: 2021-04-01 | Stop reason: HOSPADM

## 2019-08-01 NOTE — PROGRESS NOTES
"Subjective     Chief Complaint   Patient presents with   • Gynecologic Exam     AC       History of Present Illness    Michelle Espinoza is a 74 y.o.  who presents for annual exam.  Patient continues to have a difficult time.  She is on a walker and has to self cath herself.  She had thyroid surgery and has gained a significant amount of weight.  She denies any vaginal bleeding or pelvic pain.  Her sister had cervical cancer and she would like to have Pap smear.  Her Pap smear was normal last year.    Obstetric History:  OB History      Para Term  AB Living    4 3       3    SAB TAB Ectopic Molar Multiple Live Births              3         Menstrual History:     No LMP recorded (lmp unknown). Patient is postmenopausal.         Current contraception: post menopausal status  History of abnormal Pap smear: no  Received Gardasil immunization: no  Perform regular self breast exam: yes  Family history of uterine or ovarian cancer: no  Family History of colon cancer: no  Family history of breast cancer: no    Mammogram: done today.  Colonoscopy: up to date. 2018  q 5 yrs due to polyp   DEXA: ordered.  Bone density 2 years ago showed osteopenia at the hip.    Exercise: not active use walker   Calcium/Vitamin D: adequate intake and uses supplements    The following portions of the patient's history were reviewed and updated as appropriate: allergies, current medications, past family history, past medical history, past social history, past surgical history and problem list.    Review of Systems    Review of Systems   Constitutional: Negative for fatigue.   Respiratory: Negative for shortness of breath.    Gastrointestinal: Negative for abdominal pain.   Genitourinary: Negative for dysuria.   Neurological: Negative for headaches.   Psychiatric/Behavioral: Negative for dysphoric mood.         Objective   Physical Exam    /88   Ht 157.5 cm (62\")   Wt 78.5 kg (173 lb)   LMP  (LMP Unknown)   BMI 31.64 " kg/m²     General:   Patient is pleasant.  Her mobility is limited but she is able to do a Pap smear.   Neck: no adenopathy and thyroid normal to palpation   Heart: regular rate and rhythm   Lungs: clear to auscultation bilaterally   Abdomen: soft, non-tender, without masses or organomegaly   Breast: inspection negative, no nipple discharge or bleeding, no masses or nodularity palpable   Vulva: normal, Bartholin's, Urethra, [place]'s normal   Vagina: normal mucosa   Cervix: no cervical motion tenderness and no lesions   Uterus: mobile, non-tender, normal shape and consistency   Adnexa: no mass, fullness, tenderness   Rectal: normal rectal, no masses     Assessment/Plan   Michelle was seen today for gynecologic exam.    Diagnoses and all orders for this visit:    Encounter for gynecological examination without abnormal finding    Routine cervical smear  -     IGP,rfx Aptima HPV All Pth      Pap smear sent off today.  Patient will do GYN exams every other year but continue yearly mammograms.  Bone density is ordered due to history of osteopenia.  All questions answered.  Breast self exam technique reviewed and patient encouraged to perform self-exam monthly.  Discussed healthy lifestyle modifications.  Recommended 30 minutes of aerobic exercise five times per week.  Discussed calcium needs to prevent osteoporosis.

## 2019-08-05 LAB
CONV .: NORMAL
CYTOLOGIST CVX/VAG CYTO: NORMAL
CYTOLOGY CVX/VAG DOC CYTO: NORMAL
CYTOLOGY CVX/VAG DOC THIN PREP: NORMAL
DX ICD CODE: NORMAL
HIV 1 & 2 AB SER-IMP: NORMAL
OTHER STN SPEC: NORMAL
STAT OF ADQ CVX/VAG CYTO-IMP: NORMAL

## 2019-08-06 ENCOUNTER — TELEPHONE (OUTPATIENT)
Dept: OBSTETRICS AND GYNECOLOGY | Age: 74
End: 2019-08-06

## 2019-08-06 NOTE — TELEPHONE ENCOUNTER
----- Message from Mahamed Massey MD sent at 8/5/2019  4:39 PM EDT -----  Please notify pap is normal.

## 2019-08-09 RX ORDER — OXYBUTYNIN CHLORIDE 5 MG/1
TABLET ORAL
Qty: 90 TABLET | Refills: 0 | OUTPATIENT
Start: 2019-08-09

## 2019-08-09 RX ORDER — OXYBUTYNIN CHLORIDE 5 MG/1
5 TABLET ORAL 3 TIMES DAILY
Qty: 90 TABLET | Refills: 1 | Status: SHIPPED | OUTPATIENT
Start: 2019-08-09 | End: 2019-10-21 | Stop reason: SDUPTHER

## 2019-08-22 ENCOUNTER — TELEPHONE (OUTPATIENT)
Dept: ENDOCRINOLOGY | Age: 74
End: 2019-08-22

## 2019-08-22 NOTE — TELEPHONE ENCOUNTER
LAB ORDERS FAXED THOUGH [facility] TO [facility] ON [address] [phone]      ----- Message from Jessica Verde sent at 8/21/2019 10:51 AM EDT -----  Contact: PATIENT  PATIENT WILL HAVE LABS DRAWN Friday MORNING  SEND ORDERS TO [facility] ON [address]  PATIENT DOESN'T HAVE FAX  PHONE NUMBER [phone]

## 2019-08-24 LAB
25(OH)D3+25(OH)D2 SERPL-MCNC: 34.3 NG/ML (ref 30–100)
BUN SERPL-MCNC: 31 MG/DL (ref 8–27)
BUN/CREAT SERPL: 23 (ref 12–28)
CA-I SERPL ISE-MCNC: 5.4 MG/DL (ref 4.5–5.6)
CALCIUM SERPL-MCNC: 9.4 MG/DL (ref 8.7–10.3)
CHLORIDE SERPL-SCNC: 108 MMOL/L (ref 96–106)
CHOLEST SERPL-MCNC: 206 MG/DL (ref 100–199)
CO2 SERPL-SCNC: 19 MMOL/L (ref 20–29)
CREAT SERPL-MCNC: 1.33 MG/DL (ref 0.57–1)
FOLATE SERPL-MCNC: 11 NG/ML
GLUCOSE SERPL-MCNC: 116 MG/DL (ref 65–99)
HBA1C MFR BLD: 6.5 % (ref 4.8–5.6)
HDLC SERPL-MCNC: 53 MG/DL
INTACT PTH: NORMAL
INTERPRETATION: NORMAL
INTERPRETATION: NORMAL
LDLC SERPL CALC-MCNC: 128 MG/DL (ref 0–99)
Lab: NORMAL
Lab: NORMAL
POTASSIUM SERPL-SCNC: 5.3 MMOL/L (ref 3.5–5.2)
PTH-INTACT SERPL-MCNC: 32 PG/ML (ref 15–65)
SODIUM SERPL-SCNC: 140 MMOL/L (ref 134–144)
TRIGL SERPL-MCNC: 127 MG/DL (ref 0–149)
UNABLE TO VOID: NORMAL
VIT B12 SERPL-MCNC: 454 PG/ML (ref 232–1245)
VLDLC SERPL CALC-MCNC: 25 MG/DL (ref 5–40)

## 2019-08-30 ENCOUNTER — OFFICE VISIT (OUTPATIENT)
Dept: ENDOCRINOLOGY | Age: 74
End: 2019-08-30

## 2019-08-30 VITALS
DIASTOLIC BLOOD PRESSURE: 80 MMHG | OXYGEN SATURATION: 99 % | SYSTOLIC BLOOD PRESSURE: 142 MMHG | BODY MASS INDEX: 29.53 KG/M2 | HEART RATE: 58 BPM | WEIGHT: 173 LBS | HEIGHT: 64 IN

## 2019-08-30 DIAGNOSIS — E78.2 MIXED HYPERLIPIDEMIA: ICD-10-CM

## 2019-08-30 DIAGNOSIS — R73.03 PREDIABETES: ICD-10-CM

## 2019-08-30 DIAGNOSIS — E83.52 HYPERCALCEMIA: Primary | ICD-10-CM

## 2019-08-30 PROCEDURE — 99214 OFFICE O/P EST MOD 30 MIN: CPT | Performed by: INTERNAL MEDICINE

## 2019-08-30 NOTE — PROGRESS NOTES
74 y.o.    Patient Care Team:  Lillie Ji PA as PCP - General (Family Medicine)  Carole Duran DPM as PCP - Claims Attributed  Jose Luis Nelson Jr., MD as Consulting Physician (Urology)  Bon Gardiner MD as Consulting Physician (Hand Surgery)  Milly Crow MD as Consulting Physician (Neurology)  Sacha Garcia APRN as Nurse Practitioner (Neurology)  Johnnie Soni MD as Consulting Physician (Neurology)  Mahamed Massey MD as Consulting Physician (Obstetrics and Gynecology)  Clement Tierney MD as Surgeon (Neurosurgery)  Donna Olsen MA as Medical Assistant  Mahamed Massey MD as Consulting Physician (Obstetrics and Gynecology)  Milly Crow MD as Consulting Physician (Neurology)  Jeffry Bhagat MD as Consulting Physician (Ophthalmology)    Chief Complaint:    FOLLOW UP/ HYPERCALCEMIA  Subjective     HPI    74 year old white female is here for the follow up of elevated calcium levels.  Patient's calcium levels were noted to be elevated on routine blood work up in May 2017.  Her calcium levels have been ranging between 10.5-10.9 mg/dL.  With the latest being her highest-11.1 mg/dL range.     Status post parathyroid surgery in March 2019.  Patient has recovered from the surgery with no complications.  Calcium levels have normalized, latest calcium level was 9.2.  Parathyroid levels-32 pg/mL.  Consistent with left inferior parathyroid adenoma.    No further kidney stone attacks, last attack was about 2 to 3 years ago.  Did have some improvement in her energy levels along with improvement in her joint aches immediately after the surgery but she reports that she is back to normal with poor energy levels and worsening joint pains.  She does suffer with severe arthritis.  Does have history of osteopenia, last bone density scan was performed in April 2018 managed by OB/GYN.  No history of fragility fractures.  No complaints of increased urination or thirst, no issues with memory.  She does have to  straight cath herself.    Pre diabetes  On glimepiride 2 mg twice daily with meals.  Patient could not do Tradjenta or Januvia due to the cost issues.  Stopped metformin due to the abnormal renal function.        Hyperlipidemia - not on any medications at this time.   She recently had a carotid ultrasound by her primary care which showed an 80% stenosis in her left carotid artery.  I do not have the report of this.  Could not tolerate Lipitor or Crestor in the past.       Reviewed primary care physician's/consulting physician documentation and lab results :     Interval History      The following portions of the patient's history were reviewed and updated as appropriate: allergies, current medications, past family history, past medical history, past social history, past surgical history and problem list.    Past Medical History:   Diagnosis Date   • Anesthesia complication     STRONG GAG REFLEX   • Benign colonic polyp    • Cataract     REMOVED   • DDD (degenerative disc disease), cervical    • DDD (degenerative disc disease), lumbosacral    • Diabetes mellitus (CMS/[facility])     TYPE 2   • Frequent UTI    • History of kidney stones 06/2014   • History of pyelonephritis 06/2014   • Hypercalcemia    • Hypercholesteremia    • Leg muscle spasm    • Neuropathy     LEFT FOOT   • Osteoarthritis    • Pyelonephritis 06/2014   • Self-catheterizes urinary bladder     HAS NERVE DAMAGE RELATED TO BACK ISSUES   • Sepsis (CMS/HCC) 06/2014   • Urinary incontinence    • Weakness     LOWER EXTREMITES RELATED FROM NERVE DAMAGE FROM BACK     Family History   Problem Relation Age of Onset   • Stroke Mother    • Heart disease Mother    • Hypertension Mother    • Clotting disorder Father    • Hypertension Father    • Diabetes Father    • Aneurysm Father    • Hypertension Sister    • Diabetes Sister    • Cervical cancer Sister    • Diabetes Sister    • Cervical cancer Maternal Grandmother 72   • Diabetes Grandchild    • Malig Hyperthermia Neg  Hx      Social History     Socioeconomic History   • Marital status:      Spouse name: Not on file   • Number of children: Not on file   • Years of education: Not on file   • Highest education level: Not on file   Occupational History   • Occupation: retired   Tobacco Use   • Smoking status: Former Smoker     Packs/day: 0.50     Years: 15.00     Pack years: 7.50     Types: Cigarettes     Last attempt to quit:      Years since quittin.6   • Smokeless tobacco: Never Used   Substance and Sexual Activity   • Alcohol use: Yes     Comment: Socially.   • Drug use: No   Social History Narrative    Last mammogram 2018      Allergies   Allergen Reactions   • Other Nausea And Vomiting     The mercury in [place]   • Penicillins Hives   • Statins Myalgia   • Latex Rash   • Nickel Rash       Current Outpatient Medications:   •  baclofen (LIORESAL) 10 MG tablet, TAKE TWO TABLETS BY MOUTH DAILY, Disp: 60 tablet, Rfl: 2  •  Cholecalciferol (VITAMIN D3) 5000 UNITS capsule capsule, Take 5,000 Units by mouth Daily., Disp: , Rfl:   •  D-MANNOSE PO, Take 1 tablet by mouth 2 (Two) Times a Day., Disp: , Rfl:   •  glimepiride (AMARYL) 2 MG tablet, Take 1 tablet by mouth 2 (Two) Times a Day With Meals., Disp: 60 tablet, Rfl: 11  •  glucose blood (ONE TOUCH ULTRA TEST) test strip, USE ONE STRIP TO TEST TWICE A DAY, Disp: 100 each, Rfl: 11  •  Krill Oil 1000 MG capsule, Take  by mouth., Disp: , Rfl:   •  Lutein-Bilberry (BILBERRY PLUS LUTEIN) 5-1000 MCG-MG capsule, Take  by mouth Daily., Disp: , Rfl:   •  magnesium 30 MG tablet, Take 60 mg by mouth Daily., Disp: , Rfl:   •  NYSTATIN 312525 UNIT/GM powder, APPLY TO AFFECTED AREA 2 TO 3 TIMES PER DAY AS NEEDED, Disp: 30 g, Rfl: 0  •  oxybutynin (DITROPAN) 5 MG tablet, Take 1 tablet by mouth 3 (Three) Times a Day., Disp: 90 tablet, Rfl: 1  •  Probiotic Product (PROBIOTIC PO), Take 1 tablet by mouth Daily., Disp: , Rfl:   •  TURMERIC PO, Take  by mouth., Disp: , Rfl:   •   "vitamin C (ASCORBIC ACID) 500 MG tablet, Take 500 mg by mouth Daily. With D3, Disp: , Rfl:         Review of Systems   Constitutional: Positive for fatigue. Negative for fever. Appetite change: increase.   Eyes: Negative for visual disturbance.   Respiratory: Negative for shortness of breath.    Cardiovascular: Negative for palpitations and leg swelling.   Gastrointestinal: Negative for abdominal pain and vomiting.   Endocrine: Negative for polydipsia and polyuria.   Musculoskeletal: Negative for joint swelling and neck pain.   Skin: Negative for rash.   Neurological: Positive for numbness (hands and legs). Negative for weakness.   Psychiatric/Behavioral: Negative for behavioral problems.       Objective       Vitals:    08/30/19 1247   BP: 142/80   Pulse: 58   SpO2: 99%   Weight: 78.5 kg (173 lb)   Height: 161.3 cm (63.5\")     Body mass index is 30.16 kg/m².      Physical Exam   Constitutional: She is oriented to person, place, and time. She appears well-nourished.   HENT:   Head: Normocephalic and atraumatic.   Eyes: Conjunctivae and EOM are normal. No scleral icterus.   Neck: Normal range of motion. Neck supple. No thyromegaly present.   Cardiovascular: Normal rate. Exam reveals no friction rub.   Murmur heard.  Pulmonary/Chest: Effort normal and breath sounds normal. No stridor. She has no wheezes. She has no rales.   Abdominal: Soft. Bowel sounds are normal. She exhibits no distension. There is no tenderness.   Musculoskeletal: She exhibits deformity. She exhibits no edema or tenderness.   Lymphadenopathy:     She has no cervical adenopathy.   Neurological: She is alert and oriented to person, place, and time.   Skin: Skin is warm and dry. She is not diaphoretic.   Psychiatric: She has a normal mood and affect.   Vitals reviewed.    Results Review:     I reviewed the patient's new clinical results and mentioned them above in HPI and in plan as well.    Medical records reviewed  Summary:DONE      Orders Only on " 08/23/2019   Component Date Value Ref Range Status   • Glucose 08/23/2019 116* 65 - 99 mg/dL Final   • BUN 08/23/2019 31* 8 - 27 mg/dL Final   • Creatinine 08/23/2019 1.33* 0.57 - 1.00 mg/dL Final   • eGFR Non African Am 08/23/2019 40* >59 mL/min/1.73 Final   • eGFR African Am 08/23/2019 46* >59 mL/min/1.73 Final   • BUN/Creatinine Ratio 08/23/2019 23  12 - 28 Final   • Sodium 08/23/2019 140  134 - 144 mmol/L Final   • Potassium 08/23/2019 5.3* 3.5 - 5.2 mmol/L Final   • Chloride 08/23/2019 108* 96 - 106 mmol/L Final   • Total CO2 08/23/2019 19* 20 - 29 mmol/L Final   • Calcium 08/23/2019 9.4  8.7 - 10.3 mg/dL Final   • Total Cholesterol 08/23/2019 206* 100 - 199 mg/dL Final   • Triglycerides 08/23/2019 127  0 - 149 mg/dL Final   • HDL Cholesterol 08/23/2019 53  >39 mg/dL Final   • VLDL Cholesterol 08/23/2019 25  5 - 40 mg/dL Final   • LDL Cholesterol  08/23/2019 128* 0 - 99 mg/dL Final   • PTH, Intact 08/23/2019 32  15 - 65 pg/mL Final   • PTH, Intact 08/23/2019 Comment   Final    Comment: Interpretation                 Intact PTH    Calcium                                  (pg/mL)      (mg/dL)  Normal                          15 - 65     8.6 - 10.2  Primary Hyperparathyroidism         >65          >10.2  Secondary Hyperparathyroidism       >65          <10.2  Non-Parathyroid Hypercalcemia       <65          >10.2  Hypoparathyroidism                  <15          < 8.6  Non-Parathyroid Hypocalcemia    15 - 65          < 8.6     • Interpretation 08/23/2019 Note   Final    Supplemental report is available.   • PDF Image 08/23/2019 Not applicable   Final   • Interpretation 08/23/2019 Note   Final    Comment: -------------------------------  CHRONIC KIDNEY DISEASE:  EGFR, BLOOD PRESSURE, AND PROTEINURIA ASSESSMENT  The overall regression of eGFR with time is not  statistically significant. Current eGFR is 40 mL/min/1.73mE2  corresponding to CKD stage 3b. Multiply eGFR by 1.159 if  patient is African American. Potassium  is above goal and has  risen, was 5.1 and now is 5.3 mmol/L. Glycemic control (HB  A1c: 6.5 %) is within goal.  EGFR, BLOOD PRESSURE, AND PROTEINURIA TREATMENT SUGGESTIONS  -  Recent eGFR remains below the expected value (p less than  0.05) and has not changed significantly, was 42 and now is  40 mL/min/1.73mE2. Consider decreasing ACEI or ARB, if in  use. Guidelines recommend a target blood pressure of 140/90  mmHg or less in CKD patients to reduce cardiovascular risk  and CKD progression. A higher blood pressure target may be  appropriate in older individuals to avoid symptomatic  hypotension. Hyperkalemia and low CO2 may reflect Type 4  renal tubular acidosis. ACEI or ARB                            may increase serum  potassium. Maintain low potassium diet and avoid salt  substitutes and potassium retaining medications (e.g.  NSAIDS, potassium sparing diuretics, trimethoprim/sulfa).  Assessment of albuminuria (urine albumin:creatinine ratio or  urine protein:creatinine ratio preferred) is recommended at  least annually in CKD patients for staging and disease  prognosis.  EGFR, BLOOD PRESSURE, AND PROTEINURIA FOLLOW-UP  -  fasting Renal Panel within 1 week; Spot Urine Panel is  recommended by KDOQI guidelines, at least yearly; Hemoglobin  A1C within 6 months;  -  BONE and MINERAL ASSESSMENT  Intact PTH is within goal, 32 pg/mL. Calcium is within goal  and has decreased, was 10.2 and now is 9.4 mg/dL. Carbon  Dioxide is below goal and has decreased, was 24 and now is  19 mmol/L. Vitamin D is adequate (was 51.9 and now is 34.3  ng/mL).  BONE and MINERAL TREATMENT SUGGESTIONS  -  Plasma PTH cannot be interpreted without simultaneous  measurements of serum calcium and phosphorus. If n                           ot on  alkali, begin sodium bicarbonate, one 650 mg pill 2-3 times  daily, otherwise increase dose.  BONE and MINERAL FOLLOW-UP  -  25-Hydroxy Vitamin D within 12 months; fasting Renal Panel  within 1 week;  fasting PTH with Renal Panel within 6 months;  -  LIPIDS ASSESSMENT  LDL-C is borderline high and has risen, was 111 and now is  128 mg/dL. Triglyceride is normal and has decreased, was 186  and now is 127 mg/dL. Non-HDL Cholesterol is borderline high  and has not changed significantly, was 148 and now is 153  mg/dL. HDL-C is normal and has not changed significantly,  was 58 and now is 53 mg/dL.  LIPIDS TREATMENT SUGGESTIONS  -  Therapeutic lifestyle changes are always valuable to  maintain optimal blood lipid status (diet, exercise, weight  management). Begin statin. If statin already in use,  consider increasing dose to achieve at least a 50% LDL  reduction from baseline. Moderate or high intensity statin  is preferred. If statin cannot be tolerated or increased,  alternatives inc                           lude use of an intestinal agent (ezetimibe  or bile acid sequestrant) or niacin.  LIPIDS FOLLOW-UP  -  fasting Lipid Panel within 3 months;  -  ANEMIA ASSESSMENT  Most recent order does not include a CBC Panel or iron  studies.  ANEMIA FOLLOW-UP  -  CBC is recommended by KDOQI guidelines, at least yearly;  -------------------------------  DISCLAIMER  These assessments and treatment suggestions are provided as  a convenience in support of the physician-patient  relationship and are not intended to replace the physician's  clinical judgment. They are derived from national guidelines  in addition to other evidence and expert opinion. The  clinician should consider this information within the  context of clinical opinion and the individual patient.  SEE GUIDANCE FOR CHRONIC KIDNEY DISEASE PROGRAM: Kidney  Disease Improving Global Outcomes (KDIGO) clinical practice  guidelines are at http://kdigo.org/home/guidelines/.  National Kidney Foundation Kidney Disease Outcomes Quality  Initiative (                           KDOQI (TM)), with its limitations and  disclaimers, are at www.kidney.org/professionals/KDOQI.  This  program is intended for patients who have been diagnosed  with stages 3, 4, or pre-dialysis 5 CKD. It is not intended  for children, pregnant patients, or transplant patients.     • Vitamin B-12 08/23/2019 454  232 - 1,245 pg/mL Final   • Folate 08/23/2019 11.0  >3.0 ng/mL Final    Comment: A serum folate concentration of less than 3.1 ng/mL is  considered to represent clinical deficiency.     • PDF Image 08/23/2019 Not applicable   Final   • Hemoglobin A1C 08/23/2019 6.5* 4.8 - 5.6 % Final    Comment:          Prediabetes: 5.7 - 6.4           Diabetes: >6.4           Glycemic control for adults with diabetes: <7.0     • 25 Hydroxy, Vitamin D 08/23/2019 34.3  30.0 - 100.0 ng/mL Final    Comment: Vitamin D deficiency has been defined by the [place] of  Medicine and an Endocrine Society practice guideline as a  level of serum 25-OH vitamin D less than 20 ng/mL (1,2).  The Endocrine Society went on to further define vitamin D  insufficiency as a level between 21 and 29 ng/mL (2).  1. IOM ([place] of Medicine). 2010. Dietary reference     intakes for calcium and D. [place]: The     National Academies Press.  2. Hilton MF, Margie NC, Lennox CORONEL, et al.     Evaluation, treatment, and prevention of vitamin D     deficiency: an Endocrine Society clinical practice     guideline. JCEM. 2011 Jul; 96(7):1911-30.     • Ionized Calcium 08/23/2019 5.4  4.5 - 5.6 mg/dL Final   • Unable to Void 08/23/2019 Comment   Final    Comment: The patient was not able to render a urine sample and has been  instructed to return for a urine collection at their earliest  convenience.  The urine testing that you have requested has  been deleted from this report.  When the patient returns and  provides a urine specimen, the urine testing will be performed  and separately reported.       Lab Results   Component Value Date    HGBA1C 6.5 (H) 08/23/2019    HGBA1C 6.60 (H) 05/17/2019    HGBA1C 6.5 (H) 01/18/2019     Lab Results  "  Component Value Date    CREATININE 1.33 (H) 08/23/2019     Imaging Results (most recent)     None                Assessment and Plan:    Diagnoses and all orders for this visit:    Hypercalcemia  -     TSH; Future  -     T4, Free; Future  -     Hemoglobin A1c; Future  -     Basic Metabolic Panel; Future  -     Lipid Panel; Future  -     Vitamin B12 & Folate; Future  -     Vitamin D 25 Hydroxy; Future    Mixed hyperlipidemia   -     TSH; Future  -     T4, Free; Future  -     Hemoglobin A1c; Future  -     Basic Metabolic Panel; Future  -     Lipid Panel; Future  -     Vitamin B12 & Folate; Future  -     Vitamin D 25 Hydroxy; Future    Prediabetes   -     TSH; Future  -     T4, Free; Future  -     Hemoglobin A1c; Future  -     Basic Metabolic Panel; Future  -     Lipid Panel; Future  -     Vitamin B12 & Folate; Future  -     Vitamin D 25 Hydroxy; Future        Hypercalcemia status post hyper parathyroidectomy  Calcium levels have normalized  Continue to monitor  Bone density scan due in 2020.    Hyperlipidemia  Could not tolerate statins  Continue Zetia    Prediabetes  HbA1c is decently controlled  Continue glimepiride 2 mg twice daily with meals.    Chronic fatigue-worse  Multifactorial  Emphasized the importance of exercise and its improvement in energy levels.  Reviewed Lab results with the patient.             Bethanie Montano MD  08/30/19    EMR Dragon / transcription disclaimer:     \"Dictated utilizing Dragon dictation\".  "

## 2019-10-22 RX ORDER — OXYBUTYNIN CHLORIDE 5 MG/1
TABLET ORAL
Qty: 90 TABLET | Refills: 0 | Status: SHIPPED | OUTPATIENT
Start: 2019-10-22 | End: 2019-11-18 | Stop reason: SDUPTHER

## 2019-11-18 RX ORDER — OXYBUTYNIN CHLORIDE 5 MG/1
TABLET ORAL
Qty: 90 TABLET | Refills: 0 | Status: SHIPPED | OUTPATIENT
Start: 2019-11-18 | End: 2019-12-26

## 2019-11-28 ENCOUNTER — RESULTS ENCOUNTER (OUTPATIENT)
Dept: ENDOCRINOLOGY | Age: 74
End: 2019-11-28

## 2019-11-28 DIAGNOSIS — R73.03 PREDIABETES: ICD-10-CM

## 2019-11-28 DIAGNOSIS — E83.52 HYPERCALCEMIA: ICD-10-CM

## 2019-11-28 DIAGNOSIS — E78.2 MIXED HYPERLIPIDEMIA: ICD-10-CM

## 2019-11-28 DIAGNOSIS — E21.3 HYPERPARATHYROIDISM (HCC): ICD-10-CM

## 2019-12-09 NOTE — OUTREACH NOTE
Pt. Remains at [facility] for rehab, d/c date TBD.     Topical Sulfur Applications Counseling: Topical Sulfur Counseling: Patient counseled that this medication may cause skin irritation or allergic reactions.  In the event of skin irritation, the patient was advised to reduce the amount of the drug applied or use it less frequently.   The patient verbalized understanding of the proper use and possible adverse effects of topical sulfur application.  All of the patient's questions and concerns were addressed.

## 2019-12-26 RX ORDER — OXYBUTYNIN CHLORIDE 5 MG/1
TABLET ORAL
Qty: 90 TABLET | Refills: 0 | Status: SHIPPED | OUTPATIENT
Start: 2019-12-26 | End: 2020-01-27

## 2020-01-08 ENCOUNTER — OFFICE VISIT (OUTPATIENT)
Dept: FAMILY MEDICINE CLINIC | Facility: CLINIC | Age: 75
End: 2020-01-08

## 2020-01-08 VITALS
SYSTOLIC BLOOD PRESSURE: 140 MMHG | DIASTOLIC BLOOD PRESSURE: 80 MMHG | HEIGHT: 62 IN | WEIGHT: 183.2 LBS | TEMPERATURE: 99.2 F | HEART RATE: 72 BPM | OXYGEN SATURATION: 98 % | BODY MASS INDEX: 33.71 KG/M2

## 2020-01-08 DIAGNOSIS — M48.04 THORACIC SPINAL STENOSIS: ICD-10-CM

## 2020-01-08 DIAGNOSIS — E55.9 VITAMIN D DEFICIENCY: ICD-10-CM

## 2020-01-08 DIAGNOSIS — E53.8 B12 DEFICIENCY: ICD-10-CM

## 2020-01-08 DIAGNOSIS — R63.5 ABNORMAL WEIGHT GAIN: ICD-10-CM

## 2020-01-08 DIAGNOSIS — E11.22 TYPE 2 DIABETES MELLITUS WITH STAGE 2 CHRONIC KIDNEY DISEASE, WITHOUT LONG-TERM CURRENT USE OF INSULIN (HCC): Primary | ICD-10-CM

## 2020-01-08 DIAGNOSIS — N18.2 TYPE 2 DIABETES MELLITUS WITH STAGE 2 CHRONIC KIDNEY DISEASE, WITHOUT LONG-TERM CURRENT USE OF INSULIN (HCC): Primary | ICD-10-CM

## 2020-01-08 DIAGNOSIS — E21.3 HYPERPARATHYROIDISM (HCC): ICD-10-CM

## 2020-01-08 DIAGNOSIS — M48.00 SPINAL STENOSIS, UNSPECIFIED SPINAL REGION: ICD-10-CM

## 2020-01-08 DIAGNOSIS — N31.2 ATONIC NEUROGENIC BLADDER: ICD-10-CM

## 2020-01-08 DIAGNOSIS — M81.6 LOCALIZED OSTEOPOROSIS (LEQUESNE): ICD-10-CM

## 2020-01-08 DIAGNOSIS — E78.00 HYPERCHOLESTEROLEMIA: ICD-10-CM

## 2020-01-08 DIAGNOSIS — N39.0 URINARY TRACT INFECTION WITHOUT HEMATURIA, SITE UNSPECIFIED: ICD-10-CM

## 2020-01-08 DIAGNOSIS — R29.898 WEAKNESS OF BOTH LOWER EXTREMITIES: ICD-10-CM

## 2020-01-08 DIAGNOSIS — N18.2 STAGE 2 CHRONIC KIDNEY DISEASE: ICD-10-CM

## 2020-01-08 DIAGNOSIS — E11.59 TYPE 2 DIABETES MELLITUS WITH OTHER CIRCULATORY COMPLICATIONS (HCC): ICD-10-CM

## 2020-01-08 DIAGNOSIS — M51.36 DEGENERATIVE DISC DISEASE, LUMBAR: ICD-10-CM

## 2020-01-08 DIAGNOSIS — R79.9 ABNORMAL FINDING OF BLOOD CHEMISTRY, UNSPECIFIED: ICD-10-CM

## 2020-01-08 PROCEDURE — 99214 OFFICE O/P EST MOD 30 MIN: CPT | Performed by: PHYSICIAN ASSISTANT

## 2020-01-08 NOTE — PROGRESS NOTES
Subjective   Michelle Espinoza is a 74 y.o. female who presents today in follow up of diabetes mellitus, hyperlipidemia, hyperparathyroidism and vitamin D deficiency, B12 deficiency, CKD, recurrent UTI, spinal stenosis, arthritis, and specialists.     History of Present Illness     Hyperlipidemia- Did not tolerate Crestor due to increased weakness and Livalo was too expensive. Has failed multiple statins and is afraid of AE  B12-     Vitamin D- taking 5000IU daily. Tolerating without AE.   Hyperparathyroidism- States she did well with single parathyroidectomy. Had intraoperative decrease in PTH to normal. Has not had labs since her surgery.   Type 2 Diabetes Mellitus- failed Metformin due to AE. She reports she cannot afford the other brand name medications. She denies hypoglycemia on Glimepiride.     CKD- avoiding NSAIDS. Not on diuretics or Metformin. Is also not on ARB or ACE.   Recurrent UTI- follows with urology- self cath with neurogenic bladder. On Oxybutinin.     She was seen by Dr Duggan- 5/2019- for shoulder pain and what they thought was a ganglion cyst. Went to see Dr Abdi for right knee pain and had drain of knee that helped. Then he drained the right shoulder and then increased in size again and again. He also noticed her hands- left > right. Had an xray of her left wrist with almost fused bones. They gave systemic steroid. They referred to rheumatology. Was given allopurinol. Was to see Dr Christian's office. They sent records 1/2/2020 for appt. Orthopedist did labs and thinks it could be RA. Awaiting appt with rheumatology. Also seen by hand surgery. Hand surgeon gave hand injections. CBD- uses topically and helps some. If she takes Tylenol and CBD orally, she has weakness of bilateral LE.     Reports she no longer needs to follow up with neuromuscular at [facility]. She has exhausted her PT visits.     The following portions of the patient's history were reviewed and updated as appropriate: allergies,  current medications, past family history, past medical history, past social history, past surgical history and problem list.    Review of Systems   Constitutional: Positive for fatigue.   HENT: Negative.    Respiratory: Negative.    Cardiovascular: Negative.    Genitourinary: Positive for difficulty urinating, dysuria and urgency.   Musculoskeletal: Positive for arthralgias, back pain, gait problem and myalgias.   Neurological: Positive for weakness and numbness.       Objective    Vitals:    01/08/20 1321   BP: 140/80   Pulse: 72   Temp: 99.2 °F (37.3 °C)   SpO2: 98%     Body mass index is 33.5 kg/m².    Physical Exam   Constitutional: She is oriented to person, place, and time. She appears well-developed and well-nourished.   HENT:   Head: Normocephalic and atraumatic.   Right Ear: External ear normal.   Left Ear: External ear normal.   Nose: Nose normal.   Eyes: Conjunctivae and lids are normal.   Neck: Neck supple. Carotid bruit is not present.   Cardiovascular: Normal rate, regular rhythm, normal heart sounds and intact distal pulses. Exam reveals no gallop and no friction rub.   No murmur heard.  Pulmonary/Chest: Effort normal and breath sounds normal. No respiratory distress. She has no wheezes. She has no rhonchi. She has no rales.   Musculoskeletal: She exhibits no edema or deformity.   Neurological: She is alert and oriented to person, place, and time. Gait (ambulates with difficulty, abnormal gait, with walker) abnormal.   Skin: Skin is warm and dry.   Psychiatric: She has a normal mood and affect. Her speech is normal and behavior is normal. Judgment and thought content normal. Cognition and memory are normal.   Nursing note and vitals reviewed.      Assessment/Plan   Michelle was seen today for follow-up.    Diagnoses and all orders for this visit:    Type 2 diabetes mellitus with stage 2 chronic kidney disease, without long-term current use of insulin (CMS/[facility])  -     Comprehensive Metabolic Panel  -      Hemoglobin A1c  -     Urinalysis With Microscopic - Urine, Clean Catch  -     MicroAlbumin, Urine, Random - Urine, Clean Catch    Type 2 diabetes mellitus with other circulatory complications (CMS/HCC)   -     Comprehensive Metabolic Panel  -     Uric Acid  -     Hemoglobin A1c  -     Urinalysis With Microscopic - Urine, Clean Catch  -     Urine Culture - Urine, Urine, Clean Catch  -     MicroAlbumin, Urine, Random - Urine, Clean Catch    Hypercholesterolemia  -     Comprehensive Metabolic Panel  -     CK  -     Lipid Panel With LDL / HDL Ratio    B12 deficiency  -     CBC & Differential  -     Vitamin B12 & Folate    Vitamin D deficiency  -     Comprehensive Metabolic Panel  -     Vitamin D 25 Hydroxy    Hyperparathyroidism (CMS/HCC)  -     Comprehensive Metabolic Panel  -     TSH  -     T4, free  -     T3, Free  -     PTH, Intact  -     Vitamin D 25 Hydroxy    Localized osteoporosis ([place])   -     T4, free    Stage 2 chronic kidney disease  -     Comprehensive Metabolic Panel  -     Iron and TIBC  -     Ferritin  -     Vitamin D 25 Hydroxy    Urinary tract infection without hematuria, site unspecified  -     CBC & Differential  -     Urinalysis With Microscopic - Urine, Clean Catch  -     Urine Culture - Urine, Urine, Clean Catch    Spinal stenosis, unspecified spinal region    T7 and T10-11 Thoracic spinal stenosis    Degenerative disc disease, lumbar    Atonic neurogenic bladder    Weakness of both lower extremities    Abnormal finding of blood chemistry, unspecified   -     Iron and TIBC  -     Ferritin    Abnormal weight gain   -     TSH      Patient Instructions   Assessment and Plan  74 year old female who presents today in follow up of diabetes mellitus, hyperlipidemia, hyperparathyroidism and vitamin D deficiency, B12 deficiency, CKD, recurrent UTI, spinal stenosis, arthritis, and specialists. Patient is following with Endocrinology for lipids, B12, vitamin D, hyperparathyroidism, and diabetes. She was  "found to have hyperparathyroidism and underwent parathyroidectomy. Patient had good intraoperative response. She continues on Glimepiride due to intolerance of Metformin and expense of other medications. She did not tolerate Crestor due to increased muscle weakness and Livalo was too expensive. Patient continues vitamin D 5000IU daily and B12 supplement. She does not necessarily want to follow up with endocrinology and would like to follow up here. I would prefer she not take sulfonylurea for diabetes control. Patient will have fasting labs. Call if no results in 1 week. Stability of conditions, plan, follow up, and further recommendations pending labs. If labs are stable and she is doing well, we can consider follow up here only. If abnormal labs or any concerns with medication of diabetes, she will need to return to endocrinology and continue follow up there.     Patient with Stage 2 CKD and is avoiding NSAIDS, is not on diuretics or Metformin, but is also not on ARB or ACE. We may need to consider medications at follow up. She has neurogenic bladder and recurrent UTI and follows with urology. Patient continues to self cath with neurogenic bladder and continues Oxybutinin.     Patient reports she no longer needs to follow up with neuromuscular at [facility]. She has exhausted her PT visits. She regresses whenever she is not in aggressive PT and needs to get into a program at [facility] [place] that is covered and will hopefully help her with strength and gait.    She was seen by Dr Duggan- 5/2019- for shoulder pain and what they thought was a ganglion cyst of the shoulder. She went to see Dr Abdi for right knee pain and had arthrocentesis of knee that helped. He then \"drained\" the right shoulder with recurrent effusions. He also noticed her hands- left > right deformity and completed an xray of her left wrist with almost fused bones. Per patient, the orthopedist did labs and thinks she could have RA. They gave her systemic " steroid, allopurinol, and referred to rheumatology, Dr Christian's office. They sent records 1/2/2020 for appt. She had autoimmune labs here in the past that were negative. Patient was seen by hand surgery in the past and had hand injections. She also uses CBD topically which helps some. If she takes Tylenol and CBD orally, she has weakness of bilateral LE. I will have her keep appt with rheumatology for evaluation, workup, and treatment. We will try to obtain orthopedic surgery records.     Of note, she has persistent color abnormality of her left > right feet. She had normal KARTHIKEYAN and carotid with 1 side with plawue without stenosis and the other mild stenosis. Recheck duplex in 1 year. I discussed with her that with carotid stenosis and hyperlipidemia, she needs to ensure she is taking her ASA 81 mg once daily and controlled BP, lipids, and blood sugars as well as a statin and as much exercise as she can get (she will be starting PT again). She was agreeable to try Livalo 2 mg at bedtime but it was too expensive and she did not tolerate Crestor due to increased muscle weakness. Endocrinology had discussed Vascular Surgery referral. If worsening color change in her leg, she should definitely see them. She should discuss statin recommendations with Endocrinology. She may need to see Vascular surgery for an evaluation and treatment recommendations.

## 2020-01-12 LAB
25(OH)D3+25(OH)D2 SERPL-MCNC: 34.7 NG/ML (ref 30–100)
ALBUMIN SERPL-MCNC: 4.5 G/DL (ref 3.5–4.8)
ALBUMIN/GLOB SERPL: 1.7 {RATIO} (ref 1.2–2.2)
ALP SERPL-CCNC: 88 IU/L (ref 39–117)
ALT SERPL-CCNC: 22 IU/L (ref 0–32)
APPEARANCE UR: ABNORMAL
AST SERPL-CCNC: 24 IU/L (ref 0–40)
BACTERIA #/AREA URNS HPF: ABNORMAL /[HPF]
BACTERIA UR CULT: NORMAL
BACTERIA UR CULT: NORMAL
BASOPHILS # BLD AUTO: 0 X10E3/UL (ref 0–0.2)
BASOPHILS NFR BLD AUTO: 0 %
BILIRUB SERPL-MCNC: 0.4 MG/DL (ref 0–1.2)
BILIRUB UR QL STRIP: NEGATIVE
BUN SERPL-MCNC: 33 MG/DL (ref 8–27)
BUN/CREAT SERPL: 28 (ref 12–28)
CALCIUM SERPL-MCNC: 10 MG/DL (ref 8.7–10.3)
CHLORIDE SERPL-SCNC: 102 MMOL/L (ref 96–106)
CHOLEST SERPL-MCNC: 259 MG/DL (ref 100–199)
CK SERPL-CCNC: 394 U/L (ref 24–173)
CO2 SERPL-SCNC: 20 MMOL/L (ref 20–29)
COLOR UR: YELLOW
CREAT SERPL-MCNC: 1.2 MG/DL (ref 0.57–1)
CRYSTALS URNS MICRO: ABNORMAL
EOSINOPHIL # BLD AUTO: 0.3 X10E3/UL (ref 0–0.4)
EOSINOPHIL NFR BLD AUTO: 3 %
EPI CELLS #/AREA URNS HPF: ABNORMAL /HPF (ref 0–10)
ERYTHROCYTE [DISTWIDTH] IN BLOOD BY AUTOMATED COUNT: 14.1 % (ref 11.7–15.4)
FERRITIN SERPL-MCNC: 72 NG/ML (ref 15–150)
FOLATE SERPL-MCNC: 11.5 NG/ML
GLOBULIN SER CALC-MCNC: 2.7 G/DL (ref 1.5–4.5)
GLUCOSE SERPL-MCNC: 136 MG/DL (ref 65–99)
GLUCOSE UR QL: NEGATIVE
HBA1C MFR BLD: 6.4 % (ref 4.8–5.6)
HCT VFR BLD AUTO: 42.4 % (ref 34–46.6)
HDLC SERPL-MCNC: 54 MG/DL
HGB BLD-MCNC: 14.2 G/DL (ref 11.1–15.9)
HGB UR QL STRIP: ABNORMAL
IMM GRANULOCYTES # BLD AUTO: 0 X10E3/UL (ref 0–0.1)
IMM GRANULOCYTES NFR BLD AUTO: 0 %
IRON SATN MFR SERPL: 27 % (ref 15–55)
IRON SERPL-MCNC: 102 UG/DL (ref 27–139)
KETONES UR QL STRIP: NEGATIVE
LDLC SERPL CALC-MCNC: 158 MG/DL (ref 0–99)
LDLC/HDLC SERPL: 2.9 RATIO (ref 0–3.2)
LEUKOCYTE ESTERASE UR QL STRIP: ABNORMAL
LYMPHOCYTES # BLD AUTO: 1.4 X10E3/UL (ref 0.7–3.1)
LYMPHOCYTES NFR BLD AUTO: 14 %
MCH RBC QN AUTO: 29.8 PG (ref 26.6–33)
MCHC RBC AUTO-ENTMCNC: 33.5 G/DL (ref 31.5–35.7)
MCV RBC AUTO: 89 FL (ref 79–97)
MICRO URNS: ABNORMAL
MICROALBUMIN UR-MCNC: 45.9 UG/ML
MONOCYTES # BLD AUTO: 0.7 X10E3/UL (ref 0.1–0.9)
MONOCYTES NFR BLD AUTO: 7 %
MUCOUS THREADS URNS QL MICRO: PRESENT
NEUTROPHILS # BLD AUTO: 7.6 X10E3/UL (ref 1.4–7)
NEUTROPHILS NFR BLD AUTO: 76 %
NITRITE UR QL STRIP: NEGATIVE
PH UR STRIP: 5.5 [PH] (ref 5–7.5)
PLATELET # BLD AUTO: 282 X10E3/UL (ref 150–450)
POTASSIUM SERPL-SCNC: 5.2 MMOL/L (ref 3.5–5.2)
PROT SERPL-MCNC: 7.2 G/DL (ref 6–8.5)
PROT UR QL STRIP: ABNORMAL
PTH-INTACT SERPL-MCNC: 32 PG/ML (ref 15–65)
RBC # BLD AUTO: 4.77 X10E6/UL (ref 3.77–5.28)
RBC #/AREA URNS HPF: ABNORMAL /HPF (ref 0–2)
SODIUM SERPL-SCNC: 140 MMOL/L (ref 134–144)
SP GR UR: 1.02 (ref 1–1.03)
T3FREE SERPL-MCNC: 3.3 PG/ML (ref 2–4.4)
T4 FREE SERPL-MCNC: 1.23 NG/DL (ref 0.82–1.77)
TIBC SERPL-MCNC: 376 UG/DL (ref 250–450)
TRIGL SERPL-MCNC: 235 MG/DL (ref 0–149)
TSH SERPL DL<=0.005 MIU/L-ACNC: 1.19 UIU/ML (ref 0.45–4.5)
UIBC SERPL-MCNC: 274 UG/DL (ref 118–369)
UNIDENT CRYS URNS QL MICRO: PRESENT
URATE SERPL-MCNC: 4.3 MG/DL (ref 2.5–7.1)
UROBILINOGEN UR STRIP-MCNC: 0.2 MG/DL (ref 0.2–1)
VIT B12 SERPL-MCNC: 446 PG/ML (ref 232–1245)
VLDLC SERPL CALC-MCNC: 47 MG/DL (ref 5–40)
WBC # BLD AUTO: 10 X10E3/UL (ref 3.4–10.8)
WBC #/AREA URNS HPF: >30 /HPF (ref 0–5)

## 2020-01-14 ENCOUNTER — TELEPHONE (OUTPATIENT)
Dept: FAMILY MEDICINE CLINIC | Facility: CLINIC | Age: 75
End: 2020-01-14

## 2020-01-14 NOTE — TELEPHONE ENCOUNTER
Patient called stating she forgot to mention this to you at her office visit on 1/8/2020 but she has been taking oxybutynin 5 mg for over 20 years and it is no longer working for urinary incontinence. She was hoping you could switch to an alternative or adjust dosage to help be more effective. Please advise, thanks.

## 2020-01-26 NOTE — PATIENT INSTRUCTIONS
Assessment and Plan  74 year old female who presents today in follow up of diabetes mellitus, hyperlipidemia, hyperparathyroidism and vitamin D deficiency, B12 deficiency, CKD, recurrent UTI, spinal stenosis, arthritis, and specialists. Patient is following with Endocrinology for lipids, B12, vitamin D, hyperparathyroidism, and diabetes. She was found to have hyperparathyroidism and underwent parathyroidectomy. Patient had good intraoperative response. She continues on Glimepiride due to intolerance of Metformin and expense of other medications. She did not tolerate Crestor due to increased muscle weakness and Livalo was too expensive. Patient continues vitamin D 5000IU daily and B12 supplement. She does not necessarily want to follow up with endocrinology and would like to follow up here. I would prefer she not take sulfonylurea for diabetes control. Patient will have fasting labs. Call if no results in 1 week. Stability of conditions, plan, follow up, and further recommendations pending labs. If labs are stable and she is doing well, we can consider follow up here only. If abnormal labs or any concerns with medication of diabetes, she will need to return to endocrinology and continue follow up there.     Patient with Stage 2 CKD and is avoiding NSAIDS, is not on diuretics or Metformin, but is also not on ARB or ACE. We may need to consider medications at follow up. She has neurogenic bladder and recurrent UTI and follows with urology. Patient continues to self cath with neurogenic bladder and continues Oxybutinin.     Patient reports she no longer needs to follow up with neuromuscular at [facility]. She has exhausted her PT visits. She regresses whenever she is not in aggressive PT and needs to get into a program at [facility] that is covered and will hopefully help her with strength and gait.    She was seen by Dr Duggan- 5/2019- for shoulder pain and what they thought was a ganglion cyst of the shoulder. She went  "to see Dr Abdi for right knee pain and had arthrocentesis of knee that helped. He then \"drained\" the right shoulder with recurrent effusions. He also noticed her hands- left > right deformity and completed an xray of her left wrist with almost fused bones. Per patient, the orthopedist did labs and thinks she could have RA. They gave her systemic steroid, allopurinol, and referred to rheumatology, Dr Christian's office. They sent records 1/2/2020 for appt. She had autoimmune labs here in the past that were negative. Patient was seen by hand surgery in the past and had hand injections. She also uses CBD topically which helps some. If she takes Tylenol and CBD orally, she has weakness of bilateral LE. I will have her keep appt with rheumatology for evaluation, workup, and treatment. We will try to obtain orthopedic surgery records.     Of note, she has persistent color abnormality of her left > right feet. She had normal KARTHIKEYAN and carotid with 1 side with plawue without stenosis and the other mild stenosis. Recheck duplex in 1 year. I discussed with her that with carotid stenosis and hyperlipidemia, she needs to ensure she is taking her ASA 81 mg once daily and controlled BP, lipids, and blood sugars as well as a statin and as much exercise as she can get (she will be starting PT again). She was agreeable to try Livalo 2 mg at bedtime but it was too expensive and she did not tolerate Crestor due to increased muscle weakness. Endocrinology had discussed Vascular Surgery referral. If worsening color change in her leg, she should definitely see them. She should discuss statin recommendations with Endocrinology. She may need to see Vascular surgery for an evaluation and treatment recommendations.     "

## 2020-01-27 RX ORDER — OXYBUTYNIN CHLORIDE 5 MG/1
TABLET ORAL
Qty: 90 TABLET | Refills: 0 | Status: SHIPPED | OUTPATIENT
Start: 2020-01-27 | End: 2020-03-05

## 2020-02-26 ENCOUNTER — RESULTS ENCOUNTER (OUTPATIENT)
Dept: ENDOCRINOLOGY | Age: 75
End: 2020-02-26

## 2020-02-26 DIAGNOSIS — R73.03 PREDIABETES: ICD-10-CM

## 2020-02-26 DIAGNOSIS — E83.52 HYPERCALCEMIA: ICD-10-CM

## 2020-02-26 DIAGNOSIS — E78.2 MIXED HYPERLIPIDEMIA: ICD-10-CM

## 2020-03-05 RX ORDER — OXYBUTYNIN CHLORIDE 5 MG/1
TABLET ORAL
Qty: 90 TABLET | Refills: 0 | Status: SHIPPED | OUTPATIENT
Start: 2020-03-05 | End: 2020-12-10

## 2020-03-17 NOTE — TELEPHONE ENCOUNTER
----- Message from Mahamed Massey MD sent at 7/30/2018 12:49 PM EDT -----  Notify normal.    section

## 2020-05-04 RX ORDER — GLIMEPIRIDE 2 MG/1
TABLET ORAL
Qty: 60 TABLET | Refills: 10 | Status: SHIPPED | OUTPATIENT
Start: 2020-05-04 | End: 2020-06-30 | Stop reason: SDUPTHER

## 2020-05-29 LAB
25(OH)D3+25(OH)D2 SERPL-MCNC: 56.7 NG/ML (ref 30–100)
BUN SERPL-MCNC: 33 MG/DL (ref 8–23)
BUN/CREAT SERPL: 31.4 (ref 7–25)
CALCIUM SERPL-MCNC: 10.1 MG/DL (ref 8.6–10.5)
CHLORIDE SERPL-SCNC: 103 MMOL/L (ref 98–107)
CHOLEST SERPL-MCNC: 273 MG/DL (ref 0–200)
CO2 SERPL-SCNC: 26 MMOL/L (ref 22–29)
CREAT SERPL-MCNC: 1.05 MG/DL (ref 0.57–1)
FOLATE SERPL-MCNC: 9.1 NG/ML (ref 4.78–24.2)
GLUCOSE SERPL-MCNC: 117 MG/DL (ref 65–99)
HBA1C MFR BLD: 6.64 % (ref 4.8–5.6)
HDLC SERPL-MCNC: 48 MG/DL (ref 40–60)
INTERPRETATION: NORMAL
LDLC SERPL CALC-MCNC: 164 MG/DL (ref 0–100)
Lab: NORMAL
POTASSIUM SERPL-SCNC: 4.9 MMOL/L (ref 3.5–5.2)
SODIUM SERPL-SCNC: 138 MMOL/L (ref 136–145)
T4 FREE SERPL-MCNC: 1.14 NG/DL (ref 0.93–1.7)
TRIGL SERPL-MCNC: 305 MG/DL (ref 0–150)
TSH SERPL DL<=0.005 MIU/L-ACNC: 0.86 UIU/ML (ref 0.27–4.2)
VIT B12 SERPL-MCNC: 374 PG/ML (ref 211–946)
VLDLC SERPL CALC-MCNC: 61 MG/DL (ref 5–40)

## 2020-06-02 ENCOUNTER — OFFICE VISIT (OUTPATIENT)
Dept: ENDOCRINOLOGY | Age: 75
End: 2020-06-02

## 2020-06-02 VITALS
SYSTOLIC BLOOD PRESSURE: 142 MMHG | DIASTOLIC BLOOD PRESSURE: 80 MMHG | HEART RATE: 113 BPM | OXYGEN SATURATION: 97 % | HEIGHT: 63 IN | BODY MASS INDEX: 32.43 KG/M2 | WEIGHT: 183 LBS

## 2020-06-02 DIAGNOSIS — R73.03 PRE-DIABETES: ICD-10-CM

## 2020-06-02 DIAGNOSIS — E21.3 HYPERPARATHYROIDISM (HCC): Primary | ICD-10-CM

## 2020-06-02 DIAGNOSIS — E83.52 HYPERCALCEMIA: ICD-10-CM

## 2020-06-02 DIAGNOSIS — R53.82 CHRONIC FATIGUE: ICD-10-CM

## 2020-06-02 PROCEDURE — 99214 OFFICE O/P EST MOD 30 MIN: CPT | Performed by: INTERNAL MEDICINE

## 2020-06-02 RX ORDER — COLCHICINE 0.6 MG/1
0.6 TABLET ORAL DAILY
COMMUNITY
End: 2021-09-23

## 2020-06-02 RX ORDER — MULTIVIT WITH MINERALS/LUTEIN
1000 TABLET ORAL DAILY
COMMUNITY
End: 2022-10-06 | Stop reason: SDUPTHER

## 2020-06-02 RX ORDER — DULOXETIN HYDROCHLORIDE 60 MG/1
60 CAPSULE, DELAYED RELEASE ORAL DAILY
COMMUNITY
Start: 2020-05-29 | End: 2021-09-23

## 2020-06-02 NOTE — PROGRESS NOTES
75 y.o.    Patient Care Team:  Lillie Ji PA as PCP - General (Family Medicine)  Bethanie Montano MD as PCP - Claims Attributed  Jose Luis Nelson Jr., MD as Consulting Physician (Urology)  Bon Gardiner MD as Consulting Physician (Hand Surgery)  Milly Crow MD as Consulting Physician (Neurology)  Sacha Garcia APRN as Nurse Practitioner (Neurology)  Johnnie Soni MD as Consulting Physician (Neurology)  Mahamed Massey MD as Consulting Physician (Obstetrics and Gynecology)  Clement Tierney MD as Surgeon (Neurosurgery)  Donna Olsen MA as Medical Assistant  Mahamed Massey MD as Consulting Physician (Obstetrics and Gynecology)  Milly Crow MD as Consulting Physician (Neurology)  Jeffry Bhagat MD as Consulting Physician (Ophthalmology)    Chief Complaint:    FOLLOW UP/ HYPERCALCEMIA / type 2 diabetes mellitus  Subjective     HPI    74 year old white female is here for the follow up of elevated calcium levels.  Patient's calcium levels were noted to be elevated on routine blood work up in May 2017.  Her calcium levels have been ranging between 10.5-10.9 mg/dL.  With the latest being her highest-11.1 mg/dL range.     Status post parathyroid surgery in March 2019.  Patient has recovered from the surgery with no complications.  Calcium levels have normalized, latest calcium level was 10.1.  Parathyroid levels-32 pg/mL.  Consistent with left inferior parathyroid adenoma.    Today in clinic patient reports that her energy levels are still poor, she has significant arthritis and as a result her energy levels are affected by the lack of physical activity, muscle aches and weight gain.  No further kidney stones.  Last attack was more than 3 years ago.  Does have history of osteopenia, last bone density scan was from December 2018.  No history of fragility fractures.  Improved urinary tract infections.  No issues with memory loss.     Pre diabetes  On glimepiride 2 mg twice daily with meals.  Patient could  not do Tradjenta or Januvia due to the cost issues.  Stopped metformin due to the abnormal renal function.        Hyperlipidemia - not on any medications at this time.   She recently had a carotid ultrasound by her primary care which showed an 80% stenosis in her left carotid artery.  I do not have the report of this.  Could not tolerate Lipitor or Crestor in the past.         Reviewed primary care physician's/consulting physician documentation and lab results :     Interval History      The following portions of the patient's history were reviewed and updated as appropriate: allergies, current medications, past family history, past medical history, past social history, past surgical history and problem list.    Past Medical History:   Diagnosis Date   • Anesthesia complication     STRONG GAG REFLEX   • Benign colonic polyp    • Cataract     REMOVED   • DDD (degenerative disc disease), cervical    • DDD (degenerative disc disease), lumbosacral    • Diabetes mellitus (CMS/[facility])     TYPE 2   • Frequent UTI    • History of kidney stones 06/2014   • History of pyelonephritis 06/2014   • Hypercalcemia    • Hypercholesteremia    • Leg muscle spasm    • Neuropathy     LEFT FOOT   • Osteoarthritis    • Pyelonephritis 06/2014   • Self-catheterizes urinary bladder     HAS NERVE DAMAGE RELATED TO BACK ISSUES   • Sepsis (CMS/HCC) 06/2014   • Urinary incontinence    • Weakness     LOWER EXTREMITES RELATED FROM NERVE DAMAGE FROM BACK     Family History   Problem Relation Age of Onset   • Stroke Mother    • Heart disease Mother    • Hypertension Mother    • Clotting disorder Father    • Hypertension Father    • Diabetes Father    • Aneurysm Father    • Hypertension Sister    • Diabetes Sister    • Cervical cancer Sister    • Diabetes Sister    • Cervical cancer Maternal Grandmother 72   • Diabetes Grandchild    • Malig Hyperthermia Neg Hx      Social History     Socioeconomic History   • Marital status:      Spouse name: Not  on file   • Number of children: Not on file   • Years of education: Not on file   • Highest education level: Not on file   Occupational History   • Occupation: retired   Tobacco Use   • Smoking status: Former Smoker     Packs/day: 0.50     Years: 15.00     Pack years: 7.50     Types: Cigarettes     Last attempt to quit:      Years since quittin.4   • Smokeless tobacco: Never Used   Substance and Sexual Activity   • Alcohol use: Yes     Comment: Socially.   • Drug use: No   Social History Narrative    Last mammogram 2018      Allergies   Allergen Reactions   • Other Nausea And Vomiting     The mercury in [place]   • Penicillins Hives   • Statins Myalgia   • Latex Rash   • Nickel Rash       Current Outpatient Medications:   •  baclofen (LIORESAL) 10 MG tablet, TAKE TWO TABLETS BY MOUTH DAILY, Disp: 60 tablet, Rfl: 2  •  Cholecalciferol (VITAMIN D3) 5000 UNITS capsule capsule, Take 5,000 Units by mouth Daily., Disp: , Rfl:   •  colchicine 0.6 MG tablet, Take 0.6 mg by mouth Daily., Disp: , Rfl:   •  D-MANNOSE PO, Take 1 tablet by mouth 2 (Two) Times a Day., Disp: , Rfl:   •  DULoxetine (CYMBALTA) 60 MG capsule, , Disp: , Rfl:   •  glimepiride (AMARYL) 2 MG tablet, TAKE ONE TABLET BY MOUTH TWICE A DAY WITH MEALS, Disp: 60 tablet, Rfl: 10  •  glucose blood (ONE TOUCH ULTRA TEST) test strip, USE ONE STRIP TO TEST TWICE A DAY, Disp: 100 each, Rfl: 11  •  Krill Oil 1000 MG capsule, Take  by mouth., Disp: , Rfl:   •  Lutein-Bilberry (BILBERRY PLUS LUTEIN) 5-1000 MCG-MG capsule, Take  by mouth Daily., Disp: , Rfl:   •  magnesium 30 MG tablet, Take 60 mg by mouth Daily., Disp: , Rfl:   •  Mirabegron ER (Myrbetriq) 25 MG tablet sustained-release 24 hour 24 hr tablet, Take 25 mg by mouth Daily., Disp: , Rfl:   •  NYSTATIN 348633 UNIT/GM powder, APPLY TO AFFECTED AREA 2 TO 3 TIMES PER DAY AS NEEDED, Disp: 30 g, Rfl: 0  •  oxybutynin (DITROPAN) 5 MG tablet, TAKE ONE TABLET BY MOUTH THREE TIMES A DAY, Disp: 90 tablet,  "Rfl: 0  •  Probiotic Product (PROBIOTIC PO), Take 1 tablet by mouth Daily., Disp: , Rfl:   •  TURMERIC PO, Take  by mouth., Disp: , Rfl:   •  vitamin C (ASCORBIC ACID) 500 MG tablet, Take 500 mg by mouth Daily. With D3, Disp: , Rfl:   •  vitamin E 1000 UNIT capsule, Take 1,000 Units by mouth Daily., Disp: , Rfl:         Review of Systems   Constitutional: Negative for appetite change, fatigue and fever.   Eyes: Negative for visual disturbance.   Respiratory: Negative for shortness of breath.    Cardiovascular: Negative for palpitations and leg swelling.   Gastrointestinal: Negative for abdominal pain and vomiting.   Endocrine: Negative for polydipsia and polyuria.   Musculoskeletal: Negative for joint swelling and neck pain.   Skin: Negative for rash.   Neurological: Negative for weakness and numbness.   Psychiatric/Behavioral: Negative for behavioral problems.     I have reviewed the ROS as documented by the MA; Bethanie Montano MD.      Objective       Vitals:    06/02/20 1017   BP: 142/80   Pulse: 113   SpO2: 97%   Weight: 83 kg (183 lb)   Height: 160 cm (63\")     Body mass index is 32.42 kg/m².      Physical Exam   Constitutional: She is oriented to person, place, and time. She appears well-nourished.   HENT:   Head: Normocephalic and atraumatic.   Eyes: Conjunctivae and EOM are normal. No scleral icterus.   Neck: Normal range of motion. Neck supple. No thyromegaly present.   Cardiovascular: Normal rate and normal heart sounds. Exam reveals no friction rub.   No murmur heard.  Pulmonary/Chest: Effort normal and breath sounds normal. No stridor. She has no wheezes. She has no rales.   Abdominal: Soft. Bowel sounds are normal. She exhibits no distension. There is no tenderness.   Musculoskeletal: She exhibits tenderness and deformity. She exhibits no edema.   Lymphadenopathy:     She has no cervical adenopathy.   Neurological: She is alert and oriented to person, place, and time.   Skin: Skin is warm and dry. She " is not diaphoretic.   Psychiatric: She has a normal mood and affect.   Vitals reviewed.    Results Review:     I reviewed the patient's new clinical results and mentioned them above in HPI and in plan as well.    Medical records reviewed  Summary:Done      Orders Only on 05/28/2020   Component Date Value Ref Range Status   • Glucose 05/28/2020 117* 65 - 99 mg/dL Final   • BUN 05/28/2020 33* 8 - 23 mg/dL Final   • Creatinine 05/28/2020 1.05* 0.57 - 1.00 mg/dL Final   • eGFR Non African Am 05/28/2020 51* >60 mL/min/1.73 Final    Comment: The MDRD GFR formula is only valid for adults with stable  renal function between ages 18 and 70.     • eGFR  Am 05/28/2020 62  >60 mL/min/1.73 Final   • BUN/Creatinine Ratio 05/28/2020 31.4* 7.0 - 25.0 Final   • Sodium 05/28/2020 138  136 - 145 mmol/L Final   • Potassium 05/28/2020 4.9  3.5 - 5.2 mmol/L Final   • Chloride 05/28/2020 103  98 - 107 mmol/L Final   • Total CO2 05/28/2020 26.0  22.0 - 29.0 mmol/L Final   • Calcium 05/28/2020 10.1  8.6 - 10.5 mg/dL Final   • Total Cholesterol 05/28/2020 273* 0 - 200 mg/dL Final   • Triglycerides 05/28/2020 305* 0 - 150 mg/dL Final   • HDL Cholesterol 05/28/2020 48  40 - 60 mg/dL Final   • VLDL Cholesterol 05/28/2020 61* 5 - 40 mg/dL Final   • LDL Cholesterol  05/28/2020 164* 0 - 100 mg/dL Final   • Interpretation 05/28/2020 Note   Final    Supplemental report is available.   • Vitamin B-12 05/28/2020 374  211 - 946 pg/mL Final    Results may be falsely increased if patient taking Biotin.   • Folate 05/28/2020 9.10  4.78 - 24.20 ng/mL Final    Results may be falsely increased if patient taking Biotin.   • PDF Image 05/28/2020 Not applicable   Final   • Hemoglobin A1C 05/28/2020 6.64* 4.80 - 5.60 % Final    Comment: Hemoglobin A1C Ranges:  Increased Risk for Diabetes  5.7% to 6.4%  Diabetes                     >= 6.5%  Diabetic Goal                < 7.0%     • Free T4 05/28/2020 1.14  0.93 - 1.70 ng/dL Final    Results may be falsely  increased if patient taking Biotin.   • TSH 05/28/2020 0.860  0.270 - 4.200 uIU/mL Final    Results may be falsely decreased if patient taking Biotin   • 25 Hydroxy, Vitamin D 05/28/2020 56.7  30.0 - 100.0 ng/ml Final    Comment: Results may be falsely increased if patient taking Biotin.  Reference Range for Total Vitamin D 25(OH)  Deficiency <20.0 ng/mL  Insufficiency 21-29 ng/mL  Sufficiency  ng/mL  Toxicity >100 ng/ml       Lab Results   Component Value Date    HGBA1C 6.64 (H) 05/28/2020    HGBA1C 6.4 (H) 01/10/2020    HGBA1C 6.5 (H) 08/23/2019     Lab Results   Component Value Date    MICROALBUR 45.9 01/10/2020    CREATININE 1.05 (H) 05/28/2020     Imaging Results (Most Recent)     None                Assessment and Plan:    Michelle was seen today for abnormal calcium.    Diagnoses and all orders for this visit:    Hyperparathyroidism (CMS/[facility])  -     Hemoglobin A1c; Future  -     Creatinine, Serum; Future  -     eGFR-Glomerular Filtration; Future  -     Microalbumin / Creatinine Urine Ratio - Urine, Clean Catch; Future  -     TSH; Future  -     Vitamin B12 & Folate; Future  -     Vitamin D 25 Hydroxy; Future  -     T4, Free; Future    Chronic fatigue  -     Hemoglobin A1c; Future  -     Creatinine, Serum; Future  -     eGFR-Glomerular Filtration; Future  -     Microalbumin / Creatinine Urine Ratio - Urine, Clean Catch; Future  -     TSH; Future  -     Vitamin B12 & Folate; Future  -     Vitamin D 25 Hydroxy; Future  -     T4, Free; Future    Pre-diabetes  -     Hemoglobin A1c; Future  -     Creatinine, Serum; Future  -     eGFR-Glomerular Filtration; Future  -     Microalbumin / Creatinine Urine Ratio - Urine, Clean Catch; Future  -     TSH; Future  -     Vitamin B12 & Folate; Future  -     Vitamin D 25 Hydroxy; Future  -     T4, Free; Future    Hypercalcemia  -     Hemoglobin A1c; Future  -     Creatinine, Serum; Future  -     eGFR-Glomerular Filtration; Future  -     Microalbumin / Creatinine Urine Ratio  "- Urine, Clean Catch; Future  -     TSH; Future  -     Vitamin B12 & Folate; Future  -     Vitamin D 25 Hydroxy; Future  -     T4, Free; Future        Hypercalcemia status post parathyroidectomy  Calcium levels have improved  Continue to monitor the calcium levels periodically.    Prediabetes  A1c is worse  Emphasized to the patient on the improvement of physical activity and diet control.  Cautioned her to check her blood sugars at least once a week.    Vitamin D deficiency  Continue vitamin D replacement    Reviewed the bone density scan.    Reviewed Lab results with the patient.         Bethanie Montano MD  06/02/20    EMR Dragon / transcription disclaimer:     \"Dictated utilizing Dragon dictation\".      "

## 2020-06-30 RX ORDER — GLIMEPIRIDE 2 MG/1
2 TABLET ORAL 2 TIMES DAILY WITH MEALS
Qty: 90 TABLET | Refills: 1 | Status: SHIPPED | OUTPATIENT
Start: 2020-06-30 | End: 2020-08-03 | Stop reason: SDUPTHER

## 2020-08-03 DIAGNOSIS — E11.59 TYPE 2 DIABETES MELLITUS WITH OTHER CIRCULATORY COMPLICATIONS (HCC): Primary | ICD-10-CM

## 2020-08-03 RX ORDER — GLIMEPIRIDE 2 MG/1
2 TABLET ORAL 2 TIMES DAILY WITH MEALS
Qty: 180 TABLET | Refills: 1 | Status: SHIPPED | OUTPATIENT
Start: 2020-08-03 | End: 2021-05-04

## 2020-08-06 ENCOUNTER — PROCEDURE VISIT (OUTPATIENT)
Dept: OBSTETRICS AND GYNECOLOGY | Age: 75
End: 2020-08-06

## 2020-08-06 ENCOUNTER — APPOINTMENT (OUTPATIENT)
Dept: WOMENS IMAGING | Facility: HOSPITAL | Age: 75
End: 2020-08-06

## 2020-08-06 DIAGNOSIS — Z12.31 VISIT FOR SCREENING MAMMOGRAM: ICD-10-CM

## 2020-08-06 DIAGNOSIS — Z78.0 POST-MENOPAUSAL: Primary | ICD-10-CM

## 2020-08-06 PROCEDURE — 77080 DXA BONE DENSITY AXIAL: CPT | Performed by: OBSTETRICS & GYNECOLOGY

## 2020-08-06 PROCEDURE — 77067 SCR MAMMO BI INCL CAD: CPT | Performed by: OBSTETRICS & GYNECOLOGY

## 2020-08-06 PROCEDURE — 77067 SCR MAMMO BI INCL CAD: CPT | Performed by: RADIOLOGY

## 2020-08-07 ENCOUNTER — TELEPHONE (OUTPATIENT)
Dept: OBSTETRICS AND GYNECOLOGY | Age: 75
End: 2020-08-07

## 2020-08-07 PROBLEM — M81.0 OSTEOPOROSIS: Status: ACTIVE | Noted: 2020-08-07

## 2020-08-07 RX ORDER — ALENDRONATE SODIUM 70 MG/1
70 TABLET ORAL
Qty: 4 TABLET | Refills: 11 | Status: SHIPPED | OUTPATIENT
Start: 2020-08-07 | End: 2021-05-18

## 2020-08-07 NOTE — TELEPHONE ENCOUNTER
I called the patient with her bone density results.  Both hips were checked and the right hip did have a T score of -2.7 consistent with osteoporosis.  Patient would be a candidate for treatment.  I discussed oral Fosamax to be taken weekly.  I did discuss possible side effects and that the patient would have to take the medication with a large glass of water.  She should not lie down or eat anything for at least an hour.  Patient voices understanding.  She will check with primary care to make sure that this medication will be okay with her other medications.

## 2020-12-02 ENCOUNTER — RESULTS ENCOUNTER (OUTPATIENT)
Dept: ENDOCRINOLOGY | Age: 75
End: 2020-12-02

## 2020-12-02 DIAGNOSIS — R73.03 PRE-DIABETES: ICD-10-CM

## 2020-12-02 DIAGNOSIS — E83.52 HYPERCALCEMIA: ICD-10-CM

## 2020-12-02 DIAGNOSIS — E21.3 HYPERPARATHYROIDISM (HCC): ICD-10-CM

## 2020-12-02 DIAGNOSIS — R53.82 CHRONIC FATIGUE: ICD-10-CM

## 2020-12-05 LAB
25(OH)D3+25(OH)D2 SERPL-MCNC: 40.3 NG/ML (ref 30–100)
CREAT SERPL-MCNC: 1.24 MG/DL (ref 0.57–1)
FOLATE SERPL-MCNC: 8.1 NG/ML
HBA1C MFR BLD: 7.1 % (ref 4.8–5.6)
INTERPRETATION: NORMAL
Lab: NORMAL
T4 FREE SERPL-MCNC: 1.03 NG/DL (ref 0.82–1.77)
TSH SERPL DL<=0.005 MIU/L-ACNC: 1.33 UIU/ML (ref 0.45–4.5)
UNABLE TO VOID: NORMAL
VIT B12 SERPL-MCNC: 538 PG/ML (ref 232–1245)

## 2020-12-10 ENCOUNTER — OFFICE VISIT (OUTPATIENT)
Dept: ENDOCRINOLOGY | Age: 75
End: 2020-12-10

## 2020-12-10 VITALS
SYSTOLIC BLOOD PRESSURE: 140 MMHG | BODY MASS INDEX: 31.89 KG/M2 | HEART RATE: 97 BPM | WEIGHT: 180 LBS | OXYGEN SATURATION: 98 % | HEIGHT: 63 IN | DIASTOLIC BLOOD PRESSURE: 80 MMHG

## 2020-12-10 DIAGNOSIS — E83.52 HYPERCALCEMIA: ICD-10-CM

## 2020-12-10 DIAGNOSIS — E21.3 HYPERPARATHYROIDISM (HCC): ICD-10-CM

## 2020-12-10 DIAGNOSIS — E11.59 TYPE 2 DIABETES MELLITUS WITH OTHER CIRCULATORY COMPLICATIONS (HCC): Primary | ICD-10-CM

## 2020-12-10 DIAGNOSIS — E78.2 MIXED HYPERLIPIDEMIA: ICD-10-CM

## 2020-12-10 PROCEDURE — 99214 OFFICE O/P EST MOD 30 MIN: CPT | Performed by: INTERNAL MEDICINE

## 2020-12-10 RX ORDER — OFLOXACIN 3 MG/ML
1 SOLUTION/ DROPS OPHTHALMIC 4 TIMES DAILY PRN
COMMUNITY
Start: 2020-12-02 | End: 2021-07-19

## 2020-12-10 NOTE — PROGRESS NOTES
75 y.o.    Patient Care Team:  Lillie Ji PA as PCP - General (Family Medicine)  Jose Luis Nelson Jr., MD as Consulting Physician (Urology)  Bon Gardiner MD as Consulting Physician (Hand Surgery)  Milly Crow MD as Consulting Physician (Neurology)  Sacha Garcia APRN as Nurse Practitioner (Neurology)  Johnnie Soni MD as Consulting Physician (Neurology)  Mahamed Massey MD as Consulting Physician (Obstetrics and Gynecology)  Clement Tierney MD as Surgeon (Neurosurgery)  Donna Olsen MA as Medical Assistant  Mahamed Massey MD as Consulting Physician (Obstetrics and Gynecology)  Milly Crow MD as Consulting Physician (Neurology)  Jeffry Bhagat MD as Consulting Physician (Ophthalmology)    Chief Complaint:    FOLLOW UP/ HYPERCALCEMIAS/ HYPERPARATHYROIDISM  Subjective     HPI    74 year old white female is here for the follow up of elevated calcium levels.  Patient's calcium levels were noted to be elevated on routine blood work up in May 2017.  Her calcium levels have been ranging between 10.5-10.9 mg/dL.  With the latest being her highest-11.1 mg/dL range.     Status post parathyroid surgery in March 2019.  Patient has recovered from the surgery with no complications.  Repeat calcium levels and parathyroid levels have normalized.    Today in clinic patient reports that her energy levels are poor secondary to the arthritis.  She has significant decrease in her physical activity over the last few years.  No new kidney stone attacks.  No history of fragility fractures.  Significant improvement in urinary tract infections as well.       Pre diabetes  On glimepiride 2 mg twice daily with meals.  Patient could not do Tradjenta or Januvia due to the cost issues.  Stopped metformin due to the abnormal renal function.        Hyperlipidemia - not on any medications at this time.   She recently had a carotid ultrasound by her primary care which showed an 80% stenosis in her left carotid artery.  I do not  have the report of this.  Could not tolerate Lipitor or Crestor in the past.     Reviewed primary care physician's/consulting physician documentation and lab results       Interval History      The following portions of the patient's history were reviewed and updated as appropriate: allergies, current medications, past family history, past medical history, past social history, past surgical history and problem list.    Past Medical History:   Diagnosis Date   • Anesthesia complication     STRONG GAG REFLEX   • Benign colonic polyp    • Cataract     REMOVED   • DDD (degenerative disc disease), cervical    • DDD (degenerative disc disease), lumbosacral    • Diabetes mellitus (CMS/HCC)     TYPE 2   • Frequent UTI    • History of kidney stones 06/2014   • History of pyelonephritis 06/2014   • Hypercalcemia    • Hypercholesteremia    • Leg muscle spasm    • Neuropathy     LEFT FOOT   • Osteoarthritis    • Pyelonephritis 06/2014   • Self-catheterizes urinary bladder     HAS NERVE DAMAGE RELATED TO BACK ISSUES   • Sepsis (CMS/HCC) 06/2014   • Urinary incontinence    • Weakness     LOWER EXTREMITES RELATED FROM NERVE DAMAGE FROM BACK     Family History   Problem Relation Age of Onset   • Stroke Mother    • Heart disease Mother    • Hypertension Mother    • Clotting disorder Father    • Hypertension Father    • Diabetes Father    • Aneurysm Father    • Hypertension Sister    • Diabetes Sister    • Cervical cancer Sister    • Diabetes Sister    • Cervical cancer Maternal Grandmother 72   • Diabetes Grandchild    • Malig Hyperthermia Neg Hx      Social History     Socioeconomic History   • Marital status:      Spouse name: Not on file   • Number of children: Not on file   • Years of education: Not on file   • Highest education level: Not on file   Occupational History   • Occupation: retired   Tobacco Use   • Smoking status: Former Smoker     Packs/day: 0.50     Years: 15.00     Pack years: 7.50     Types: Cigarettes      Quit date:      Years since quittin.9   • Smokeless tobacco: Never Used   Substance and Sexual Activity   • Alcohol use: Yes     Comment: Socially.   • Drug use: No   Social History Narrative    Last mammogram 2018      Allergies   Allergen Reactions   • Other Nausea And Vomiting     The mercury in [place]   • Penicillins Hives   • Statins Myalgia   • Latex Rash   • Nickel Rash       Current Outpatient Medications:   •  alendronate (FOSAMAX) 70 MG tablet, Take 1 tablet by mouth Every 7 (Seven) Days., Disp: 4 tablet, Rfl: 11  •  baclofen (LIORESAL) 10 MG tablet, TAKE TWO TABLETS BY MOUTH DAILY, Disp: 60 tablet, Rfl: 2  •  Cholecalciferol (VITAMIN D3) 5000 UNITS capsule capsule, Take 5,000 Units by mouth Daily., Disp: , Rfl:   •  colchicine 0.6 MG tablet, Take 0.6 mg by mouth Daily., Disp: , Rfl:   •  D-MANNOSE PO, Take 1 tablet by mouth 2 (Two) Times a Day., Disp: , Rfl:   •  DULoxetine (CYMBALTA) 60 MG capsule, , Disp: , Rfl:   •  glimepiride (AMARYL) 2 MG tablet, Take 1 tablet by mouth 2 (Two) Times a Day With Meals., Disp: 180 tablet, Rfl: 1  •  glucose blood (ONE TOUCH ULTRA TEST) test strip, USE ONE STRIP TO TEST TWICE A DAY, Disp: 100 each, Rfl: 11  •  Krill Oil 1000 MG capsule, Take  by mouth., Disp: , Rfl:   •  Lutein-Bilberry (BILBERRY PLUS LUTEIN) 5-1000 MCG-MG capsule, Take  by mouth Daily., Disp: , Rfl:   •  magnesium 30 MG tablet, Take 60 mg by mouth Daily., Disp: , Rfl:   •  Mirabegron ER (Myrbetriq) 25 MG tablet sustained-release 24 hour 24 hr tablet, Take 25 mg by mouth Daily., Disp: , Rfl:   •  NYSTATIN 114440 UNIT/GM powder, APPLY TO AFFECTED AREA 2 TO 3 TIMES PER DAY AS NEEDED, Disp: 30 g, Rfl: 0  •  ofloxacin (OCUFLOX) 0.3 % ophthalmic solution, , Disp: , Rfl:   •  Probiotic Product (PROBIOTIC PO), Take 1 tablet by mouth Daily., Disp: , Rfl:   •  TURMERIC PO, Take  by mouth., Disp: , Rfl:   •  vitamin C (ASCORBIC ACID) 500 MG tablet, Take 500 mg by mouth Daily. With D3, Disp: , Rfl:  "  •  vitamin E 1000 UNIT capsule, Take 1,000 Units by mouth Daily., Disp: , Rfl:         Review of Systems   Constitutional: Positive for fatigue and unexpected weight change. Negative for appetite change and fever.   Eyes: Negative for visual disturbance.   Respiratory: Negative for shortness of breath.    Cardiovascular: Negative for palpitations and leg swelling.   Gastrointestinal: Negative for abdominal pain and vomiting.   Endocrine: Negative for polydipsia and polyuria.   Musculoskeletal: Negative for joint swelling and neck pain.   Skin: Negative for rash.   Neurological: Negative for weakness and numbness.   Psychiatric/Behavioral: Negative for behavioral problems.     I have reviewed and confirmed the accuracy of the ROS as documented by the MA/LPN/RN Bethanie Montano MD          Objective       Vitals:    12/10/20 1021   BP: 140/80   Pulse: 97   SpO2: 98%   Weight: 81.6 kg (180 lb)   Height: 160 cm (63\")     Body mass index is 31.89 kg/m².      Physical Exam  Vitals signs reviewed.   Constitutional:       Appearance: Normal appearance. She is not diaphoretic.      Comments: Obese     HENT:      Head: Normocephalic and atraumatic.   Eyes:      General: No scleral icterus.     Conjunctiva/sclera: Conjunctivae normal.   Neck:      Musculoskeletal: Normal range of motion and neck supple.      Thyroid: No thyromegaly.      Comments: Acanthosis nigricans  Cardiovascular:      Rate and Rhythm: Normal rate.      Heart sounds: Normal heart sounds.   Pulmonary:      Effort: Pulmonary effort is normal. No respiratory distress.      Breath sounds: Normal breath sounds. No stridor. No wheezing.   Abdominal:      General: Bowel sounds are normal. There is no distension.      Palpations: Abdomen is soft.      Tenderness: There is no abdominal tenderness.      Comments: Central obesity   Musculoskeletal:         General: No tenderness or deformity.   Skin:     General: Skin is warm and dry.   Neurological:      Mental " Status: She is alert and oriented to person, place, and time. Mental status is at baseline.      Gait: Gait normal.   Psychiatric:         Mood and Affect: Mood normal.         Behavior: Behavior normal.         Results Review:     I reviewed the patient's new clinical results and mentioned them above in HPI and in plan as well.    Medical records reviewed  Summary:Done      Results Encounter on 12/02/2020   Component Date Value Ref Range Status   • Hemoglobin A1C 12/04/2020 7.1* 4.8 - 5.6 % Final    Comment:          Prediabetes: 5.7 - 6.4           Diabetes: >6.4           Glycemic control for adults with diabetes: <7.0     • Creatinine 12/04/2020 1.24* 0.57 - 1.00 mg/dL Final   • eGFR Non African Am 12/04/2020 43* >59 mL/min/1.73 Final   • eGFR African Am 12/04/2020 50* >59 mL/min/1.73 Final   • TSH 12/04/2020 1.330  0.450 - 4.500 uIU/mL Final   • Vitamin B-12 12/04/2020 538  232 - 1,245 pg/mL Final   • Folate 12/04/2020 8.1  >3.0 ng/mL Final    Comment: A serum folate concentration of less than 3.1 ng/mL is  considered to represent clinical deficiency.     • 25 Hydroxy, Vitamin D 12/04/2020 40.3  30.0 - 100.0 ng/mL Final    Comment: Vitamin D deficiency has been defined by the [place] of  Medicine and an Endocrine Society practice guideline as a  level of serum 25-OH vitamin D less than 20 ng/mL (1,2).  The Endocrine Society went on to further define vitamin D  insufficiency as a level between 21 and 29 ng/mL (2).  1. IOM ([place] of [employer]). 2010. Dietary reference     intakes for calcium and D. [place]: The     National Academies Press.  2. Hilton MF, Margie NC, Lennox CORONEL, et al.     Evaluation, treatment, and prevention of vitamin D     deficiency: an Endocrine Society clinical practice     guideline. JCEM. 2011 Jul; 96(7):1911-30.     • Free T4 12/04/2020 1.03  0.82 - 1.77 ng/dL Final   • Interpretation 12/04/2020 Note   Final    Comment: -------------------------------  CHRONIC KIDNEY  DISEASE:  EGFR, BLOOD PRESSURE, AND PROTEINURIA ASSESSMENT  Most recent order does not include a Renal Panel.  EGFR, BLOOD PRESSURE, AND PROTEINURIA FOLLOW-UP  -  Spot Urine Panel is recommended by KDOQI guidelines, at  least yearly;  -  BONE and MINERAL ASSESSMENT  Most recent order does not include a Renal Panel.  -  LIPIDS ASSESSMENT  Most recent order does not include a fasting Lipid Panel.  LIPIDS FOLLOW-UP  -  fasting Lipid Panel is due;  -  ANEMIA ASSESSMENT  Most recent order does not include a CBC Panel or iron  studies.  ANEMIA FOLLOW-UP  -  CBC is recommended by KDOQI guidelines, at least yearly;  -------------------------------  DISCLAIMER  These assessments and treatment suggestions are provided as  a convenience in support of the physician-patient  relationship and are not intended to replace the physician's  clinical judgment. They are derived from national guidelines  in addition to other evidence and expert opinion. The                             clinician should consider this information within the  context of clinical opinion and the individual patient.  SEE GUIDANCE FOR CHRONIC KIDNEY DISEASE PROGRAM: Kidney  Disease Improving Global Outcomes (KDIGO) clinical practice  guidelines are at http://kdigo.org/home/guidelines/.  National Kidney Foundation Kidney Disease Outcomes Quality  Initiative (KDOQI (TM)), with its limitations and  disclaimers, are at www.kidney.org/professionals/KDOQI. This  program is intended for patients who have been diagnosed  with stages 3, 4, or pre-dialysis 5 CKD. It is not intended  for children, pregnant patients, or transplant patients.     • PDF Image 12/04/2020 Not applicable   Final   • Unable to Void 12/04/2020 Comment   Final    Comment: The patient was not able to render a urine sample and has been  instructed to return for a urine collection at their earliest  convenience.  The urine testing that you have requested has  been deleted from this report.  When the  patient returns and  provides a urine specimen, the urine testing will be performed  and separately reported.       Lab Results   Component Value Date    HGBA1C 7.1 (H) 12/04/2020    HGBA1C 6.64 (H) 05/28/2020    HGBA1C 6.4 (H) 01/10/2020     Lab Results   Component Value Date    MICROALBUR 45.9 01/10/2020    CREATININE 1.24 (H) 12/04/2020     Imaging Results (Most Recent)     None                Assessment and Plan:    Diagnoses and all orders for this visit:    1. Type 2 diabetes mellitus with other circulatory complications (CMS/[facility])  (Primary)  -     Hemoglobin A1c; Future  -     TSH; Future  -     T4, Free; Future  -     Basic Metabolic Panel; Future  -     Hemoglobin A1c; Future  -     Vitamin B12 & Folate; Future  -     PTH, Intact & Calcium; Future  -     Vitamin D 25 Hydroxy; Future    2. Hyperparathyroidism (CMS/[facility])  -     Hemoglobin A1c; Future  -     TSH; Future  -     T4, Free; Future  -     Basic Metabolic Panel; Future  -     Hemoglobin A1c; Future  -     Vitamin B12 & Folate; Future  -     PTH, Intact & Calcium; Future  -     Vitamin D 25 Hydroxy; Future    3. Hypercalcemia  -     Hemoglobin A1c; Future  -     TSH; Future  -     T4, Free; Future  -     Basic Metabolic Panel; Future  -     Hemoglobin A1c; Future  -     Vitamin B12 & Folate; Future  -     PTH, Intact & Calcium; Future  -     Vitamin D 25 Hydroxy; Future    4. Mixed hyperlipidemia   -     Hemoglobin A1c; Future  -     TSH; Future  -     T4, Free; Future  -     Basic Metabolic Panel; Future  -     Hemoglobin A1c; Future  -     Vitamin B12 & Folate; Future  -     PTH, Intact & Calcium; Future  -     Vitamin D 25 Hydroxy; Future      Type 2 diabetes mellitus-uncontrolled  HbA1c is worse  Emphasized to the patient the importance of diet controlled and physical activity to help improve the blood sugars.    Hyperlipidemia  Continue the statins    Hypercalcemia  S/p surgery, continue to monitor the levels    Reviewed Lab results with the  "patient.               Bethanie Montano MD  12/10/20    EMR Dragon / transcription disclaimer:     \"Dictated utilizing Dragon dictation\".      "

## 2020-12-23 ENCOUNTER — OFFICE VISIT (OUTPATIENT)
Dept: FAMILY MEDICINE CLINIC | Facility: CLINIC | Age: 75
End: 2020-12-23

## 2020-12-23 VITALS
SYSTOLIC BLOOD PRESSURE: 160 MMHG | RESPIRATION RATE: 16 BRPM | WEIGHT: 182 LBS | TEMPERATURE: 98.2 F | HEIGHT: 63 IN | HEART RATE: 96 BPM | OXYGEN SATURATION: 98 % | DIASTOLIC BLOOD PRESSURE: 90 MMHG | BODY MASS INDEX: 32.25 KG/M2

## 2020-12-23 DIAGNOSIS — E53.8 B12 DEFICIENCY: ICD-10-CM

## 2020-12-23 DIAGNOSIS — R41.3 MEMORY LOSS: ICD-10-CM

## 2020-12-23 DIAGNOSIS — M48.00 SPINAL STENOSIS, UNSPECIFIED SPINAL REGION: ICD-10-CM

## 2020-12-23 DIAGNOSIS — R09.89 OTHER SPECIFIED SYMPTOMS AND SIGNS INVOLVING THE CIRCULATORY AND RESPIRATORY SYSTEMS: ICD-10-CM

## 2020-12-23 DIAGNOSIS — N31.2 ATONIC NEUROGENIC BLADDER: ICD-10-CM

## 2020-12-23 DIAGNOSIS — N18.2 STAGE 2 CHRONIC KIDNEY DISEASE: ICD-10-CM

## 2020-12-23 DIAGNOSIS — D48.5 NEOPLASM OF UNCERTAIN BEHAVIOR OF SKIN: ICD-10-CM

## 2020-12-23 DIAGNOSIS — E11.22 TYPE 2 DIABETES MELLITUS WITH STAGE 2 CHRONIC KIDNEY DISEASE, WITHOUT LONG-TERM CURRENT USE OF INSULIN (HCC): ICD-10-CM

## 2020-12-23 DIAGNOSIS — E11.59 TYPE 2 DIABETES MELLITUS WITH OTHER CIRCULATORY COMPLICATIONS (HCC): Primary | ICD-10-CM

## 2020-12-23 DIAGNOSIS — I65.22 ATHEROSCLEROSIS OF LEFT CAROTID ARTERY: ICD-10-CM

## 2020-12-23 DIAGNOSIS — E55.9 VITAMIN D DEFICIENCY: ICD-10-CM

## 2020-12-23 DIAGNOSIS — M48.04 THORACIC SPINAL STENOSIS: ICD-10-CM

## 2020-12-23 DIAGNOSIS — M81.6 LOCALIZED OSTEOPOROSIS (LEQUESNE): ICD-10-CM

## 2020-12-23 DIAGNOSIS — R29.898 WEAKNESS OF BOTH LOWER EXTREMITIES: ICD-10-CM

## 2020-12-23 DIAGNOSIS — E78.00 HYPERCHOLESTEROLEMIA: ICD-10-CM

## 2020-12-23 DIAGNOSIS — D72.828 OTHER ELEVATED WHITE BLOOD CELL (WBC) COUNT: ICD-10-CM

## 2020-12-23 DIAGNOSIS — I65.21 STENOSIS OF RIGHT CAROTID ARTERY: ICD-10-CM

## 2020-12-23 DIAGNOSIS — E21.3 HYPERPARATHYROIDISM (HCC): ICD-10-CM

## 2020-12-23 DIAGNOSIS — N18.2 TYPE 2 DIABETES MELLITUS WITH STAGE 2 CHRONIC KIDNEY DISEASE, WITHOUT LONG-TERM CURRENT USE OF INSULIN (HCC): ICD-10-CM

## 2020-12-23 PROCEDURE — 99214 OFFICE O/P EST MOD 30 MIN: CPT | Performed by: PHYSICIAN ASSISTANT

## 2020-12-23 NOTE — PROGRESS NOTES
"Subjective   Michelle Espinoza is a 75 y.o. female who presents today in follow up of diabetes mellitus, hyperlipidemia, hyperparathyroidism and vitamin D deficiency, B12 deficiency, CKD, recurrent UTI, spinal stenosis, arthritis, and specialists.     History of Present Illness     Mole on her forehead and is dry. Has never seen a dermatologist in the past.     Turned 75 and \"I couldn't remember anything\". Reports has been ongoing and progressive.     Following with Endocrinology, Dr Montano, for conditions- last appt 12/2020.   Type 2 Diabetes Mellitus- failed Metformin due to AE. She reports she cannot afford the other brand name medications. She denies hypoglycemia on Glimepiride. Last A1C 12/2020 increased to 7.1%- advised better compliance with diet/ exercise.   Hyperlipidemia- Did not tolerate Crestor due to increased weakness and Livalo was too expensive. Has failed multiple statins and is afraid of AE. Advised continue statin.   B12- stable 12/2020.   Vitamin D- Stable 12/2020. Taking 5000IU daily. Tolerating without AE.   Hyperparathyroidism- States she did well with single parathyroidectomy. Had intraoperative decrease in PTH to normal. Has not had labs since her surgery.    CKD stage 2- Avoiding NSAIDS. Not on diuretics or Metformin. Is also not on ARB or ACE.     Followed by urology- Dr Nelson- last appt 3/2020  Recurrent UTI- self cath with neurogenic bladder. Was on Oxybutinin. He advised she change to Myrbetriq and follow up in 6 weeks with consideration of botox injections.     OA/DJD- CBD- uses topically and helps some. If she takes Tylenol and CBD orally, she has weakness of bilateral LE. Seen by orthopedic surgery, hand surgery, and rheumatology.   Shoulder pain- She was seen by Dr Duggan- 5/2019- for shoulder pain and what they thought was a ganglion cyst.   Right knee pain- Went to see Dr Abdi for right knee pain and had drain of knee that helped. Then he drained the right shoulder and then " increased in size again and again.  Hand pain and deformity- Hand surgery gave hand injections.  Orthopedist-Dr Sheppard also noticed her hands- left > right. Had an xray of her left wrist with almost fused bones. They gave systemic steroid. They referred to rheumatology and was given allopurinol.   Rheumatology-Dr Christian/ Sangeeta Garza, [facility]- last appt 2/2020 for OA, mult joint pain, additional labs and advised follow up in a couple weeks with teaching.     Weakness/ gait abnormality- Reports she no longer needs to follow up with neuromuscular at [facility]. She has exhausted her PT visits.   · Previous neurology- Sacha Garcia, R[facility]NA- last appt 3/2017 for polyneuropathy and lumbar radiculopathy. Advised follow up with Dr Crow and consider PT. Follow up PRN.   · Previous neurosurgery- Dr Tierney- last appt 1/2017 for thoracic spinal stenosis. Discussed surgical options.     Color abnormality of her left > right feet. She had normal KARTHIKEYAN and carotid with 1 side with plawue without stenosis and the other mild stenosis. Recheck duplex in 1 year.     Gynecology- Dr Massey- last appt 8/2020 for DEXA with osteoporosis- advised treatment and mammogram. Prior 8/2019 with PAP- normal. Follow up 11/2021.     The following portions of the patient's history were reviewed and updated as appropriate: allergies, current medications, past family history, past medical history, past social history, past surgical history and problem list.    Review of Systems   Constitutional: Positive for fatigue.   HENT: Negative.    Respiratory: Negative.    Cardiovascular: Negative.    Genitourinary: Positive for difficulty urinating, dysuria and urgency.   Musculoskeletal: Positive for arthralgias, back pain, gait problem and myalgias.   Skin: Positive for color change.   Neurological: Positive for weakness and numbness.       Objective    Vitals:    12/23/20 1030   BP: 160/90   Pulse: 96   Resp: 16   Temp: 98.2 °F (36.8 °C)   SpO2: 98%     Body mass  index is 32.24 kg/m².    Physical Exam   Constitutional: She is oriented to person, place, and time. She appears well-developed. No distress.   HENT:   Head: Normocephalic and atraumatic.   Right Ear: External ear normal.   Left Ear: External ear normal.   Nose: Nose normal.   Eyes: Conjunctivae and lids are normal.   Neck: Neck supple. Carotid bruit is not present.   Cardiovascular: Normal rate, regular rhythm, normal heart sounds and normal pulses. Exam reveals no gallop and no friction rub.   No murmur heard.  Pulmonary/Chest: Effort normal and breath sounds normal. No respiratory distress. She has no wheezes. She has no rhonchi. She has no rales.   Abdominal: Normal appearance.   Musculoskeletal: No deformity.   Neurological: She is alert and oriented to person, place, and time. Gait (ambulates with difficulty, abnormal gait, with walker) abnormal.   Skin: Skin is warm and dry.   Psychiatric: Her speech is normal and behavior is normal. Mood, judgment and thought content normal.   Nursing note and vitals reviewed.      Assessment/Plan   Diagnoses and all orders for this visit:    1. Memory loss (Primary)  -     RPR  -     MRI Brain Without Contrast  -     Duplex Carotid Ultrasound CAR  -     NeuroPsych Testing  -     Ambulatory Referral to Neurology    2. Neoplasm of uncertain behavior of skin  -     Ambulatory Referral to Dermatology    3. Other specified symptoms and signs involving the circulatory and respiratory systems   -     Duplex Carotid Ultrasound CAR          Assessment and Plan  I reviewed all labs with endocrinology. Patient continues to see specialist as noted above.  I have reviewed available records, including consult notes, labs, and imaging/testing.  Patient to continue follow-up with specialist as directed by them.    · Type 2 Diabetes Mellitus- Last A1C 12/2020 increased to 7.1%. Continue Glimepiride. She needs better compliance with diet/ exercise and follow up with with endocrinology as  directed by them.   · Hyperlipidemia- Continue follow up with endocrinology and treatment as directed by them.   · B12- Stable 12/2020. We will continue to monitor.   · Vitamin D- Stable 12/2020. Continue vitamin D 5000 IU daily and follow up with endocrinology with monitoring and treatment.   · Hyperparathyroidism- Continue follow up with endocrinology.   · CKD stage 2- Avoiding NSAIDS, diuretics, and Metformin where possible. Consider ARB or ACE for renal protection. .   · Recurrent UTI- Keep follow up with urology as directed by them.   · OA/DJD- Continue follow up with orthopedic surgery, hand surgery, and rheumatology as directed by them and continue PT.   · Weakness/ gait abnormality- Continue follow up with neuromuscular at [facility] and physical therapy at [facility].   · Osteoporosis- Keep follow up with gynecology as directed.   · Color change in LE- Consider referral to vascular surgery for evaluation and treatment.   · Carotid stenosis/ arteriosclerosis- Endocrinology discussed Vascular Surgery referral. She should discuss statin recommendations with Endocrinology. She may need to see Vascular surgery for an evaluation and treatment recommendations.   · Memory loss- I will refer for MRI brain, carotid duplex, and for neuropsych testing. I will check RPR and have reviewed other labs. I will refer to neurology for evaluation once workup is complete. Patient to be seen if worsening, new, or changing symptoms.   · Neoplasm uncertain behavior skin of forehead- I will refer to dermatology.     Patient continues to see specialist as noted above.  I have reviewed available records, including consult notes, labs, and imaging/testing.  Patient to continue follow-up with specialist as directed by them.

## 2020-12-24 LAB — RPR SER QL: NORMAL

## 2020-12-28 ENCOUNTER — TELEPHONE (OUTPATIENT)
Dept: ENDOCRINOLOGY | Age: 75
End: 2020-12-28

## 2020-12-28 NOTE — TELEPHONE ENCOUNTER
Called pt to schedule a F/U visit with . pt requested that she get external labs mailed to her home. Because she will be going to a [facility] closer to home.    PLEASE MAIL EXTERNAL LAB ORDERS TO PT HOME . She is to have labs in 3 mos.

## 2020-12-29 RX ORDER — BACLOFEN 10 MG/1
TABLET ORAL
Qty: 60 TABLET | Refills: 1 | Status: SHIPPED | OUTPATIENT
Start: 2020-12-29 | End: 2022-02-15

## 2021-01-08 ENCOUNTER — TELEPHONE (OUTPATIENT)
Dept: FAMILY MEDICINE CLINIC | Facility: CLINIC | Age: 76
End: 2021-01-08

## 2021-01-08 NOTE — TELEPHONE ENCOUNTER
THE PATIENT CALLED WANTING TO LET KATARINA EASON KNOW THAT THE MRI OF HER HEAD HAS NOT BEEN SCHEDULED YET DUE TO PATIENT'S CLAUSTROPHOBIA. THE PATIENT STATED THAT THE LAST SHE HAD A MRI OF THE HEAD SHE HAD TO BE HEAVILY SEDATED. THE PATIENT STATED THAT SHE JUST WOULD RATHER NOT HAVE THE MRI AT ALL.      PATIENT'S CALLBACK: [phone]

## 2021-01-12 NOTE — TELEPHONE ENCOUNTER
Please find out if she means premedication or sedation. This is up to patient- workup is for her memory issues that she complained of at visit. She can also see neurology for evaluation and they can determine if MRI is necessary.

## 2021-01-13 NOTE — TELEPHONE ENCOUNTER
Patient states that they just premedicated her and the medication made her almost fall asleep. She states she has an appt with neurology on Thursday 01/18 and will wait for that appt to see what they say

## 2021-01-15 ENCOUNTER — HOSPITAL ENCOUNTER (OUTPATIENT)
Dept: CARDIOLOGY | Facility: HOSPITAL | Age: 76
Discharge: HOME | End: 2021-01-15
Admitting: PHYSICIAN ASSISTANT

## 2021-01-15 LAB
BH CV XLRA MEAS CAROTID RIGHT ICA/CCA DIASTOLIC RATIO: 76
BH CV XLRA MEAS LEFT DIST CCA EDV: 15 CM/SEC
BH CV XLRA MEAS LEFT DIST CCA PSV: 62 CM/SEC
BH CV XLRA MEAS LEFT DIST ICA EDV: 15 CM/SEC
BH CV XLRA MEAS LEFT DIST ICA PSV: 56 CM/SEC
BH CV XLRA MEAS LEFT ICA/CCA RATIO: 0.61
BH CV XLRA MEAS LEFT MID ICA EDV: 16 CM/SEC
BH CV XLRA MEAS LEFT MID ICA PSV: 50 CM/SEC
BH CV XLRA MEAS LEFT PROX CCA EDV: 16 CM/SEC
BH CV XLRA MEAS LEFT PROX CCA PSV: 76 CM/SEC
BH CV XLRA MEAS LEFT PROX ECA EDV: 10 CM/SEC
BH CV XLRA MEAS LEFT PROX ECA PSV: 64 CM/SEC
BH CV XLRA MEAS LEFT PROX ICA EDV: 16 CM/SEC
BH CV XLRA MEAS LEFT PROX ICA PSV: 38 CM/SEC
BH CV XLRA MEAS LEFT PROX SCLA EDV: 0 CM/SEC
BH CV XLRA MEAS LEFT PROX SCLA PSV: 100 CM/SEC
BH CV XLRA MEAS LEFT VERTEBRAL A EDV: 15 CM/SEC
BH CV XLRA MEAS LEFT VERTEBRAL A PSV: 41 CM/SEC
BH CV XLRA MEAS RIGHT DIST CCA EDV: 18 CM/SEC
BH CV XLRA MEAS RIGHT DIST CCA PSV: 48 CM/SEC
BH CV XLRA MEAS RIGHT DIST ICA EDV: 17 CM/SEC
BH CV XLRA MEAS RIGHT DIST ICA PSV: 52 CM/SEC
BH CV XLRA MEAS RIGHT ICA/CCA RATIO: 1.43
BH CV XLRA MEAS RIGHT MID ICA EDV: 13 CM/SEC
BH CV XLRA MEAS RIGHT MID ICA PSV: 49 CM/SEC
BH CV XLRA MEAS RIGHT PROX CCA EDV: 15 CM/SEC
BH CV XLRA MEAS RIGHT PROX CCA PSV: 55 CM/SEC
BH CV XLRA MEAS RIGHT PROX ECA EDV: 6 CM/SEC
BH CV XLRA MEAS RIGHT PROX ECA PSV: 63 CM/SEC
BH CV XLRA MEAS RIGHT PROX ICA EDV: 17 CM/SEC
BH CV XLRA MEAS RIGHT PROX ICA PSV: 69 CM/SEC
BH CV XLRA MEAS RIGHT PROX SCLA EDV: 0 CM/SEC
BH CV XLRA MEAS RIGHT PROX SCLA PSV: 115 CM/SEC
BH CV XLRA MEAS RIGHT VERTEBRAL A EDV: 7 CM/SEC
BH CV XLRA MEAS RIGHT VERTEBRAL A PSV: 37 CM/SEC
LEFT ARM BP: NORMAL MMHG
RIGHT ARM BP: NORMAL MMHG

## 2021-01-15 PROCEDURE — 93880 EXTRACRANIAL BILAT STUDY: CPT

## 2021-02-04 ENCOUNTER — OFFICE VISIT (OUTPATIENT)
Dept: FAMILY MEDICINE CLINIC | Facility: CLINIC | Age: 76
End: 2021-02-04

## 2021-02-04 DIAGNOSIS — R11.0 NAUSEA: Primary | ICD-10-CM

## 2021-02-04 PROCEDURE — G2025 DIS SITE TELE SVCS RHC/FQHC: HCPCS | Performed by: NURSE PRACTITIONER

## 2021-02-04 RX ORDER — ONDANSETRON 8 MG/1
8 TABLET, ORALLY DISINTEGRATING ORAL EVERY 8 HOURS PRN
Qty: 30 TABLET | Refills: 0 | Status: SHIPPED | OUTPATIENT
Start: 2021-02-04 | End: 2021-04-28

## 2021-02-04 NOTE — PROGRESS NOTES
Subjective   Michelle Espinoza is a 75 y.o. female.     No chief complaint on file.       There were no vitals filed for this visit.     History of Present Illness     The following portions of the patient's history were reviewed and updated as appropriate: allergies, current medications, past family history, past medical history, past social history, past surgical history, and problem list.    Review of Systems    Objective   Physical Exam      Assessment/Plan   Problems Addressed this Visit     None      Diagnoses    None.

## 2021-02-04 NOTE — PROGRESS NOTES
Subjective   Michelle Espinoza is a 75 y.o. female who is being evaluated by telephone visit for nausea, diarrhea, cough.     History of Present Illness   This is a 75-year-old female patient being seen today by telephone visit due to nausea, diarrhea, cough.  She reports her symptoms started on Sunday with diarrhea.  She later developed a mild cough.  She reports minimal amount of phlegm.  She denies any shortness of breath.  She received a Covid test yesterday and results are currently pending.  Her main complaint today is her nausea and would like some relief from that.  She reports being afebrile.      You have chosen to receive care through a telephone visit. Do you consent to use a telephone visit for your medical care today? Yes        The following portions of the patient's history were reviewed and updated as appropriate: allergies, current medications, past family history, past medical history, past social history, past surgical history and problem list.    Review of Systems   Constitutional: Positive for fatigue. Negative for chills and fever.   HENT: Negative for congestion, ear pain, rhinorrhea and sore throat.    Respiratory: Positive for cough. Negative for chest tightness and shortness of breath.    Cardiovascular: Negative for chest pain and leg swelling.   Gastrointestinal: Positive for diarrhea and nausea. Negative for abdominal pain and vomiting.   Neurological: Negative for dizziness and weakness.       Objective   There were no vitals filed for this visit.  There is no height or weight on file to calculate BMI.    Physical Exam    Assessment/Plan   There are no diagnoses linked to this encounter.    Assessment and plan  75 y.o. female who is being evaluated by telephone visit for nausea, diarrhea, cough    I will send over Zofran for relief from nausea.    I encouraged patient to follow a bland diet to help with the diarrhea.  Patient reports cough is very mild.  She will monitor her symptoms  closely and if she needs to be seen she will call back and make a follow-up appointment with Lillie.     About 15 minutes spent reviewing the patient's chart and telephone visit, medical decision-making, and treatment plan.

## 2021-02-08 ENCOUNTER — TELEPHONE (OUTPATIENT)
Dept: FAMILY MEDICINE CLINIC | Facility: CLINIC | Age: 76
End: 2021-02-08

## 2021-02-08 NOTE — TELEPHONE ENCOUNTER
Caller: Michelle Espinoza    Relationship to patient: Self    Best call back number:[phone]    Concerns or Questions if Applicable: PATIENT WAS TESTED LAST Wednesday FOR COVID RECEIVED RESULTS TODAY THAT WERE POSITIVE . PATIENT IS VERY NAUSEOUS. PATIENT NEEDS TO KNOW WHAT TO DO OR WHAT SHE COULD TAKE. PLEASE ADVISE.

## 2021-02-08 NOTE — TELEPHONE ENCOUNTER
Patient informed and received her test at [facility].  She is going to upload her results to [facility].  She will go to the ER if her symptoms persist.

## 2021-02-08 NOTE — TELEPHONE ENCOUNTER
Please find out where she was tested for Covid and obtain results.  Unfortunately, I cannot make significant recommendations.  If the Zofran did not help with her nausea, she needs to be seen here or at the ER.  If she is having any significant weakness, shortness of breath, cough, she should go straight to the ER.  I have seen nausea as some of the presenting symptoms with Covid 19 pneumonia.

## 2021-02-18 ENCOUNTER — TELEMEDICINE (OUTPATIENT)
Dept: NEUROLOGY | Facility: CLINIC | Age: 76
End: 2021-02-18

## 2021-02-18 DIAGNOSIS — R41.89 SUBJECTIVE MEMORY COMPLAINTS: Primary | ICD-10-CM

## 2021-02-18 PROCEDURE — 99214 OFFICE O/P EST MOD 30 MIN: CPT | Performed by: NURSE PRACTITIONER

## 2021-02-18 NOTE — PROGRESS NOTES
Subjective   Michelle Espinoza is a 75 y.o. female presenting for evaluation for memory complaints. 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 She complained mostly of numbness at the time and her syjmptoms were not overly bothersome to her so we did not treat her with any medication. She has not been seen since that time.     She states today that she has noticed some mild memory trouble over the last several months. She ays she occasionally jose alberto forget someone's name. She also says that she was in the car with her grandson going to a store and parked in front of the wrong store. Her grandson mentioned she was at the wrong store. She lives with her  and performs all ADLs independently. She denies any recent medication changes. She says she is up a few times a night to use the restroom. She denies snoring. Denies headaches, dry mouth.     No history of headaches. Denies known history of stroke. History of hyperlipidemia but does not take medication. States that her diet was worse due to Covid-19 and being home with nothing to do. Working to improve this.     Denies any recent medication changes.       History of Present Illness     The following portions of the patient's history were reviewed and updated as appropriate: allergies, current medications, past family history, past medical history, past social history, past surgical history and problem list.    Review of Systems    Objective   Physical Exam  Limited exam due to limitations of video visit.    MENTAL STATUS: Awake, alert, oriented x3. Conversant. No evidence of an affective disorder, thought disorder, delusions or hallucinations.   HCF: No aphasia, apraxia, or dysarthria. Recent and remote memory intact.  CRANIAL NERVES: Facial muscles symmetrical. Hearing intact to finger rub. Tongue protrudes midline.     Assessment/Plan      Diagnoses and all orders for this visit:    1. Subjective memory complaints  (Primary)        The patient presents today with reports of some mild memory changes over the last several months. She says she will occasionally forget names. She went to a store with her grandson and parked at the store next to the one she was intending to go to.   She is not overly concerned about this but says she wants to make sure nothing more serious is going on. She says she is very claustrophobic and does not want an MRI at this time. I did explain to her that an MRI brain is part of our work up for memory loss, but if she would prefer we can complete the neuropsychology testing first. If testing is normal we may be able to hold off on any imaging at this time. If testing is abnormal we can discuss the need for imaging at that time. She had lab work in December that revealed normal vitamin B12, negative RPR, normal vitamin D, normal thyroid studies, CBC, and CMP. Her hemoglobin A1c was slightly elevated at 7.1. I do not see a need for additional lab work at this time. She does have a history of hyperlipidemia and is not on any medication for this. Her most recent lipid panel was in May 2020 and revealed an LDL of 164. HDL 48.     Will await results of neuropsychology testing and then f/u to determine plan of care. She will call in the meantime with any problems. I did encouraged regular physical activity as well as staying cognitively and socially active. Those tasks are a challenge at this time with the weather and Covid-19, however I recommend she continue these as safely as possible given the circumstances.

## 2021-02-24 ENCOUNTER — TELEPHONE (OUTPATIENT)
Dept: FAMILY MEDICINE CLINIC | Facility: CLINIC | Age: 76
End: 2021-02-24

## 2021-02-24 NOTE — TELEPHONE ENCOUNTER
PT WANTED TO KNOW WHEN SHE SHOULD GET HER COVID VACCINE;  PT HAD A NEGATIVE TEST 2/20/21; TESTED POSITIVE BEFORE THAT.

## 2021-03-09 DIAGNOSIS — Z23 IMMUNIZATION DUE: ICD-10-CM

## 2021-03-13 LAB
25(OH)D3+25(OH)D2 SERPL-MCNC: 50 NG/ML (ref 30–100)
AMBIG ABBREV BMP8 DEFAULT: NORMAL
BUN SERPL-MCNC: 32 MG/DL (ref 8–27)
BUN/CREAT SERPL: 26 (ref 12–28)
CALCIUM SERPL-MCNC: 9.8 MG/DL (ref 8.7–10.3)
CHLORIDE SERPL-SCNC: 103 MMOL/L (ref 96–106)
CO2 SERPL-SCNC: 22 MMOL/L (ref 20–29)
CREAT SERPL-MCNC: 1.25 MG/DL (ref 0.57–1)
FOLATE SERPL-MCNC: >20 NG/ML
GLUCOSE SERPL-MCNC: 170 MG/DL (ref 65–99)
HBA1C MFR BLD: 7.3 % (ref 4.8–5.6)
Lab: NORMAL
POTASSIUM SERPL-SCNC: 5.2 MMOL/L (ref 3.5–5.2)
PTH-INTACT SERPL-MCNC: 26 PG/ML (ref 15–65)
REPORT: NORMAL
SODIUM SERPL-SCNC: 143 MMOL/L (ref 134–144)
T4 FREE SERPL-MCNC: 0.93 NG/DL (ref 0.82–1.77)
TSH SERPL DL<=0.005 MIU/L-ACNC: 1.88 UIU/ML (ref 0.45–4.5)
VIT B12 SERPL-MCNC: 1631 PG/ML (ref 232–1245)

## 2021-03-27 ENCOUNTER — APPOINTMENT (OUTPATIENT)
Dept: CT IMAGING | Facility: HOSPITAL | Age: 76
End: 2021-03-27

## 2021-03-27 ENCOUNTER — APPOINTMENT (OUTPATIENT)
Dept: GENERAL RADIOLOGY | Facility: HOSPITAL | Age: 76
End: 2021-03-27

## 2021-03-27 ENCOUNTER — HOSPITAL ENCOUNTER (INPATIENT)
Facility: HOSPITAL | Age: 76
LOS: 5 days | Discharge: HOME HEALTH | End: 2021-04-01
Attending: EMERGENCY MEDICINE | Admitting: INTERNAL MEDICINE

## 2021-03-27 ENCOUNTER — APPOINTMENT (OUTPATIENT)
Dept: CARDIOLOGY | Facility: HOSPITAL | Age: 76
End: 2021-03-27

## 2021-03-27 DIAGNOSIS — N18.2 STAGE 2 CHRONIC KIDNEY DISEASE: ICD-10-CM

## 2021-03-27 DIAGNOSIS — I21.3 ST ELEVATION MYOCARDIAL INFARCTION (STEMI), UNSPECIFIED ARTERY (HCC): Primary | ICD-10-CM

## 2021-03-27 DIAGNOSIS — I21.11 ST ELEVATION MYOCARDIAL INFARCTION INVOLVING RIGHT CORONARY ARTERY (HCC): ICD-10-CM

## 2021-03-27 LAB
ACT BLD: 268 SECONDS (ref 82–152)
ACT BLD: 279 SECONDS (ref 82–152)
ACT BLD: 285 SECONDS (ref 82–152)
ACT BLD: 318 SECONDS (ref 82–152)
ACT BLD: 378 SECONDS (ref 82–152)
ALBUMIN SERPL-MCNC: 4 G/DL (ref 3.5–5.2)
ALBUMIN/GLOB SERPL: 1.2 G/DL
ALP SERPL-CCNC: 81 U/L (ref 39–117)
ALT SERPL W P-5'-P-CCNC: 55 U/L (ref 1–33)
ANION GAP SERPL CALCULATED.3IONS-SCNC: 12 MMOL/L (ref 5–15)
AST SERPL-CCNC: 64 U/L (ref 1–32)
BACTERIA UR QL AUTO: ABNORMAL /HPF
BASOPHILS # BLD AUTO: 0.04 10*3/MM3 (ref 0–0.2)
BASOPHILS NFR BLD AUTO: 0.3 % (ref 0–1.5)
BILIRUB SERPL-MCNC: 0.4 MG/DL (ref 0–1.2)
BILIRUB UR QL STRIP: NEGATIVE
BUN SERPL-MCNC: 27 MG/DL (ref 8–23)
BUN/CREAT SERPL: 21.1 (ref 7–25)
CALCIUM SPEC-SCNC: 9.5 MG/DL (ref 8.6–10.5)
CHLORIDE SERPL-SCNC: 102 MMOL/L (ref 98–107)
CK SERPL-CCNC: 252 U/L (ref 20–180)
CLARITY UR: CLEAR
CO2 SERPL-SCNC: 22 MMOL/L (ref 22–29)
COLOR UR: YELLOW
CREAT SERPL-MCNC: 1.28 MG/DL (ref 0.57–1)
DEPRECATED RDW RBC AUTO: 43.2 FL (ref 37–54)
EOSINOPHIL # BLD AUTO: 0.03 10*3/MM3 (ref 0–0.4)
EOSINOPHIL NFR BLD AUTO: 0.2 % (ref 0.3–6.2)
ERYTHROCYTE [DISTWIDTH] IN BLOOD BY AUTOMATED COUNT: 13.4 % (ref 12.3–15.4)
GFR SERPL CREATININE-BSD FRML MDRD: 41 ML/MIN/1.73
GLOBULIN UR ELPH-MCNC: 3.4 GM/DL
GLUCOSE BLDC GLUCOMTR-MCNC: 146 MG/DL (ref 70–130)
GLUCOSE BLDC GLUCOMTR-MCNC: 184 MG/DL (ref 70–130)
GLUCOSE BLDC GLUCOMTR-MCNC: 219 MG/DL (ref 70–130)
GLUCOSE SERPL-MCNC: 373 MG/DL (ref 65–99)
GLUCOSE UR STRIP-MCNC: NEGATIVE MG/DL
HCT VFR BLD AUTO: 41.9 % (ref 34–46.6)
HGB BLD-MCNC: 13.6 G/DL (ref 12–15.9)
HGB UR QL STRIP.AUTO: ABNORMAL
HOLD SPECIMEN: NORMAL
HYALINE CASTS UR QL AUTO: ABNORMAL /LPF
IMM GRANULOCYTES # BLD AUTO: 0.09 10*3/MM3 (ref 0–0.05)
IMM GRANULOCYTES NFR BLD AUTO: 0.6 % (ref 0–0.5)
KETONES UR QL STRIP: NEGATIVE
LEUKOCYTE ESTERASE UR QL STRIP.AUTO: ABNORMAL
LYMPHOCYTES # BLD AUTO: 0.83 10*3/MM3 (ref 0.7–3.1)
LYMPHOCYTES NFR BLD AUTO: 6 % (ref 19.6–45.3)
MAGNESIUM SERPL-MCNC: 2.2 MG/DL (ref 1.6–2.4)
MAGNESIUM SERPL-MCNC: 2.3 MG/DL (ref 1.6–2.4)
MCH RBC QN AUTO: 29.2 PG (ref 26.6–33)
MCHC RBC AUTO-ENTMCNC: 32.5 G/DL (ref 31.5–35.7)
MCV RBC AUTO: 89.9 FL (ref 79–97)
MONOCYTES # BLD AUTO: 0.67 10*3/MM3 (ref 0.1–0.9)
MONOCYTES NFR BLD AUTO: 4.8 % (ref 5–12)
NEUTROPHILS NFR BLD AUTO: 12.22 10*3/MM3 (ref 1.7–7)
NEUTROPHILS NFR BLD AUTO: 88.1 % (ref 42.7–76)
NITRITE UR QL STRIP: NEGATIVE
NRBC BLD AUTO-RTO: 0 /100 WBC (ref 0–0.2)
PH UR STRIP.AUTO: 6 [PH] (ref 5–8)
PLATELET # BLD AUTO: 221 10*3/MM3 (ref 140–450)
PMV BLD AUTO: 10.8 FL (ref 6–12)
POTASSIUM SERPL-SCNC: 4.6 MMOL/L (ref 3.5–5.2)
POTASSIUM SERPL-SCNC: 4.7 MMOL/L (ref 3.5–5.2)
PROT SERPL-MCNC: 7.4 G/DL (ref 6–8.5)
PROT UR QL STRIP: ABNORMAL
QT INTERVAL: 391 MS
QT INTERVAL: 442 MS
RBC # BLD AUTO: 4.66 10*6/MM3 (ref 3.77–5.28)
RBC # UR: ABNORMAL /HPF
REF LAB TEST METHOD: ABNORMAL
SARS-COV-2 RNA RESP QL NAA+PROBE: NOT DETECTED
SODIUM SERPL-SCNC: 136 MMOL/L (ref 136–145)
SP GR UR STRIP: 1.01 (ref 1–1.03)
SQUAMOUS #/AREA URNS HPF: ABNORMAL /HPF
T4 FREE SERPL-MCNC: 0.93 NG/DL (ref 0.93–1.7)
TROPONIN T SERPL-MCNC: 0.68 NG/ML (ref 0–0.03)
TROPONIN T SERPL-MCNC: 7.68 NG/ML (ref 0–0.03)
TSH SERPL DL<=0.05 MIU/L-ACNC: 1.62 UIU/ML (ref 0.27–4.2)
UROBILINOGEN UR QL STRIP: ABNORMAL
WBC # BLD AUTO: 13.88 10*3/MM3 (ref 3.4–10.8)
WBC UR QL AUTO: ABNORMAL /HPF
WHOLE BLOOD HOLD SPECIMEN: NORMAL

## 2021-03-27 PROCEDURE — C1725 CATH, TRANSLUMIN NON-LASER: HCPCS | Performed by: INTERNAL MEDICINE

## 2021-03-27 PROCEDURE — 83735 ASSAY OF MAGNESIUM: CPT | Performed by: EMERGENCY MEDICINE

## 2021-03-27 PROCEDURE — U0003 INFECTIOUS AGENT DETECTION BY NUCLEIC ACID (DNA OR RNA); SEVERE ACUTE RESPIRATORY SYNDROME CORONAVIRUS 2 (SARS-COV-2) (CORONAVIRUS DISEASE [COVID-19]), AMPLIFIED PROBE TECHNIQUE, MAKING USE OF HIGH THROUGHPUT TECHNOLOGIES AS DESCRIBED BY CMS-2020-01-R: HCPCS | Performed by: EMERGENCY MEDICINE

## 2021-03-27 PROCEDURE — 93005 ELECTROCARDIOGRAM TRACING: CPT | Performed by: INTERNAL MEDICINE

## 2021-03-27 PROCEDURE — 93306 TTE W/DOPPLER COMPLETE: CPT

## 2021-03-27 PROCEDURE — C1874 STENT, COATED/COV W/DEL SYS: HCPCS | Performed by: INTERNAL MEDICINE

## 2021-03-27 PROCEDURE — 92921: CPT | Performed by: INTERNAL MEDICINE

## 2021-03-27 PROCEDURE — 0 IOPAMIDOL PER 1 ML: Performed by: INTERNAL MEDICINE

## 2021-03-27 PROCEDURE — 25010000002 HYDROMORPHONE 1 MG/ML SOLUTION: Performed by: EMERGENCY MEDICINE

## 2021-03-27 PROCEDURE — 25010000002 ONDANSETRON PER 1 MG: Performed by: EMERGENCY MEDICINE

## 2021-03-27 PROCEDURE — 027036Z DILATION OF CORONARY ARTERY, ONE ARTERY WITH THREE DRUG-ELUTING INTRALUMINAL DEVICES, PERCUTANEOUS APPROACH: ICD-10-PCS | Performed by: INTERNAL MEDICINE

## 2021-03-27 PROCEDURE — 25010000002 HEPARIN (PORCINE) PER 1000 UNITS: Performed by: INTERNAL MEDICINE

## 2021-03-27 PROCEDURE — 84132 ASSAY OF SERUM POTASSIUM: CPT | Performed by: INTERNAL MEDICINE

## 2021-03-27 PROCEDURE — 93005 ELECTROCARDIOGRAM TRACING: CPT | Performed by: EMERGENCY MEDICINE

## 2021-03-27 PROCEDURE — 84484 ASSAY OF TROPONIN QUANT: CPT | Performed by: EMERGENCY MEDICINE

## 2021-03-27 PROCEDURE — 82550 ASSAY OF CK (CPK): CPT | Performed by: EMERGENCY MEDICINE

## 2021-03-27 PROCEDURE — C1894 INTRO/SHEATH, NON-LASER: HCPCS | Performed by: INTERNAL MEDICINE

## 2021-03-27 PROCEDURE — 83735 ASSAY OF MAGNESIUM: CPT | Performed by: INTERNAL MEDICINE

## 2021-03-27 PROCEDURE — 73030 X-RAY EXAM OF SHOULDER: CPT

## 2021-03-27 PROCEDURE — 63710000001 INSULIN LISPRO (HUMAN) PER 5 UNITS: Performed by: INTERNAL MEDICINE

## 2021-03-27 PROCEDURE — 25010000002 ATROPINE PER 0.01 MG: Performed by: INTERNAL MEDICINE

## 2021-03-27 PROCEDURE — 25010000002 PHENYLEPHRINE PER 1 ML: Performed by: INTERNAL MEDICINE

## 2021-03-27 PROCEDURE — 99223 1ST HOSP IP/OBS HIGH 75: CPT | Performed by: INTERNAL MEDICINE

## 2021-03-27 PROCEDURE — 25010000002 PERFLUTREN (DEFINITY) 8.476 MG IN SODIUM CHLORIDE (PF) 0.9 % 10 ML INJECTION: Performed by: INTERNAL MEDICINE

## 2021-03-27 PROCEDURE — 93010 ELECTROCARDIOGRAM REPORT: CPT | Performed by: INTERNAL MEDICINE

## 2021-03-27 PROCEDURE — 99153 MOD SED SAME PHYS/QHP EA: CPT | Performed by: INTERNAL MEDICINE

## 2021-03-27 PROCEDURE — U0005 INFEC AGEN DETEC AMPLI PROBE: HCPCS | Performed by: EMERGENCY MEDICINE

## 2021-03-27 PROCEDURE — 84443 ASSAY THYROID STIM HORMONE: CPT | Performed by: EMERGENCY MEDICINE

## 2021-03-27 PROCEDURE — 71045 X-RAY EXAM CHEST 1 VIEW: CPT

## 2021-03-27 PROCEDURE — C1757 CATH, THROMBECTOMY/EMBOLECT: HCPCS | Performed by: INTERNAL MEDICINE

## 2021-03-27 PROCEDURE — 93306 TTE W/DOPPLER COMPLETE: CPT | Performed by: INTERNAL MEDICINE

## 2021-03-27 PROCEDURE — 25010000002 HEPARIN (PORCINE) PER 1000 UNITS: Performed by: EMERGENCY MEDICINE

## 2021-03-27 PROCEDURE — 80053 COMPREHEN METABOLIC PANEL: CPT | Performed by: EMERGENCY MEDICINE

## 2021-03-27 PROCEDURE — 81001 URINALYSIS AUTO W/SCOPE: CPT | Performed by: EMERGENCY MEDICINE

## 2021-03-27 PROCEDURE — B2151ZZ FLUOROSCOPY OF LEFT HEART USING LOW OSMOLAR CONTRAST: ICD-10-PCS | Performed by: INTERNAL MEDICINE

## 2021-03-27 PROCEDURE — B2111ZZ FLUOROSCOPY OF MULTIPLE CORONARY ARTERIES USING LOW OSMOLAR CONTRAST: ICD-10-PCS | Performed by: INTERNAL MEDICINE

## 2021-03-27 PROCEDURE — C1769 GUIDE WIRE: HCPCS | Performed by: INTERNAL MEDICINE

## 2021-03-27 PROCEDURE — C9606 PERC D-E COR REVASC W AMI S: HCPCS | Performed by: INTERNAL MEDICINE

## 2021-03-27 PROCEDURE — 84484 ASSAY OF TROPONIN QUANT: CPT | Performed by: INTERNAL MEDICINE

## 2021-03-27 PROCEDURE — 25010000002 EPTIFIBATIDE PER 5 MG: Performed by: INTERNAL MEDICINE

## 2021-03-27 PROCEDURE — P9612 CATHETERIZE FOR URINE SPEC: HCPCS

## 2021-03-27 PROCEDURE — 25010000002 FENTANYL CITRATE (PF) 100 MCG/2ML SOLUTION: Performed by: INTERNAL MEDICINE

## 2021-03-27 PROCEDURE — 92921 PR PRQ TRLUML CORONARY ANGIOPLASTY ADDL BRANCH: CPT | Performed by: INTERNAL MEDICINE

## 2021-03-27 PROCEDURE — 72125 CT NECK SPINE W/O DYE: CPT

## 2021-03-27 PROCEDURE — 4A023N7 MEASUREMENT OF CARDIAC SAMPLING AND PRESSURE, LEFT HEART, PERCUTANEOUS APPROACH: ICD-10-PCS | Performed by: INTERNAL MEDICINE

## 2021-03-27 PROCEDURE — 82962 GLUCOSE BLOOD TEST: CPT

## 2021-03-27 PROCEDURE — 93458 L HRT ARTERY/VENTRICLE ANGIO: CPT | Performed by: INTERNAL MEDICINE

## 2021-03-27 PROCEDURE — 02C03ZZ EXTIRPATION OF MATTER FROM CORONARY ARTERY, ONE ARTERY, PERCUTANEOUS APPROACH: ICD-10-PCS | Performed by: INTERNAL MEDICINE

## 2021-03-27 PROCEDURE — 3E033PZ INTRODUCTION OF PLATELET INHIBITOR INTO PERIPHERAL VEIN, PERCUTANEOUS APPROACH: ICD-10-PCS | Performed by: INTERNAL MEDICINE

## 2021-03-27 PROCEDURE — 99284 EMERGENCY DEPT VISIT MOD MDM: CPT

## 2021-03-27 PROCEDURE — 25010000002 BH (CUPID ONLY) ADENOSINE 6 MG/100ML MIXTURE: Performed by: INTERNAL MEDICINE

## 2021-03-27 PROCEDURE — 25010000002 DOPAMINE PER 40 MG: Performed by: INTERNAL MEDICINE

## 2021-03-27 PROCEDURE — 85025 COMPLETE CBC W/AUTO DIFF WBC: CPT | Performed by: EMERGENCY MEDICINE

## 2021-03-27 PROCEDURE — 70450 CT HEAD/BRAIN W/O DYE: CPT

## 2021-03-27 PROCEDURE — 25010000002 MIDAZOLAM PER 1 MG: Performed by: INTERNAL MEDICINE

## 2021-03-27 PROCEDURE — C1887 CATHETER, GUIDING: HCPCS | Performed by: INTERNAL MEDICINE

## 2021-03-27 PROCEDURE — 99152 MOD SED SAME PHYS/QHP 5/>YRS: CPT | Performed by: INTERNAL MEDICINE

## 2021-03-27 PROCEDURE — 84439 ASSAY OF FREE THYROXINE: CPT | Performed by: EMERGENCY MEDICINE

## 2021-03-27 PROCEDURE — 92941 PRQ TRLML REVSC TOT OCCL AMI: CPT | Performed by: INTERNAL MEDICINE

## 2021-03-27 PROCEDURE — 85347 COAGULATION TIME ACTIVATED: CPT

## 2021-03-27 DEVICE — XIENCE SIERRA™ EVEROLIMUS ELUTING CORONARY STENT SYSTEM 3.50 MM X 15 MM / RAPID-EXCHANGE
Type: IMPLANTABLE DEVICE | Status: FUNCTIONAL
Brand: XIENCE SIERRA™

## 2021-03-27 DEVICE — XIENCE SIERRA™ EVEROLIMUS ELUTING CORONARY STENT SYSTEM 3.50 MM X 12 MM / RAPID-EXCHANGE
Type: IMPLANTABLE DEVICE | Status: FUNCTIONAL
Brand: XIENCE SIERRA™

## 2021-03-27 DEVICE — XIENCE SIERRA™ EVEROLIMUS ELUTING CORONARY STENT SYSTEM 3.00 MM X 28 MM / RAPID-EXCHANGE
Type: IMPLANTABLE DEVICE | Status: FUNCTIONAL
Brand: XIENCE SIERRA™

## 2021-03-27 RX ORDER — ASPIRIN 81 MG/1
324 TABLET, CHEWABLE ORAL ONCE
Status: COMPLETED | OUTPATIENT
Start: 2021-03-27 | End: 2021-03-27

## 2021-03-27 RX ORDER — OXYBUTYNIN CHLORIDE 5 MG/1
5 TABLET, EXTENDED RELEASE ORAL DAILY
Status: DISCONTINUED | OUTPATIENT
Start: 2021-03-27 | End: 2021-04-01 | Stop reason: HOSPADM

## 2021-03-27 RX ORDER — SODIUM CHLORIDE 9 MG/ML
75 INJECTION, SOLUTION INTRAVENOUS CONTINUOUS
Status: ACTIVE | OUTPATIENT
Start: 2021-03-27 | End: 2021-03-27

## 2021-03-27 RX ORDER — BACLOFEN 10 MG/1
20 TABLET ORAL DAILY
Status: DISCONTINUED | OUTPATIENT
Start: 2021-03-27 | End: 2021-04-01 | Stop reason: HOSPADM

## 2021-03-27 RX ORDER — MORPHINE SULFATE 2 MG/ML
1 INJECTION, SOLUTION INTRAMUSCULAR; INTRAVENOUS EVERY 4 HOURS PRN
Status: DISCONTINUED | OUTPATIENT
Start: 2021-03-27 | End: 2021-04-01 | Stop reason: HOSPADM

## 2021-03-27 RX ORDER — DULOXETIN HYDROCHLORIDE 60 MG/1
60 CAPSULE, DELAYED RELEASE ORAL DAILY
Status: DISCONTINUED | OUTPATIENT
Start: 2021-03-27 | End: 2021-04-01 | Stop reason: HOSPADM

## 2021-03-27 RX ORDER — ONDANSETRON 2 MG/ML
4 INJECTION INTRAMUSCULAR; INTRAVENOUS ONCE
Status: COMPLETED | OUTPATIENT
Start: 2021-03-27 | End: 2021-03-27

## 2021-03-27 RX ORDER — MIDAZOLAM HYDROCHLORIDE 1 MG/ML
INJECTION INTRAMUSCULAR; INTRAVENOUS AS NEEDED
Status: DISCONTINUED | OUTPATIENT
Start: 2021-03-27 | End: 2021-03-27 | Stop reason: HOSPADM

## 2021-03-27 RX ORDER — INSULIN LISPRO 100 [IU]/ML
0-9 INJECTION, SOLUTION INTRAVENOUS; SUBCUTANEOUS
Status: DISCONTINUED | OUTPATIENT
Start: 2021-03-27 | End: 2021-04-01 | Stop reason: HOSPADM

## 2021-03-27 RX ORDER — ATROPINE SULFATE 1 MG/ML
INJECTION, SOLUTION INTRAMUSCULAR; INTRAVENOUS; SUBCUTANEOUS AS NEEDED
Status: DISCONTINUED | OUTPATIENT
Start: 2021-03-27 | End: 2021-03-27 | Stop reason: HOSPADM

## 2021-03-27 RX ORDER — ACETAMINOPHEN 325 MG/1
650 TABLET ORAL EVERY 4 HOURS PRN
Status: DISCONTINUED | OUTPATIENT
Start: 2021-03-27 | End: 2021-04-01 | Stop reason: HOSPADM

## 2021-03-27 RX ORDER — ONDANSETRON 4 MG/1
4 TABLET, FILM COATED ORAL EVERY 6 HOURS PRN
Status: DISCONTINUED | OUTPATIENT
Start: 2021-03-27 | End: 2021-04-01 | Stop reason: HOSPADM

## 2021-03-27 RX ORDER — DOPAMINE HYDROCHLORIDE 160 MG/100ML
INJECTION, SOLUTION INTRAVENOUS CONTINUOUS PRN
Status: DISCONTINUED | OUTPATIENT
Start: 2021-03-27 | End: 2021-03-27 | Stop reason: HOSPADM

## 2021-03-27 RX ORDER — HYDROCODONE BITARTRATE AND ACETAMINOPHEN 5; 325 MG/1; MG/1
1 TABLET ORAL EVERY 4 HOURS PRN
Status: DISCONTINUED | OUTPATIENT
Start: 2021-03-27 | End: 2021-04-01 | Stop reason: HOSPADM

## 2021-03-27 RX ORDER — NALOXONE HCL 0.4 MG/ML
0.4 VIAL (ML) INJECTION
Status: DISCONTINUED | OUTPATIENT
Start: 2021-03-27 | End: 2021-04-01 | Stop reason: HOSPADM

## 2021-03-27 RX ORDER — HEPARIN SODIUM 1000 [USP'U]/ML
INJECTION, SOLUTION INTRAVENOUS; SUBCUTANEOUS AS NEEDED
Status: DISCONTINUED | OUTPATIENT
Start: 2021-03-27 | End: 2021-03-27 | Stop reason: HOSPADM

## 2021-03-27 RX ORDER — EPTIFIBATIDE 0.75 MG/ML
INJECTION, SOLUTION INTRAVENOUS CONTINUOUS PRN
Status: DISCONTINUED | OUTPATIENT
Start: 2021-03-27 | End: 2021-03-27 | Stop reason: HOSPADM

## 2021-03-27 RX ORDER — ATORVASTATIN CALCIUM 20 MG/1
40 TABLET, FILM COATED ORAL NIGHTLY
Status: DISCONTINUED | OUTPATIENT
Start: 2021-03-27 | End: 2021-03-30

## 2021-03-27 RX ORDER — SODIUM CHLORIDE 0.9 % (FLUSH) 0.9 %
10 SYRINGE (ML) INJECTION AS NEEDED
Status: DISCONTINUED | OUTPATIENT
Start: 2021-03-27 | End: 2021-04-01 | Stop reason: HOSPADM

## 2021-03-27 RX ORDER — DEXTROSE MONOHYDRATE 25 G/50ML
25 INJECTION, SOLUTION INTRAVENOUS
Status: DISCONTINUED | OUTPATIENT
Start: 2021-03-27 | End: 2021-04-01 | Stop reason: HOSPADM

## 2021-03-27 RX ORDER — FENTANYL CITRATE 50 UG/ML
INJECTION, SOLUTION INTRAMUSCULAR; INTRAVENOUS AS NEEDED
Status: DISCONTINUED | OUTPATIENT
Start: 2021-03-27 | End: 2021-03-27 | Stop reason: HOSPADM

## 2021-03-27 RX ORDER — ASPIRIN 81 MG/1
81 TABLET, CHEWABLE ORAL DAILY
Status: DISCONTINUED | OUTPATIENT
Start: 2021-03-28 | End: 2021-04-01 | Stop reason: HOSPADM

## 2021-03-27 RX ORDER — ONDANSETRON 2 MG/ML
4 INJECTION INTRAMUSCULAR; INTRAVENOUS EVERY 6 HOURS PRN
Status: DISCONTINUED | OUTPATIENT
Start: 2021-03-27 | End: 2021-04-01 | Stop reason: HOSPADM

## 2021-03-27 RX ORDER — CLOPIDOGREL BISULFATE 75 MG/1
600 TABLET ORAL ONCE
Status: COMPLETED | OUTPATIENT
Start: 2021-03-27 | End: 2021-03-27

## 2021-03-27 RX ORDER — COLCHICINE 0.6 MG/1
0.6 TABLET ORAL DAILY
Status: DISCONTINUED | OUTPATIENT
Start: 2021-03-27 | End: 2021-04-01 | Stop reason: HOSPADM

## 2021-03-27 RX ORDER — NICOTINE POLACRILEX 4 MG
15 LOZENGE BUCCAL
Status: DISCONTINUED | OUTPATIENT
Start: 2021-03-27 | End: 2021-04-01 | Stop reason: HOSPADM

## 2021-03-27 RX ORDER — GLIPIZIDE 5 MG/1
5 TABLET ORAL
Status: DISCONTINUED | OUTPATIENT
Start: 2021-03-28 | End: 2021-04-01 | Stop reason: HOSPADM

## 2021-03-27 RX ORDER — LIDOCAINE HYDROCHLORIDE 20 MG/ML
INJECTION, SOLUTION INFILTRATION; PERINEURAL AS NEEDED
Status: DISCONTINUED | OUTPATIENT
Start: 2021-03-27 | End: 2021-03-27 | Stop reason: HOSPADM

## 2021-03-27 RX ORDER — SODIUM CHLORIDE 9 MG/ML
INJECTION, SOLUTION INTRAVENOUS CONTINUOUS PRN
Status: COMPLETED | OUTPATIENT
Start: 2021-03-27 | End: 2021-03-27

## 2021-03-27 RX ORDER — HEPARIN SODIUM 5000 [USP'U]/ML
60 INJECTION, SOLUTION INTRAVENOUS; SUBCUTANEOUS ONCE
Status: COMPLETED | OUTPATIENT
Start: 2021-03-27 | End: 2021-03-27

## 2021-03-27 RX ORDER — CLOPIDOGREL BISULFATE 75 MG/1
75 TABLET ORAL DAILY
Status: DISCONTINUED | OUTPATIENT
Start: 2021-03-28 | End: 2021-04-01 | Stop reason: HOSPADM

## 2021-03-27 RX ADMIN — OXYBUTYNIN CHLORIDE 5 MG: 5 TABLET, EXTENDED RELEASE ORAL at 16:03

## 2021-03-27 RX ADMIN — ATORVASTATIN CALCIUM 40 MG: 20 TABLET, FILM COATED ORAL at 20:11

## 2021-03-27 RX ADMIN — PERFLUTREN 2 ML: 6.52 INJECTION, SUSPENSION INTRAVENOUS at 15:35

## 2021-03-27 RX ADMIN — HYDROCODONE BITARTRATE AND ACETAMINOPHEN 1 TABLET: 5; 325 TABLET ORAL at 14:15

## 2021-03-27 RX ADMIN — ONDANSETRON 4 MG: 2 INJECTION INTRAMUSCULAR; INTRAVENOUS at 09:36

## 2021-03-27 RX ADMIN — ASPIRIN 324 MG: 81 TABLET, CHEWABLE ORAL at 09:24

## 2021-03-27 RX ADMIN — INSULIN LISPRO 2 UNITS: 100 INJECTION, SOLUTION INTRAVENOUS; SUBCUTANEOUS at 18:14

## 2021-03-27 RX ADMIN — BACLOFEN 20 MG: 10 TABLET ORAL at 16:02

## 2021-03-27 RX ADMIN — HYDROCODONE BITARTRATE AND ACETAMINOPHEN 1 TABLET: 5; 325 TABLET ORAL at 18:19

## 2021-03-27 RX ADMIN — COLCHICINE 0.6 MG: 0.6 TABLET, FILM COATED ORAL at 16:03

## 2021-03-27 RX ADMIN — DULOXETINE HYDROCHLORIDE 60 MG: 60 CAPSULE, DELAYED RELEASE ORAL at 16:03

## 2021-03-27 RX ADMIN — HYDROMORPHONE HYDROCHLORIDE 1 MG: 1 INJECTION, SOLUTION INTRAMUSCULAR; INTRAVENOUS; SUBCUTANEOUS at 12:23

## 2021-03-27 RX ADMIN — HEPARIN SODIUM 5800 UNITS: 5000 INJECTION, SOLUTION INTRAVENOUS; SUBCUTANEOUS at 09:36

## 2021-03-27 RX ADMIN — METOPROLOL TARTRATE 12.5 MG: 25 TABLET, FILM COATED ORAL at 14:15

## 2021-03-27 RX ADMIN — METOPROLOL TARTRATE 12.5 MG: 25 TABLET, FILM COATED ORAL at 20:11

## 2021-03-27 RX ADMIN — CLOPIDOGREL BISULFATE 600 MG: 300 TABLET, FILM COATED ORAL at 09:37

## 2021-03-28 LAB
ANION GAP SERPL CALCULATED.3IONS-SCNC: 15.1 MMOL/L (ref 5–15)
AORTIC DIMENSIONLESS INDEX: 0.5 (DI)
BH CV ECHO MEAS - ACS: 2.3 CM
BH CV ECHO MEAS - AO MAX PG (FULL): 2.7 MMHG
BH CV ECHO MEAS - AO MAX PG: 3.7 MMHG
BH CV ECHO MEAS - AO MEAN PG (FULL): 1 MMHG
BH CV ECHO MEAS - AO MEAN PG: 2 MMHG
BH CV ECHO MEAS - AO ROOT AREA (BSA CORRECTED): 1.7
BH CV ECHO MEAS - AO ROOT AREA: 8.6 CM^2
BH CV ECHO MEAS - AO ROOT DIAM: 3.3 CM
BH CV ECHO MEAS - AO V2 MAX: 96.8 CM/SEC
BH CV ECHO MEAS - AO V2 MEAN: 69.1 CM/SEC
BH CV ECHO MEAS - AO V2 VTI: 17.8 CM
BH CV ECHO MEAS - ASC AORTA: 2.4 CM
BH CV ECHO MEAS - AVA(I,A): 1.4 CM^2
BH CV ECHO MEAS - AVA(I,D): 1.4 CM^2
BH CV ECHO MEAS - AVA(V,A): 1.5 CM^2
BH CV ECHO MEAS - AVA(V,D): 1.5 CM^2
BH CV ECHO MEAS - BSA(HAYCOCK): 2.1 M^2
BH CV ECHO MEAS - BSA: 2 M^2
BH CV ECHO MEAS - BZI_BMI: 37.6 KILOGRAMS/M^2
BH CV ECHO MEAS - BZI_METRIC_HEIGHT: 160 CM
BH CV ECHO MEAS - BZI_METRIC_WEIGHT: 96.2 KG
BH CV ECHO MEAS - EDV(CUBED): 74.1 ML
BH CV ECHO MEAS - EDV(MOD-SP2): 58 ML
BH CV ECHO MEAS - EDV(MOD-SP4): 63 ML
BH CV ECHO MEAS - EDV(TEICH): 78.6 ML
BH CV ECHO MEAS - EF(CUBED): 67.1 %
BH CV ECHO MEAS - EF(MOD-BP): 41.3 %
BH CV ECHO MEAS - EF(MOD-SP2): 43.1 %
BH CV ECHO MEAS - EF(MOD-SP4): 44.4 %
BH CV ECHO MEAS - EF(TEICH): 59 %
BH CV ECHO MEAS - ESV(CUBED): 24.4 ML
BH CV ECHO MEAS - ESV(MOD-SP2): 33 ML
BH CV ECHO MEAS - ESV(MOD-SP4): 35 ML
BH CV ECHO MEAS - ESV(TEICH): 32.2 ML
BH CV ECHO MEAS - FS: 31 %
BH CV ECHO MEAS - IVS/LVPW: 1
BH CV ECHO MEAS - IVSD: 1.2 CM
BH CV ECHO MEAS - LAT PEAK E' VEL: 3.2 CM/SEC
BH CV ECHO MEAS - LV DIASTOLIC VOL/BSA (35-75): 31.8 ML/M^2
BH CV ECHO MEAS - LV MASS(C)D: 178.2 GRAMS
BH CV ECHO MEAS - LV MASS(C)DI: 89.9 GRAMS/M^2
BH CV ECHO MEAS - LV MAX PG: 1 MMHG
BH CV ECHO MEAS - LV MEAN PG: 1 MMHG
BH CV ECHO MEAS - LV SYSTOLIC VOL/BSA (12-30): 17.7 ML/M^2
BH CV ECHO MEAS - LV V1 MAX: 50.3 CM/SEC
BH CV ECHO MEAS - LV V1 MEAN: 34.2 CM/SEC
BH CV ECHO MEAS - LV V1 VTI: 8.9 CM
BH CV ECHO MEAS - LVIDD: 4.2 CM
BH CV ECHO MEAS - LVIDS: 2.9 CM
BH CV ECHO MEAS - LVLD AP2: 6.6 CM
BH CV ECHO MEAS - LVLD AP4: 6.7 CM
BH CV ECHO MEAS - LVLS AP2: 5.8 CM
BH CV ECHO MEAS - LVLS AP4: 6.6 CM
BH CV ECHO MEAS - LVOT AREA (M): 2.8 CM^2
BH CV ECHO MEAS - LVOT AREA: 2.8 CM^2
BH CV ECHO MEAS - LVOT DIAM: 1.9 CM
BH CV ECHO MEAS - LVPWD: 1.2 CM
BH CV ECHO MEAS - MED PEAK E' VEL: 3.2 CM/SEC
BH CV ECHO MEAS - MV A DUR: 0.1 SEC
BH CV ECHO MEAS - MV A MAX VEL: 66 CM/SEC
BH CV ECHO MEAS - MV DEC SLOPE: 222.5 CM/SEC^2
BH CV ECHO MEAS - MV DEC TIME: 242 SEC
BH CV ECHO MEAS - MV E MAX VEL: 58.7 CM/SEC
BH CV ECHO MEAS - MV E/A: 0.89
BH CV ECHO MEAS - MV MAX PG: 2.4 MMHG
BH CV ECHO MEAS - MV MEAN PG: 1 MMHG
BH CV ECHO MEAS - MV P1/2T MAX VEL: 59.7 CM/SEC
BH CV ECHO MEAS - MV P1/2T: 78.6 MSEC
BH CV ECHO MEAS - MV V2 MAX: 78.2 CM/SEC
BH CV ECHO MEAS - MV V2 MEAN: 46.6 CM/SEC
BH CV ECHO MEAS - MV V2 VTI: 13.3 CM
BH CV ECHO MEAS - MVA P1/2T LCG: 3.7 CM^2
BH CV ECHO MEAS - MVA(P1/2T): 2.8 CM^2
BH CV ECHO MEAS - MVA(VTI): 1.9 CM^2
BH CV ECHO MEAS - PA ACC TIME: 0.07 SEC
BH CV ECHO MEAS - PA MAX PG (FULL): -0.06 MMHG
BH CV ECHO MEAS - PA MAX PG: 1.5 MMHG
BH CV ECHO MEAS - PA PR(ACCEL): 45.7 MMHG
BH CV ECHO MEAS - PA V2 MAX: 62.1 CM/SEC
BH CV ECHO MEAS - PULM A REVS DUR: 0.12 SEC
BH CV ECHO MEAS - PULM A REVS VEL: 28.4 CM/SEC
BH CV ECHO MEAS - PULM DIAS VEL: 25.7 CM/SEC
BH CV ECHO MEAS - PULM S/D: 1.3
BH CV ECHO MEAS - PULM SYS VEL: 32.4 CM/SEC
BH CV ECHO MEAS - PVA(V,A): 3.5 CM^2
BH CV ECHO MEAS - PVA(V,D): 3.5 CM^2
BH CV ECHO MEAS - QP/QS: 1.6
BH CV ECHO MEAS - RV MAX PG: 1.6 MMHG
BH CV ECHO MEAS - RV MEAN PG: 1 MMHG
BH CV ECHO MEAS - RV V1 MAX: 63.3 CM/SEC
BH CV ECHO MEAS - RV V1 MEAN: 39.2 CM/SEC
BH CV ECHO MEAS - RV V1 VTI: 11.9 CM
BH CV ECHO MEAS - RVOT AREA: 3.5 CM^2
BH CV ECHO MEAS - RVOT DIAM: 2.1 CM
BH CV ECHO MEAS - SI(AO): 76.8 ML/M^2
BH CV ECHO MEAS - SI(CUBED): 25.1 ML/M^2
BH CV ECHO MEAS - SI(LVOT): 12.7 ML/M^2
BH CV ECHO MEAS - SI(MOD-SP2): 12.6 ML/M^2
BH CV ECHO MEAS - SI(MOD-SP4): 14.1 ML/M^2
BH CV ECHO MEAS - SI(TEICH): 23.4 ML/M^2
BH CV ECHO MEAS - SV(AO): 152.2 ML
BH CV ECHO MEAS - SV(CUBED): 49.7 ML
BH CV ECHO MEAS - SV(LVOT): 25.1 ML
BH CV ECHO MEAS - SV(MOD-SP2): 25 ML
BH CV ECHO MEAS - SV(MOD-SP4): 28 ML
BH CV ECHO MEAS - SV(RVOT): 41.2 ML
BH CV ECHO MEAS - SV(TEICH): 46.4 ML
BH CV ECHO MEAS - TAPSE (>1.6): 1.6 CM
BH CV ECHO MEASUREMENTS AVERAGE E/E' RATIO: 18.34
BH CV XLRA - TDI S': 7.4 CM/SEC
BUN SERPL-MCNC: 33 MG/DL (ref 8–23)
BUN/CREAT SERPL: 21.6 (ref 7–25)
CALCIUM SPEC-SCNC: 9.1 MG/DL (ref 8.6–10.5)
CHLORIDE SERPL-SCNC: 102 MMOL/L (ref 98–107)
CHOLEST SERPL-MCNC: 180 MG/DL (ref 0–200)
CO2 SERPL-SCNC: 17.9 MMOL/L (ref 22–29)
CREAT SERPL-MCNC: 1.53 MG/DL (ref 0.57–1)
DEPRECATED RDW RBC AUTO: 44 FL (ref 37–54)
ERYTHROCYTE [DISTWIDTH] IN BLOOD BY AUTOMATED COUNT: 13.6 % (ref 12.3–15.4)
GFR SERPL CREATININE-BSD FRML MDRD: 33 ML/MIN/1.73
GLUCOSE BLDC GLUCOMTR-MCNC: 151 MG/DL (ref 70–130)
GLUCOSE BLDC GLUCOMTR-MCNC: 151 MG/DL (ref 70–130)
GLUCOSE BLDC GLUCOMTR-MCNC: 170 MG/DL (ref 70–130)
GLUCOSE BLDC GLUCOMTR-MCNC: 98 MG/DL (ref 70–130)
GLUCOSE SERPL-MCNC: 158 MG/DL (ref 65–99)
HBA1C MFR BLD: 7 % (ref 4.8–5.6)
HCT VFR BLD AUTO: 37.6 % (ref 34–46.6)
HDLC SERPL-MCNC: 38 MG/DL (ref 40–60)
HGB BLD-MCNC: 12.6 G/DL (ref 12–15.9)
LDLC SERPL CALC-MCNC: 90 MG/DL (ref 0–100)
LDLC/HDLC SERPL: 2.11 {RATIO}
LEFT ATRIUM VOLUME INDEX: 9.8 ML/M2
MAXIMAL PREDICTED HEART RATE: 147 BPM
MCH RBC QN AUTO: 30.4 PG (ref 26.6–33)
MCHC RBC AUTO-ENTMCNC: 33.5 G/DL (ref 31.5–35.7)
MCV RBC AUTO: 90.6 FL (ref 79–97)
PLATELET # BLD AUTO: 222 10*3/MM3 (ref 140–450)
PMV BLD AUTO: 11.4 FL (ref 6–12)
POTASSIUM SERPL-SCNC: 5.1 MMOL/L (ref 3.5–5.2)
QT INTERVAL: 420 MS
RBC # BLD AUTO: 4.15 10*6/MM3 (ref 3.77–5.28)
SODIUM SERPL-SCNC: 135 MMOL/L (ref 136–145)
STRESS TARGET HR: 125 BPM
TRIGL SERPL-MCNC: 310 MG/DL (ref 0–150)
TROPONIN T SERPL-MCNC: 9.77 NG/ML (ref 0–0.03)
VLDLC SERPL-MCNC: 52 MG/DL (ref 5–40)
WBC # BLD AUTO: 14.27 10*3/MM3 (ref 3.4–10.8)

## 2021-03-28 PROCEDURE — 84484 ASSAY OF TROPONIN QUANT: CPT | Performed by: INTERNAL MEDICINE

## 2021-03-28 PROCEDURE — 80048 BASIC METABOLIC PNL TOTAL CA: CPT | Performed by: INTERNAL MEDICINE

## 2021-03-28 PROCEDURE — 63710000001 INSULIN LISPRO (HUMAN) PER 5 UNITS: Performed by: INTERNAL MEDICINE

## 2021-03-28 PROCEDURE — 93005 ELECTROCARDIOGRAM TRACING: CPT | Performed by: INTERNAL MEDICINE

## 2021-03-28 PROCEDURE — 99232 SBSQ HOSP IP/OBS MODERATE 35: CPT | Performed by: INTERNAL MEDICINE

## 2021-03-28 PROCEDURE — 80061 LIPID PANEL: CPT | Performed by: INTERNAL MEDICINE

## 2021-03-28 PROCEDURE — 85027 COMPLETE CBC AUTOMATED: CPT | Performed by: INTERNAL MEDICINE

## 2021-03-28 PROCEDURE — 93010 ELECTROCARDIOGRAM REPORT: CPT | Performed by: INTERNAL MEDICINE

## 2021-03-28 PROCEDURE — 25010000002 ONDANSETRON PER 1 MG: Performed by: INTERNAL MEDICINE

## 2021-03-28 PROCEDURE — 83036 HEMOGLOBIN GLYCOSYLATED A1C: CPT | Performed by: INTERNAL MEDICINE

## 2021-03-28 PROCEDURE — 82962 GLUCOSE BLOOD TEST: CPT

## 2021-03-28 RX ORDER — SODIUM CHLORIDE 9 MG/ML
50 INJECTION, SOLUTION INTRAVENOUS CONTINUOUS
Status: ACTIVE | OUTPATIENT
Start: 2021-03-28 | End: 2021-03-28

## 2021-03-28 RX ADMIN — INSULIN LISPRO 2 UNITS: 100 INJECTION, SOLUTION INTRAVENOUS; SUBCUTANEOUS at 07:56

## 2021-03-28 RX ADMIN — OXYBUTYNIN CHLORIDE 5 MG: 5 TABLET, EXTENDED RELEASE ORAL at 08:00

## 2021-03-28 RX ADMIN — GLIPIZIDE 5 MG: 5 TABLET ORAL at 08:00

## 2021-03-28 RX ADMIN — ONDANSETRON 4 MG: 2 INJECTION INTRAMUSCULAR; INTRAVENOUS at 07:55

## 2021-03-28 RX ADMIN — INSULIN LISPRO 2 UNITS: 100 INJECTION, SOLUTION INTRAVENOUS; SUBCUTANEOUS at 13:07

## 2021-03-28 RX ADMIN — ASPIRIN 81 MG: 81 TABLET, CHEWABLE ORAL at 08:00

## 2021-03-28 RX ADMIN — CLOPIDOGREL 75 MG: 75 TABLET, FILM COATED ORAL at 08:00

## 2021-03-28 RX ADMIN — ATORVASTATIN CALCIUM 40 MG: 20 TABLET, FILM COATED ORAL at 21:57

## 2021-03-28 RX ADMIN — SODIUM CHLORIDE 50 ML/HR: 9 INJECTION, SOLUTION INTRAVENOUS at 13:48

## 2021-03-28 RX ADMIN — COLCHICINE 0.6 MG: 0.6 TABLET, FILM COATED ORAL at 08:00

## 2021-03-28 RX ADMIN — METOPROLOL TARTRATE 12.5 MG: 25 TABLET, FILM COATED ORAL at 08:00

## 2021-03-28 RX ADMIN — METOPROLOL TARTRATE 12.5 MG: 25 TABLET, FILM COATED ORAL at 21:57

## 2021-03-28 RX ADMIN — DULOXETINE HYDROCHLORIDE 60 MG: 60 CAPSULE, DELAYED RELEASE ORAL at 08:00

## 2021-03-28 RX ADMIN — BACLOFEN 20 MG: 10 TABLET ORAL at 08:00

## 2021-03-29 LAB
AMPHET+METHAMPHET UR QL: NEGATIVE
ANION GAP SERPL CALCULATED.3IONS-SCNC: 13.8 MMOL/L (ref 5–15)
BACTERIA UR QL AUTO: ABNORMAL /HPF
BARBITURATES UR QL SCN: NEGATIVE
BENZODIAZ UR QL SCN: POSITIVE
BILIRUB UR QL STRIP: NEGATIVE
BUN SERPL-MCNC: 31 MG/DL (ref 8–23)
BUN/CREAT SERPL: 27.2 (ref 7–25)
CALCIUM SPEC-SCNC: 9.2 MG/DL (ref 8.6–10.5)
CANNABINOIDS SERPL QL: NEGATIVE
CHLORIDE SERPL-SCNC: 101 MMOL/L (ref 98–107)
CLARITY UR: ABNORMAL
CO2 SERPL-SCNC: 20.2 MMOL/L (ref 22–29)
COCAINE UR QL: NEGATIVE
COLOR UR: YELLOW
CREAT SERPL-MCNC: 1.14 MG/DL (ref 0.57–1)
FOLATE SERPL-MCNC: 17.7 NG/ML (ref 4.78–24.2)
GFR SERPL CREATININE-BSD FRML MDRD: 47 ML/MIN/1.73
GLUCOSE BLDC GLUCOMTR-MCNC: 125 MG/DL (ref 70–130)
GLUCOSE BLDC GLUCOMTR-MCNC: 144 MG/DL (ref 70–130)
GLUCOSE BLDC GLUCOMTR-MCNC: 150 MG/DL (ref 70–130)
GLUCOSE BLDC GLUCOMTR-MCNC: 182 MG/DL (ref 70–130)
GLUCOSE SERPL-MCNC: 175 MG/DL (ref 65–99)
GLUCOSE UR STRIP-MCNC: NEGATIVE MG/DL
GRAN CASTS URNS QL MICRO: ABNORMAL /LPF
HGB UR QL STRIP.AUTO: ABNORMAL
HYALINE CASTS UR QL AUTO: ABNORMAL /LPF
KETONES UR QL STRIP: ABNORMAL
LEUKOCYTE ESTERASE UR QL STRIP.AUTO: ABNORMAL
METHADONE UR QL SCN: NEGATIVE
NITRITE UR QL STRIP: POSITIVE
OPIATES UR QL: NEGATIVE
OXYCODONE UR QL SCN: NEGATIVE
PH UR STRIP.AUTO: 6 [PH] (ref 5–8)
POTASSIUM SERPL-SCNC: 4.6 MMOL/L (ref 3.5–5.2)
PROT UR QL STRIP: ABNORMAL
QT INTERVAL: 388 MS
RBC # UR: ABNORMAL /HPF
REF LAB TEST METHOD: ABNORMAL
SODIUM SERPL-SCNC: 135 MMOL/L (ref 136–145)
SP GR UR STRIP: 1.02 (ref 1–1.03)
SQUAMOUS #/AREA URNS HPF: ABNORMAL /HPF
TROPONIN T SERPL-MCNC: 6.1 NG/ML (ref 0–0.03)
TSH SERPL DL<=0.05 MIU/L-ACNC: 0.57 UIU/ML (ref 0.27–4.2)
UROBILINOGEN UR QL STRIP: ABNORMAL
VIT B12 BLD-MCNC: 1432 PG/ML (ref 211–946)
WBC UR QL AUTO: ABNORMAL /HPF

## 2021-03-29 PROCEDURE — 82962 GLUCOSE BLOOD TEST: CPT

## 2021-03-29 PROCEDURE — 80307 DRUG TEST PRSMV CHEM ANLYZR: CPT | Performed by: HOSPITALIST

## 2021-03-29 PROCEDURE — 87186 SC STD MICRODIL/AGAR DIL: CPT | Performed by: INTERNAL MEDICINE

## 2021-03-29 PROCEDURE — 87086 URINE CULTURE/COLONY COUNT: CPT | Performed by: INTERNAL MEDICINE

## 2021-03-29 PROCEDURE — 93005 ELECTROCARDIOGRAM TRACING: CPT | Performed by: INTERNAL MEDICINE

## 2021-03-29 PROCEDURE — 82607 VITAMIN B-12: CPT | Performed by: INTERNAL MEDICINE

## 2021-03-29 PROCEDURE — 99223 1ST HOSP IP/OBS HIGH 75: CPT | Performed by: PSYCHIATRY & NEUROLOGY

## 2021-03-29 PROCEDURE — 97162 PT EVAL MOD COMPLEX 30 MIN: CPT

## 2021-03-29 PROCEDURE — 84443 ASSAY THYROID STIM HORMONE: CPT | Performed by: INTERNAL MEDICINE

## 2021-03-29 PROCEDURE — 97110 THERAPEUTIC EXERCISES: CPT

## 2021-03-29 PROCEDURE — 82746 ASSAY OF FOLIC ACID SERUM: CPT | Performed by: INTERNAL MEDICINE

## 2021-03-29 PROCEDURE — 80048 BASIC METABOLIC PNL TOTAL CA: CPT | Performed by: INTERNAL MEDICINE

## 2021-03-29 PROCEDURE — 87088 URINE BACTERIA CULTURE: CPT | Performed by: INTERNAL MEDICINE

## 2021-03-29 PROCEDURE — 84484 ASSAY OF TROPONIN QUANT: CPT | Performed by: INTERNAL MEDICINE

## 2021-03-29 PROCEDURE — 63710000001 INSULIN LISPRO (HUMAN) PER 5 UNITS: Performed by: INTERNAL MEDICINE

## 2021-03-29 PROCEDURE — 99232 SBSQ HOSP IP/OBS MODERATE 35: CPT | Performed by: INTERNAL MEDICINE

## 2021-03-29 PROCEDURE — 93010 ELECTROCARDIOGRAM REPORT: CPT | Performed by: INTERNAL MEDICINE

## 2021-03-29 PROCEDURE — 25010000002 THIAMINE PER 100 MG: Performed by: HOSPITALIST

## 2021-03-29 PROCEDURE — 81001 URINALYSIS AUTO W/SCOPE: CPT | Performed by: INTERNAL MEDICINE

## 2021-03-29 RX ORDER — DIAZEPAM 5 MG/1
10 TABLET ORAL ONCE
Status: COMPLETED | OUTPATIENT
Start: 2021-03-29 | End: 2021-03-30

## 2021-03-29 RX ADMIN — ATORVASTATIN CALCIUM 40 MG: 20 TABLET, FILM COATED ORAL at 21:48

## 2021-03-29 RX ADMIN — DULOXETINE HYDROCHLORIDE 60 MG: 60 CAPSULE, DELAYED RELEASE ORAL at 09:51

## 2021-03-29 RX ADMIN — COLCHICINE 0.6 MG: 0.6 TABLET, FILM COATED ORAL at 09:51

## 2021-03-29 RX ADMIN — INSULIN LISPRO 2 UNITS: 100 INJECTION, SOLUTION INTRAVENOUS; SUBCUTANEOUS at 07:46

## 2021-03-29 RX ADMIN — METOPROLOL TARTRATE 25 MG: 25 TABLET, FILM COATED ORAL at 09:51

## 2021-03-29 RX ADMIN — ASPIRIN 81 MG: 81 TABLET, CHEWABLE ORAL at 09:51

## 2021-03-29 RX ADMIN — OXYBUTYNIN CHLORIDE 5 MG: 5 TABLET, EXTENDED RELEASE ORAL at 09:51

## 2021-03-29 RX ADMIN — METOPROLOL TARTRATE 25 MG: 25 TABLET, FILM COATED ORAL at 21:48

## 2021-03-29 RX ADMIN — BACLOFEN 20 MG: 10 TABLET ORAL at 09:51

## 2021-03-29 RX ADMIN — GLIPIZIDE 5 MG: 5 TABLET ORAL at 07:46

## 2021-03-29 RX ADMIN — CLOPIDOGREL 75 MG: 75 TABLET, FILM COATED ORAL at 09:51

## 2021-03-29 RX ADMIN — THIAMINE HYDROCHLORIDE 100 MG: 100 INJECTION, SOLUTION INTRAMUSCULAR; INTRAVENOUS at 18:59

## 2021-03-30 ENCOUNTER — APPOINTMENT (OUTPATIENT)
Dept: ULTRASOUND IMAGING | Facility: HOSPITAL | Age: 76
End: 2021-03-30

## 2021-03-30 ENCOUNTER — APPOINTMENT (OUTPATIENT)
Dept: MRI IMAGING | Facility: HOSPITAL | Age: 76
End: 2021-03-30

## 2021-03-30 LAB
ALBUMIN SERPL-MCNC: 3.5 G/DL (ref 3.5–5.2)
ALP SERPL-CCNC: 92 U/L (ref 39–117)
ALT SERPL W P-5'-P-CCNC: 145 U/L (ref 1–33)
AMMONIA BLD-SCNC: 30 UMOL/L (ref 11–51)
ANION GAP SERPL CALCULATED.3IONS-SCNC: 10.7 MMOL/L (ref 5–15)
AST SERPL-CCNC: 141 U/L (ref 1–32)
BASOPHILS # BLD AUTO: 0.04 10*3/MM3 (ref 0–0.2)
BASOPHILS NFR BLD AUTO: 0.4 % (ref 0–1.5)
BILIRUB CONJ SERPL-MCNC: <0.2 MG/DL (ref 0–0.3)
BILIRUB INDIRECT SERPL-MCNC: ABNORMAL MG/DL
BILIRUB SERPL-MCNC: 0.3 MG/DL (ref 0–1.2)
BUN SERPL-MCNC: 27 MG/DL (ref 8–23)
BUN/CREAT SERPL: 30.3 (ref 7–25)
CALCIUM SPEC-SCNC: 9.2 MG/DL (ref 8.6–10.5)
CHLORIDE SERPL-SCNC: 105 MMOL/L (ref 98–107)
CO2 SERPL-SCNC: 18.3 MMOL/L (ref 22–29)
CREAT SERPL-MCNC: 0.89 MG/DL (ref 0.57–1)
DEPRECATED RDW RBC AUTO: 45.3 FL (ref 37–54)
EOSINOPHIL # BLD AUTO: 0.1 10*3/MM3 (ref 0–0.4)
EOSINOPHIL NFR BLD AUTO: 1 % (ref 0.3–6.2)
ERYTHROCYTE [DISTWIDTH] IN BLOOD BY AUTOMATED COUNT: 13.4 % (ref 12.3–15.4)
GFR SERPL CREATININE-BSD FRML MDRD: 62 ML/MIN/1.73
GLUCOSE BLDC GLUCOMTR-MCNC: 126 MG/DL (ref 70–130)
GLUCOSE BLDC GLUCOMTR-MCNC: 144 MG/DL (ref 70–130)
GLUCOSE BLDC GLUCOMTR-MCNC: 161 MG/DL (ref 70–130)
GLUCOSE SERPL-MCNC: 161 MG/DL (ref 65–99)
HCT VFR BLD AUTO: 37 % (ref 34–46.6)
HGB BLD-MCNC: 11.9 G/DL (ref 12–15.9)
IMM GRANULOCYTES # BLD AUTO: 0.08 10*3/MM3 (ref 0–0.05)
IMM GRANULOCYTES NFR BLD AUTO: 0.8 % (ref 0–0.5)
LYMPHOCYTES # BLD AUTO: 1.2 10*3/MM3 (ref 0.7–3.1)
LYMPHOCYTES NFR BLD AUTO: 12.3 % (ref 19.6–45.3)
MCH RBC QN AUTO: 29.5 PG (ref 26.6–33)
MCHC RBC AUTO-ENTMCNC: 32.2 G/DL (ref 31.5–35.7)
MCV RBC AUTO: 91.6 FL (ref 79–97)
MONOCYTES # BLD AUTO: 1.04 10*3/MM3 (ref 0.1–0.9)
MONOCYTES NFR BLD AUTO: 10.7 % (ref 5–12)
NEUTROPHILS NFR BLD AUTO: 7.26 10*3/MM3 (ref 1.7–7)
NEUTROPHILS NFR BLD AUTO: 74.8 % (ref 42.7–76)
NRBC BLD AUTO-RTO: 0 /100 WBC (ref 0–0.2)
PLATELET # BLD AUTO: 184 10*3/MM3 (ref 140–450)
PMV BLD AUTO: 11.2 FL (ref 6–12)
POTASSIUM SERPL-SCNC: 4.4 MMOL/L (ref 3.5–5.2)
PROT SERPL-MCNC: 6.4 G/DL (ref 6–8.5)
RBC # BLD AUTO: 4.04 10*6/MM3 (ref 3.77–5.28)
RPR SER QL: NORMAL
SODIUM SERPL-SCNC: 134 MMOL/L (ref 136–145)
WBC # BLD AUTO: 9.72 10*3/MM3 (ref 3.4–10.8)

## 2021-03-30 PROCEDURE — 80076 HEPATIC FUNCTION PANEL: CPT | Performed by: HOSPITALIST

## 2021-03-30 PROCEDURE — 63710000001 INSULIN LISPRO (HUMAN) PER 5 UNITS: Performed by: INTERNAL MEDICINE

## 2021-03-30 PROCEDURE — 85025 COMPLETE CBC W/AUTO DIFF WBC: CPT | Performed by: HOSPITALIST

## 2021-03-30 PROCEDURE — 82140 ASSAY OF AMMONIA: CPT | Performed by: HOSPITALIST

## 2021-03-30 PROCEDURE — 80048 BASIC METABOLIC PNL TOTAL CA: CPT | Performed by: HOSPITALIST

## 2021-03-30 PROCEDURE — 99232 SBSQ HOSP IP/OBS MODERATE 35: CPT | Performed by: INTERNAL MEDICINE

## 2021-03-30 PROCEDURE — 86592 SYPHILIS TEST NON-TREP QUAL: CPT | Performed by: HOSPITALIST

## 2021-03-30 PROCEDURE — 76705 ECHO EXAM OF ABDOMEN: CPT

## 2021-03-30 PROCEDURE — 82962 GLUCOSE BLOOD TEST: CPT

## 2021-03-30 PROCEDURE — 99232 SBSQ HOSP IP/OBS MODERATE 35: CPT | Performed by: PSYCHIATRY & NEUROLOGY

## 2021-03-30 PROCEDURE — 97110 THERAPEUTIC EXERCISES: CPT

## 2021-03-30 PROCEDURE — 97530 THERAPEUTIC ACTIVITIES: CPT

## 2021-03-30 PROCEDURE — 97166 OT EVAL MOD COMPLEX 45 MIN: CPT

## 2021-03-30 RX ORDER — CARVEDILOL 3.12 MG/1
3.12 TABLET ORAL 2 TIMES DAILY WITH MEALS
Status: DISCONTINUED | OUTPATIENT
Start: 2021-03-30 | End: 2021-04-01 | Stop reason: HOSPADM

## 2021-03-30 RX ADMIN — INSULIN LISPRO 2 UNITS: 100 INJECTION, SOLUTION INTRAVENOUS; SUBCUTANEOUS at 10:56

## 2021-03-30 RX ADMIN — DIAZEPAM 10 MG: 5 TABLET ORAL at 07:43

## 2021-03-30 RX ADMIN — COLCHICINE 0.6 MG: 0.6 TABLET, FILM COATED ORAL at 09:03

## 2021-03-30 RX ADMIN — GLIPIZIDE 5 MG: 5 TABLET ORAL at 09:03

## 2021-03-30 RX ADMIN — METOPROLOL TARTRATE 25 MG: 25 TABLET, FILM COATED ORAL at 09:03

## 2021-03-30 RX ADMIN — OXYBUTYNIN CHLORIDE 5 MG: 5 TABLET, EXTENDED RELEASE ORAL at 09:03

## 2021-03-30 RX ADMIN — CARVEDILOL 3.12 MG: 3.12 TABLET, FILM COATED ORAL at 18:02

## 2021-03-30 RX ADMIN — BACLOFEN 20 MG: 10 TABLET ORAL at 09:03

## 2021-03-30 RX ADMIN — CLOPIDOGREL 75 MG: 75 TABLET, FILM COATED ORAL at 09:03

## 2021-03-30 RX ADMIN — ASPIRIN 81 MG: 81 TABLET, CHEWABLE ORAL at 09:03

## 2021-03-30 RX ADMIN — DULOXETINE HYDROCHLORIDE 60 MG: 60 CAPSULE, DELAYED RELEASE ORAL at 09:03

## 2021-03-31 LAB
ALBUMIN SERPL-MCNC: 3.5 G/DL (ref 3.5–5.2)
ALBUMIN/GLOB SERPL: 1.2 G/DL
ALP SERPL-CCNC: 92 U/L (ref 39–117)
ALT SERPL W P-5'-P-CCNC: 113 U/L (ref 1–33)
ANION GAP SERPL CALCULATED.3IONS-SCNC: 10.6 MMOL/L (ref 5–15)
AST SERPL-CCNC: 71 U/L (ref 1–32)
BACTERIA SPEC AEROBE CULT: ABNORMAL
BACTERIA SPEC AEROBE CULT: ABNORMAL
BILIRUB SERPL-MCNC: 0.2 MG/DL (ref 0–1.2)
BUN SERPL-MCNC: 26 MG/DL (ref 8–23)
BUN/CREAT SERPL: 26.8 (ref 7–25)
CALCIUM SPEC-SCNC: 8.6 MG/DL (ref 8.6–10.5)
CHLORIDE SERPL-SCNC: 103 MMOL/L (ref 98–107)
CO2 SERPL-SCNC: 22.4 MMOL/L (ref 22–29)
CREAT SERPL-MCNC: 0.97 MG/DL (ref 0.57–1)
DEPRECATED RDW RBC AUTO: 44.7 FL (ref 37–54)
ERYTHROCYTE [DISTWIDTH] IN BLOOD BY AUTOMATED COUNT: 13.7 % (ref 12.3–15.4)
GFR SERPL CREATININE-BSD FRML MDRD: 56 ML/MIN/1.73
GLOBULIN UR ELPH-MCNC: 2.9 GM/DL
GLUCOSE BLDC GLUCOMTR-MCNC: 101 MG/DL (ref 70–130)
GLUCOSE BLDC GLUCOMTR-MCNC: 132 MG/DL (ref 70–130)
GLUCOSE BLDC GLUCOMTR-MCNC: 150 MG/DL (ref 70–130)
GLUCOSE BLDC GLUCOMTR-MCNC: 171 MG/DL (ref 70–130)
GLUCOSE SERPL-MCNC: 133 MG/DL (ref 65–99)
HCT VFR BLD AUTO: 37.9 % (ref 34–46.6)
HGB BLD-MCNC: 12.3 G/DL (ref 12–15.9)
MCH RBC QN AUTO: 29.2 PG (ref 26.6–33)
MCHC RBC AUTO-ENTMCNC: 32.5 G/DL (ref 31.5–35.7)
MCV RBC AUTO: 90 FL (ref 79–97)
PLATELET # BLD AUTO: 226 10*3/MM3 (ref 140–450)
PMV BLD AUTO: 11.2 FL (ref 6–12)
POTASSIUM SERPL-SCNC: 4.3 MMOL/L (ref 3.5–5.2)
PROT SERPL-MCNC: 6.4 G/DL (ref 6–8.5)
RBC # BLD AUTO: 4.21 10*6/MM3 (ref 3.77–5.28)
SODIUM SERPL-SCNC: 136 MMOL/L (ref 136–145)
WBC # BLD AUTO: 9.71 10*3/MM3 (ref 3.4–10.8)

## 2021-03-31 PROCEDURE — 97110 THERAPEUTIC EXERCISES: CPT

## 2021-03-31 PROCEDURE — 80053 COMPREHEN METABOLIC PANEL: CPT | Performed by: INTERNAL MEDICINE

## 2021-03-31 PROCEDURE — 63710000001 INSULIN LISPRO (HUMAN) PER 5 UNITS: Performed by: INTERNAL MEDICINE

## 2021-03-31 PROCEDURE — 82962 GLUCOSE BLOOD TEST: CPT

## 2021-03-31 PROCEDURE — 99232 SBSQ HOSP IP/OBS MODERATE 35: CPT | Performed by: INTERNAL MEDICINE

## 2021-03-31 PROCEDURE — 85027 COMPLETE CBC AUTOMATED: CPT | Performed by: INTERNAL MEDICINE

## 2021-03-31 RX ORDER — UREA 10 %
3 LOTION (ML) TOPICAL NIGHTLY PRN
Status: DISCONTINUED | OUTPATIENT
Start: 2021-03-31 | End: 2021-04-01 | Stop reason: HOSPADM

## 2021-03-31 RX ORDER — LISINOPRIL 5 MG/1
5 TABLET ORAL
Status: DISCONTINUED | OUTPATIENT
Start: 2021-03-31 | End: 2021-04-01 | Stop reason: HOSPADM

## 2021-03-31 RX ADMIN — LISINOPRIL 5 MG: 5 TABLET ORAL at 15:10

## 2021-03-31 RX ADMIN — Medication 3 MG: at 21:52

## 2021-03-31 RX ADMIN — BACLOFEN 20 MG: 10 TABLET ORAL at 08:34

## 2021-03-31 RX ADMIN — CARVEDILOL 3.12 MG: 3.12 TABLET, FILM COATED ORAL at 08:34

## 2021-03-31 RX ADMIN — ASPIRIN 81 MG: 81 TABLET, CHEWABLE ORAL at 08:34

## 2021-03-31 RX ADMIN — COLCHICINE 0.6 MG: 0.6 TABLET, FILM COATED ORAL at 08:35

## 2021-03-31 RX ADMIN — DULOXETINE HYDROCHLORIDE 60 MG: 60 CAPSULE, DELAYED RELEASE ORAL at 08:34

## 2021-03-31 RX ADMIN — CARVEDILOL 3.12 MG: 3.12 TABLET, FILM COATED ORAL at 18:23

## 2021-03-31 RX ADMIN — CLOPIDOGREL 75 MG: 75 TABLET, FILM COATED ORAL at 08:34

## 2021-03-31 RX ADMIN — INSULIN LISPRO 2 UNITS: 100 INJECTION, SOLUTION INTRAVENOUS; SUBCUTANEOUS at 11:56

## 2021-03-31 RX ADMIN — GLIPIZIDE 5 MG: 5 TABLET ORAL at 08:34

## 2021-03-31 RX ADMIN — OXYBUTYNIN CHLORIDE 5 MG: 5 TABLET, EXTENDED RELEASE ORAL at 08:34

## 2021-04-01 ENCOUNTER — READMISSION MANAGEMENT (OUTPATIENT)
Dept: CALL CENTER | Facility: HOSPITAL | Age: 76
End: 2021-04-01

## 2021-04-01 VITALS
HEIGHT: 63 IN | OXYGEN SATURATION: 95 % | DIASTOLIC BLOOD PRESSURE: 71 MMHG | WEIGHT: 212 LBS | RESPIRATION RATE: 18 BRPM | BODY MASS INDEX: 37.56 KG/M2 | HEART RATE: 89 BPM | TEMPERATURE: 99.2 F | SYSTOLIC BLOOD PRESSURE: 97 MMHG

## 2021-04-01 LAB
ALBUMIN SERPL-MCNC: 3.5 G/DL (ref 3.5–5.2)
ALBUMIN/GLOB SERPL: 1.3 G/DL
ALP SERPL-CCNC: 87 U/L (ref 39–117)
ALT SERPL W P-5'-P-CCNC: 82 U/L (ref 1–33)
ANION GAP SERPL CALCULATED.3IONS-SCNC: 9.7 MMOL/L (ref 5–15)
AST SERPL-CCNC: 39 U/L (ref 1–32)
BILIRUB SERPL-MCNC: 0.2 MG/DL (ref 0–1.2)
BUN SERPL-MCNC: 25 MG/DL (ref 8–23)
BUN/CREAT SERPL: 24.5 (ref 7–25)
CALCIUM SPEC-SCNC: 8.8 MG/DL (ref 8.6–10.5)
CHLORIDE SERPL-SCNC: 104 MMOL/L (ref 98–107)
CO2 SERPL-SCNC: 22.3 MMOL/L (ref 22–29)
CREAT SERPL-MCNC: 1.02 MG/DL (ref 0.57–1)
GFR SERPL CREATININE-BSD FRML MDRD: 53 ML/MIN/1.73
GLOBULIN UR ELPH-MCNC: 2.6 GM/DL
GLUCOSE BLDC GLUCOMTR-MCNC: 125 MG/DL (ref 70–130)
GLUCOSE BLDC GLUCOMTR-MCNC: 231 MG/DL (ref 70–130)
GLUCOSE SERPL-MCNC: 133 MG/DL (ref 65–99)
POTASSIUM SERPL-SCNC: 4 MMOL/L (ref 3.5–5.2)
PROT SERPL-MCNC: 6.1 G/DL (ref 6–8.5)
SODIUM SERPL-SCNC: 136 MMOL/L (ref 136–145)

## 2021-04-01 PROCEDURE — 99239 HOSP IP/OBS DSCHRG MGMT >30: CPT | Performed by: INTERNAL MEDICINE

## 2021-04-01 PROCEDURE — 97110 THERAPEUTIC EXERCISES: CPT

## 2021-04-01 PROCEDURE — 82962 GLUCOSE BLOOD TEST: CPT

## 2021-04-01 PROCEDURE — 63710000001 INSULIN LISPRO (HUMAN) PER 5 UNITS: Performed by: INTERNAL MEDICINE

## 2021-04-01 PROCEDURE — 80053 COMPREHEN METABOLIC PANEL: CPT | Performed by: INTERNAL MEDICINE

## 2021-04-01 PROCEDURE — 97535 SELF CARE MNGMENT TRAINING: CPT

## 2021-04-01 RX ORDER — LISINOPRIL 5 MG/1
5 TABLET ORAL
Qty: 30 TABLET | Refills: 11 | Status: SHIPPED | OUTPATIENT
Start: 2021-04-02 | End: 2021-06-03

## 2021-04-01 RX ORDER — CARVEDILOL 3.12 MG/1
3.12 TABLET ORAL 2 TIMES DAILY WITH MEALS
Qty: 30 TABLET | Refills: 11 | Status: SHIPPED | OUTPATIENT
Start: 2021-04-01 | End: 2021-04-15

## 2021-04-01 RX ORDER — ASPIRIN 81 MG/1
81 TABLET, CHEWABLE ORAL DAILY
Start: 2021-04-02 | End: 2022-10-06 | Stop reason: ALTCHOICE

## 2021-04-01 RX ORDER — CLOPIDOGREL BISULFATE 75 MG/1
75 TABLET ORAL DAILY
Qty: 30 TABLET | Refills: 11 | Status: SHIPPED | OUTPATIENT
Start: 2021-04-02 | End: 2022-04-01

## 2021-04-01 RX ADMIN — LISINOPRIL 5 MG: 5 TABLET ORAL at 08:42

## 2021-04-01 RX ADMIN — OXYBUTYNIN CHLORIDE 5 MG: 5 TABLET, EXTENDED RELEASE ORAL at 08:41

## 2021-04-01 RX ADMIN — GLIPIZIDE 5 MG: 5 TABLET ORAL at 08:41

## 2021-04-01 RX ADMIN — DULOXETINE HYDROCHLORIDE 60 MG: 60 CAPSULE, DELAYED RELEASE ORAL at 08:41

## 2021-04-01 RX ADMIN — ASPIRIN 81 MG: 81 TABLET, CHEWABLE ORAL at 08:41

## 2021-04-01 RX ADMIN — INSULIN LISPRO 4 UNITS: 100 INJECTION, SOLUTION INTRAVENOUS; SUBCUTANEOUS at 11:06

## 2021-04-01 RX ADMIN — CARVEDILOL 3.12 MG: 3.12 TABLET, FILM COATED ORAL at 08:42

## 2021-04-01 RX ADMIN — CLOPIDOGREL 75 MG: 75 TABLET, FILM COATED ORAL at 08:41

## 2021-04-01 RX ADMIN — COLCHICINE 0.6 MG: 0.6 TABLET, FILM COATED ORAL at 08:42

## 2021-04-01 RX ADMIN — BACLOFEN 20 MG: 10 TABLET ORAL at 08:41

## 2021-04-01 NOTE — THERAPY TREATMENT NOTE
Patient Name: Michelle Espinoza  : 1945    MRN: [id number]                              Today's Date: 2021       Admit Date: 3/27/2021    Visit Dx:     ICD-10-CM ICD-9-CM   1. ST elevation myocardial infarction (STEMI), unspecified artery (CMS/[facility])  I21.3 410.90     Patient Active Problem List   Diagnosis   • Abnormal weight gain   • Arm pain   • Arthritis   • Atonic neurogenic bladder   • Benign colonic polyp   • Carotid atherosclerosis   • Fatigue   • Hypercalcemia   • Hypercholesterolemia   • Weakness of lower extremity   • Nephrolithiasis   • Spinal stenosis   • Urinary tract infection   • Chronic venous insufficiency   • B12 deficiency   • Vitamin D deficiency   • Degenerative disc disease, lumbar   • T7 and T10-11 Thoracic spinal stenosis   • Weakness   • CKD (chronic kidney disease)   • MINERVA (acute kidney injury) (CMS/[facility])   • Stenosis of right carotid artery   • Hyperparathyroidism (CMS/[facility])   • Type 2 diabetes mellitus with other circulatory complications (CMS/[facility])    • Osteoporosis   • ST elevation myocardial infarction (STEMI) (CMS/[facility])     Past Medical History:   Diagnosis Date   • Anesthesia complication     STRONG GAG REFLEX   • Benign colonic polyp    • Cataract     REMOVED   • DDD (degenerative disc disease), cervical    • DDD (degenerative disc disease), lumbosacral    • Diabetes mellitus (CMS/[facility])     TYPE 2   • Frequent UTI    • History of kidney stones 2014   • History of pyelonephritis 2014   • Hypercalcemia    • Hypercholesteremia    • Leg muscle spasm    • Neuropathy     LEFT FOOT   • Osteoarthritis    • Pyelonephritis 2014   • Self-catheterizes urinary bladder     HAS NERVE DAMAGE RELATED TO BACK ISSUES   • Sepsis (CMS/[facility]) 2014   • Urinary incontinence    • Weakness     LOWER EXTREMITES RELATED FROM NERVE DAMAGE FROM BACK     Past Surgical History:   Procedure Laterality Date   • APPENDECTOMY     • BACK SURGERY      MULTIPLE. WITH HARDWARE   • CARDIAC CATHETERIZATION  N/A 3/27/2021    Procedure: Left Heart Cath;  Surgeon: Leydi Romo MD;  Location:  [facility];  Service: Cardiovascular;  Laterality: N/A;   • CARDIAC CATHETERIZATION  3/27/2021    Procedure: Percutaneous Manual Thrombectomy;  Surgeon: Leydi Romo MD;  Location:  [facility];  Service: Cardiovascular;;   • CARDIAC CATHETERIZATION N/A 3/27/2021    Procedure: Percutaneous Coronary Intervention;  Surgeon: Leydi Romo MD;  Location:  [facility];  Service: Cardiovascular;  Laterality: N/A;   • CARDIAC CATHETERIZATION N/A 3/27/2021    Procedure: Coronary angiography;  Surgeon: Leydi Romo MD;  Location:  [facility];  Service: Cardiovascular;  Laterality: N/A;   • CARPAL TUNNEL RELEASE      LEFT X2 RIGHT    • CATARACT EXTRACTION WITH INTRAOCULAR LENS IMPLANT      LEFT AND RIGHT   • CERVICAL SPINE SURGERY      MULTIPLE C3-4 C6-7   •  SECTION      x 2   • COLONOSCOPY  2018   • PARATHYROIDECTOMY Left 3/7/2019    Procedure: PARATHYROIDECTOMY LEFT INFERIOR;  Surgeon: Mason Prather MD;  Location:  [facility];  Service: ENT   • THYROID SURGERY     • THYROIDECTOMY, PARTIAL       General Information     Row Name 21 1020          Physical Therapy Time and Intention    Document Type  therapy note (daily note)  -PC     Mode of Treatment  physical therapy  -PC     Row Name 21 1020          General Information    Existing Precautions/Restrictions  fall  -PC     Row Name 21 1020          Cognition    Orientation Status (Cognition)  oriented x 3  -PC     Row Name 21 1020          Safety Issues, Functional Mobility    Impairments Affecting Function (Mobility)  endurance/activity tolerance;strength;sensation/sensory awareness;balance  -PC       User Key  (r) = Recorded By, (t) = Taken By, (c) = Cosigned By    Initials Name Provider Type    PC Deb Mckeon PT Physical Therapist        Mobility     Row Name  04/01/21 1021          Bed Mobility    Comment (Bed Mobility)  in chair  -PC     Row Name 04/01/21 1021          Sit-Stand Transfer    Sit-Stand [place] (Transfers)  contact guard from low chair  -PC     Assistive Device (Sit-Stand Transfers)  walker, front-wheeled  -PC     Row Name 04/01/21 1021          Gait/Stairs (Locomotion)    [place] Level (Gait)  supervision  -PC     Assistive Device (Gait)  walker, front-wheeled  -PC     Distance in Feet (Gait)  30 ft  -PC     Deviations/Abnormal Patterns (Gait)  bryn decreased;gait speed decreased;stride length decreased  -PC     Bilateral Gait Deviations  forward flexed posture  -PC     Left Sided Gait Deviations  foot drop/toe drag;heel strike decreased;knee hyperextension  -PC     Comment (Gait/Stairs)  improved gait quality today, cont to have chronic impairments of LLE which cause L knee hyperextension and dec heel strike, but pt able to compensate for it better with overall improved strength  -PC       User Key  (r) = Recorded By, (t) = Taken By, (c) = Cosigned By    Initials Name Provider Type    PC Deb Mckeon, PT Physical Therapist        Obj/Interventions     Row Name 04/01/21 1023          Motor Skills    Therapeutic Exercise  -- 10 reps AP, LAQ, hip flexion, shoulder flexion with deep breathing  -PC       User Key  (r) = Recorded By, (t) = Taken By, (c) = Cosigned By    Initials Name Provider Type    PC Deb Mckeon, PT Physical Therapist        Goals/Plan    No documentation.       Clinical Impression     Row Name 04/01/21 1024          Pain Scale: Numbers Pre/Post-Treatment    Pretreatment Pain Rating  0/10 - no pain  -PC     Row Name 04/01/21 1024          Plan of Care Review    Plan of Care Reviewed With  patient  -PC     Progress  improving  -PC     Outcome Summary  pt cont to show improvement with functional mobility and is near her baseline, I support plans to go home with assist of , pt has chronic issues with LLE which affect  gait but compensating for it well with Rwx, pt is walking 30 ft with rwx with supervision assist, will have assist for stairs  -PC     Row Name 04/01/21 1024          Positioning and Restraints    In Chair  sitting;call light within reach;encouraged to call for assist  -PC       User Key  (r) = Recorded By, (t) = Taken By, (c) = Cosigned By    Initials Name Provider Type    PC Deb Mckeon PT Physical Therapist        Outcome Measures     Row Name 04/01/21 1026          How much help from another person do you currently need...    Turning from your back to your side while in flat bed without using bedrails?  3  -PC     Moving from lying on back to sitting on the side of a flat bed without bedrails?  3  -PC     Moving to and from a bed to a chair (including a wheelchair)?  3  -PC     Standing up from a chair using your arms (e.g., wheelchair, bedside chair)?  3  -PC     Climbing 3-5 steps with a railing?  2  -PC     To walk in hospital room?  3  -PC     AM-PAC 6 Clicks Score (PT)  17  -PC       User Key  (r) = Recorded By, (t) = Taken By, (c) = Cosigned By    Initials Name Provider Type    PC Deb Mckeon PT Physical Therapist        Physical Therapy Education                 Title: PT OT SLP Therapies (Done)     Topic: Physical Therapy (Done)     Point: Mobility training (Done)     Learning Progress Summary           Patient Acceptance, E,D, DU by PC at 4/1/2021 1026    Acceptance, E,D, DU by PC at 3/31/2021 1328    Acceptance, E,D, DU by PC at 3/30/2021 1204    Acceptance, E,D, NR by PC at 3/29/2021 1305                   Point: Home exercise program (Done)     Learning Progress Summary           Patient Acceptance, E,D, DU by PC at 4/1/2021 1026    Acceptance, E,D, DU by PC at 3/30/2021 1204    Acceptance, E,D, NR by PC at 3/29/2021 1305                   Point: Body mechanics (Done)     Learning Progress Summary           Patient Acceptance, E,D, DU by PC at 3/31/2021 1328    Acceptance, E,D, DU by PC at  3/30/2021 1204    Acceptance, E,D, NR by PC at 3/29/2021 1305                   Point: Precautions (Done)     Learning Progress Summary           Patient Acceptance, E,D, DU by PC at 3/31/2021 1328    Acceptance, E,D, DU by PC at 3/30/2021 1204    Acceptance, E,D, NR by PC at 3/29/2021 1305                               User Key     Initials Effective Dates Name Provider Type Discipline     04/03/18 -  Deb Mckeon, PT Physical Therapist PT              PT Recommendation and Plan  Planned Therapy Interventions (PT): bed mobility training, gait training, transfer training, strengthening  Plan of Care Reviewed With: patient  Progress: improving  Outcome Summary: pt cont to show improvement with functional mobility and is near her baseline, I support plans to go home with assist of , pt has chronic issues with LLE which affect gait but compensating for it well with Rwx, pt is walking 30 ft with rwx with supervision assist, will have assist for stairs     Time Calculation:   PT Charges     Row Name 04/01/21 1028             Time Calculation    Start Time  0917  -PC      Stop Time  0931  -PC      Time Calculation (min)  14 min  -PC      PT Received On  04/01/21  -PC      PT - Next Appointment  04/02/21  -PC        User Key  (r) = Recorded By, (t) = Taken By, (c) = Cosigned By    Initials Name Provider Type    PC Deb Mckeon PT Physical Therapist        Therapy Charges for Today     Code Description Service Date Service Provider Modifiers Qty    38320679779 HC PT THER PROC EA 15 MIN 3/31/2021 Deb Mckeon, PT GP 2    14533790184 HC PT THER PROC EA 15 MIN 4/1/2021 Deb Mckeon, PT GP 1          PT G-Codes  Outcome Measure Options: AM-PAC 6 Clicks Basic Mobility (PT)  AM-PAC 6 Clicks Score (PT): 17    Deb Mckeon PT  4/1/2021

## 2021-04-01 NOTE — THERAPY TREATMENT NOTE
Acute Care - Occupational Therapy Treatment Note  [facility]     Patient Name: Michelle Espinoza  : 1945  MRN: [id number]  Today's Date: 2021     Date of Referral to OT: 21       Admit Date: 3/27/2021       ICD-10-CM ICD-9-CM   1. ST elevation myocardial infarction (STEMI), unspecified artery (CMS/[facility])  I21.3 410.90     Patient Active Problem List   Diagnosis   • Abnormal weight gain   • Arm pain   • Arthritis   • Atonic neurogenic bladder   • Benign colonic polyp   • Carotid atherosclerosis   • Fatigue   • Hypercalcemia   • Hypercholesterolemia   • Weakness of lower extremity   • Nephrolithiasis   • Spinal stenosis   • Urinary tract infection   • Chronic venous insufficiency   • B12 deficiency   • Vitamin D deficiency   • Degenerative disc disease, lumbar   • T7 and T10-11 Thoracic spinal stenosis   • Weakness   • CKD (chronic kidney disease)   • MINERVA (acute kidney injury) (CMS/[facility])   • Stenosis of right carotid artery   • Hyperparathyroidism (CMS/[facility])   • Type 2 diabetes mellitus with other circulatory complications (CMS/[facility])    • Osteoporosis   • ST elevation myocardial infarction (STEMI) (CMS/[facility])     Past Medical History:   Diagnosis Date   • Anesthesia complication     STRONG GAG REFLEX   • Benign colonic polyp    • Cataract     REMOVED   • DDD (degenerative disc disease), cervical    • DDD (degenerative disc disease), lumbosacral    • Diabetes mellitus (CMS/[facility])     TYPE 2   • Frequent UTI    • History of kidney stones 2014   • History of pyelonephritis 2014   • Hypercalcemia    • Hypercholesteremia    • Leg muscle spasm    • Neuropathy     LEFT FOOT   • Osteoarthritis    • Pyelonephritis 2014   • Self-catheterizes urinary bladder     HAS NERVE DAMAGE RELATED TO BACK ISSUES   • Sepsis (CMS/[facility]) 2014   • Urinary incontinence    • Weakness     LOWER EXTREMITES RELATED FROM NERVE DAMAGE FROM BACK     Past Surgical History:   Procedure Laterality Date   • APPENDECTOMY     • BACK  SURGERY      MULTIPLE. WITH HARDWARE   • CARDIAC CATHETERIZATION N/A 3/27/2021    Procedure: Left Heart Cath;  Surgeon: Leydi Romo MD;  Location[facility] C[facility];  Service: Cardiovascular;  Laterality: N/A;   • CARDIAC CATHETERIZATION  3/27/2021    Procedure: Percutaneous Manual Thrombectomy;  Surgeon: Leydi Romo MD;  Location:  [facility] CATH INVASIVE LOCATION;  Service: Cardiovascular;;   • CARDIAC CATHETERIZATION N/A 3/27/2021    Procedure: Percutaneous Coronary Intervention;  Surgeon: Leydi Romo MD;  Location:  [facility] CATH INVASIVE LOCATION;  Service: Cardiovascular;  Laterality: N/A;   • CARDIAC CATHETERIZATION N/A 3/27/2021    Procedure: Coronary angiography;  Surgeon: Leydi Romo MD;  Location:  [facility] CATH INVASIVE LOCATION;  Service: Cardiovascular;  Laterality: N/A;   • CARPAL TUNNEL RELEASE      LEFT X2 RIGHT    • CATARACT EXTRACTION WITH INTRAOCULAR LENS IMPLANT      LEFT AND RIGHT   • CERVICAL SPINE SURGERY      MULTIPLE C3-4 C6-7   •  SECTION      x 2   • COLONOSCOPY  2018   • PARATHYROIDECTOMY Left 3/7/2019    Procedure: PARATHYROIDECTOMY LEFT INFERIOR;  Surgeon: Mason Prather MD;  [place]  [facility] [place] [facility] [place]  Service: E[place]   • THYROID SURGERY     • THYROIDECTOMY, PARTIAL              OT ASSESSMENT FLOWSHEET (last 12 hours)      OT Evaluation and Treatment     Row Name 21 1230                   OT Time and Intention    Subjective Information  no complaints  -MR        Document Type  therapy note (daily note)  -MR        Mode of Treatment  occupational therapy  -MR        Patient Effort  good  -MR        Symptoms Noted During/After Treatment  none  -MR           General Information    Patient Profile Reviewed  yes  -MR        Existing Precautions/Restrictions  fall  -MR           Cognition    Affect/Mental Status (Cognitive)  WFL  -MR        Orientation Status (Cognition)  oriented to;person;place;situation  -MR        Follows Commands (Cognition)   follows one-step commands  -MR           Pain Scale: Numbers Pre/Post-Treatment    Pretreatment Pain Rating  0/10 - no pain  -MR           Bed Mobility    Comment (Bed Mobility)  up in chair  -MR           Functional Mobility    Functional Mobility- Ind. Level  contact guard assist  -MR        Functional Mobility- Device  standard walker  -MR        Functional Mobility- Comment  pt performs functional mobility in room, from chair to doorway and back   -MR           Transfer Assessment/Treatment    Transfers  sit-stand transfer  -MR           Transfers    Sit-Stand [place] (Transfers)  contact guard  -MR           Sit-Stand Transfer    Assistive Device (Sit-Stand Transfers)  walker, standard  -MR           Safety Issues, Functional Mobility    Impairments Affecting Function (Mobility)  endurance/activity tolerance;strength;balance  -MR           Motor Skills    Motor Skills  therapeutic exercise  -MR        Therapeutic Exercise  shoulder;elbow/forearm;hand  -MR           Shoulder (Therapeutic Exercise)    Shoulder (Therapeutic Exercise)  AROM (active range of motion)  -MR        Shoulder AROM (Therapeutic Exercise)  bilateral;flexion;horizontal aBduction/aDduction;scapular protraction;scapular retraction;sitting  -MR           Elbow/Forearm (Therapeutic Exercise)    Elbow/Forearm (Therapeutic Exercise)  AROM (active range of motion)  -MR        Elbow/Forearm AROM (Therapeutic Exercise)  bilateral;flexion;extension;supination;pronation;sitting  -MR           Hand (Therapeutic Exercise)    Hand (Therapeutic Exercise)  AROM (active range of motion)  -MR        Hand AROM/AAROM (Therapeutic Exercise)  bilateral;AROM (active range of motion);finger flexion;finger extension  -MR           Balance    Balance Assessment  sitting static balance;standing static balance  -MR        Static Sitting Balance  WFL  -MR        Static Standing Balance  mild impairment  -MR           Activities of Daily Living    BADL  Assessment/Intervention  lower body dressing;grooming;bathing  -MR           Bathing Assessment/Intervention    [place] Level (Bathing)  bathing skills;upper extremities;set up;standby assist  -MR        Position (Bathing)  supported sitting  -MR           Lower Body Dressing Assessment/Training    [place] Level (Lower Body Dressing)  lower body dressing skills;doff;don;socks;maximum assist (25% patient effort)  -MR        Position (Lower Body Dressing)  supported sitting  -MR        Comment (Lower Body Dressing)  pt normally uses sock aide for LB dressing  -MR           Grooming Assessment/Training    [place] Level (Grooming)  grooming skills;set up;standby assist  -MR        Position (Grooming)  supported sitting  -MR           BADL Safety/Performance    Impairments, BADL Safety/Performance  balance;endurance/activity tolerance;strength  -MR           Plan of Care Review    Plan of Care Reviewed With  patient;spouse  -MR        Progress  improving  -MR        Outcome Summary  Pt demos good participation with all OT interventions this date. Pt is able to perform functional mobility in room with CGA and use of walker. Continue OT to increase safety and independence with performance of ADLs.  -MR           Positioning and Restraints    Pre-Treatment Position  sitting in chair/recliner  -MR        Post Treatment Position  chair  -MR        In Chair  sitting;notified nsg;call light within reach;encouraged to call for assist;with family/caregiver  -MR          User Key  (r) = Recorded By, (t) = Taken By, (c) = Cosigned By    Initials Name Effective Dates    MR Day Kristen Loera, OT 06/22/16 -                OT Recommendation and Plan     Plan of Care Review  Plan of Care Reviewed With: patient, spouse  Progress: improving  Outcome Summary: Pt demos good participation with all OT interventions this date. Pt is able to perform functional mobility in room with CGA and use of walker. Continue OT to increase  safety and independence with performance of ADLs.  Plan of Care Reviewed With: patient, spouse  Outcome Summary: Pt demos good participation with all OT interventions this date. Pt is able to perform functional mobility in room with CGA and use of walker. Continue OT to increase safety and independence with performance of ADLs.    Outcome Measures     Row Name 04/01/21 1300             How much help from another is currently needed...    Putting on and taking off regular lower body clothing?  2  -MR      Bathing (including washing, rinsing, and drying)  2  -MR      Toileting (which includes using toilet bed pan or urinal)  2  -MR      Putting on and taking off regular upper body clothing  3  -MR      Taking care of personal grooming (such as brushing teeth)  3  -MR      Eating meals  3  -MR      AM-PAC 6 Clicks Score (OT)  15  -MR         Functional Assessment    Outcome Measure Options  AM-PAC 6 Clicks Daily Activity (OT)  -MR        User Key  (r) = Recorded By, (t) = Taken By, (c) = Cosigned By    Initials Name Provider Type    Kristen Keane Luz Maria OT Occupational Therapist          Time Calculation:   Time Calculation- OT     Row Name 04/01/21 1303             Time Calculation- OT    OT Start Time  1230  -MR      OT Stop Time  1254  -MR      OT Time Calculation (min)  24 min  -MR      Total Timed Code Minutes- OT  24 minute(s)  -MR      OT Received On  04/01/21  -MR      OT - Next Appointment  04/03/21  -MR        User Key  (r) = Recorded By, (t) = Taken By, (c) = Cosigned By    Initials Name Provider Type    Kristen Keane Luz Maria OT Occupational Therapist        Therapy Charges for Today     Code Description Service Date Service Provider Modifiers Qty    82875771529 HC OT THER PROC EA 15 MIN 4/1/2021 Kristen Day OT GO 1    35504655958 HC OT SELF CARE/MGMT/TRAIN EA 15 MIN 4/1/2021 Kristen Day OT GO 1               Kristen Day OT  4/1/2021

## 2021-04-01 NOTE — PLAN OF CARE
Problem: Adult Inpatient Plan of Care  Goal: Plan of Care Review  Recent Flowsheet Documentation  Taken 4/1/2021 1024 by Deb Mckeon PT  Progress: improving  Plan of Care Reviewed With: patient  Outcome Summary: pt cont to show improvement with functional mobility and is near her baseline, I support plans to go home with assist of , pt has chronic issues with LLE which affect gait but compensating for it well with Rwx, pt is walking 30 ft with rwx with supervision assist, will have assist for stairs  Patient was intermittently wearing a face mask during this therapy encounter. Therapist used appropriate personal protective equipment including eye protection, mask, and gloves.  Mask used was standard procedure mask. Appropriate PPE was worn during the entire therapy session. Hand hygiene was completed before and after therapy session. Patient is not in enhanced droplet precautions.

## 2021-04-01 NOTE — DISCHARGE PLACEMENT REQUEST
"Michelle Espinoza (75 y.o. Female)     Date of Birth Social Security Number Address Home Phone MRN    1945  [id number] [facility] [address] [phone] [id number]    [facility] Marital Status          [place]        Admission Date Admission Type Admitting Provider Attending Provider Department, Room/Bed    3/27/21 Emergency Leydi Romo MD Gondi, Sreedevi, MD [address] CVI, 2204/1    Discharge Date Discharge Disposition Discharge Destination                       Attending Provider: Leydi Romo MD    Allergies: Other, Penicillins, Statins, Latex, Nickel    Isolation: None   Infection: None   Code Status: CPR    Ht: 160 cm (63\")   Wt: 96.2 kg (212 lb)    Admission Cmt: None   Principal Problem: None                Active Insurance as of 3/27/2021     Primary Coverage     Payor Plan Insurance Group Employer/Plan Group    MEDICARE MEDICARE A & B      Payor Plan Address Payor Plan Phone Number Payor Plan Fax Number Effective Dates    [address] [phone]  10/1/2007 - None Entered    [facility] [id number]       Subscriber Name Subscriber Birth Date Member ID       MICHELLE ESPINOZA 1945 [id number]           Secondary Coverage     Payor Plan Insurance Group Employer/Plan Group    [employer]                Payor Plan Address Payor Plan Phone Number Payor Plan Fax Number Effective Dates    [address] [address]   12/1/2017 - None Entered    [facility] [id number]       Subscriber Name Subscriber Birth Date Member ID       MICHELLE ESPINOZA 1945 [id number]                 Emergency Contacts      (Rel.) Home Phone Work Phone Mobile Phone    Singh Espinoza (Spouse) [phone] -- [phone]          "

## 2021-04-01 NOTE — PROGRESS NOTES
[facility] Clinical [facility]acy Services: National Cardiology Data Registry (NCDR) Medication Review    Michelle Espinoza is s/p PCI with drug-eluting stent placement for STEMI. Pharmacy to review discharge medications to make sure appropriate medications have been prescribed.    Patient has been discharged on the following:  · P2Y12 Inhibitor: Clopidogrel 75 mg daily  · Aspirin 81 mg daily  · High Intensity Statin: Contraindicated  · Beta-blocker: Carvedilol 3.125 mg BID  · LVEF ~40: Lisinopril 5 mg daily    Provider did not discharge the patient on a statin since the patient has elevated liver transaminases. May try a low-dose statin in the future due to intolerance if liver function returns to normal.    These medications meet the requirements for NCDR discharge medication for chest pain and MI.    Sheila Nguyen, Pharm.D., [facility]  Clinical Staff Pharmacist  Phone Extension #[phone]

## 2021-04-01 NOTE — PROGRESS NOTES
Cardiology note noted.  Okay for discharge from medical point of view.  Please see my recommendations on discharge on yesterday's note.  We appreciate been involved with her care.  Please feel free to call if any questions.

## 2021-04-01 NOTE — DISCHARGE SUMMARY
Michelle Espinoza  [id number]    Date of Admit: 3/27/2021  Date of Discharge:  4/1/2021    Discharge Diagnosis:  Active Hospital Problems    Diagnosis  POA   • ST elevation myocardial infarction (STEMI) (CMS/[facility]) [I21.3]  Yes      Resolved Hospital Problems   No resolved problems to display.       Hospital Course:   This is a 75 year old with DM, hyperparathyroidism, CKD, HLD, CKD, spinal stenosis who was admitted on 3/27/21 for presenting with an inferior MI.     She had originally come in after a fall. She had become suddenly weak after going to the bathroom. EKG found inferolateral ST elevations and she was brought emergently to the cardiac catheterization laboratory where she was found to have an occluded proxima RCA. She underwent PCI and CHRISTOPHER placement of the proximal, mid, and distal RCA.  Unfortunately she did have distal embolization of thrombus into her distal posterior descending branch. She was briefly placed on Integrilin infusion but this was stopped soon after the procedure due to complaints of a headache.  A CT of the head and neck did not show evidence of an intracranial bleed.  However she did have some issues with bradycardia and hypotension during the procedure that required treatment with dopamine infusion and phenylephrine.  At the end of the procedure both her heart rates and blood pressures had normalized and she no longer required pressor support.     Following her cardiac catheterization she did fairly well from a cardiac standpoint.  An echocardiogram performed on 3/28/2021 showed mildly depressed left ventricular systolic function with an EF of 41%, inferior wall motion abnormalities, grade 1 diastolic dysfunction, and no significant valvular disease.      She did have some confusion as well, possible underlying dementia.  Neurology was consulted for both her confusion and her lower extremity weakness.  They recommended pursuing an MRI however the patient was unable to tolerate it due to  anxiety.  At this point her lower extremity weakness and confusion was improving and was recommended that she could pursue the MRI as an outpatient at an open MRI center.      Medicine was consulted regarding diabetes management and management of her noncardiac issues.  She was noted to have elevated liver transaminases and her statin therapy was held.  Right upper quadrant ultrasound was performed that was unremarkable.  Her liver transaminases were declining by the time of her discharge.  She was started on guideline directed medical therapy with carvedilol and lisinopril.  Her renal function initially improved but started to trend up at the time of discharge.      She has been discharged with home health today.  She will need to follow-up in 1 week with a nurse practitioner and a repeat complete metabolic panel to follow-up on both her renal and liver function.  She will follow up with me in about a month.    Procedures Performed  Procedure(s):  Left Heart Cath  Percutaneous Manual Thrombectomy  Percutaneous Coronary Intervention  Coronary angiography      Cardiac catheterization on 3/27/21-  Coronary Findings    Diagnostic  Dominance: Right  Number of lesions: 1  Left Main   Large-caliber vessel with 0% stenosis. The vessel bifurcates into left anterior descending and left circumflex arteries.   Left Anterior Descending   Large-caliber vessel with 30% proximal stenosis. Is a 50% mid stenosis. The mid vessel then tapers to a small caliber with 20 to 30% mid stenosis. The distal vessel is severely tortuous and has no significant disease.   Left Circumflex   Moderate caliber vessel with 0% proximal stenosis. Proximal vessel gives rise to a moderate caliber, severely tortuous first obtuse marginal branch with no significant disease. The distal continuation of the circumflex vessel tapers to small caliber with 0% stenosis.   Right Coronary Artery   Large-caliber vessel with severe proximal calcification and 100%  thrombotic occlusion.   Prox RCA lesion is 100% stenosed. Culprit lesion. JOVANNY flow is 0. The lesion is type B2.   Intervention  Number of interventions: 1  Prox RCA lesion   Stent   Drug-eluting stent was successfully placed.   Post-Intervention Lesion Assessment   Post-intervention JOVANNY flow is 3.   There is a 0% residual stenosis post intervention.     Conclusion    1.  Inferior myocardial infarction status post drug-eluting stent placement of the proximal, mid, and distal right coronary artery  2.  Coronary artery disease   Recommendations    •     Continue dual antiplatelet therapy for at least 1 year.  Routine post MI care.  Aggressive secondary cardiac risk factor management.  If the patient's headache does not improve we will proceed with a stat CT of the head       Echocardiogram on 3/27/21-  Interpretation Summary    · Calculated left ventricular EF = 41.3% Estimated left ventricular EF was in agreement with the calculated left ventricular EF. Left ventricular systolic function is moderately decreased.  · The following left ventricular wall segments are hypokinetic: basal inferolateral, mid inferolateral, apical inferior, mid inferior and basal inferior.  · Left ventricular diastolic function is consistent with (grade I) impaired relaxation.  · There is calcification of the aortic valve.  · There is no significant valular stenosis or regurgatation           Consults     Date and Time Order Name Status Description    3/28/2021  1:23 PM Inpatient Neurology Consult General Completed     3/28/2021  1:23 PM Inpatient Internal Medicine Consult Completed           Discharge Medications     Your medication list      START taking these medications      Instructions Last Dose Given Next Dose Due   aspirin 81 MG chewable tablet  Start taking on: April 2, 2021      Chew 1 tablet Daily.       carvedilol 3.125 MG tablet  Commonly known as: COREG      Take 1 tablet by mouth 2 (Two) Times a Day With Meals.        clopidogrel 75 MG tablet  Commonly known as: PLAVIX  Start taking on: April 2, 2021      Take 1 tablet by mouth Daily.       lisinopril 5 MG tablet  Commonly known as: PRINIVIL,ZESTRIL  Start taking on: April 2, 2021      Take 1 tablet by mouth Daily.          CONTINUE taking these medications      Instructions Last Dose Given Next Dose Due   alendronate 70 MG tablet  Commonly known as: FOSAMAX      Take 1 tablet by mouth Every 7 (Seven) Days.       baclofen 10 MG tablet  Commonly known as: LIORESAL      TAKE TWO TABLETS BY MOUTH DAILY       colchicine 0.6 MG tablet      Take 0.6 mg by mouth Daily.       DULoxetine 60 MG capsule  Commonly known as: CYMBALTA           glimepiride 2 MG tablet  Commonly known as: AMARYL      Take 1 tablet by mouth 2 (Two) Times a Day With Meals.       glucose blood test strip  Commonly known as: ONE TOUCH ULTRA TEST      USE ONE STRIP TO TEST TWICE A DAY       magnesium 30 MG tablet      Take 60 mg by mouth Daily.       Myrbetriq 25 MG tablet sustained-release 24 hour 24 hr tablet  Generic drug: Mirabegron ER      Take 25 mg by mouth Daily.       ofloxacin 0.3 % ophthalmic solution  Commonly known as: OCUFLOX           ondansetron ODT 8 MG disintegrating tablet  Commonly known as: Zofran ODT      Place 1 tablet on the tongue Every 8 (Eight) Hours As Needed for Nausea or Vomiting.       PROBIOTIC PO      Take 1 tablet by mouth Daily.       vitamin C 500 MG tablet  Commonly known as: ASCORBIC ACID      Take 500 mg by mouth Daily. With D3       vitamin D3 125 MCG (5000 UT) capsule capsule      Take 5,000 Units by mouth Daily.       vitamin E 1000 UNIT capsule      Take 1,000 Units by mouth Daily.          STOP taking these medications    Bilberry Plus Lutein 5-1000 MCG-MG capsule        D-MANNOSE PO        Krill Oil 1000 MG capsule        nystatin 294411 UNIT/GM powder  Generic drug: nystatin        TURMERIC PO              Where to Get Your Medications      These medications were sent  to [facility] [address] [facility] [address] - [phone]  - [phone]   [address] [facility] [address]    Phone: [phone]   · carvedilol 3.125 MG tablet  · clopidogrel 75 MG tablet  · lisinopril 5 MG tablet     Information about where to get these medications is not yet available    Ask your nurse or doctor about these medications  · aspirin 81 MG chewable tablet         Discharge Diet: healthy heart    Activity at Discharge: as tolerated    Discharge disposition: home with home health    Condition on Discharge: stable    Follow-up Appointments  Future Appointments   Date Time Provider Department Center   7/19/2021  1:00 PM Bethanie Montano MD [facility] None   11/11/2021 10:20 AM MAMMOGRAM PITOMMY ALL[facility]   11/11/2021 10:45 AM Mahamed Massey MD [facility]     Additional Instructions for the Follow-ups that You Need to Schedule     Ambulatory Referral to Cardiac Rehab   As directed      Ambulatory Referral to Home Health   As directed      Face to Face Visit Date: 4/1/2021    Follow-up provider for Plan of Care?: I will be treating the patient on an ongoing basis.  Please send me the Plan of Care for signature.    Follow-up provider: SARAH KEMP [[id number]]    Reason/Clinical Findings: weakness    Describe mobility limitations that make leaving home difficult: weakness, sp heart attack    Nursing/Therapeutic Services Requested: Home Monitoring    Home Monitoring Type: Continuous Monitoring via [facility])    Frequency: 1 Week 1         Discharge Follow-up with PCP   As directed       Currently Documented PCP:    Lillie Ji PA    PCP Phone Number:    [phone]     Follow Up Details: Follow up with PCP in 2-4 weeks         Discharge Follow-up with Specialty: Follow up with [facility]N in 1 week, follow up with Dr. Kemp in 4 weeks   As directed      Specialty: Follow up with  [facility] in 1 week, follow up with Dr. Romo in 4 weeks         Comprehensive Metabolic Panel    Apr 08, 2021 (Approximate)            Discharge time: 45 minutes.       Leydi Romo MD  04/01/21  13:26 EDT

## 2021-04-01 NOTE — PLAN OF CARE
Goal Outcome Evaluation:  Plan of Care Reviewed With: patient, spouse  Progress: improving  Outcome Summary: Pt demos good participation with all OT interventions this date. Pt is able to perform functional mobility in room with CGA and use of walker. Continue OT to increase safety and independence with performance of ADLs. Therapist used appropriate personal protective equipment including mask, gloves, and eye protection.  Mask used was standard procedure mask. Appropriate PPE was worn during the entire therapy session. Hand hygiene was completed before and after therapy session. Patient is not in enhanced droplet precautions.

## 2021-04-01 NOTE — PROGRESS NOTES
"Pharmacy Services-Post PCI Discharge Medication Education    Michelle Espinoza was counseled over post-PCI medications prior to discharge.  Counseling points included the followin) Clopidogrel's indication, patient's need for the medication, and dosing/frequency  2) Enforced the importance of taking clopidogrel/aspirin as instructed every day  3) Explained possible side effects of clopidogrel/aspirin therapy, including increased risk of bleeding, s/sx of bleeding, and increase in cold intolerance. Also talked about ways to control bleeding for minor cuts and scrapes.  4) Discussed all important drug interactions, including over-the-counter medications and supplements (\"G\" supplements, fish oil, green tea, Vincent's Wort, and Vitamin E).     Patient cannot take statins as she reported intolerance to them.    Explained risk for thrombosis on new stent (especially in the first 3-6 months) and medication compliance.     Patient expressed understanding and had no further questions.      Sheila Nguyen, Pharm.D., [facility]   Clinical Staff Pharmacist   Phone Extension #[phone]  "

## 2021-04-01 NOTE — OUTREACH NOTE
Prep Survey      Responses   [facility] facility patient discharged from?  [place]   Is LACE score < 7 ?  No   Emergency Room discharge w/ pulse ox?  No   Eligibility  TCM   [facility]   Date of Admission  03/27/21   Date of Discharge  04/01/21   Discharge Disposition  Home or Self Care   Discharge diagnosis  STEMI, heart cath & stents   Does the patient have one of the following disease processes/diagnoses(primary or secondary)?  Acute MI (STEMI,NSTEMI)   Does the patient have Home health ordered?  Yes   What is the Home health agency?   [facility]   Is there a DME ordered?  No   Prep survey completed?  Yes          Caitlin Soni RN

## 2021-04-01 NOTE — PROGRESS NOTES
Continued Stay Note  [facility]     Patient Name: Michelle Espinoza  MRN: [id number]  Today's Date: 4/1/2021    Admit Date: 3/27/2021    Discharge Plan     Row Name 04/01/21 1356       Plan    Plan Comments  Spoke with pts , pt has decided to d/c home and would like [facility] to follow for needs. Referral was sent in [facility] and accepted. Sammi/[facility] notified. Plan will be to d/c home with family support and [facility].    Final Discharge Disposition Code  06 - home with home health care    Final Note  Pt d/c'ing home with [facility]        Discharge Codes    No documentation.       Expected Discharge Date and Time     Expected Discharge Date Expected Discharge Time    Apr 1, 2021             Meg Alvarez RN

## 2021-04-01 NOTE — PROGRESS NOTES
[facility] Follow Up    Chief Complaint: Follow up inferior myocardial infarction    Interval History:  No chest pain or dyspnea.      Objective:     Objective:  Temp:  [97.1 °F (36.2 °C)-98.7 °F (37.1 °C)] 98.2 °F (36.8 °C)  Heart Rate:  [75-97] 97  Resp:  [18] 18  BP: (112-162)/() 119/74     Intake/Output Summary (Last 24 hours) at 4/1/2021 0738  Last data filed at 4/1/2021 0729  Gross per 24 hour   Intake 795 ml   Output 950 ml   Net -155 ml     Body mass index is 37.55 kg/m².      03/27/21  0931   Weight: 96.2 kg (212 lb)     Weight change:       Physical Exam:   General : Alert, cooperative, in no acute distress.  Neuro: Alert,cooperative and oriented.  Lungs: CTAB. Normal respiratory effort and rate.  CV: Regular rate and rhythm, normal S1 and S2, no murmurs, gallops or rubs.  ABD: Soft, nontender, nondistended. Positive bowel sounds.  Extr: No edema or cyanosis, moves all extremities.    Lab Review:   Results from last 7 days   Lab Units 04/01/21  0432 03/31/21  0448   SODIUM mmol/L 136 136   POTASSIUM mmol/L 4.0 4.3   CHLORIDE mmol/L 104 103   CO2 mmol/L 22.3 22.4   BUN mg/dL 25* 26*   CREATININE mg/dL 1.02* 0.97   GLUCOSE mg/dL 133* 133*   CALCIUM mg/dL 8.8 8.6   AST (SGOT) U/L 39* 71*   ALT (SGPT) U/L 82* 113*     Results from last 7 days   Lab Units 03/29/21  0500 03/28/21  0409 03/27/21  1332 03/27/21  0923   CK TOTAL U/L  --   --   --  252*   TROPONIN T ng/mL 6.100* 9.770* 7.680* 0.679*     Results from last 7 days   Lab Units 03/31/21  0448 03/30/21  0405   WBC 10*3/mm3 9.71 9.72   HEMOGLOBIN g/dL 12.3 11.9*   HEMATOCRIT % 37.9 37.0   PLATELETS 10*3/mm3 226 184         Results from last 7 days   Lab Units 03/27/21  1332 03/27/21  0923   MAGNESIUM mg/dL 2.2 2.3     Results from last 7 days   Lab Units 03/28/21  0409   CHOLESTEROL mg/dL 180   TRIGLYCERIDES mg/dL 310*   HDL CHOL mg/dL 38*         Results from last 7 days   Lab Units 03/29/21  0500 03/27/21  0923   TSH uIU/mL  0.568 1.620     I reviewed the patient's new clinical results.  I personally viewed and interpreted the patient's EKG  Current Medications:   Scheduled Meds:aspirin, 81 mg, Oral, Daily  baclofen, 20 mg, Oral, Daily  carvedilol, 3.125 mg, Oral, BID With Meals  clopidogrel, 75 mg, Oral, Daily  colchicine, 0.6 mg, Oral, Daily  DULoxetine, 60 mg, Oral, Daily  glipizide, 5 mg, Oral, QAM AC  insulin lispro, 0-9 Units, Subcutaneous, TID AC  lisinopril, 5 mg, Oral, Q24H  oxybutynin XL, 5 mg, Oral, Daily      Continuous Infusions:     Allergies:  Allergies   Allergen Reactions   • Other Nausea And Vomiting     The mercury in [place]   • Penicillins Hives   • Statins Myalgia   • Latex Rash   • Nickel Rash       Assessment/Plan:     1.  Inferior myocardial infarction.  Status post drug-eluting stent placement of a proximal, mid, and distal right coronary arteries with 3 separate stents.  Her presentation was quite unusual and that she did not present with chest discomfort but presented following a fall.  She only later admitted to some mild jaw pain.  Her coronary PCI was complicated by issues with poor distal reflow and distal embolization to her posterior descending branch both of which appear to have improved by the end of the procedure.  She was briefly treated with Integrilin infusion which was stopped soon after the procedure.  2.  Coronary artery disease.  As above.  Otherwise mild nonobstructive disease of her remaining coronary arteries.  3.  Ischemic cardiomyopathy.  Due to #1.  EF of about 40%.  3.  Diabetes mellitus type 2.    Fairly well-controlled with a hemoglobin A1c of 7.  4.  Chronic bilateral lower extremity weakness.  Improved.  This is a chronic issue with some recent worsening resulting in 3 falls in the last week.  Unable to tolerate MRI today.  Outpatient MRI recommended by neurology.  5.  Status post fall.  Prior to admission. No evidence of significant trauma.  6.  Chronic kidney disease.  Trended up  mildly this morning.    7.  Hyperlipidemia.  Statin on hold for elevated liver transaminases.  8.  Confusion.  Present before admission and worsening over the last few months per her .  May have underlying undiagnosed dementia.  Neurology and Medicine following.  9.  Elevated liver transminases.    Statin discontinued.  Right upper quadrant ultrasound is unremarkable.  Numbers continue to improve.  10.    Bacturia.  No plans to start antibiotics at this point per medicine.     - Creatinine trended up mildly but I think we can continue lisinopril at current dose and recheck renal function as outpatient.    -  Will keep statin on hold and consider resuming if liver transaminases normalize.  -  Discharge home later today once home health arranged and if ok with Medicine.         Leydi Romo MD  04/01/21  07:38 EDT

## 2021-04-02 ENCOUNTER — TRANSITIONAL CARE MANAGEMENT TELEPHONE ENCOUNTER (OUTPATIENT)
Dept: CALL CENTER | Facility: HOSPITAL | Age: 76
End: 2021-04-02

## 2021-04-02 NOTE — OUTREACH NOTE
Call Center TCM Note      Responses   [facility] patient discharged from?  [place]   Does the patient have one of the following disease processes/diagnoses(primary or secondary)?  Acute MI (STEMI,NSTEMI)   TCM attempt successful?  Yes   Call start time  1418   Call end time  1420   Discharge diagnosis  STEMI, heart cath & stents   Meds reviewed with patient/caregiver?  Yes   Is the patient having any side effects they believe may be caused by any medication additions or changes?  No   Does the patient have all prescriptions related to this admission filled (includes statins,anticoagulants,HTN meds,anti-arrhythmia meds)  Yes   Is the patient taking all medications as directed (includes completed medication regime)?  Yes   Does the patient have a primary care provider?   Yes   Does the patient have an appointment with their PCP,cardiologist,or clinic within 7 days of discharge?  No   Comments regarding PCP  Attempted to make a followup appt with patient and HH is there at time of call. She will call to schedule her own appt.    What is preventing the patient from scheduling follow up appointments within 7 days of discharge?  Haven't had time   Nursing Interventions  Educated patient on importance of making appointment, Advised patient to make appointment   Has the patient kept scheduled appointments due by today?  N/A   What is the Home health agency?   [facility]   Has home health visited the patient within 72 hours of discharge?  Yes   Psychosocial issues?  No   Did the patient receive a copy of their discharge instructions?  Yes   Nursing interventions  Reviewed instructions with patient   What is the patient's perception of their health status since discharge?  Improving   Nursing interventions  Nurse provided patient education   Is the patient/caregiver able to teach back signs and symptoms of when to call for help immediately:  Sudden chest discomfort, Shortness of breath at any time   Nursing interventions   Nurse provided patient education   Is the patient/caregiver able to teach back ways to prevent a second heart attack:  Take medications, Follow up with MD   If the patient is a current smoker, are they able to teach back resources for cessation?  Not a smoker   Is the patient/caregiver able to teach back the hierarchy of who to call/visit for symptoms/problems? PCP, Specialist, Home health nurse, Urgent Care, ED, 911  Yes   TCM call completed?  Yes          Mayank Rios RN    4/2/2021, 14:20 EDT

## 2021-04-05 RX ORDER — BLOOD SUGAR DIAGNOSTIC
STRIP MISCELLANEOUS
Qty: 200 EACH | Refills: 10 | Status: SHIPPED | OUTPATIENT
Start: 2021-04-05 | End: 2021-04-07 | Stop reason: SDUPTHER

## 2021-04-07 DIAGNOSIS — E11.59 TYPE 2 DIABETES MELLITUS WITH OTHER CIRCULATORY COMPLICATIONS (HCC): Primary | ICD-10-CM

## 2021-04-07 RX ORDER — BLOOD SUGAR DIAGNOSTIC
STRIP MISCELLANEOUS
Qty: 200 EACH | Refills: 10 | Status: SHIPPED | OUTPATIENT
Start: 2021-04-07 | End: 2021-05-20

## 2021-04-08 ENCOUNTER — READMISSION MANAGEMENT (OUTPATIENT)
Dept: CALL CENTER | Facility: HOSPITAL | Age: 76
End: 2021-04-08

## 2021-04-08 NOTE — OUTREACH NOTE
AMI Week 2 Survey      Responses   [facility] patient discharged from?  [place]   Does the patient have one of the following disease processes/diagnoses(primary or secondary)?  Acute MI (STEMI,NSTEMI)   Week 2 attempt successful?  Yes   Call start time  1719   Call end time  1724   Discharge diagnosis  STEMI, heart cath & stents   Is patient permission given to speak with other caregiver?  No   Meds reviewed with patient/caregiver?  Yes   Does the patient have all prescriptions related to this admission filled (includes statins,anticoagulants,HTN meds,anti-arrhythmia meds)  Yes   Is the patient taking all medications as directed (includes completed medication regime)?  Yes   Medication comments  Denies any medication issues or questions.    Does the patient have a primary care provider?   Yes   Comments regarding PCP  Follow up with BHAKTI Rasheed 4/14/21   Has the patient kept scheduled appointments due by today?  No   What is preventing the patient from keeping their appointments?  -- [Patient reports that she rescheduled cardiology NP appt until next week. ]   Nursing Interventions  Advised patient to keep appointment   [facility]   Has home health visited the patient within 72 hours of discharge?  Yes   Psychosocial issues?  No   Did the patient receive a copy of their discharge instructions?  Yes   Nursing interventions  Reviewed instructions with patient   What is the patient's perception of their health status since discharge?  Improving   Nursing interventions  Nurse provided patient education   Is the patient/caregiver able to teach back signs and symptoms of when to call for help immediately:  Sudden chest discomfort, Sudden discomfort in arms, back, neck or jaw, Shortness of breath at any time, Sudden sweating or clammy skin, Nausea or vomiting, Dizziness or lightheadedness, Irregular or rapid heart rate   Is the patient/caregiver able to teach back ways to prevent  a second heart attack:  Take medications, Follow up with MD   If the patient is a current smoker, are they able to teach back resources for cessation?  Not a smoker   Is the patient/caregiver able to teach back the hierarchy of who to call/visit for symptoms/problems? PCP, Specialist, Home health nurse, Urgent Care, ED, 911  Yes   Week 2 call completed?  Yes          Kristen Fisher RN

## 2021-04-09 ENCOUNTER — TELEPHONE (OUTPATIENT)
Dept: FAMILY MEDICINE CLINIC | Facility: CLINIC | Age: 76
End: 2021-04-09

## 2021-04-09 ENCOUNTER — APPOINTMENT (OUTPATIENT)
Dept: GENERAL RADIOLOGY | Facility: HOSPITAL | Age: 76
End: 2021-04-09

## 2021-04-09 ENCOUNTER — TELEPHONE (OUTPATIENT)
Dept: CARDIAC REHAB | Facility: HOSPITAL | Age: 76
End: 2021-04-09

## 2021-04-09 ENCOUNTER — HOSPITAL ENCOUNTER (EMERGENCY)
Facility: HOSPITAL | Age: 76
Discharge: HOME | End: 2021-04-09
Attending: EMERGENCY MEDICINE | Admitting: EMERGENCY MEDICINE

## 2021-04-09 VITALS
SYSTOLIC BLOOD PRESSURE: 135 MMHG | HEART RATE: 94 BPM | OXYGEN SATURATION: 98 % | RESPIRATION RATE: 16 BRPM | DIASTOLIC BLOOD PRESSURE: 85 MMHG | TEMPERATURE: 98.2 F

## 2021-04-09 DIAGNOSIS — S46.312A RUPTURE OF LEFT TRICEPS TENDON, INITIAL ENCOUNTER: ICD-10-CM

## 2021-04-09 DIAGNOSIS — S46.309A INJURY OF TRICEPS: Primary | ICD-10-CM

## 2021-04-09 PROCEDURE — 99282 EMERGENCY DEPT VISIT SF MDM: CPT

## 2021-04-09 PROCEDURE — 73070 X-RAY EXAM OF ELBOW: CPT

## 2021-04-09 PROCEDURE — 73060 X-RAY EXAM OF HUMERUS: CPT

## 2021-04-09 NOTE — TELEPHONE ENCOUNTER
Thank you. I will review urgent care notes when I receive them. Please let me know if she needs anything prior to her 4/14/2021 appt.

## 2021-04-09 NOTE — ED TRIAGE NOTES
Pt states that she was admitted here following an MI 2 weeks ago. Pt was started on Plavix. Reports starting yesterday she has had heat, pain, swelling, and bruising to her left arm around the elbow. Denies any injury. Went to an [facility] and was sent here. Patient masked at arrival and triage staff wore all appropriate PPE during entire encounter with patient.

## 2021-04-09 NOTE — ED PROVIDER NOTES
EMERGENCY DEPARTMENT ENCOUNTER    Room Number:  11/11  Date of encounter:  4/9/2021  PCP: Lillie Ji PA  Historian: Patient      HPI:  Chief Complaint: Left upper extremity pain  A complete HPI/ROS/PMH/PSH/SH/FH are unobtainable due to: N/A    Context: Michelle Espinoza is a 75 y.o. female who presents to the ED c/o left upper extremity pain, bruising and swelling which started 2 days ago.  She states that she was pushing on the windowsill next to her toilet in order to raise herself up when she felt a pop and severe pain in the left tricep and elbow.  Since that time, she has noticed increased swelling and ecchymosis.  She has pain with extension and using her walker.  She did not fall or injure herself.  No fevers or chills.  No numbness or tingling.  Symptoms are constant, no alleviating factors, exacerbated by movement/palpation.  No prior issues in the past.      The patient was placed in a mask in triage, hand hygiene was performed before and after my interaction with the patient.  I wore a mask, safety glasses and gloves during my entire interaction with the patient.    PAST MEDICAL HISTORY  Active Ambulatory Problems     Diagnosis Date Noted   • Abnormal weight gain 07/19/2016   • Arm pain 07/19/2016   • Arthritis 07/19/2016   • Atonic neurogenic bladder 07/19/2016   • Benign colonic polyp 07/19/2016   • Carotid atherosclerosis 07/19/2016   • Fatigue 07/19/2016   • Hypercalcemia 07/19/2016   • Hypercholesterolemia 07/19/2016   • Weakness of lower extremity 07/19/2016   • Nephrolithiasis 07/19/2016   • Spinal stenosis 07/19/2016   • Urinary tract infection 07/19/2016   • Chronic venous insufficiency 07/19/2016   • B12 deficiency 07/19/2016   • Vitamin D deficiency 07/19/2016   • Degenerative disc disease, lumbar 07/19/2016   • T7 and T10-11 Thoracic spinal stenosis 11/22/2016   • Weakness 12/05/2017   • CKD (chronic kidney disease) 12/06/2017   • MINERVA (acute kidney injury) (CMS/[facility]) 12/06/2017   •  Stenosis of right carotid artery 02/26/2019   • Hyperparathyroidism (CMS/HCC) 04/28/2019   • Type 2 diabetes mellitus with other circulatory complications (CMS/HCC)  01/08/2020   • Osteoporosis 08/07/2020   • ST elevation myocardial infarction (STEMI) (CMS/HCC) 03/27/2021     Resolved Ambulatory Problems     Diagnosis Date Noted   • Impaired fasting glucose 07/19/2016   • Osteopenia 10/25/2016     Past Medical History:   Diagnosis Date   • Anesthesia complication    • Cataract    • DDD (degenerative disc disease), cervical    • DDD (degenerative disc disease), lumbosacral    • Diabetes mellitus (CMS/[facility])    • Frequent UTI    • History of kidney stones 06/2014   • History of pyelonephritis 06/2014   • Hypercholesteremia    • Leg muscle spasm    • Neuropathy    • Osteoarthritis    • Pyelonephritis 06/2014   • Self-catheterizes urinary bladder    • Sepsis (CMS/HCC) 06/2014   • Urinary incontinence          PAST SURGICAL HISTORY  Past Surgical History:   Procedure Laterality Date   • APPENDECTOMY     • BACK SURGERY      MULTIPLE. WITH HARDWARE   • CARDIAC CATHETERIZATION N/A 3/27/2021    Procedure: Left Heart Cath;  Surgeon: Leydi Romo MD;  Location:  [facility];  Service: Cardiovascular;  Laterality: N/A;   • CARDIAC CATHETERIZATION  3/27/2021    Procedure: Percutaneous Manual Thrombectomy;  Surgeon: Leydi Romo MD;  Location:  [facility];  Service: Cardiovascular;;   • CARDIAC CATHETERIZATION N/A 3/27/2021    Procedure: Percutaneous Coronary Intervention;  Surgeon: Leydi Romo MD;  Location:  [facility];  Service: Cardiovascular;  Laterality: N/A;   • CARDIAC CATHETERIZATION N/A 3/27/2021    Procedure: Coronary angiography;  Surgeon: Leydi Romo MD;  Location:  [facility];  Service: Cardiovascular;  Laterality: N/A;   • CARPAL TUNNEL RELEASE      LEFT X2 RIGHT    • CATARACT EXTRACTION WITH INTRAOCULAR LENS IMPLANT      LEFT  AND RIGHT   • CERVICAL SPINE SURGERY      MULTIPLE C3-4 C6-7   •  SECTION      x 2   • COLONOSCOPY  2018   • PARATHYROIDECTOMY Left 3/7/2019    Procedure: PARATHYROIDECTOMY LEFT INFERIOR;  Surgeon: Mason Prather MD;  Location: [facility];  Service: ENT   • THYROID SURGERY     • THYROIDECTOMY, PARTIAL           FAMILY HISTORY  Family History   Problem Relation Age of Onset   • Stroke Mother    • Heart disease Mother    • Hypertension Mother    • Clotting disorder Father    • Hypertension Father    • Diabetes Father    • Aneurysm Father    • Hypertension Sister    • Diabetes Sister    • Cervical cancer Sister    • Diabetes Sister    • Cervical cancer Maternal Grandmother 72   • Diabetes Grandchild    • Malig Hyperthermia Neg Hx          SOCIAL HISTORY  Social History     Socioeconomic History   • Marital status:      Spouse name: Not on file   • Number of children: Not on file   • Years of education: Not on file   • Highest education level: Not on file   Tobacco Use   • Smoking status: Former Smoker     Packs/day: 0.50     Years: 15.00     Pack years: 7.50     Types: Cigarettes     Quit date:      Years since quittin.2   • Smokeless tobacco: Never Used   Substance and Sexual Activity   • Alcohol use: Yes     Comment: Socially.   • Drug use: No         ALLERGIES  Other, Penicillins, Statins, Latex, and Nickel        REVIEW OF SYSTEMS  Review of Systems   Constitutional: Negative for chills and fever.   Respiratory: Negative for cough and shortness of breath.    Cardiovascular: Negative for chest pain.   Musculoskeletal: Positive for joint swelling.        All systems reviewed and negative except for those discussed in HPI.       PHYSICAL EXAM    I have reviewed the triage vital signs and nursing notes.    ED Triage Vitals [21 0926]   Temp Heart Rate Resp BP SpO2   98.2 °F (36.8 °C) 94 16 -- 98 %      Temp src Heart Rate Source Patient Position BP Location FiO2 (%)   Tympanic  Monitor -- -- --       Physical Exam   Constitutional: Pt. is oriented to person, place, and time and well-developed, well-nourished, and in no distress.   Neck: Normal range of motion. Neck supple. No JVD present.   Cardiovascular: Normal rate, regular rhythm and normal heart sounds. Exam reveals no gallop and no friction rub.   No murmur heard.  Pulmonary/Chest: Effort normal and breath sounds normal. No stridor. No respiratory distress. No wheezes, no rales.   Musculoskeletal: Left upper extremity-there is swelling and ecchymosis along the distal tricep extending into the proximal forearm.  There are no deformities.  Radial and ulnar pulses are easily palpable with brisk capillary refill.  Biceps and shoulder strength is normal, however she has weak elbow extension when compared to the right.  She is tender at the insertion of the triceps.  Remaining extremities exhibit normal range of motion and are without edema, tenderness or deformity.   Neurological: Pt. is alert and oriented to person, place, and time.  She has no focal neurologic deficits.  Skin: Skin is warm and dry. No rash noted. Pt. is not diaphoretic. No erythema.  Ecchymosis and erythema as noted above.  Psychiatric: She is pleasant cooperative.  Nursing note and vitals reviewed.        LAB RESULTS  No results found for this or any previous visit (from the past 24 hour(s)).    Ordered the above labs and independently reviewed the results.        RADIOLOGY  XR Humerus Left, XR Elbow 2 View Left    Result Date: 4/9/2021  XR HUMERUS LEFT-, XR ELBOW 2 VW LEFT-  Clinical: Injured with pain  Left elbow findings: No joint effusion, fracture or dislocation. There is moderate to substantial left elbow joint degeneration with periarticular bone hypertrophy. The overlying soft tissues are unremarkable.  CONCLUSION: No elbow fracture, joint degeneration as described above  Left humerus findings: There is left shoulder joint degeneration. No fracture of the  humerus is demonstrated. Surrounding soft tissues within normal limits.  CONCLUSION: No fracture of the left humerus.  This report was finalized on 4/9/2021 10:54 AM by Dr. Cristopher Holloway M.D.        I ordered the above noted radiological studies. Reviewed by me and discussed with radiologist.  See dictation for official radiology interpretation.      PROCEDURES    Splint - Cast - Strapping    Date/Time: 4/9/2021 11:00 AM  Performed by: Armen Ramos MD  Authorized by: Armen Ramos MD     Consent:     Consent obtained:  Verbal  Pre-procedure details:     Sensation:  Normal  Procedure details:     Laterality:  Left    Strapping: no      Splint type:  Long arm    Supplies:  Ortho-Glass, cotton padding and elastic bandage  Post-procedure details:     Sensation:  Normal    Patient tolerance of procedure:  Tolerated well, no immediate complications          MEDICATIONS GIVEN IN ER    Medications - No data to display      PROGRESS, DATA ANALYSIS, CONSULTS, AND MEDICAL DECISION MAKING    Any/all labs have been independently reviewed by me.  Any/all radiology studies have been reviewed by me and discussed with radiologist dictating the report.   EKG's independently viewed and interpreted by me.  Discussion below represents my analysis of pertinent findings related to patient's condition, differential diagnosis, treatment plan and final disposition.      ED Course as of Apr 09 1104 Fri Apr 09, 2021   0937 Prior record review: The patient was admitted to this facility from March 27 through April 1 for an inferior ST elevation myocardial infarction.  She had an occluded proximal RCA and underwent PCI/CHRISTOPHER.  Ejection fraction at discharge was 41% with grade 1 diastolic dysfunction.    [WC]   0949 Current symptoms are consistent with a triceps tear.  Will check plain films of the humerus and elbow-she will be placed in a posterior splint/sling and will need orthopedic follow-up.  Since she is walker dependent, she will  need a walker with a elbow platform on the left.    [WC]   1049 Case discussed with Dr. Arun Cho (orthopedics)-he agrees to see patient in follow-up as an outpatient.    [WC]   1100 X-rays of the left elbow and humerus reveal no acute processes.    [WC]      ED Course User Index  [WC] Armen Ramos MD       AS OF 11:04 EDT VITALS:    BP - 135/85  HR - 94  TEMP - 98.2 °F (36.8 °C) (Tympanic)  02 SATS - 98%        DIAGNOSIS  Final diagnoses:   Injury of triceps   Rupture of left triceps tendon, initial encounter         DISPOSITION  Discharged           Armen Ramos MD  04/09/21 1103       Armen Ramos MD  04/09/21 1104

## 2021-04-09 NOTE — TELEPHONE ENCOUNTER
----- Message from Claritza Pires sent at 4/8/2021  2:54 PM EDT -----  Sydney with [facility] called said Ms Espinoza' elbow was red swollen and warm she said patient didn't fall but she had used her elbow to help her get up, the home health nurse said there wasn't any bruising but the patient said it did hurt, I advised her to go to the urgent care or ER so they could do an xray    Sydney's phone is [phone] if you needed to talk to her

## 2021-04-09 NOTE — DISCHARGE INSTRUCTIONS
Take paperwork to [facility] to obtain a walker with a platform.  Call Dr. Cho's office today to arrange follow-up.  Use sling for support.

## 2021-04-13 ENCOUNTER — TELEPHONE (OUTPATIENT)
Dept: CARDIOLOGY | Facility: CLINIC | Age: 76
End: 2021-04-13

## 2021-04-13 ENCOUNTER — OFFICE VISIT (OUTPATIENT)
Dept: FAMILY MEDICINE CLINIC | Facility: CLINIC | Age: 76
End: 2021-04-13

## 2021-04-13 ENCOUNTER — TELEPHONE (OUTPATIENT)
Dept: FAMILY MEDICINE CLINIC | Facility: CLINIC | Age: 76
End: 2021-04-13

## 2021-04-13 VITALS
HEIGHT: 63 IN | WEIGHT: 193 LBS | OXYGEN SATURATION: 97 % | SYSTOLIC BLOOD PRESSURE: 122 MMHG | TEMPERATURE: 98.7 F | BODY MASS INDEX: 34.2 KG/M2 | DIASTOLIC BLOOD PRESSURE: 80 MMHG | RESPIRATION RATE: 18 BRPM | HEART RATE: 93 BPM

## 2021-04-13 DIAGNOSIS — S46.309A INJURY OF TENDON OF TRICEPS: ICD-10-CM

## 2021-04-13 DIAGNOSIS — R74.8 ELEVATED CK: ICD-10-CM

## 2021-04-13 DIAGNOSIS — Z95.5 S/P DRUG ELUTING CORONARY STENT PLACEMENT: ICD-10-CM

## 2021-04-13 DIAGNOSIS — M25.522 LEFT ELBOW PAIN: ICD-10-CM

## 2021-04-13 DIAGNOSIS — I21.11 ST ELEVATION MYOCARDIAL INFARCTION INVOLVING RIGHT CORONARY ARTERY (HCC): Primary | ICD-10-CM

## 2021-04-13 DIAGNOSIS — R79.89 ELEVATED LIVER FUNCTION TESTS: ICD-10-CM

## 2021-04-13 DIAGNOSIS — R31.0 GROSS HEMATURIA: ICD-10-CM

## 2021-04-13 LAB
BILIRUB BLD-MCNC: NEGATIVE MG/DL
CLARITY, POC: ABNORMAL
COLOR UR: ABNORMAL
GLUCOSE UR STRIP-MCNC: NEGATIVE MG/DL
KETONES UR QL: NEGATIVE
LEUKOCYTE EST, POC: ABNORMAL
NITRITE UR-MCNC: NEGATIVE MG/ML
PH UR: 6 [PH] (ref 5–8)
PROT UR STRIP-MCNC: ABNORMAL MG/DL
RBC # UR STRIP: ABNORMAL /UL
SP GR UR: 1.02 (ref 1–1.03)
UROBILINOGEN UR QL: NORMAL

## 2021-04-13 PROCEDURE — 81003 URINALYSIS AUTO W/O SCOPE: CPT | Performed by: PHYSICIAN ASSISTANT

## 2021-04-13 PROCEDURE — 99214 OFFICE O/P EST MOD 30 MIN: CPT | Performed by: PHYSICIAN ASSISTANT

## 2021-04-13 NOTE — PROGRESS NOTES
Subjective   Michelle Espinoza is a 75 y.o. female who presents today in follow up of hospitalization 3/27/2021 for ST elevation MI, PCI as well as urgent care and ER for elbow pain and recent blood in urine.     History of Present Illness     Patient was hospitalized 3/27/2021 through 4/1/2021 for ST elevation MI. I have reviewed hospital notes, including H&P, labs, imaging, consult notes, and discharge summary. Per discharge summary, she went to the ER after a fall.  She reported suddenly getting weak after going to the bathroom and falling.  EKG with inferior lateral ST elevation.  Patient underwent emergent cardiac catheterization with occluded proximal RCA.  She underwent PCI and CHRISTOPHER placement of the proximal, mid, and distal RCA.  She did have distal embolization of thrombus anterior distal posterior descending branch and was placed on Integrilin infusion.  This was stopped due to intolerance with headache.  CT head without intracranial bleed.  Patient also had some bradycardia and hypotension during the procedure requiring dopamine infusion and phenylephrine.  The end of procedure, she stabilized.  She was started on carvedilol and lisinopril.  Her renal function initially improved but started to trend up at the time of discharge.  She did have some confusion and possible underlying dementia.  Neurology was consulted for confusion and lower extremity weakness.  They recommended MRI but she was unable to tolerate due to anxiety.  With improvement in weakness and confusion, they advised she undergo MRI at an outpatient open MRI center.    · Echocardiogram 3/28/2021-mildly depressed LV systolic function-EF 41%, inferior wall motion abnormality, grade 1 diastolic dysfunction, no valvular disease.  · Elevated LFT-statin held.  LFT declining at the time of discharge.    Discharged with home health.    She was advised to follow-up in 1 week with nurse practitioner and Dr. Romo in 1 month.    She missed her 1 week  appointment due to arm pain.  She has appointment 4/15/2021 with RATNA and 5/18/2021 with Dr. Romo.    4/9/2021, patient went to the urgent care for upper extremity swelling.  She was sent to the ER.  ER evaluated for left upper extremity pain, bruising, and swelling x2 days.  She was pushing on the windows next to her toilet to raise herself up and felt a pop and severe pain in her left triceps and elbow.  Since then, she had swelling in bruising.  She had pain with extension and walker use.    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  Otherwise negative-no fracture.    · Diagnosed with triceps tear.  Was advised orthopedic surgery follow-up.  It was noted that she needed a walker with elbow platform on the left.  Discussed with Dr. Arun Cho who agreed to see patient as an outpatient.  · She was splinted with a long-arm Ortho-Glass splint and bandage  Orthopedic surgery- was seen orthopedist yesterday-MRI tomorrow. He is not sure that he tore anything but will have to have MRI. When they make the appt for MRI, they will make follow up for results.     Patient then called today here in cardiology, noting pink urine since 4/12/2021.  She then started noticing clots in her urine.  She was concerned due to recently starting DAPT.  They advised that with recent MI and stent placement, she will need to continue her aspirin and Plavix and that it is not safe for her to stop even with blood in her urine.  She was advised to contact us for work-up and treatment.  She denies urinary symptoms more than normal- pink last night, then this morning clot the size of a dime and a couple small clots but that was all. Urine was normal prior to leaving house. No fever, vomiting, diarrhea, change or increased back pain.     Patient self caths and follows with urology. Has not called urology.     The following portions of the patient's history were reviewed and updated  as appropriate: allergies, current medications, past family history, past medical history, past social history, past surgical history and problem list.    Review of Systems   Constitutional: Positive for fatigue. Negative for fever.   HENT: Negative.    Respiratory: Negative.    Cardiovascular: Negative.    Genitourinary: Positive for hematuria. Negative for dysuria and flank pain.   Musculoskeletal: Positive for arthralgias, back pain and gait problem.   Neurological: Positive for weakness.   Psychiatric/Behavioral:        Memory disturbance       Objective   Vitals:    04/13/21 1131   BP: 122/80   Pulse: 93   Resp: 18   Temp: 98.7 °F (37.1 °C)   SpO2: 97%     Body mass index is 34.2 kg/m².    Physical Exam  Vitals and nursing note reviewed.   Constitutional:       General: She is not in acute distress.     Appearance: Normal appearance. She is well-developed.   HENT:      Head: Normocephalic and atraumatic.      Right Ear: External ear normal.      Left Ear: External ear normal.      Nose: Nose normal.   Eyes:      General: Lids are normal.      Conjunctiva/sclera: Conjunctivae normal.   Neck:      Vascular: No carotid bruit.   Cardiovascular:      Rate and Rhythm: Normal rate and regular rhythm.      Pulses: Normal pulses.      Heart sounds: Normal heart sounds. No murmur heard.   No friction rub. No gallop.    Pulmonary:      Effort: Pulmonary effort is normal. No respiratory distress.      Breath sounds: Normal breath sounds. No wheezing, rhonchi or rales.   Musculoskeletal:         General: No deformity.      Cervical back: Neck supple.   Skin:     General: Skin is warm and dry.   Neurological:      Mental Status: She is alert and oriented to person, place, and time.      Gait: Gait abnormal (abnormal gait with walker).   Psychiatric:         Mood and Affect: Mood normal.         Speech: Speech normal.         Behavior: Behavior normal.         Thought Content: Thought content normal.         Cognition and  Memory: Memory is impaired.         Assessment/Plan   Diagnoses and all orders for this visit:    1. ST elevation myocardial infarction involving right coronary artery (CMS/HCC) (Primary)    2. S/P drug eluting coronary stent placement  -     CBC & Differential  -     Urinalysis With Microscopic - Urine, Clean Catch  -     Urine Culture - Urine, Urine, Clean Catch    3. Elevated liver function tests  -     Comprehensive Metabolic Panel    4. Elevated CK  -     Comprehensive Metabolic Panel  -     CK    5. Left elbow pain    6. Injury of tendon of triceps    7. Gross hematuria  -     CBC & Differential  -     Urinalysis With Microscopic - Urine, Clean Catch  -     Urine Culture - Urine, Urine, Clean Catch  -     POCT urinalysis dipstick, automated    Other orders  -     Cancel: POC Urinalysis Dipstick, Automated  -     Cancel: Urine Culture - Urine, Urine, Clean Catch  -     Microscopic Examination -      Assessment and Plan  Patient was hospitalized 3/27/2021-4/1/2021 for STEMI with PCI and concerns for weakness and memory loss. She then injured her elbow in the bathroom and is following with orthopedic surgery. Patient has now developed hematuria. I have reviewed all records from hospitalization, ER, urgent care, specialist follow up including notes, labs, imaging, and procedures. She needs to follow up with cardiology, orthopedic surgery, urology, and to have neuropsych testing and see neurology as directed. She has history of elevated LFT and CK. Patient will have fasting labs. Call if no results in 1 week. Stability of conditions, plan, follow up, and further recommendations pending labs.    · NSTEMI s/p PCI- Patient went to ER 3/27/2021 following an episode of weakness with a fall and was found to have STEMI. She underwent successful PCI and was started on DAPT. She has been resistant to statin for years. Patient was started on statin with increased LFT and she has history of elevated CK. Statin was help. She  missed her follow up with cardiology due to not feeling well but has upcoming appt this week. Keep follow up and treatment as directed by cardiology. She will need to retry statin or try a different statin.   · Memory disturbance, weakness- She did have neurology consultation for memory loss and weakness. They wanted her to undergo MRI, however, she was unable with anxiety. They advised outpatient follow up and MRI. She has not scheduled with neurology in follow up. Patient to see 12/2020 and referred to neurology and 2/2021 and referred for neuropsych testing. She needs to have neuropsych testing and to see neurology as referred. Her  is driving her today. I have counseled her about my concerns for her safety with driving and that she should not drive unless cleared by neurology.   · UE injury- Patient to have MRI and follow up with orthopedic surgery as directed by them.   · Hematuria- She developed asymptomatic gross hematuria yesterday. She is on DAPT and has to self cath for neurogenic bladder. She has had resolution of gross hematuria. I will check urine testing today. She should also schedule follow up with her urologist ASAP. Per cardiology, she needs to continue DAPT with recent PCI. I discussed likely need for these medications for at least 1 year.     I spent 35 minutes cariting for Michelle Espinoza on this date of service. This time includes time spent by me in the following activities as necessary: preparing for the visit, reviewing tests, specialists records and previous visits, obtaining and/or reviewing a separately obtained history, performing a medically appropriate exam and/or evaluation, counseling and educating the patient, family, garegiver, referring and/or communicating with other healthcare professionals, documenting information in the medical record, independently interpreting results and communicating that information with the patient, family, caregiver, and developing a medically  appropriate treatment plan with consideration of other conditions, medications, and treatments.

## 2021-04-13 NOTE — TELEPHONE ENCOUNTER
Please let her know that because of her recent heart attack and stent placement she will need to continue her aspirin and clopidogrel.  It is not safe for her to stop it even with possible blood in her urine.  I agree she needs to contact her PCP for further work-up.

## 2021-04-13 NOTE — TELEPHONE ENCOUNTER
[phone]  08:21 am    Pt called stating she has been noticing pink in her urine.      09:59am-I contacted pt and she states she has been having pink in her urine since 4/12/21.  She states she just went to the bathroom and noticed she is now having clots.  She is concerned since she was recently placed on blood thinners.     I did inform her to contact her PCP as well.    [facility] DAISY

## 2021-04-13 NOTE — TELEPHONE ENCOUNTER
Caller: Michelle Espinoza    Relationship: Self    Best call back number: [phone]    What is the best time to reach you: ANY    Who are you requesting to speak with (clinical staff, provider,  specific staff member): KATARINA EASON     Do you require a callback: YES, PATIENT STATES SHE HAS PINK URINE AND CLOTS IN HER URINE. PLEASE CALL PATIENT TO ADVISE, PATIENT DIDN'T KNOW IF SHE SHOULD MAKE AN APPT.

## 2021-04-14 LAB
ALBUMIN SERPL-MCNC: 4 G/DL (ref 3.5–5.2)
ALBUMIN/GLOB SERPL: 1.5 G/DL
ALP SERPL-CCNC: 91 U/L (ref 39–117)
ALT SERPL-CCNC: 19 U/L (ref 1–33)
AST SERPL-CCNC: 22 U/L (ref 1–32)
BASOPHILS # BLD AUTO: 0.04 10*3/MM3 (ref 0–0.2)
BASOPHILS NFR BLD AUTO: 0.4 % (ref 0–1.5)
BILIRUB SERPL-MCNC: 0.6 MG/DL (ref 0–1.2)
BUN SERPL-MCNC: 29 MG/DL (ref 8–23)
BUN/CREAT SERPL: 28.4 (ref 7–25)
CALCIUM SERPL-MCNC: 10 MG/DL (ref 8.6–10.5)
CHLORIDE SERPL-SCNC: 102 MMOL/L (ref 98–107)
CK SERPL-CCNC: 238 U/L (ref 20–180)
CO2 SERPL-SCNC: 26.6 MMOL/L (ref 22–29)
CREAT SERPL-MCNC: 1.02 MG/DL (ref 0.57–1)
EOSINOPHIL # BLD AUTO: 0.2 10*3/MM3 (ref 0–0.4)
EOSINOPHIL NFR BLD AUTO: 1.9 % (ref 0.3–6.2)
ERYTHROCYTE [DISTWIDTH] IN BLOOD BY AUTOMATED COUNT: 13.5 % (ref 12.3–15.4)
GLOBULIN SER CALC-MCNC: 2.7 GM/DL
GLUCOSE SERPL-MCNC: 105 MG/DL (ref 65–99)
HCT VFR BLD AUTO: 39.2 % (ref 34–46.6)
HGB BLD-MCNC: 13 G/DL (ref 12–15.9)
IMM GRANULOCYTES # BLD AUTO: 0.06 10*3/MM3 (ref 0–0.05)
IMM GRANULOCYTES NFR BLD AUTO: 0.6 % (ref 0–0.5)
LYMPHOCYTES # BLD AUTO: 1.49 10*3/MM3 (ref 0.7–3.1)
LYMPHOCYTES NFR BLD AUTO: 14.3 % (ref 19.6–45.3)
MCH RBC QN AUTO: 30 PG (ref 26.6–33)
MCHC RBC AUTO-ENTMCNC: 33.2 G/DL (ref 31.5–35.7)
MCV RBC AUTO: 90.3 FL (ref 79–97)
MONOCYTES # BLD AUTO: 0.96 10*3/MM3 (ref 0.1–0.9)
MONOCYTES NFR BLD AUTO: 9.2 % (ref 5–12)
NEUTROPHILS # BLD AUTO: 7.66 10*3/MM3 (ref 1.7–7)
NEUTROPHILS NFR BLD AUTO: 73.6 % (ref 42.7–76)
NRBC BLD AUTO-RTO: 0 /100 WBC (ref 0–0.2)
PLATELET # BLD AUTO: 304 10*3/MM3 (ref 140–450)
POTASSIUM SERPL-SCNC: 4.6 MMOL/L (ref 3.5–5.2)
PROT SERPL-MCNC: 6.7 G/DL (ref 6–8.5)
RBC # BLD AUTO: 4.34 10*6/MM3 (ref 3.77–5.28)
SODIUM SERPL-SCNC: 138 MMOL/L (ref 136–145)
WBC # BLD AUTO: 10.41 10*3/MM3 (ref 3.4–10.8)

## 2021-04-15 ENCOUNTER — OFFICE VISIT (OUTPATIENT)
Dept: CARDIOLOGY | Facility: CLINIC | Age: 76
End: 2021-04-15

## 2021-04-15 ENCOUNTER — TELEPHONE (OUTPATIENT)
Dept: CARDIAC REHAB | Facility: HOSPITAL | Age: 76
End: 2021-04-15

## 2021-04-15 ENCOUNTER — READMISSION MANAGEMENT (OUTPATIENT)
Dept: CALL CENTER | Facility: HOSPITAL | Age: 76
End: 2021-04-15

## 2021-04-15 ENCOUNTER — APPOINTMENT (OUTPATIENT)
Dept: CARDIAC REHAB | Facility: HOSPITAL | Age: 76
End: 2021-04-15

## 2021-04-15 VITALS
HEIGHT: 63 IN | SYSTOLIC BLOOD PRESSURE: 144 MMHG | HEART RATE: 102 BPM | OXYGEN SATURATION: 97 % | DIASTOLIC BLOOD PRESSURE: 96 MMHG | WEIGHT: 193 LBS | BODY MASS INDEX: 34.2 KG/M2 | RESPIRATION RATE: 18 BRPM

## 2021-04-15 DIAGNOSIS — I49.3 ASYMPTOMATIC PVCS: ICD-10-CM

## 2021-04-15 DIAGNOSIS — I21.11 ST ELEVATION MYOCARDIAL INFARCTION INVOLVING RIGHT CORONARY ARTERY (HCC): ICD-10-CM

## 2021-04-15 DIAGNOSIS — I25.5 ISCHEMIC CARDIOMYOPATHY: ICD-10-CM

## 2021-04-15 DIAGNOSIS — I25.10 CORONARY ARTERY DISEASE INVOLVING NATIVE CORONARY ARTERY OF NATIVE HEART WITHOUT ANGINA PECTORIS: Primary | ICD-10-CM

## 2021-04-15 DIAGNOSIS — E78.00 HYPERCHOLESTEROLEMIA: ICD-10-CM

## 2021-04-15 LAB
APPEARANCE UR: ABNORMAL
BACTERIA #/AREA URNS HPF: ABNORMAL /HPF
BACTERIA UR CULT: NORMAL
BACTERIA UR CULT: NORMAL
BILIRUB UR QL STRIP: NEGATIVE
CASTS URNS MICRO: ABNORMAL
COLOR UR: YELLOW
EPI CELLS #/AREA URNS HPF: ABNORMAL /HPF
GLUCOSE UR QL: NEGATIVE
HGB UR QL STRIP: ABNORMAL
KETONES UR QL STRIP: NEGATIVE
LEUKOCYTE ESTERASE UR QL STRIP: ABNORMAL
NITRITE UR QL STRIP: NEGATIVE
PH UR STRIP: 6.5 [PH] (ref 5–8)
PROT UR QL STRIP: ABNORMAL
RBC #/AREA URNS HPF: ABNORMAL /HPF
SP GR UR: 1.02 (ref 1–1.03)
UROBILINOGEN UR STRIP-MCNC: ABNORMAL MG/DL
WBC #/AREA URNS HPF: ABNORMAL /HPF

## 2021-04-15 PROCEDURE — 99214 OFFICE O/P EST MOD 30 MIN: CPT | Performed by: NURSE PRACTITIONER

## 2021-04-15 PROCEDURE — 93000 ELECTROCARDIOGRAM COMPLETE: CPT | Performed by: NURSE PRACTITIONER

## 2021-04-15 RX ORDER — CARVEDILOL 6.25 MG/1
6.25 TABLET ORAL 2 TIMES DAILY WITH MEALS
Qty: 60 TABLET | Refills: 3 | Status: SHIPPED | OUTPATIENT
Start: 2021-04-15 | End: 2021-07-08 | Stop reason: SDUPTHER

## 2021-04-15 RX ORDER — ATORVASTATIN CALCIUM 40 MG/1
40 TABLET, FILM COATED ORAL DAILY
Qty: 30 TABLET | Refills: 11 | Status: SHIPPED | OUTPATIENT
Start: 2021-04-15 | End: 2021-05-20

## 2021-04-15 NOTE — TELEPHONE ENCOUNTER
"Met with both pt and . Very nice couple. Pt presented to cardiac rehab for her initial assessment to begin phase II cardiac rehab program. Noted pt is in a motorized scooter. Explored reason for pt being in scooter. Pt says she has had a total of 7 spinal surgeries, 2 in her neck the rest in low back. Included screws and rods. Pt is also in a full arm \"soft cast\" on her left arm. She says she injured her arm a week ago getting off toilet. Was seen by ortho and not sure if she may need surgery. She had injury to triceps, so not able to use her walker. Pt also says she has leg weakness, has fallen twice, ongoing balance issues. She has been attending a program at [facility] for pts with spinal issues. They are working on leg strengthening and restoring balance. Explained goal of this program and with her issues, she would not be appropriate for cardiac rehab exercise at this time. Discussed risk factors for CVD, reviewed S&S of MI especially in women. [facility]. Then cardiac rehab after that. Pt lives in [place], so CR program through [facility] would be a lot closer to her. LIkely she will attend there in future. Provided information for area CR programs.   "

## 2021-04-15 NOTE — PROGRESS NOTES
Date of Office Visit: 04/15/2021  Encounter Provider: RATNA Colon  Place of Service: [facility] CARDIOLOGY  Patient Name: Michelle Espinoza  :1945    Chief Complaint   Patient presents with   • Coronary Artery Disease     1 [facility]OSP FOLLOW UP   :     HPI: Michelle Espinoza is a 75 y.o. female who presents today for follow-up.  Old records have been obtained and reviewed by me.  She is a patient with a past medical history significant for diabetes, hyperlipidemia, CKD, hypoparathyroidism, and spinal stenosis.  In March of this year, she presented with an inferior STEMI and was found to have an occluded proximal RCA.  She underwent drug-eluting stent placement of the proximal, mid, and distal RCA.  Unfortunately she developed distal embolization of thrombus into her distal posterior descending branch and was briefly placed on Integrilin.  Echocardiogram revealed mildly decreased LV function with an EF of 41% and no significant valvular abnormalities.  She developed some confusion and lower extremity weakness for which neurology was consulted.  They recommended an MRI; however, the patient was unable to tolerate an MRI due to anxiety.  Ultimately, she was advised to follow up as an outpatient for a possible open MRI.  She was noted to have elevated liver transaminases and her statin was held.  Right upper quadrant ultrasound was performed and unremarkable.  By the time of discharge, her liver transaminases were declining.  On 2021, she was felt stable for discharge.  She is here today for follow-up.   She has been doing well.  She denies any chest pain, palpitations, dizziness, or syncope.  She has mild shortness of breath but no PND orthopnea.  She has some mild lower extremity edema.  Monday and Tuesday she developed some pink-tinged urine for which she saw her PCP.  It has since resolved.  She denies any melena.  Evidently her only symptoms prior to her heart attack  were some pain in her right jaw which she attributed to a tooth ache.  She was previously participating in a rehab program at [facility] for her chronic back issues.  She is hoping to get back into that sometime in the near future.    Past Medical History:   Diagnosis Date   • Anesthesia complication     STRONG GAG REFLEX   • Benign colonic polyp    • Cataract     REMOVED   • DDD (degenerative disc disease), cervical    • DDD (degenerative disc disease), lumbosacral    • Diabetes mellitus (CMS/HCC)     TYPE 2   • Frequent UTI    • History of kidney stones 06/2014   • History of pyelonephritis 06/2014   • Hypercalcemia    • Hypercholesteremia    • Leg muscle spasm    • Neuropathy     LEFT FOOT   • Osteoarthritis    • Pyelonephritis 06/2014   • Self-catheterizes urinary bladder     HAS NERVE DAMAGE RELATED TO BACK ISSUES   • Sepsis (CMS/HCC) 06/2014   • Urinary incontinence    • Weakness     LOWER EXTREMITES RELATED FROM NERVE DAMAGE FROM BACK       Past Surgical History:   Procedure Laterality Date   • APPENDECTOMY     • BACK SURGERY      MULTIPLE. WITH HARDWARE   • CARDIAC CATHETERIZATION N/A 3/27/2021    Procedure: Left Heart Cath;  Surgeon: Leydi Romo MD;  Location:  [facility];  Service: Cardiovascular;  Laterality: N/A;   • CARDIAC CATHETERIZATION  3/27/2021    Procedure: Percutaneous Manual Thrombectomy;  Surgeon: Leydi Romo MD;  Location:  [facility];  Service: Cardiovascular;;   • CARDIAC CATHETERIZATION N/A 3/27/2021    Procedure: Percutaneous Coronary Intervention;  Surgeon: Leydi Romo MD;  Location:  [facility];  Service: Cardiovascular;  Laterality: N/A;   • CARDIAC CATHETERIZATION N/A 3/27/2021    Procedure: Coronary angiography;  Surgeon: Leydi Romo MD;  Location:  [facility];  Service: Cardiovascular;  Laterality: N/A;   • CARPAL TUNNEL RELEASE      LEFT X2 RIGHT    • CATARACT EXTRACTION WITH INTRAOCULAR LENS  IMPLANT      LEFT AND RIGHT   • CERVICAL SPINE SURGERY      MULTIPLE C3-4 C6-7   •  SECTION      x 2   • COLONOSCOPY  2018   • PARATHYROIDECTOMY Left 3/7/2019    Procedure: PARATHYROIDECTOMY LEFT INFERIOR;  Surgeon: Mason Prather MD;  Location: [facility] [place];  Service: ENT   • THYROID SURGERY     • THYROIDECTOMY, PARTIAL         Social History     Socioeconomic History   • Marital status:      Spouse name: Not on file   • Number of children: Not on file   • Years of education: Not on file   • Highest education level: Not on file   Tobacco Use   • Smoking status: Former Smoker     Packs/day: 0.50     Years: 15.00     Pack years: 7.50     Types: Cigarettes     Quit date:      Years since quittin.3   • Smokeless tobacco: Never Used   • Tobacco comment: CAFFEINE USE: ICE TEA OCC.   Vaping Use   • Vaping Use: Never used   Substance and Sexual Activity   • Alcohol use: Yes     Comment: Socially.   • Drug use: No   • Sexual activity: Defer       Family History   Problem Relation Age of Onset   • Stroke Mother    • Heart disease Mother    • Hypertension Mother    • Clotting disorder Father    • Hypertension Father    • Diabetes Father    • Aneurysm Father    • Hypertension Sister    • Diabetes Sister    • Cervical cancer Sister    • Diabetes Sister    • Cervical cancer Maternal Grandmother 72   • Diabetes Grandchild    • Malig Hyperthermia Neg Hx        Review of Systems   Constitutional: Positive for malaise/fatigue.   Cardiovascular: Positive for dyspnea on exertion. Negative for chest pain, leg swelling, orthopnea, paroxysmal nocturnal dyspnea and syncope.   Respiratory: Negative.    Hematologic/Lymphatic: Negative for bleeding problem.   Musculoskeletal: Positive for arthritis, back pain and joint pain. Negative for falls.   Gastrointestinal: Negative for melena.   Neurological: Negative for dizziness and light-headedness.       Allergies   Allergen Reactions   • Other Nausea And  Vomiting     The mercury in [place]   • Penicillins Hives   • Statins Myalgia   • Latex Rash   • Nickel Rash         Current Outpatient Medications:   •  alendronate (FOSAMAX) 70 MG tablet, Take 1 tablet by mouth Every 7 (Seven) Days., Disp: 4 tablet, Rfl: 11  •  aspirin 81 MG chewable tablet, Chew 1 tablet Daily., Disp: , Rfl:   •  baclofen (LIORESAL) 10 MG tablet, TAKE TWO TABLETS BY MOUTH DAILY, Disp: 60 tablet, Rfl: 1  •  carvedilol (COREG) 6.25 MG tablet, Take 1 tablet by mouth 2 (Two) Times a Day With Meals., Disp: 60 tablet, Rfl: 3  •  Cholecalciferol (VITAMIN D3) 5000 UNITS capsule capsule, Take 5,000 Units by mouth Daily., Disp: , Rfl:   •  ciprofloxacin (CIPRO) 500 MG tablet, , Disp: , Rfl:   •  clopidogrel (PLAVIX) 75 MG tablet, Take 1 tablet by mouth Daily., Disp: 30 tablet, Rfl: 11  •  colchicine 0.6 MG tablet, Take 0.6 mg by mouth Daily., Disp: , Rfl:   •  DULoxetine (CYMBALTA) 60 MG capsule, , Disp: , Rfl:   •  glimepiride (AMARYL) 2 MG tablet, Take 1 tablet by mouth 2 (Two) Times a Day With Meals., Disp: 180 tablet, Rfl: 1  •  glucose blood (OneTouch Ultra) test strip, Use to check blood sugar twice a day, Disp: 200 each, Rfl: 10  •  lisinopril (PRINIVIL,ZESTRIL) 5 MG tablet, Take 1 tablet by mouth Daily., Disp: 30 tablet, Rfl: 11  •  magnesium 30 MG tablet, Take 60 mg by mouth Daily., Disp: , Rfl:   •  Mirabegron ER (Myrbetriq) 25 MG tablet sustained-release 24 hour 24 hr tablet, Take 25 mg by mouth Daily., Disp: , Rfl:   •  ofloxacin (OCUFLOX) 0.3 % ophthalmic solution, , Disp: , Rfl:   •  ondansetron ODT (Zofran ODT) 8 MG disintegrating tablet, Place 1 tablet on the tongue Every 8 (Eight) Hours As Needed for Nausea or Vomiting., Disp: 30 tablet, Rfl: 0  •  oxybutynin XL (DITROPAN-XL) 10 MG 24 hr tablet, , Disp: , Rfl:   •  Probiotic Product (PROBIOTIC PO), Take 1 tablet by mouth Daily., Disp: , Rfl:   •  traMADol (ULTRAM) 50 MG tablet, , Disp: , Rfl:   •  vitamin C (ASCORBIC ACID) 500 MG tablet,  "Take 500 mg by mouth Daily. With D3, Disp: , Rfl:   •  vitamin E 1000 UNIT capsule, Take 1,000 Units by mouth Daily., Disp: , Rfl:   •  atorvastatin (LIPITOR) 40 MG tablet, Take 1 tablet by mouth Daily., Disp: 30 tablet, Rfl: 11      Objective:     Vitals:    04/15/21 1011   BP: 144/96   BP Location: Right arm   Patient Position: Sitting   Pulse: 102   Resp: 18   SpO2: 97%   Weight: 87.5 kg (193 lb)   Height: 160 cm (63\")     Body mass index is 34.19 kg/m².    PHYSICAL EXAM:    Neck:      Vascular: No JVD.   Pulmonary:      Effort: Pulmonary effort is normal.      Breath sounds: Normal breath sounds.   Cardiovascular:      Normal rate. Frequent ectopic beats. Regular rhythm.      Murmurs: There is no murmur.      No gallop. No click. No rub.   Pulses:     Intact distal pulses.           ECG 12 Lead    Date/Time: 4/15/2021 10:29 AM  Performed by: Sangeeta Og APRN  Authorized by: Sangeeta Og APRN   Comparison: compared with previous ECG from 3/29/2021  Similar to previous ECG  Rhythm: sinus rhythm  Ectopy: unifocal PVCs  Rate: normal  BPM: 95  Q waves: III and aVF    T inversion: III and aVF    Clinical impression: abnormal EKG  Comments: Indication: CAD              Assessment:       Diagnosis Plan   1. Coronary artery disease involving native coronary artery of native heart without angina pectoris  ECG 12 Lead   2. ST elevation myocardial infarction involving right coronary artery (CMS/HCC)     3. Ischemic cardiomyopathy     4. Hypercholesterolemia     5. Asymptomatic PVCs       Orders Placed This Encounter   Procedures   • ECG 12 Lead     This order was created via procedure documentation     Order Specific Question:   Release to patient     Answer:   Immediate          Plan:       1.  Coronary artery disease.  Status post inferior STEMI and drug-eluting stent placement of the proximal, mid, and distal RCA.  She developed distal embolization of thrombus into her distal posterior descending branch " and was briefly placed on Integrilin.  She is on dual antiplatelet therapy with aspirin and Plavix.  Will resume high intensity statin and check labs in 1 month.      2.  Ischemic cardiomyopathy.  EF 41%.  She is on guideline directed medical therapy with carvedilol and lisinopril.  She denies any symptoms of heart failure and appears euvolemic.      3.  Hypercholesterolemia.  Lipid panel from the hospitalization revealed an LDL of 90 and an HDL of 38.  She was placed on atorvastatin 40 mg daily.  However, this was discontinued due to elevated liver transaminases.  On the day of discharge, her liver transaminases were normal.  I will resume atorvastatin 40 mg daily.      4.  PVCs.  She is completely asymptomatic.  Recent echocardiogram revealed mildly decreased LV function.  I will increase her carvedilol dose to 6.25 mg twice daily.  Carvedilol dose.  She was previously taking magnesium supplementation which I told her she could resume it.      Overall I think she is stable.  She denies any symptoms of angina or heart failure.  She will follow-up with Dr. Romo on 5/18/2021.      As always, it has been a pleasure to participate in your patient's care.      Sincerely,         RATNA Kat

## 2021-04-16 ENCOUNTER — TELEPHONE (OUTPATIENT)
Dept: FAMILY MEDICINE CLINIC | Facility: CLINIC | Age: 76
End: 2021-04-16

## 2021-04-16 ENCOUNTER — TELEPHONE (OUTPATIENT)
Dept: CARDIOLOGY | Facility: CLINIC | Age: 76
End: 2021-04-16

## 2021-04-16 NOTE — TELEPHONE ENCOUNTER
PT CALLED STATING SHE HAD A HEART ATTACK WITHIN THE FIRST 12 HOURS OF GETTING THE FIRST COVID VACCINE. SHE IS SUPPOSED TO RECIEVE A CALL TODAY TO SCHEDULE THE SECOND. SHE IS REQUESTING A CALL BACK TO SEE OF SHE SHOULD HAVE IT DONE.

## 2021-04-16 NOTE — TELEPHONE ENCOUNTER
[phone]  9:39am    Pt called asking if she should get the 2nd Covid shot.  She states she had a heart attack-12 hrs after the first Covid shot.  Please advise.    [facility]

## 2021-04-16 NOTE — TELEPHONE ENCOUNTER
Advised pt to discuss this with her Cardiologist since she is worried about heart issues.  She said she will contact them and if you need her to do anything else let her know.

## 2021-04-23 ENCOUNTER — READMISSION MANAGEMENT (OUTPATIENT)
Dept: CALL CENTER | Facility: HOSPITAL | Age: 76
End: 2021-04-23

## 2021-04-23 NOTE — OUTREACH NOTE
AMI Week 4 Survey      Responses   [facility] patient discharged from?  [place]   Does the patient have one of the following disease processes/diagnoses(primary or secondary)?  Acute MI (STEMI,NSTEMI)   Week 4 attempt successful?  Yes   Call start time  1053   Call end time  1057   Discharge diagnosis  STEMI, heart cath & stents   Meds reviewed with patient/caregiver?  Yes   Is the patient taking all medications as directed (includes completed medication regime)?  Yes   Has the patient kept scheduled appointments due by today?  Yes   Is the patient still receiving Home Health Services?  N/A   What is the patient's perception of their health status since discharge?  Improving   Is the patient/caregiver able to teach back signs and symptoms of when to call for help immediately:  Sudden chest discomfort, Sudden discomfort in arms, back, neck or jaw, Shortness of breath at any time, Sudden sweating or clammy skin, Dizziness or lightheadedness, Irregular or rapid heart rate   Is the pateint /caregiver able to teach back the importance of cardiac rehab?  Yes   Nursing interventions  Provided education on importance of cardiac rehab [went for eval-legs too weak]   Is the patient/caregiver able to teach back lifestyle changes to help prevent MIs  Regular exercise as approved by provider, Heart healthy diet, Maintaining a healthy weight   Is the patient/caregiver able to teach back ways to prevent a second heart attack:  Participate in Cardiac Rehab, Manage risk factors   Is the patient/caregiver able to teach back the hierarchy of who to call/visit for symptoms/problems? PCP, Specialist, Home health nurse, Urgent Care, ED, 911  Yes   Additional teach back comments  Has a walker and cane in case she becomes weak   Week 4 call completed?  Yes   Would the patient like one additional call?  No   Graduated  Yes   Is the patient interested in additional calls from an ambulatory ?  NOTE:  applies to high risk  patients requiring additional follow-up.  No   Did the patient feel the follow up calls were helpful during their recovery period?  Yes   Was the number of calls appropriate?  Yes          Mary Mckeon RN

## 2021-05-04 DIAGNOSIS — E11.59 TYPE 2 DIABETES MELLITUS WITH OTHER CIRCULATORY COMPLICATIONS (HCC): ICD-10-CM

## 2021-05-04 RX ORDER — GLIMEPIRIDE 2 MG/1
TABLET ORAL
Qty: 60 TABLET | Refills: 9 | Status: SHIPPED | OUTPATIENT
Start: 2021-05-04 | End: 2021-08-30 | Stop reason: SDUPTHER

## 2021-05-14 ENCOUNTER — TELEPHONE (OUTPATIENT)
Dept: CARDIOLOGY | Facility: CLINIC | Age: 76
End: 2021-05-14

## 2021-05-14 DIAGNOSIS — I25.10 CORONARY ARTERY DISEASE INVOLVING NATIVE CORONARY ARTERY OF NATIVE HEART WITHOUT ANGINA PECTORIS: Primary | ICD-10-CM

## 2021-05-14 NOTE — TELEPHONE ENCOUNTER
Please call the patient and remind her to come in for a repeat hepatic function panel for assessment of her liver enzymes.

## 2021-05-18 ENCOUNTER — OFFICE VISIT (OUTPATIENT)
Dept: CARDIOLOGY | Facility: CLINIC | Age: 76
End: 2021-05-18

## 2021-05-18 ENCOUNTER — TRANSCRIBE ORDERS (OUTPATIENT)
Dept: PREADMISSION TESTING | Facility: HOSPITAL | Age: 76
End: 2021-05-18

## 2021-05-18 VITALS
HEIGHT: 64 IN | HEART RATE: 86 BPM | SYSTOLIC BLOOD PRESSURE: 124 MMHG | DIASTOLIC BLOOD PRESSURE: 74 MMHG | BODY MASS INDEX: 31.07 KG/M2 | WEIGHT: 182 LBS

## 2021-05-18 DIAGNOSIS — Z95.5 STATUS POST CORONARY ARTERY STENT PLACEMENT: ICD-10-CM

## 2021-05-18 DIAGNOSIS — I21.11 ST ELEVATION MYOCARDIAL INFARCTION INVOLVING RIGHT CORONARY ARTERY (HCC): ICD-10-CM

## 2021-05-18 DIAGNOSIS — I25.10 CORONARY ARTERY DISEASE INVOLVING NATIVE CORONARY ARTERY OF NATIVE HEART WITHOUT ANGINA PECTORIS: Primary | ICD-10-CM

## 2021-05-18 DIAGNOSIS — E78.00 HYPERCHOLESTEROLEMIA: ICD-10-CM

## 2021-05-18 DIAGNOSIS — E11.59 TYPE 2 DIABETES MELLITUS WITH OTHER CIRCULATORY COMPLICATIONS (HCC): ICD-10-CM

## 2021-05-18 DIAGNOSIS — I25.5 ISCHEMIC CARDIOMYOPATHY: ICD-10-CM

## 2021-05-18 DIAGNOSIS — Z01.818 OTHER SPECIFIED PRE-OPERATIVE EXAMINATION: Primary | ICD-10-CM

## 2021-05-18 PROCEDURE — 99214 OFFICE O/P EST MOD 30 MIN: CPT | Performed by: INTERNAL MEDICINE

## 2021-05-18 PROCEDURE — 93000 ELECTROCARDIOGRAM COMPLETE: CPT | Performed by: INTERNAL MEDICINE

## 2021-05-18 RX ORDER — OXYBUTYNIN CHLORIDE 5 MG/1
TABLET ORAL
COMMUNITY
Start: 2021-04-21 | End: 2021-05-18

## 2021-05-18 NOTE — PROGRESS NOTES
Subjective:     Encounter Date:05/18/2021      Patient ID: Michelle Espinoza is a 75 y.o. female.    Chief Complaint:  History of Present Illness    This is a 75-year-old with history of diabetes mellitus type 2, hyperparathyroidism, chronic kidney disease, hyperlipidemia, spinal stenosis, coronary artery disease status post inferior myocardial infarction and drug-eluting stent placement of the proximal, mid, and distal right coronary artery, ischemic cardiomyopathy with an EF of 41%, who presents for follow-up.    I saw the patient initially when she came to the emergency room on 3/27/2021 with an inferior myocardial infarction.  She had presented to the emergency room after a fall.  She reported that she had become suddenly weak after going to the bathroom.  Following her arrival to the emergency room an EKG was performed showing inferolateral ST elevations system with an inferior myocardial infarction.  She is brought emergently to the cardiac catheterization laboratory where she is found to have an occluded proximal right coronary artery.  After reestablishing flow in the vessel was noted that she had severe stenosis in her proximal, mid, and distal right coronary artery for which she underwent 3 separate drug-eluting stent placements.  The procedure was complicated by distal embolization of thrombus in her posterior descending branch.  She was treated with Integrilin infusion for period of time before complaining of a headache.  Work-up of this headache including a CT of the head was unremarkable.  She also had some issues with bradycardia and hypotension during the procedure that required treatment with dopamine and phenylephrine but both of these had improved by the end of the procedure and she no longer required pressor support.    Following a cardiac catheterization she did well from a cardiac standpoint.  She did have an echocardiogram performed on 3/28/2021 that showed mildly depressed left ventricular  systolic function with an EF of 41%, inferior wall motion abnormalities, grade 1 diastolic dysfunction, and no significant valvular disease.    She did have some confusion during the remainder of her hospitalization that was concerning for underlying dementia.  She was seen by neurology who recommended pursuing an MRI but she was unable to tolerate this due to her anxiety.  Is recommended that she disco be performed as an outpatient.  Medicine also evaluated the patient for diabetes management and her noncardiac issues.  She was noted to have elevated liver transaminases and her statin therapy was briefly held.  Right upper quadrant ultrasound was performed and was unremarkable.    Following her discharge she was seen by RATNA Kat on 4/15/2021 at that time she was doing well overall.  Her atorvastatin was restarted at 40 mg daily.  Her carvedilol dosage was increased.    Sometime after her last office visit the patient injured her left tricep.  She reports that she was attempting to pull her self off the toilet and ended up injuring her tricep.  She was seen by Dr. Arun Cho and underwent an MRI which confirmed a tricep tear.  She is now scheduled for a tricep repair on 5/25/2021.    From a cardiac standpoint she has been doing well.  She denies any chest pain, shortness of breath, palpitations, orthopnea, near-syncope or syncope, or lower extremity edema.  She had to discontinue the atorvastatin because it exacerbated her lower extremity weakness.  She is unable to attend cardiac rehab because of her issues with lower extremity weakness and spinal stenosis.  She is hoping to return to rehab at [facility].    Review of Systems   Constitutional: Negative for malaise/fatigue.   HENT: Negative for hearing loss, hoarse voice, nosebleeds and sore throat.    Eyes: Negative for pain.   Cardiovascular: Negative for chest pain, claudication, cyanosis, dyspnea on exertion, irregular heartbeat, leg swelling,  near-syncope, orthopnea, palpitations, paroxysmal nocturnal dyspnea and syncope.   Respiratory: Negative for shortness of breath and snoring.    Endocrine: Negative for cold intolerance, heat intolerance, polydipsia, polyphagia and polyuria.   Skin: Negative for itching and rash.   Musculoskeletal: Positive for joint pain and muscle weakness. Negative for arthritis, falls, joint swelling, muscle cramps and myalgias.   Gastrointestinal: Negative for constipation, diarrhea, dysphagia, heartburn, hematemesis, hematochezia, melena, nausea and vomiting.   Genitourinary: Negative for frequency, hematuria and hesitancy.   Neurological: Negative for excessive daytime sleepiness, dizziness, headaches, light-headedness, numbness and weakness.   Psychiatric/Behavioral: Negative for depression. The patient is not nervous/anxious.          Current Outpatient Medications:   •  aspirin 81 MG chewable tablet, Chew 1 tablet Daily., Disp: , Rfl:   •  baclofen (LIORESAL) 10 MG tablet, TAKE TWO TABLETS BY MOUTH DAILY (Patient taking differently: Take 20 mg by mouth Daily As Needed.), Disp: 60 tablet, Rfl: 1  •  carvedilol (COREG) 6.25 MG tablet, Take 1 tablet by mouth 2 (Two) Times a Day With Meals., Disp: 60 tablet, Rfl: 3  •  Cholecalciferol (VITAMIN D3) 5000 UNITS capsule capsule, Take 10,000 Units by mouth Daily., Disp: , Rfl:   •  clopidogrel (PLAVIX) 75 MG tablet, Take 1 tablet by mouth Daily., Disp: 30 tablet, Rfl: 11  •  colchicine 0.6 MG tablet, Take 0.6 mg by mouth Daily., Disp: , Rfl:   •  COLLAGEN PO, Take  by mouth., Disp: , Rfl:   •  DULoxetine (CYMBALTA) 60 MG capsule, Take 60 mg by mouth Daily., Disp: , Rfl:   •  glimepiride (AMARYL) 2 MG tablet, TAKE ONE TABLET BY MOUTH TWICE A DAY WITH MEALS, Disp: 60 tablet, Rfl: 9  •  glucose blood (OneTouch Ultra) test strip, Use to check blood sugar twice a day, Disp: 200 each, Rfl: 10  •  lisinopril (PRINIVIL,ZESTRIL) 5 MG tablet, Take 1 tablet by mouth Daily., Disp: 30 tablet,  Rfl: 11  •  magnesium 30 MG tablet, Take 60 mg by mouth Daily., Disp: , Rfl:   •  ofloxacin (OCUFLOX) 0.3 % ophthalmic solution, , Disp: , Rfl:   •  oxybutynin XL (DITROPAN-XL) 10 MG 24 hr tablet, Take 10 mg by mouth 2 (two) times a day., Disp: , Rfl:   •  Probiotic Product (PROBIOTIC PO), Take 1 tablet by mouth Daily., Disp: , Rfl:   •  vitamin C (ASCORBIC ACID) 500 MG tablet, Take 500 mg by mouth Daily. With D3, Disp: , Rfl:   •  vitamin E 1000 UNIT capsule, Take 1,000 Units by mouth Daily., Disp: , Rfl:   •  atorvastatin (LIPITOR) 40 MG tablet, Take 1 tablet by mouth Daily., Disp: 30 tablet, Rfl: 11    Past Medical History:   Diagnosis Date   • Anesthesia complication     STRONG GAG REFLEX   • Benign colonic polyp    • Cataract     REMOVED   • DDD (degenerative disc disease), cervical    • DDD (degenerative disc disease), lumbosacral    • Diabetes mellitus (CMS/HCC)     TYPE 2   • Frequent UTI    • History of kidney stones 06/2014   • History of pyelonephritis 06/2014   • Hypercalcemia    • Hypercholesteremia    • Leg muscle spasm    • Neuropathy     LEFT FOOT   • Osteoarthritis    • Pyelonephritis 06/2014   • Self-catheterizes urinary bladder     HAS NERVE DAMAGE RELATED TO BACK ISSUES   • Sepsis (CMS/HCC) 06/2014   • Urinary incontinence    • Weakness     LOWER EXTREMITES RELATED FROM NERVE DAMAGE FROM BACK       Past Surgical History:   Procedure Laterality Date   • APPENDECTOMY     • BACK SURGERY      MULTIPLE. WITH HARDWARE   • CARDIAC CATHETERIZATION N/A 3/27/2021    Procedure: Left Heart Cath;  Surgeon: Leydi Romo MD;  Location:  [facility];  Service: Cardiovascular;  Laterality: N/A;   • CARDIAC CATHETERIZATION  3/27/2021    Procedure: Percutaneous Manual Thrombectomy;  Surgeon: Leydi Romo MD;  Location:  [facility];  Service: Cardiovascular;;   • CARDIAC CATHETERIZATION N/A 3/27/2021    Procedure: Percutaneous Coronary Intervention;  Surgeon: Demetrius  MD Leydi;  Location:  [facility];  Service: Cardiovascular;  Laterality: N/A;   • CARDIAC CATHETERIZATION N/A 3/27/2021    Procedure: Coronary angiography;  Surgeon: Leydi Romo MD;  Location:  [facility];  Service: Cardiovascular;  Laterality: N/A;   • CARPAL TUNNEL RELEASE      LEFT X2 RIGHT    • CATARACT EXTRACTION WITH INTRAOCULAR LENS IMPLANT      LEFT AND RIGHT   • CERVICAL SPINE SURGERY      MULTIPLE C3-4 C6-7   •  SECTION      x 2   • COLONOSCOPY  2018   • PARATHYROIDECTOMY Left 3/7/2019    Procedure: PARATHYROIDECTOMY LEFT INFERIOR;  Surgeon: Mason Prather MD;  Location:  [facility];  Service: ENT   • THYROID SURGERY     • THYROIDECTOMY, PARTIAL         Family History   Problem Relation Age of Onset   • Stroke Mother    • Heart disease Mother    • Hypertension Mother    • Clotting disorder Father    • Hypertension Father    • Diabetes Father    • Aneurysm Father    • Hypertension Sister    • Diabetes Sister    • Cervical cancer Sister    • Diabetes Sister    • Cervical cancer Maternal Grandmother 72   • Diabetes Grandchild    • Malig Hyperthermia Neg Hx        Social History     Tobacco Use   • Smoking status: Former Smoker     Packs/day: 0.50     Years: 15.00     Pack years: 7.50     Types: Cigarettes     Quit date:      Years since quittin.3   • Smokeless tobacco: Never Used   • Tobacco comment: CAFFEINE USE: ICE TEA OCC.   Vaping Use   • Vaping Use: Never used   Substance Use Topics   • Alcohol use: Yes     Comment: Socially.   • Drug use: No         ECG 12 Lead    Date/Time: 2021 3:02 PM  Performed by: Leydi Romo MD  Authorized by: Leydi Romo MD   Comparison: compared with previous ECG   Comparison to previous ECG: PVCs have resolved  Rhythm: sinus rhythm  Q waves: V1, V2, V3, II, III and aVF    T inversion: III and aVF                 Objective:     Visit Vitals  /74 (BP Location: Right arm, Patient  "Position: Sitting, Cuff Size: Large Adult)   Pulse 86   Ht 161.3 cm (63.5\")   Wt 82.6 kg (182 lb)   LMP  (LMP Unknown)   BMI 31.73 kg/m²         Constitutional:       Appearance: Normal appearance. Well-developed.   Eyes:      General: Lids are normal.      Conjunctiva/sclera: Conjunctivae normal.      Pupils: Pupils are equal, round, and reactive to light.   HENT:      Head: Normocephalic and atraumatic.   Neck:      Vascular: No carotid bruit or JVD.      Lymphadenopathy: No cervical adenopathy.   Pulmonary:      Effort: Pulmonary effort is normal.      Breath sounds: Normal breath sounds.   Cardiovascular:      Normal rate. Regular rhythm.      No gallop.   Pulses:     Radial: 2+ bilaterally.  Edema:     Peripheral edema absent.   Abdominal:      Palpations: Abdomen is soft.   Musculoskeletal:      Cervical back: Full passive range of motion without pain, normal range of motion and neck supple. Skin:     General: Skin is warm and dry.   Neurological:      Mental Status: Alert and oriented to person, place, and time.             Assessment:          Diagnosis Plan   1. Coronary artery disease involving native coronary artery of native heart without angina pectoris     2. ST elevation myocardial infarction involving right coronary artery (CMS/HCC)     3. Ischemic cardiomyopathy     4. Hypercholesterolemia     5. Type 2 diabetes mellitus with other circulatory complications (CMS/HCC)      6. Status post coronary artery stent placement            Plan:       1.  Coronary artery disease.  Status post inferior myocardial infarction and drug-eluting stent placement of a proximal, mid, and distal right coronary artery on 3/27/2021.  EKG continues to show evolving changes that are expected following her recent event.  He otherwise appears to be doing well from a cardiac standpoint.  Continue current management including aspirin, clopidogrel, beta-blocker.  2.  Ischemic cardiomyopathy.  Mild and related to her recent " inferior myocardial infarction.  EF following her myocardial infarction was around 41%.  Continue guideline directed management with lisinopril and carvedilol.  She will need a repeat echocardiogram at the time of her next office visit to ensure that her LV function has improved.  3.  Hyperlipidemia.  Statin intolerant.  We tried her on atorvastatin again but she was unable to tolerate it due to severe myalgias and muscle weakness.  I think we need to try getting her started on PCSK9 inhibitor.  We will address this at her next office visit once her current noncardiac issues improve.  4.  Diabetes mellitus type 2  5.  Left tricep tear.  Plan for repair on 5/25 with Dr. Arun Cho.  Patient informs me that the plan is to have her hold her clopidogrel for 1 day before the surgery.  Although it is not ideal I think it is reasonable to hold it for just a day and resume as soon as possible following her surgery.  I think holding the clopidogrel any longer would put her at risk for recurrent myocardial infarction.  I discussed this at length with the patient and she verbalized understanding.    I will plan on seeing the patient back again in 3 months with a repeat echocardiogram.

## 2021-05-20 ENCOUNTER — PRE-ADMISSION TESTING (OUTPATIENT)
Dept: PREADMISSION TESTING | Facility: HOSPITAL | Age: 76
End: 2021-05-20

## 2021-05-20 VITALS
HEART RATE: 80 BPM | SYSTOLIC BLOOD PRESSURE: 153 MMHG | RESPIRATION RATE: 16 BRPM | HEIGHT: 64 IN | OXYGEN SATURATION: 97 % | BODY MASS INDEX: 32.44 KG/M2 | WEIGHT: 190 LBS | TEMPERATURE: 97.5 F | DIASTOLIC BLOOD PRESSURE: 93 MMHG

## 2021-05-20 LAB
ALBUMIN SERPL-MCNC: 3.9 G/DL (ref 3.5–5.2)
ALBUMIN/GLOB SERPL: 1.3 G/DL
ALP SERPL-CCNC: 116 U/L (ref 39–117)
ALT SERPL W P-5'-P-CCNC: 19 U/L (ref 1–33)
ANION GAP SERPL CALCULATED.3IONS-SCNC: 8 MMOL/L (ref 5–15)
AST SERPL-CCNC: 20 U/L (ref 1–32)
BASOPHILS # BLD AUTO: 0.04 10*3/MM3 (ref 0–0.2)
BASOPHILS NFR BLD AUTO: 0.5 % (ref 0–1.5)
BILIRUB SERPL-MCNC: 0.3 MG/DL (ref 0–1.2)
BUN SERPL-MCNC: 25 MG/DL (ref 8–23)
BUN/CREAT SERPL: 22.5 (ref 7–25)
CALCIUM SPEC-SCNC: 9.7 MG/DL (ref 8.6–10.5)
CHLORIDE SERPL-SCNC: 106 MMOL/L (ref 98–107)
CO2 SERPL-SCNC: 25 MMOL/L (ref 22–29)
CREAT SERPL-MCNC: 1.11 MG/DL (ref 0.57–1)
DEPRECATED RDW RBC AUTO: 42.1 FL (ref 37–54)
EOSINOPHIL # BLD AUTO: 0.29 10*3/MM3 (ref 0–0.4)
EOSINOPHIL NFR BLD AUTO: 3.7 % (ref 0.3–6.2)
ERYTHROCYTE [DISTWIDTH] IN BLOOD BY AUTOMATED COUNT: 12.9 % (ref 12.3–15.4)
GFR SERPL CREATININE-BSD FRML MDRD: 48 ML/MIN/1.73
GLOBULIN UR ELPH-MCNC: 3.1 GM/DL
GLUCOSE SERPL-MCNC: 84 MG/DL (ref 65–99)
HBA1C MFR BLD: 6.8 % (ref 4.8–5.6)
HCT VFR BLD AUTO: 42.7 % (ref 34–46.6)
HGB BLD-MCNC: 14.6 G/DL (ref 12–15.9)
IMM GRANULOCYTES # BLD AUTO: 0.04 10*3/MM3 (ref 0–0.05)
IMM GRANULOCYTES NFR BLD AUTO: 0.5 % (ref 0–0.5)
LYMPHOCYTES # BLD AUTO: 1.92 10*3/MM3 (ref 0.7–3.1)
LYMPHOCYTES NFR BLD AUTO: 24.5 % (ref 19.6–45.3)
MCH RBC QN AUTO: 30.4 PG (ref 26.6–33)
MCHC RBC AUTO-ENTMCNC: 34.2 G/DL (ref 31.5–35.7)
MCV RBC AUTO: 89 FL (ref 79–97)
MONOCYTES # BLD AUTO: 0.74 10*3/MM3 (ref 0.1–0.9)
MONOCYTES NFR BLD AUTO: 9.4 % (ref 5–12)
NEUTROPHILS NFR BLD AUTO: 4.81 10*3/MM3 (ref 1.7–7)
NEUTROPHILS NFR BLD AUTO: 61.4 % (ref 42.7–76)
NRBC BLD AUTO-RTO: 0 /100 WBC (ref 0–0.2)
PLATELET # BLD AUTO: 239 10*3/MM3 (ref 140–450)
PMV BLD AUTO: 10.5 FL (ref 6–12)
POTASSIUM SERPL-SCNC: 4.8 MMOL/L (ref 3.5–5.2)
PROT SERPL-MCNC: 7 G/DL (ref 6–8.5)
RBC # BLD AUTO: 4.8 10*6/MM3 (ref 3.77–5.28)
SODIUM SERPL-SCNC: 139 MMOL/L (ref 136–145)
WBC # BLD AUTO: 7.84 10*3/MM3 (ref 3.4–10.8)

## 2021-05-20 PROCEDURE — 83036 HEMOGLOBIN GLYCOSYLATED A1C: CPT

## 2021-05-20 PROCEDURE — 36415 COLL VENOUS BLD VENIPUNCTURE: CPT

## 2021-05-20 PROCEDURE — 80053 COMPREHEN METABOLIC PANEL: CPT

## 2021-05-20 PROCEDURE — 85025 COMPLETE CBC W/AUTO DIFF WBC: CPT

## 2021-05-21 ENCOUNTER — APPOINTMENT (OUTPATIENT)
Dept: PREADMISSION TESTING | Facility: HOSPITAL | Age: 76
End: 2021-05-21

## 2021-05-22 ENCOUNTER — LAB (OUTPATIENT)
Dept: LAB | Facility: HOSPITAL | Age: 76
End: 2021-05-22

## 2021-05-22 ENCOUNTER — APPOINTMENT (OUTPATIENT)
Dept: LAB | Facility: HOSPITAL | Age: 76
End: 2021-05-22

## 2021-05-22 DIAGNOSIS — Z01.818 OTHER SPECIFIED PRE-OPERATIVE EXAMINATION: ICD-10-CM

## 2021-05-22 PROCEDURE — C9803 HOPD COVID-19 SPEC COLLECT: HCPCS

## 2021-05-22 PROCEDURE — U0005 INFEC AGEN DETEC AMPLI PROBE: HCPCS

## 2021-05-22 PROCEDURE — U0004 COV-19 TEST NON-CDC HGH THRU: HCPCS

## 2021-05-24 LAB — SARS-COV-2 RNA RESP QL NAA+PROBE: NOT DETECTED

## 2021-05-24 RX ORDER — CEFAZOLIN SODIUM 2 G/100ML
2 INJECTION, SOLUTION INTRAVENOUS
Status: COMPLETED | OUTPATIENT
Start: 2021-05-25 | End: 2021-05-25

## 2021-05-25 ENCOUNTER — HOSPITAL ENCOUNTER (OUTPATIENT)
Facility: HOSPITAL | Age: 76
Setting detail: HOSPITAL OUTPATIENT SURGERY
Discharge: HOME | End: 2021-05-25
Attending: ORTHOPAEDIC SURGERY | Admitting: ORTHOPAEDIC SURGERY

## 2021-05-25 ENCOUNTER — ANESTHESIA EVENT (OUTPATIENT)
Dept: PERIOP | Facility: HOSPITAL | Age: 76
End: 2021-05-25

## 2021-05-25 ENCOUNTER — ANESTHESIA (OUTPATIENT)
Dept: PERIOP | Facility: HOSPITAL | Age: 76
End: 2021-05-25

## 2021-05-25 VITALS
RESPIRATION RATE: 16 BRPM | BODY MASS INDEX: 33.55 KG/M2 | DIASTOLIC BLOOD PRESSURE: 77 MMHG | HEART RATE: 73 BPM | WEIGHT: 189.38 LBS | HEIGHT: 63 IN | TEMPERATURE: 97.8 F | SYSTOLIC BLOOD PRESSURE: 134 MMHG | OXYGEN SATURATION: 93 %

## 2021-05-25 LAB
GLUCOSE BLDC GLUCOMTR-MCNC: 140 MG/DL (ref 70–130)
GLUCOSE BLDC GLUCOMTR-MCNC: 158 MG/DL (ref 70–130)

## 2021-05-25 PROCEDURE — C1889 IMPLANT/INSERT DEVICE, NOC: HCPCS | Performed by: ORTHOPAEDIC SURGERY

## 2021-05-25 PROCEDURE — 25010000002 FENTANYL CITRATE (PF) 50 MCG/ML SOLUTION: Performed by: ANESTHESIOLOGY

## 2021-05-25 PROCEDURE — 25010000003 CEFAZOLIN IN DEXTROSE 2-4 GM/100ML-% SOLUTION: Performed by: ORTHOPAEDIC SURGERY

## 2021-05-25 PROCEDURE — 25010000002 ONDANSETRON PER 1 MG: Performed by: ANESTHESIOLOGY

## 2021-05-25 PROCEDURE — 25010000003 CEFAZOLIN IN DEXTROSE 2-4 GM/100ML-% SOLUTION: Performed by: ANESTHESIOLOGY

## 2021-05-25 PROCEDURE — 25010000002 PHENYLEPHRINE PER 1 ML: Performed by: ANESTHESIOLOGY

## 2021-05-25 PROCEDURE — 82962 GLUCOSE BLOOD TEST: CPT

## 2021-05-25 PROCEDURE — 25010000002 PROPOFOL 10 MG/ML EMULSION: Performed by: ANESTHESIOLOGY

## 2021-05-25 PROCEDURE — C1713 ANCHOR/SCREW BN/BN,TIS/BN: HCPCS | Performed by: ORTHOPAEDIC SURGERY

## 2021-05-25 DEVICE — SUTURE ANCHOR, BIO- COMPOSITE PUSHLOCK
Type: IMPLANTABLE DEVICE | Site: ARM | Status: FUNCTIONAL
Brand: ARTHREX®

## 2021-05-25 DEVICE — KNOTLESS TISSUE CONTROL DEVICE, VIOLET UNIDIRECTIONAL (ANTIBACTERIAL) SYNTHETIC ABSORBABLE DEVICE
Type: IMPLANTABLE DEVICE | Site: ARM | Status: FUNCTIONAL
Brand: STRATAFIX

## 2021-05-25 DEVICE — SUT FW 5 W .5 CIR CUT NDL 48M AR7211: Type: IMPLANTABLE DEVICE | Site: ARM | Status: FUNCTIONAL

## 2021-05-25 RX ORDER — NALOXONE HCL 0.4 MG/ML
0.2 VIAL (ML) INJECTION AS NEEDED
Status: DISCONTINUED | OUTPATIENT
Start: 2021-05-25 | End: 2021-05-25 | Stop reason: HOSPADM

## 2021-05-25 RX ORDER — PROMETHAZINE HYDROCHLORIDE 25 MG/1
25 TABLET ORAL ONCE AS NEEDED
Status: DISCONTINUED | OUTPATIENT
Start: 2021-05-25 | End: 2021-05-25 | Stop reason: HOSPADM

## 2021-05-25 RX ORDER — HYDROCODONE BITARTRATE AND ACETAMINOPHEN 7.5; 325 MG/1; MG/1
1 TABLET ORAL ONCE AS NEEDED
Status: COMPLETED | OUTPATIENT
Start: 2021-05-25 | End: 2021-05-25

## 2021-05-25 RX ORDER — FENTANYL CITRATE 50 UG/ML
INJECTION, SOLUTION INTRAMUSCULAR; INTRAVENOUS AS NEEDED
Status: DISCONTINUED | OUTPATIENT
Start: 2021-05-25 | End: 2021-05-25 | Stop reason: SURG

## 2021-05-25 RX ORDER — DIPHENHYDRAMINE HCL 25 MG
25 CAPSULE ORAL
Status: DISCONTINUED | OUTPATIENT
Start: 2021-05-25 | End: 2021-05-25 | Stop reason: HOSPADM

## 2021-05-25 RX ORDER — FENTANYL CITRATE 50 UG/ML
50 INJECTION, SOLUTION INTRAMUSCULAR; INTRAVENOUS
Status: DISCONTINUED | OUTPATIENT
Start: 2021-05-25 | End: 2021-05-25 | Stop reason: HOSPADM

## 2021-05-25 RX ORDER — ROCURONIUM BROMIDE 10 MG/ML
INJECTION, SOLUTION INTRAVENOUS AS NEEDED
Status: DISCONTINUED | OUTPATIENT
Start: 2021-05-25 | End: 2021-05-25 | Stop reason: SURG

## 2021-05-25 RX ORDER — SODIUM CHLORIDE 0.9 % (FLUSH) 0.9 %
3 SYRINGE (ML) INJECTION EVERY 12 HOURS SCHEDULED
Status: DISCONTINUED | OUTPATIENT
Start: 2021-05-25 | End: 2021-05-25 | Stop reason: HOSPADM

## 2021-05-25 RX ORDER — HYDROMORPHONE HYDROCHLORIDE 1 MG/ML
0.5 INJECTION, SOLUTION INTRAMUSCULAR; INTRAVENOUS; SUBCUTANEOUS
Status: DISCONTINUED | OUTPATIENT
Start: 2021-05-25 | End: 2021-05-25 | Stop reason: HOSPADM

## 2021-05-25 RX ORDER — MIDAZOLAM HYDROCHLORIDE 1 MG/ML
0.5 INJECTION INTRAMUSCULAR; INTRAVENOUS
Status: DISCONTINUED | OUTPATIENT
Start: 2021-05-25 | End: 2021-05-25 | Stop reason: HOSPADM

## 2021-05-25 RX ORDER — FAMOTIDINE 10 MG/ML
20 INJECTION, SOLUTION INTRAVENOUS ONCE
Status: COMPLETED | OUTPATIENT
Start: 2021-05-25 | End: 2021-05-25

## 2021-05-25 RX ORDER — DIPHENHYDRAMINE HYDROCHLORIDE 50 MG/ML
12.5 INJECTION INTRAMUSCULAR; INTRAVENOUS
Status: DISCONTINUED | OUTPATIENT
Start: 2021-05-25 | End: 2021-05-25 | Stop reason: HOSPADM

## 2021-05-25 RX ORDER — EPHEDRINE SULFATE 50 MG/ML
5 INJECTION, SOLUTION INTRAVENOUS ONCE AS NEEDED
Status: DISCONTINUED | OUTPATIENT
Start: 2021-05-25 | End: 2021-05-25 | Stop reason: HOSPADM

## 2021-05-25 RX ORDER — PROPOFOL 10 MG/ML
VIAL (ML) INTRAVENOUS AS NEEDED
Status: DISCONTINUED | OUTPATIENT
Start: 2021-05-25 | End: 2021-05-25 | Stop reason: SURG

## 2021-05-25 RX ORDER — HYDROCODONE BITARTRATE AND ACETAMINOPHEN 5; 325 MG/1; MG/1
1 TABLET ORAL EVERY 4 HOURS PRN
Qty: 30 TABLET | Refills: 0 | Status: SHIPPED | OUTPATIENT
Start: 2021-05-25 | End: 2021-09-23

## 2021-05-25 RX ORDER — FLUMAZENIL 0.1 MG/ML
0.2 INJECTION INTRAVENOUS AS NEEDED
Status: DISCONTINUED | OUTPATIENT
Start: 2021-05-25 | End: 2021-05-25 | Stop reason: HOSPADM

## 2021-05-25 RX ORDER — OXYCODONE AND ACETAMINOPHEN 7.5; 325 MG/1; MG/1
1 TABLET ORAL ONCE AS NEEDED
Status: DISCONTINUED | OUTPATIENT
Start: 2021-05-25 | End: 2021-05-25 | Stop reason: HOSPADM

## 2021-05-25 RX ORDER — SODIUM CHLORIDE, SODIUM LACTATE, POTASSIUM CHLORIDE, CALCIUM CHLORIDE 600; 310; 30; 20 MG/100ML; MG/100ML; MG/100ML; MG/100ML
9 INJECTION, SOLUTION INTRAVENOUS CONTINUOUS
Status: DISCONTINUED | OUTPATIENT
Start: 2021-05-25 | End: 2021-05-25 | Stop reason: HOSPADM

## 2021-05-25 RX ORDER — MAGNESIUM HYDROXIDE 1200 MG/15ML
LIQUID ORAL AS NEEDED
Status: DISCONTINUED | OUTPATIENT
Start: 2021-05-25 | End: 2021-05-25 | Stop reason: HOSPADM

## 2021-05-25 RX ORDER — PROMETHAZINE HYDROCHLORIDE 25 MG/1
25 SUPPOSITORY RECTAL ONCE AS NEEDED
Status: DISCONTINUED | OUTPATIENT
Start: 2021-05-25 | End: 2021-05-25 | Stop reason: HOSPADM

## 2021-05-25 RX ORDER — LABETALOL HYDROCHLORIDE 5 MG/ML
5 INJECTION, SOLUTION INTRAVENOUS
Status: DISCONTINUED | OUTPATIENT
Start: 2021-05-25 | End: 2021-05-25 | Stop reason: HOSPADM

## 2021-05-25 RX ORDER — LIDOCAINE HYDROCHLORIDE 20 MG/ML
INJECTION, SOLUTION INFILTRATION; PERINEURAL AS NEEDED
Status: DISCONTINUED | OUTPATIENT
Start: 2021-05-25 | End: 2021-05-25 | Stop reason: SURG

## 2021-05-25 RX ORDER — ONDANSETRON 2 MG/ML
INJECTION INTRAMUSCULAR; INTRAVENOUS AS NEEDED
Status: DISCONTINUED | OUTPATIENT
Start: 2021-05-25 | End: 2021-05-25 | Stop reason: SURG

## 2021-05-25 RX ORDER — ONDANSETRON 2 MG/ML
4 INJECTION INTRAMUSCULAR; INTRAVENOUS ONCE AS NEEDED
Status: DISCONTINUED | OUTPATIENT
Start: 2021-05-25 | End: 2021-05-25 | Stop reason: HOSPADM

## 2021-05-25 RX ORDER — LIDOCAINE HYDROCHLORIDE 10 MG/ML
0.5 INJECTION, SOLUTION EPIDURAL; INFILTRATION; INTRACAUDAL; PERINEURAL ONCE AS NEEDED
Status: DISCONTINUED | OUTPATIENT
Start: 2021-05-25 | End: 2021-05-25 | Stop reason: HOSPADM

## 2021-05-25 RX ORDER — CEFAZOLIN SODIUM 2 G/100ML
INJECTION, SOLUTION INTRAVENOUS AS NEEDED
Status: DISCONTINUED | OUTPATIENT
Start: 2021-05-25 | End: 2021-05-25 | Stop reason: SURG

## 2021-05-25 RX ORDER — SODIUM CHLORIDE 0.9 % (FLUSH) 0.9 %
3-10 SYRINGE (ML) INJECTION AS NEEDED
Status: DISCONTINUED | OUTPATIENT
Start: 2021-05-25 | End: 2021-05-25 | Stop reason: HOSPADM

## 2021-05-25 RX ADMIN — LIDOCAINE HYDROCHLORIDE 80 MG: 20 INJECTION, SOLUTION INFILTRATION; PERINEURAL at 07:09

## 2021-05-25 RX ADMIN — HYDROCODONE BITARTRATE AND ACETAMINOPHEN 1 TABLET: 7.5; 325 TABLET ORAL at 08:29

## 2021-05-25 RX ADMIN — CEFAZOLIN SODIUM 2 G: 2 INJECTION, SOLUTION INTRAVENOUS at 07:09

## 2021-05-25 RX ADMIN — PHENYLEPHRINE HYDROCHLORIDE 200 MCG: 10 INJECTION INTRAVENOUS at 07:31

## 2021-05-25 RX ADMIN — PROPOFOL 150 MG: 10 INJECTION, EMULSION INTRAVENOUS at 07:09

## 2021-05-25 RX ADMIN — SODIUM CHLORIDE, POTASSIUM CHLORIDE, SODIUM LACTATE AND CALCIUM CHLORIDE 9 ML/HR: 600; 310; 30; 20 INJECTION, SOLUTION INTRAVENOUS at 06:31

## 2021-05-25 RX ADMIN — SUGAMMADEX 400 MG: 100 INJECTION, SOLUTION INTRAVENOUS at 07:56

## 2021-05-25 RX ADMIN — FENTANYL CITRATE 50 MCG: 50 INJECTION INTRAMUSCULAR; INTRAVENOUS at 07:08

## 2021-05-25 RX ADMIN — PHENYLEPHRINE HYDROCHLORIDE 100 MCG: 10 INJECTION INTRAVENOUS at 07:21

## 2021-05-25 RX ADMIN — CEFAZOLIN SODIUM 2 G: 2 INJECTION, SOLUTION INTRAVENOUS at 06:56

## 2021-05-25 RX ADMIN — FENTANYL CITRATE 50 MCG: 50 INJECTION INTRAMUSCULAR; INTRAVENOUS at 07:58

## 2021-05-25 RX ADMIN — FAMOTIDINE 20 MG: 10 INJECTION INTRAVENOUS at 06:31

## 2021-05-25 RX ADMIN — ROCURONIUM BROMIDE 50 MG: 50 INJECTION INTRAVENOUS at 07:09

## 2021-05-25 RX ADMIN — SODIUM CHLORIDE, POTASSIUM CHLORIDE, SODIUM LACTATE AND CALCIUM CHLORIDE: 600; 310; 30; 20 INJECTION, SOLUTION INTRAVENOUS at 08:04

## 2021-05-25 RX ADMIN — ONDANSETRON 4 MG: 2 INJECTION INTRAMUSCULAR; INTRAVENOUS at 07:35

## 2021-05-25 RX ADMIN — SODIUM CHLORIDE, POTASSIUM CHLORIDE, SODIUM LACTATE AND CALCIUM CHLORIDE: 600; 310; 30; 20 INJECTION, SOLUTION INTRAVENOUS at 08:05

## 2021-05-25 NOTE — ANESTHESIA POSTPROCEDURE EVALUATION
"Patient: Michelle Espinoza    Procedure Summary     Date: 05/25/21 Room / Location: [facility] OR [address] [place] [address] MAIN OR    Anesthesia Start: 0700 Anesthesia Stop: 0812    Procedure: LEFT OPEN TRICEPS REPAIR (Left Arm Upper) Diagnosis:     Surgeons: Luis Cho II, MD Provider: Adalberto Donahue MD    Anesthesia Type: general ASA Status: 3          Anesthesia Type: general    Vitals  Vitals Value Taken Time   /83 05/25/21 0845   Temp 36.6 °C (97.8 °F) 05/25/21 0859   Pulse 78 05/25/21 0859   Resp 16 05/25/21 0859   SpO2 94 % 05/25/21 0859           Post Anesthesia Care and Evaluation    Patient location during evaluation: bedside  Patient participation: complete - patient participated  Level of consciousness: awake and alert  Pain score: 2  Pain management: adequate  Airway patency: patent  Anesthetic complications: No anesthetic complications  PONV Status: none  Cardiovascular status: acceptable and hemodynamically stable  Respiratory status: acceptable  Hydration status: acceptable    Comments: /79 (BP Location: Right arm, Patient Position: Lying)   Pulse 69   Temp 36.6 °C (97.8 °F) (Oral)   Resp 16   Ht 160 cm (63\")   Wt 85.9 kg (189 lb 6 oz)   LMP  (LMP Unknown)   SpO2 93%   BMI 33.55 kg/m²         "

## 2021-05-25 NOTE — ANESTHESIA PROCEDURE NOTES
Airway  Urgency: elective    Date/Time: 5/25/2021 7:10 AM    General Information and Staff    Patient location during procedure: OR  Anesthesiologist: Adalberto Donahue MD    Indications and Patient Condition    Preoxygenated: yes      Final Airway Details  Final airway type: endotracheal airway      Successful airway: ETT  Cuffed: yes   Successful intubation technique: direct laryngoscopy  Endotracheal tube insertion site: oral  Blade: Aurelia  Blade size: 3  ETT size (mm): 7.0  Cormack-Lehane Classification: grade I - full view of glottis  Placement verified by: chest auscultation   Number of attempts at approach: 1

## 2021-05-25 NOTE — ANESTHESIA PREPROCEDURE EVALUATION
Anesthesia Evaluation     Patient summary reviewed and Nursing notes reviewed   no history of anesthetic complications:  NPO Solid Status: > 8 hours  NPO Liquid Status: > 8 hours           Airway   Mallampati: II  Dental - normal exam     Pulmonary - normal exam   (+) a smoker Former,   Cardiovascular - normal exam    (+) past MI  3-6 months, CAD, cardiac stents more than 12 months ago hyperlipidemia,  carotid artery disease right carotid      Neuro/Psych  (+) weakness, psychiatric history,     GI/Hepatic/Renal/Endo    (+)   renal disease CRI, diabetes mellitus type 2,     Musculoskeletal     Abdominal    Substance History      OB/GYN          Other   arthritis,                    Anesthesia Plan    ASA 3     general     intravenous induction     Anesthetic plan, all risks, benefits, and alternatives have been provided, discussed and informed consent has been obtained with: patient.

## 2021-06-02 ENCOUNTER — TELEPHONE (OUTPATIENT)
Dept: CARDIOLOGY | Facility: CLINIC | Age: 76
End: 2021-06-02

## 2021-06-02 NOTE — TELEPHONE ENCOUNTER
Pt calling stating 1 of her 3  heart medications are effecting her oxybuytnin, not sure which one. It is one of the only medications that works for her so she wont be able to change. Please advise.    DA

## 2021-06-03 RX ORDER — LOSARTAN POTASSIUM 25 MG/1
25 TABLET ORAL DAILY
Qty: 30 TABLET | Refills: 5 | Status: SHIPPED | OUTPATIENT
Start: 2021-06-03 | End: 2021-06-29 | Stop reason: SINTOL

## 2021-06-03 NOTE — TELEPHONE ENCOUNTER
Message relayed to pt, expressed understanding I told her to give it a week and call us back if symptoms do not change.    DA

## 2021-06-03 NOTE — TELEPHONE ENCOUNTER
Please let her know that the lisinopril may be causing a dry cough.  I am going to switch her from lisinopril to losartan which is similar to lisinopril but should not cause a dry cough.  If she continues to have issues have her call us back.    I sent in a prescription for the losartan.  She will need to discontinue the lisinopril when she starts the losartan.

## 2021-06-03 NOTE — TELEPHONE ENCOUNTER
Can you find out how she thinks it is effecting the oxybutynin and which medications she is worried about?

## 2021-06-07 ENCOUNTER — TELEPHONE (OUTPATIENT)
Dept: FAMILY MEDICINE CLINIC | Facility: CLINIC | Age: 76
End: 2021-06-07

## 2021-06-07 NOTE — TELEPHONE ENCOUNTER
Caller: Michelle Espinoza    Relationship to patient: Self    Best call back number [phone]    Patient is needing: PATIENT IS ABLE TO GET IN TO SEE UROLOGIST TODAY, 6-7-21.

## 2021-06-07 NOTE — TELEPHONE ENCOUNTER
Appointment is no longer on my schedule today.  Please verify with patient that she is seeing urology.

## 2021-06-29 ENCOUNTER — TELEPHONE (OUTPATIENT)
Dept: CARDIOLOGY | Facility: CLINIC | Age: 76
End: 2021-06-29

## 2021-06-29 RX ORDER — HYDRALAZINE HYDROCHLORIDE 10 MG/1
10 TABLET, FILM COATED ORAL 2 TIMES DAILY
Qty: 60 TABLET | Refills: 1 | Status: SHIPPED | OUTPATIENT
Start: 2021-06-29 | End: 2021-08-25

## 2021-06-29 NOTE — TELEPHONE ENCOUNTER
Patient denies any wheezing.  She does take colchicine 0.6mg DAILY.    Please advise.    Thank you,  Ashley Curry RN  [facility]  Triage

## 2021-06-29 NOTE — TELEPHONE ENCOUNTER
Is she having any wheezing?  Please find out if she actually takes colchicine 0.6 mg daily or if this is just an as needed medicine.  There is alerts for use with carvedilol and I would like to increase her carvedilol and stop the losartan to see if this helps.  Please let me know Ashley.

## 2021-06-29 NOTE — TELEPHONE ENCOUNTER
If she is taking it daily would probably hold off on increasing her carvedilol dose for now then.  We can start by adding low-dose hydralazine 10 mg twice a day (rx sent to her listed pharmacy) for her blood pressure as cough is not a major associated side effect and hold off on losartan and lisinopril at this time.  If her cough does not improve or worsen she needs to see her PCP for evaluation of that.  Please have her give us an update within the next week on how she is doing with change in medication and how her blood pressures are doing.  Please let me know if she has any further questions or concerns. Thanks

## 2021-06-29 NOTE — TELEPHONE ENCOUNTER
"[place],    I'm adding on to this message so you can see the previous correspondence.  She calls today reporting an \"allergic reaction to a new medicine.\"    Apparently, she was changed from lisinopril to losartan on 6/3 due to developing a cough.  Since the change in meds, she feels like her cough has worsened and at times can make her gag because she coughs so hard.    She denies any SOA, dyspnea, difficulty swallowing, rash, or fever. Occasionally she notices that her Left ankle is \"puffy\" during the day. Not significant and this resolves during the night.    Of note is that since the change in meds, her DBP has been elevated at 110-120/>.  HR in the 70s.  "

## 2021-06-29 NOTE — TELEPHONE ENCOUNTER
Recommendations reviewed with the patient. Patient verbalized understanding. Denied further questions at this time.    Ashley Curry RN  Triage [facility]

## 2021-07-08 RX ORDER — CARVEDILOL 6.25 MG/1
6.25 TABLET ORAL 2 TIMES DAILY WITH MEALS
Qty: 180 TABLET | Refills: 0 | Status: SHIPPED | OUTPATIENT
Start: 2021-07-08 | End: 2021-08-11

## 2021-07-19 ENCOUNTER — OFFICE VISIT (OUTPATIENT)
Dept: ENDOCRINOLOGY | Age: 76
End: 2021-07-19

## 2021-07-19 DIAGNOSIS — E83.52 HYPERCALCEMIA: ICD-10-CM

## 2021-07-19 DIAGNOSIS — E11.65 TYPE 2 DIABETES MELLITUS WITH HYPERGLYCEMIA, WITHOUT LONG-TERM CURRENT USE OF INSULIN (HCC): Primary | ICD-10-CM

## 2021-07-19 DIAGNOSIS — E78.2 MIXED HYPERLIPIDEMIA: ICD-10-CM

## 2021-07-19 PROCEDURE — 99214 OFFICE O/P EST MOD 30 MIN: CPT | Performed by: INTERNAL MEDICINE

## 2021-07-19 NOTE — PROGRESS NOTES
Chief Complaint  Diabetes and Abnormal Calcium    Subjective            History of Present Illness  Michelle Espinoza,76 y.o. is here as a follow-up for the evaluation of elevated Calcium levels.      Patient's calcium levels were noted to be elevated on routine blood work up in May 2017.  Her calcium levels have been ranging between 10.5-10.9 mg/dL.  With the latest being her highest-11.1 mg/dL range.     Status post parathyroid surgery in March 2019.  Patient has recovered from the surgery with no complications.  Repeat calcium levels and parathyroid levels have normalized.    Today in clinic pt reports that her energy levels are still very low, weight has been stable, no recent hx of kidneys stones and no fractures, but did show tore the triceps muscle on her left arm.  Does have significant hx of arthritis.     Type 2 dm - on glimepiride 2 mg po bid with meals. Hx of CAD and since then her BG are running high.   BG - 140 -1 50     Hyperlipidemia - on lipitor - ? No idea of the dose.  She could not tolerate the prior statins with significant muscle weakness and lethargy.  Recently hospitalized with coronary artery disease in March 2021.    You have chosen to receive care through a telehealth visit.  Do you consent to use a video/audio connection for your medical care today? Yes    Reviewed primary care physician's/consulting physician documentation and lab results     I have reviewed the patient's allergies, medicines, past medical hx, family hx and social hx in detail.    Objective   Vital Signs:   There were no vitals taken for this visit.   - Tele health visit  Physical Exam   General appearance-pleasant, no distress  Respiratory-normal breathing appreciated.  No respiratory distress noted  Ear nose and throat-no hard of hearing.  Neurological assessment-alert and oriented x3.      Result Review :   The following data was reviewed by: Bethanie Montano MD on 07/19/2021:  Admission on 05/25/2021, Discharged on  05/25/2021   Component Date Value Ref Range Status   • Glucose 05/25/2021 158* 70 - 130 mg/dL Final   • Glucose 05/25/2021 140* 70 - 130 mg/dL Final     Data reviewed: hospital documentation        I reviewed the patient's new clinical results and mentioned them above in HPI and in plan as well.          Assessment and Plan    Problem List Items Addressed This Visit        Other    Hypercalcemia    Overview     Description: June 2015 ionized calcium 5.8 with inappropriate parathyroid hormone at 21. 34 hour urine calcium 232. Prior history of kidney stones. Difficult bone density screening because of severe degenerative joint disease. Recommend repeat parathyroid hormone and ionized calcium 12/15.         Relevant Orders    TSH    T4, Free    T3, Free    Basic Metabolic Panel    Hemoglobin A1c    Lipid Panel    Vitamin B12 & Folate    Vitamin D 25 Hydroxy      Other Visit Diagnoses     Type 2 diabetes mellitus with hyperglycemia, without long-term current use of insulin (CMS/[facility])    -  Primary    Relevant Orders    TSH    T4, Free    T3, Free    Basic Metabolic Panel    Hemoglobin A1c    Lipid Panel    Vitamin B12 & Folate    Vitamin D 25 Hydroxy    Mixed hyperlipidemia         Relevant Orders    TSH    T4, Free    T3, Free    Basic Metabolic Panel    Hemoglobin A1c    Lipid Panel    Vitamin B12 & Folate    Vitamin D 25 Hydroxy        Hypercalcemia-resolved  Continue to monitor the calcium levels  Continue vitamin D replacement.    Hyperlipidemia  Continue low-dose Lipitor, patient was not entirely sure of the dosage.    Type 2 diabetes mellitus-uncontrolled with hyperglycemia  Noted that the A1c slowly trending up  Blood sugars are also high.  Emphasized the patient to come into the clinic sometime within the next 1 to 2 weeks for CGM placement, patient will alert us a day before she is coming into the office.  Based on the CGM information we would consider further medication adjustments for the glycemic  control.      I spent 32 minutes caring for Michelle on this date of service. This time includes time spent by me in the following activities:reviewing tests, obtaining and/or reviewing a separately obtained history, counseling and educating the patient/family/caregiver, ordering medications, tests, or procedures, referring and communicating with other health care professionals , documenting information in the medical record, independently interpreting results and communicating that information with the patient/family/caregiver and care coordination        Follow Up   No follow-ups on file.    Refills/Meds sent to pharmacy    Interpreted the blood work-up/imaging results performed by the primary care/consulting physician -    Patient was given instructions and counseling regarding her condition or for health maintenance advice. Please see specific information pulled into the AVS if appropriate.

## 2021-08-11 RX ORDER — CARVEDILOL 6.25 MG/1
TABLET ORAL
Qty: 90 TABLET | Refills: 2 | Status: SHIPPED | OUTPATIENT
Start: 2021-08-11 | End: 2021-12-22

## 2021-08-12 ENCOUNTER — TRANSCRIBE ORDERS (OUTPATIENT)
Dept: PREADMISSION TESTING | Facility: HOSPITAL | Age: 76
End: 2021-08-12

## 2021-08-12 DIAGNOSIS — Z01.818 OTHER SPECIFIED PRE-OPERATIVE EXAMINATION: Primary | ICD-10-CM

## 2021-08-16 NOTE — PROGRESS NOTES
Date of Office Visit: 21  Encounter Provider: RATNA Abbasi  Primary Cardiologist: Dr. Romo  Place of Service: [facility] CARDIOLOGY  Patient Name: Michelle Espinoza  :1945      Subjective:     Chief Complaint:  3 month CAD follow-up/ischemic cardiomyopathy    History of Present Illness:  Michelle Espinoza is a pleasant 76 y.o. female who is new to me .  Outside records have been requested and reviewed by me if available.     This is a patient of Dr. Romo with coronary artery disease status post inferior MI and drug-eluting stent placement of proximal, mid and distal RCA 2021, ischemic cardiomyopathy, type 2 diabetes, hyperparathyroidism, chronic kidney disease, hyperlipidemia, spinal stenosis.    3/27/2021 patient presented to the emergency department with sudden weakness after going to the bathroom and had a fall.  EKG showed inferolateral ST elevation consistent with inferior MI and she was brought emergently to the Cath Lab where she was found to have an occluded proximal RCA.  After reestablishing flow in the vessel was noted she had severe stenosis in her proximal, mid and distal RCA.  She underwent 3 separate drug-eluting stent placement.  Procedure was complicated by distal embolization of thrombus in her posterior descending branch and she was treated with Integrilin infusion for a period of time before complaining of a headache.  Work-up of the headache included a CT of the head that was reportedly unremarkable.  She had some issues with bradycardia and hypotension during the procedure that required treatment with dopamine and phenylephrine and both of those had improved by the end of the procedure.  She no longer required pressor support.  3/28/2021 patient had an echo that showed mildly depressed LV systolic function with an EF of 41%, inferior wall motion abnormalities, grade 1 diastolic dysfunction no significant valvular disease.  Patient had  "some confusion the remainder of her hospitalization there was concern for underlying dementia.  She was seen by neurology who recommended pursuing MRI but she is unable to tolerate due to her anxiety.  She was noted to have elevated liver transaminases and her statin was held briefly and right upper quadrant ultrasound was formed and reportedly unremarkable.  Medicine saw her that admission for diabetes management and noncardiac issues.    4/15/2021 patient saw RATNA Kat was overall doing well.  Her atorvastatin was restarted at 40 mg daily and carvedilol dosage was increased.    Sometime after her last office visit patient injured her left triceps after attempting to pull herself off the toilet.  She was seen by Dr. Linden Cho and underwent MRI which confirmed a tricep tear and she was scheduled for repair 5/25/2021.  She last saw Dr. Room in the office 5/18/2021 he was doing well without chest pain, shortness of breath.  Apparently she had to discontinue atorvastatin because it exacerbated lower extremity weakness and had been unable to attend cardiac rehab because of issues with lower extremity weakness and spinal stenosis and apparently was hoping to return to [facility] rehab.  Was noted at that time the plan was to just hold her clopidogrel for 1 day.  Dr. Romo thought that was reasonable though not ideal but any longer would put her at increased risk for recurrent MI.    Dr. Romo recommended a 3-month follow-up with repeat echocardiogram.    Follow-up patient called reporting a cough and her lisinopril was switched to losartan.  She called later in the month reporting \"an allergic reaction to a new medicine\".  Apparently her cough worsened since that time and was making her gag she did not really have any other significant symptoms.  She had some puffiness of the left ankle during the day that was not significant.  Blood pressures running 110s to 120s/90s to 105 heart rate in the 70s.  Apparently " she was not having any wheezing and was taking colchicine daily though was unsure why there was no overt of carvedilol with colchicine.  I recommended she hold off losartan and start low-dose hydralazine.  If her cough did not improve she was to see her PCP.    She is presenting today for 3-month follow-up. She had an echocardiogram today.  I reviewed preliminary results with Dr. Romo today.  She still has inferior wall motion abnormalities but EF looks like it may be improved a little bit to low normal 45 to 50%.  She feels okay from a heart standpoint she is not having any chest pain, significant shortness of breath denies rapid irregular heartbeat has some rare lower extremity edema not worsening she does feel like she has some weight gain but attributes this to some of her medications.  She is not having any PND, orthopnea, dizziness, headaches.  Her primary complaints are her joint symptoms.  She is now following with rheumatology Dr. Raman and it sounds like she is being treated for combination of gout or osteoarthritis but sounds like they may be starting her on methotrexate and stopping colchicine and duloxetine.  This has not yet occurred yet.  Her cough is overall better since medication adjustment.  She states she has an old blood pressure cuff and is not sure if it is accurate.  She gets some diastolic readings in the 120s-150s she states but then states that that might actually be her systolic reading.  Also states that she checks her blood pressure first thing in the morning before any medication.  She also states she was incidentally found to have a kidney stone.  She denies any flank pain or trouble with urination but is scheduled for lithotripsy and a stent placement next week.  Her instructions are to hold her aspirin and Plavix 7 days prior.  I did call and leave a [facility] to call [facility] as would prefer her not to hold this medication that long and prefer only a 2 to  3-day hold if possible or postponing procedure at least a month.  She states that she did okay with her temporary hold of antiplatelet therapy with her tricep repair and is recovering from that.  She is back on atorvastatin and states her rheumatologist is aware.  She does not feel like it is exacerbating any muscle weakness.  She denies any bleeding issues or other intolerances to her cardiac meds.      Past Medical History:   Diagnosis Date   • Anesthesia complication     STRONG GAG REFLEX   • Benign colonic polyp    • Chronic back pain    • Claustrophobia    • Coronary artery disease 03/27/2021    PTCA WITH PLACEMENT OF STENT. PLAVIX ONLY TO BE HELD ONE DAY   • DDD (degenerative disc disease), cervical    • DDD (degenerative disc disease), lumbosacral    • Diabetes mellitus (CMS/[facility])     TYPE 2   • Frequent UTI    • History of kidney stones 06/2014   • History of MI (myocardial infarction) 03/27/2021   • History of pyelonephritis 06/2014   • Hypercalcemia     PARATHYROID SURGERY REMOVED ONE LOBE   • Hypercholesteremia    • Leg muscle spasm    • Neuropathy     LEFT FOOT. AS RESULT OF BACK PROBLEMS   • Osteoarthritis    • Osteoporosis    • Sepsis (CMS/HCC) 06/2014   • Traumatic rupture of left triceps tendon    • Urinary incontinence    • Weakness     LOWER EXTREMITES RELATED FROM NERVE DAMAGE FROM BACK     Past Surgical History:   Procedure Laterality Date   • APPENDECTOMY     • BACK SURGERY      MULTIPLE. WITH HARDWARE   • CARDIAC CATHETERIZATION N/A 3/27/2021    Procedure: Left Heart Cath;  Surgeon: Leydi Romo MD;  Location: [facility] CATH INVASIVE LOCATION;  Service: Cardiovascular;  Laterality: N/A;   • CARDIAC CATHETERIZATION  3/27/2021    Procedure: Percutaneous Manual Thrombectomy;  Surgeon: Leydi Romo MD;  Location:  [facility] CATH INVASIVE LOCATION;  Service: Cardiovascular;;   • CARDIAC CATHETERIZATION N/A 3/27/2021    Procedure: Percutaneous Coronary Intervention;  Surgeon: Leydi Romo MD;   Location:  [facility] CATH INVASIVE LOCATION;  Service: Cardiovascular;  Laterality: N/A;   • CARDIAC CATHETERIZATION N/A 3/27/2021    Procedure: Coronary angiography;  Surgeon: Leydi Kemp MD;  Location:  [facility] CATH INVASIVE LOCATION;  Service: Cardiovascular;  Laterality: N/A;   • CARPAL TUNNEL RELEASE      LEFT X2 RIGHT    • CATARACT EXTRACTION WITH INTRAOCULAR LENS IMPLANT      LEFT AND RIGHT   • CERVICAL SPINE SURGERY      MULTIPLE C3-4 C6-7   •  SECTION      x 2   • COLONOSCOPY  2018   • PARATHYROIDECTOMY Left 3/7/2019    Procedure: PARATHYROIDECTOMY LEFT INFERIOR;  Surgeon: Mason Prather MD;  Location:  [facility];  Service: ENT   • TRICEP TENDON REPAIR Left 2021    Procedure: LEFT OPEN TRICEPS REPAIR;  Surgeon: Luis Cho II, MD;  Location: [facility] MAIN OR;  Service: Orthopedics;  Laterality: Left;     Outpatient Medications Prior to Visit   Medication Sig Dispense Refill   • aspirin 81 MG chewable tablet Chew 1 tablet Daily. (Patient taking differently: Chew 81 mg Daily. NOT TOLD TO STOP FOR PROCEDURE)     • atorvastatin (LIPITOR) 40 MG tablet Take 1 tablet by mouth Daily.     • baclofen (LIORESAL) 10 MG tablet TAKE TWO TABLETS BY MOUTH DAILY (Patient taking differently: Take 20 mg by mouth Daily As Needed.) 60 tablet 1   • carvedilol (COREG) 6.25 MG tablet TAKE ONE TABLET BY MOUTH TWICE A DAY WITH MEALS 90 tablet 2   • Cholecalciferol (VITAMIN D3) 5000 UNITS capsule capsule Take 10,000 Units by mouth Daily.     • clopidogrel (PLAVIX) 75 MG tablet Take 1 tablet by mouth Daily. (Patient taking differently: Take 75 mg by mouth Daily. TO BE HELD FOR 1 DAY PRIOR TO SURGERY. OKED WITH DR KEMP) 30 tablet 11   • colchicine 0.6 MG tablet Take 0.6 mg by mouth Daily.     • COLLAGEN PO Take 3 tablets by mouth Daily.     • DULoxetine (CYMBALTA) 60 MG capsule Take 60 mg by mouth Daily.     • glimepiride (AMARYL) 2 MG tablet TAKE ONE TABLET BY MOUTH TWICE A DAY WITH MEALS (Patient  taking differently: Take 2 mg by mouth 2 (two) times a day.) 60 tablet 9   • hydrALAZINE (APRESOLINE) 10 MG tablet Take 1 tablet by mouth 2 (two) times a day. 60 tablet 1   • HYDROcodone-acetaminophen (NORCO) 5-325 MG per tablet Take 1 tablet by mouth Every 4 (Four) Hours As Needed for Severe Pain . 30 tablet 0   • magnesium 30 MG tablet Take 60 mg by mouth Daily.     • oxybutynin XL (DITROPAN-XL) 10 MG 24 hr tablet Take 10 mg by mouth 2 (two) times a day.     • Probiotic Product (PROBIOTIC PO) Take 1 tablet by mouth Daily.     • vitamin C (ASCORBIC ACID) 500 MG tablet Take 500 mg by mouth Daily. With D3     • vitamin E 1000 UNIT capsule Take 1,000 Units by mouth Daily. HOLD FOR SURGERY       No facility-administered medications prior to visit.       Allergies as of 2021 - Reviewed 2021   Allergen Reaction Noted   • Other Nausea And Vomiting 10/19/2016   • Penicillins Hives 2016   • Statins Myalgia 2019   • Latex Rash 2016   • Nickel Rash 2016     Social History     Socioeconomic History   • Marital status:      Spouse name: Not on file   • Number of children: Not on file   • Years of education: Not on file   • Highest education level: Not on file   Tobacco Use   • Smoking status: Former Smoker     Packs/day: 0.50     Years: 15.00     Pack years: 7.50     Types: Cigarettes     Quit date:      Years since quittin.6   • Smokeless tobacco: Never Used   • Tobacco comment: CAFFEINE USE: ICE TEA OCC.   Vaping Use   • Vaping Use: Never used   Substance and Sexual Activity   • Alcohol use: Yes     Comment: Socially.   • Drug use: No   • Sexual activity: Defer     Family History   Problem Relation Age of Onset   • Stroke Mother    • Heart disease Mother    • Hypertension Mother    • Clotting disorder Father    • Hypertension Father    • Diabetes Father    • Aneurysm Father    • Hypertension Sister    • Diabetes Sister    • Cervical cancer Sister    • Diabetes Sister    •  "Cervical cancer Maternal Grandmother 72   • Diabetes Grandchild    • Malig Hyperthermia Neg Hx      Review of Systems   Constitutional: Positive for malaise/fatigue and weight gain (believes med related- 20 lbs since MI). Negative for chills, fever and weight loss.   Eyes: Negative for visual disturbance.   Cardiovascular: Negative for chest pain, dyspnea on exertion, irregular heartbeat, leg swelling, near-syncope, orthopnea, palpitations, paroxysmal nocturnal dyspnea and syncope.   Respiratory: Negative for cough (improved on different meds), snoring and wheezing.    Hematologic/Lymphatic: Negative for bleeding problem. Bruises/bleeds easily.   Skin: Negative for color change.   Musculoskeletal: Positive for joint pain. Negative for falls and myalgias.   Gastrointestinal: Negative for diarrhea, hematemesis, hematochezia, melena, nausea and vomiting.   Genitourinary: Negative for hematuria.   Neurological: Positive for excessive daytime sleepiness and weakness (improving). Negative for dizziness and light-headedness.   Psychiatric/Behavioral: Negative for depression. The patient is not nervous/anxious.           Objective:     Vitals:    08/17/21 1303 08/17/21 1342   BP: 130/82    Pulse: 69 73   SpO2:  97%   Weight: 86.2 kg (190 lb)    Height: 160 cm (63\")      Body mass index is 33.66 kg/m².    PHYSICAL EXAM:  Vitals and nursing note reviewed.   Constitutional:       General: Not in acute distress.     Appearance: Well-developed and not in distress. Obese. Not diaphoretic.   HENT:      Head: Normocephalic and atraumatic.   Neck:      Vascular: No carotid bruit or JVD.   Pulmonary:      Effort: Pulmonary effort is normal. No respiratory distress.      Breath sounds: Normal breath sounds. No decreased breath sounds. No wheezing. No rhonchi. No rales.   Cardiovascular:      Normal rate. Regular rhythm.      Murmurs: There is no murmur.      Comments: Trace bilateral lower extremity edema with venous stasis skin color " changes  Pulses:     Radial: 2+ bilaterally.     Dorsalis pedis: 2+ bilaterally.  Abdominal:      General: Bowel sounds are normal. There is no distension.      Palpations: Abdomen is soft.      Tenderness: There is no abdominal tenderness.   Musculoskeletal:      Comments: Uses power scooter Skin:     General: Skin is warm and dry.      Findings: No erythema.   Neurological:      Mental Status: Alert, oriented to person, place, and time and oriented to person, place and time.   Psychiatric:         Attention and Perception: Attention normal.         Mood and Affect: Mood normal.         Speech: Speech normal.         Behavior: Behavior normal.         Thought Content: Thought content normal.         Cognition and Memory: Cognition normal.         Judgment: Judgment normal.           ECG 12 Lead    Date/Time: 8/17/2021 1:20 PM  Performed by: China Mcgregor APRN  Authorized by: China Mcgregor APRN   Comparison: compared with previous ECG from 5/18/2021  Similar to previous ECG  Rhythm: sinus rhythm  Rate: normal  BPM: 68  Q waves: V1, V2, V3, V4, V5, V6, III and aVF    QRS axis: left  Other findings: low voltage    Clinical impression: abnormal EKG  Comments: Old infarct  Indication: CAD with prior MI, ischemic cardiomyopathy, hypertension        Recent lab result review:  5/20/2021 hemoglobin A1c 6.0% CMP mild renal insufficiency creatinine 1.11 with a BUN of 25 GFR 48, CBC normal  3/29/2021 TSH normal  3/20/2021 lipid panel triglycerides elevated 310, HDL low 38, VLDL elevated 52, LDL 90, total cholesterol 180      Assessment:       Diagnosis Plan   1. Coronary artery disease involving native coronary artery of native heart without angina pectoris     2. ST elevation myocardial infarction involving right coronary artery (CMS/HCC)     3. Status post coronary artery stent placement     4. Ischemic cardiomyopathy     5. Type 2 diabetes mellitus with other circulatory complications (CMS/HCC)      6. Stage 2 chronic  kidney disease         Plan:     1.  Coronary artery disease:  Status post inferior myocardial infarction and drug-eluting stent placement of a proximal, mid, and distal right coronary artery on 3/27/2021.    EKG shows prior infarct. She otherwise appears to be doing well from a cardiac standpoint.  Continue current management including aspirin, clopidogrel, beta-blocker.  2.  Ischemic cardiomyopathy:   EF following her myocardial infarction was around 41%.  Continue guideline directed management with carvedilol.  Was on lisinopril but was switched to losartan due to cough but cough worsened so they were both discontinued. Repeat echo today continues to show some inferior wall motion abnormality and was suspected her infarct was actually subacute but EF appeared to be improved 45 to 50% after discussion with Dr. Romo.  Continue current management.  3.  Hyperlipidemia:   She is now somehow back on statin and doesn't have any intolerance.  Her PCP and rheumatologist are apparently aware of this history of elevated CK level. plan was to try to get her started on PCSK9 inhibitor after her noncardiac issues had resolved.  We will continue to monitor her progress on statin and if it has to be stopped then will consider PCSK9 inhibitor but for now would continue current management since she is overall tolerating.  Goal LDL less than 70 HDL above 40.  Recommend repeat labs with PCP.  4.  Diabetes mellitus type 2  5.  Left tricep tear: Status post surgery-recovering.  6.  Hypertension: BP is completely stable in office today.  Not sure she has her blood pressure readings correct.  Recommended to BP check with new cuff in the next couple weeks.  I did review blood pressure goals.  7.  Cough: Improved since stopping lisinopril and losartan.  8.  Spinal stenosis  9. Joint pain: follows with rheumatology [facility]. Sounds like she will be starting on MTX and stopping colchicine.  10. Kidney stone: scheduled for  "lithotripsy and stent placement.  She states she has a history of \"kidney stent\" many years ago.  Follows with Dr. Nelson.  I have called and left a message for the patient's nurse to see if procedure can be postponed or if they are agreeable to patient only holding dual antiplatelet for 2 or 3 days prior given her MI and stent placement less than 6 months ago.  I am awaiting a callback.  Patient was supposed to stop her Plavix and aspirin today but she has already taken.  She will continue taking but will call me tomorrow if she has not heard back about the plan of care with her procedure.  I discussed this with Dr. Romo.  11.  History of CKD: PCP monitoring.    CHF teaching reviewed and magnet given.  Overall she appears stable without angina or chest pain but she is many other noncardiac issues.  She denied the need for medication refills.  When she stops the colchicine and has medicine adjustment with rheumatology she will call office back so we can update her medication list.  At this time we would also be able to uptitrate carvedilol if needed.  She is going to bring her home blood pressure cuff and blood pressure log and for accuracy check in a couple weeks.  Patient verbalized understanding of the plan of care.    Follow up with Dr. Romo in 3 months, unless otherwise needed sooner.  I advised the patient to contact our office with any questions or concerns.      It has been a pleasure to participate in this patient's care. Please feel free to contact me with any questions or concerns.     China Mcgregor, RATNA  08/17/21             Your medication list          Accurate as of August 17, 2021  4:23 PM. If you have any questions, ask your nurse or doctor.            CHANGE how you take these medications      Instructions Last Dose Given Next Dose Due   aspirin 81 MG chewable tablet  What changed: additional instructions      Chew 1 tablet Daily.       baclofen 10 MG tablet  Commonly known as: LIORESAL  What " changed:   · when to take this  · reasons to take this      TAKE TWO TABLETS BY MOUTH DAILY       clopidogrel 75 MG tablet  Commonly known as: PLAVIX  What changed: additional instructions      Take 1 tablet by mouth Daily.       glimepiride 2 MG tablet  Commonly known as: AMARYL  What changed: when to take this      TAKE ONE TABLET BY MOUTH TWICE A DAY WITH MEALS          CONTINUE taking these medications      Instructions Last Dose Given Next Dose Due   atorvastatin 40 MG tablet  Commonly known as: LIPITOR      Take 1 tablet by mouth Daily.       carvedilol 6.25 MG tablet  Commonly known as: COREG      TAKE ONE TABLET BY MOUTH TWICE A DAY WITH MEALS       colchicine 0.6 MG tablet      Take 0.6 mg by mouth Daily.       COLLAGEN PO      Take 3 tablets by mouth Daily.       DULoxetine 60 MG capsule  Commonly known as: CYMBALTA      Take 60 mg by mouth Daily.       hydrALAZINE 10 MG tablet  Commonly known as: APRESOLINE      Take 1 tablet by mouth 2 (two) times a day.       HYDROcodone-acetaminophen 5-325 MG per tablet  Commonly known as: NORCO      Take 1 tablet by mouth Every 4 (Four) Hours As Needed for Severe Pain .       magnesium 30 MG tablet      Take 60 mg by mouth Daily.       oxybutynin XL 10 MG 24 hr tablet  Commonly known as: DITROPAN-XL      Take 10 mg by mouth 2 (two) times a day.       PROBIOTIC PO      Take 1 tablet by mouth Daily.       vitamin C 500 MG tablet  Commonly known as: ASCORBIC ACID      Take 500 mg by mouth Daily. With D3       vitamin D3 125 MCG (5000 UT) capsule capsule      Take 10,000 Units by mouth Daily.       vitamin E 1000 UNIT capsule      Take 1,000 Units by mouth Daily. HOLD FOR SURGERY              The above medication changes may not have been made by this provider.  Medication list was updated to reflect medications patient is currently taking including medication changes and discontinuations made by other healthcare providers or the patients themselves.     Dictated  utilizing Dragon Dictation System.

## 2021-08-17 ENCOUNTER — HOSPITAL ENCOUNTER (OUTPATIENT)
Dept: CARDIOLOGY | Facility: HOSPITAL | Age: 76
Discharge: HOME | End: 2021-08-17
Admitting: INTERNAL MEDICINE

## 2021-08-17 ENCOUNTER — OFFICE VISIT (OUTPATIENT)
Dept: CARDIOLOGY | Facility: CLINIC | Age: 76
End: 2021-08-17

## 2021-08-17 ENCOUNTER — TELEPHONE (OUTPATIENT)
Dept: CARDIOLOGY | Facility: CLINIC | Age: 76
End: 2021-08-17

## 2021-08-17 VITALS
DIASTOLIC BLOOD PRESSURE: 90 MMHG | HEIGHT: 63 IN | HEART RATE: 58 BPM | BODY MASS INDEX: 33.49 KG/M2 | SYSTOLIC BLOOD PRESSURE: 143 MMHG | WEIGHT: 189 LBS

## 2021-08-17 VITALS
SYSTOLIC BLOOD PRESSURE: 130 MMHG | DIASTOLIC BLOOD PRESSURE: 82 MMHG | BODY MASS INDEX: 33.66 KG/M2 | HEIGHT: 63 IN | OXYGEN SATURATION: 97 % | HEART RATE: 73 BPM | WEIGHT: 190 LBS

## 2021-08-17 DIAGNOSIS — E11.59 TYPE 2 DIABETES MELLITUS WITH OTHER CIRCULATORY COMPLICATIONS (HCC): ICD-10-CM

## 2021-08-17 DIAGNOSIS — Z95.5 STATUS POST CORONARY ARTERY STENT PLACEMENT: ICD-10-CM

## 2021-08-17 DIAGNOSIS — I25.5 ISCHEMIC CARDIOMYOPATHY: ICD-10-CM

## 2021-08-17 DIAGNOSIS — N18.2 STAGE 2 CHRONIC KIDNEY DISEASE: ICD-10-CM

## 2021-08-17 DIAGNOSIS — I25.10 CORONARY ARTERY DISEASE INVOLVING NATIVE CORONARY ARTERY OF NATIVE HEART WITHOUT ANGINA PECTORIS: Primary | ICD-10-CM

## 2021-08-17 DIAGNOSIS — I25.10 CORONARY ARTERY DISEASE INVOLVING NATIVE CORONARY ARTERY OF NATIVE HEART WITHOUT ANGINA PECTORIS: ICD-10-CM

## 2021-08-17 DIAGNOSIS — I21.11 ST ELEVATION MYOCARDIAL INFARCTION INVOLVING RIGHT CORONARY ARTERY (HCC): ICD-10-CM

## 2021-08-17 LAB
BH CV ECHO MEAS - BSA(HAYCOCK): 2 M^2
BH CV ECHO MEAS - BSA: 1.9 M^2
BH CV ECHO MEAS - BZI_BMI: 33.5 KILOGRAMS/M^2
BH CV ECHO MEAS - BZI_METRIC_HEIGHT: 160 CM
BH CV ECHO MEAS - BZI_METRIC_WEIGHT: 85.7 KG
BH CV ECHO MEAS - EDV(MOD-SP2): 92 ML
BH CV ECHO MEAS - EDV(MOD-SP4): 127 ML
BH CV ECHO MEAS - EDV(TEICH): 74.2 ML
BH CV ECHO MEAS - EF(CUBED): 56.8 %
BH CV ECHO MEAS - EF(MOD-BP): 57.3 %
BH CV ECHO MEAS - EF(MOD-SP2): 52.2 %
BH CV ECHO MEAS - EF(MOD-SP4): 60.6 %
BH CV ECHO MEAS - EF(TEICH): 48.9 %
BH CV ECHO MEAS - ESV(MOD-SP2): 44 ML
BH CV ECHO MEAS - ESV(MOD-SP4): 50 ML
BH CV ECHO MEAS - ESV(TEICH): 37.9 ML
BH CV ECHO MEAS - FS: 24.4 %
BH CV ECHO MEAS - IVS/LVPW: 1.1
BH CV ECHO MEAS - IVSD: 1.4 CM
BH CV ECHO MEAS - LV DIASTOLIC VOL/BSA (35-75): 67.3 ML/M^2
BH CV ECHO MEAS - LV MASS(C)D: 204.9 GRAMS
BH CV ECHO MEAS - LV MASS(C)DI: 108.5 GRAMS/M^2
BH CV ECHO MEAS - LV SYSTOLIC VOL/BSA (12-30): 26.5 ML/M^2
BH CV ECHO MEAS - LVIDD: 4.1 CM
BH CV ECHO MEAS - LVIDS: 3.1 CM
BH CV ECHO MEAS - LVLD AP2: 7.3 CM
BH CV ECHO MEAS - LVLD AP4: 8 CM
BH CV ECHO MEAS - LVLS AP2: 6.6 CM
BH CV ECHO MEAS - LVLS AP4: 6.2 CM
BH CV ECHO MEAS - LVPWD: 1.3 CM
BH CV ECHO MEAS - SI(CUBED): 20.7 ML/M^2
BH CV ECHO MEAS - SI(MOD-SP2): 25.4 ML/M^2
BH CV ECHO MEAS - SI(MOD-SP4): 40.8 ML/M^2
BH CV ECHO MEAS - SI(TEICH): 19.2 ML/M^2
BH CV ECHO MEAS - SV(CUBED): 39.1 ML
BH CV ECHO MEAS - SV(MOD-SP2): 48 ML
BH CV ECHO MEAS - SV(MOD-SP4): 77 ML
BH CV ECHO MEAS - SV(TEICH): 36.3 ML
MAXIMAL PREDICTED HEART RATE: 146 BPM
STRESS TARGET HR: 124 BPM

## 2021-08-17 PROCEDURE — 93308 TTE F-UP OR LMTD: CPT | Performed by: INTERNAL MEDICINE

## 2021-08-17 PROCEDURE — 93308 TTE F-UP OR LMTD: CPT

## 2021-08-17 PROCEDURE — 93000 ELECTROCARDIOGRAM COMPLETE: CPT | Performed by: NURSE PRACTITIONER

## 2021-08-17 PROCEDURE — 99214 OFFICE O/P EST MOD 30 MIN: CPT | Performed by: NURSE PRACTITIONER

## 2021-08-17 PROCEDURE — 25010000002 PERFLUTREN (DEFINITY) 8.476 MG IN SODIUM CHLORIDE (PF) 0.9 % 10 ML INJECTION: Performed by: INTERNAL MEDICINE

## 2021-08-17 RX ORDER — ATORVASTATIN CALCIUM 40 MG/1
40 TABLET, FILM COATED ORAL DAILY
COMMUNITY
Start: 2021-07-23 | End: 2022-03-16 | Stop reason: SDUPTHER

## 2021-08-17 RX ADMIN — PERFLUTREN 2 ML: 6.52 INJECTION, SUSPENSION INTRAVENOUS at 13:14

## 2021-08-17 NOTE — TELEPHONE ENCOUNTER
Mya-can you please call Dr. Nelson office with [facility] and ask them to call office back I need to discuss this patient's case and possible need to delay this patient's upcoming procedure.  I did already leave a message for Rosa at [phone] earlier today to call my cell phone but we need to try to reach him as her procedure is scheduled early next week.  Thanks.

## 2021-08-18 ENCOUNTER — TREATMENT (OUTPATIENT)
Dept: ENDOCRINOLOGY | Age: 76
End: 2021-08-18

## 2021-08-18 DIAGNOSIS — E11.59 TYPE 2 DIABETES MELLITUS WITH OTHER CIRCULATORY COMPLICATIONS (HCC): ICD-10-CM

## 2021-08-18 PROCEDURE — 95251 CONT GLUC MNTR ANALYSIS I&R: CPT | Performed by: INTERNAL MEDICINE

## 2021-08-18 PROCEDURE — 95250 CONT GLUC MNTR PHYS/QHP EQP: CPT | Performed by: INTERNAL MEDICINE

## 2021-08-18 NOTE — TELEPHONE ENCOUNTER
I spoke with Rosa at [facility].  Discussed with her Dr. Romo's recommendations to only on the aspirin and Plavix for 2 to 3 days max if possible for procedure or discussed postponing the procedure about a month or so.  Rosa will get back with me after she discusses with Dr. Nelson will give Dr. Nelson my cell phone number.

## 2021-08-18 NOTE — PROGRESS NOTES
A continuous glucose monitor (CGM) was placed 7/30/2021. The device was activated, and the patient was educated on proper use of the device. The patient will return in 14 days for removal of the device and interpretation of the results    Continues glucose monitoring data      Patient wore continuous glucose monitoring-free style carole Pro from 7/30/21 - 08/12/21  Average blood glucose reading was 199 mg/dl.  Estimated HbA1c 8 %   Likelihood of low blood glucose levels was low  Likelihood of high blood glucose levels was high  Blood glucose trends showed     Current treatment regimen  Diet control and exercise at this time.    Changes to the treatment regimen    Please let the patient know that based on the CGM information if she does not start the medication there is a likelihood that the upcoming A1c would be around 8%.  Would recommend starting glimepiride 2 mg twice daily with meals.    Also send a referral to [facility]as[facility] Holden for diet education.

## 2021-08-19 ENCOUNTER — APPOINTMENT (OUTPATIENT)
Dept: PREADMISSION TESTING | Facility: HOSPITAL | Age: 76
End: 2021-08-19

## 2021-08-19 NOTE — TELEPHONE ENCOUNTER
Rosa with Dr. Nelson office called.  Dr. Nelson said that 2 to 3-day Plavix and aspirin hold would be fine and was okay to push out a month.  She has been rescheduled to 9/28/2021.  Rosa will no longer be scheduling and she will have [facility] reach out to us a week before her procedures to make sure patient is still stable and able to hold her antiplatelets for her procedure.    Dr. Romo-JULIAN-just wanted to sure you are okay with this plan.

## 2021-08-20 ENCOUNTER — APPOINTMENT (OUTPATIENT)
Dept: PREADMISSION TESTING | Facility: HOSPITAL | Age: 76
End: 2021-08-20

## 2021-08-21 ENCOUNTER — APPOINTMENT (OUTPATIENT)
Dept: LAB | Facility: HOSPITAL | Age: 76
End: 2021-08-21

## 2021-08-23 DIAGNOSIS — E11.59 TYPE 2 DIABETES MELLITUS WITH OTHER CIRCULATORY COMPLICATIONS (HCC): Primary | ICD-10-CM

## 2021-08-25 RX ORDER — HYDRALAZINE HYDROCHLORIDE 10 MG/1
TABLET, FILM COATED ORAL
Qty: 60 TABLET | Refills: 1 | Status: SHIPPED | OUTPATIENT
Start: 2021-08-25 | End: 2021-09-16 | Stop reason: SDUPTHER

## 2021-08-27 ENCOUNTER — TELEPHONE (OUTPATIENT)
Dept: ENDOCRINOLOGY | Age: 76
End: 2021-08-27

## 2021-08-27 NOTE — TELEPHONE ENCOUNTER
We could try many other medications but some of these medications could be expensive and hence the reason we are trying to work with the glimepiride.  Recommend her to increase the dosage of glimepiride to 4 mg two times a day with breakfast and dinner.  Gave her a 2 weeks to monitor the blood sugars if they are not any better please advise the patient to touch base with us again and we will make some other changes if necessary.

## 2021-08-27 NOTE — TELEPHONE ENCOUNTER
Patient called stating that she been on the glimepiride 2 mg for a while now is there another medication she can take to help get her numbers down

## 2021-08-30 DIAGNOSIS — E11.59 TYPE 2 DIABETES MELLITUS WITH OTHER CIRCULATORY COMPLICATIONS (HCC): ICD-10-CM

## 2021-08-30 RX ORDER — GLIMEPIRIDE 4 MG/1
TABLET ORAL
Qty: 60 TABLET | Refills: 2 | Status: SHIPPED | OUTPATIENT
Start: 2021-08-30 | End: 2021-12-03

## 2021-09-09 ENCOUNTER — TELEPHONE (OUTPATIENT)
Dept: CARDIOLOGY | Facility: CLINIC | Age: 76
End: 2021-09-09

## 2021-09-09 ENCOUNTER — CLINICAL SUPPORT (OUTPATIENT)
Dept: CARDIOLOGY | Facility: CLINIC | Age: 76
End: 2021-09-09

## 2021-09-09 VITALS — SYSTOLIC BLOOD PRESSURE: 134 MMHG | HEART RATE: 88 BPM | DIASTOLIC BLOOD PRESSURE: 88 MMHG

## 2021-09-09 NOTE — PROGRESS NOTES
Pt presented to the office today for a BP check.  BP in the office today was 134/88, 88.  Pt brought some BP readings but didn't bring her machine and hasn't gotten a new one yet.  I spoke with RATNA Lopez and showed her pt's readings.  Readings are stable but pt must bring her machine to next BP check.  Pt was taken to checkout to  make another appt in @ 2 wks WITH her machine.  I told her there really wasn't any point in coming if she didn't bring her machine since it needs to be checked against ours, etc-she verbalized understanding./prt

## 2021-09-13 NOTE — TELEPHONE ENCOUNTER
Spoke with patient regarding her blood pressure check last week.  Unfortunately her log she brought in was with her old cuff.  She brought in a new cuff and did not bring it in for accuracy.  She also did not bring in her old cuff to be checked for accuracy.  Most of her blood pressure readings were stable occasional high reading.  She is going to bring in her new cuff and her log of readings 9/24/2021 and will make further recommendations from there.  She verbalized understanding of the plan of care.

## 2021-09-16 ENCOUNTER — TRANSCRIBE ORDERS (OUTPATIENT)
Dept: PREADMISSION TESTING | Facility: HOSPITAL | Age: 76
End: 2021-09-16

## 2021-09-16 DIAGNOSIS — Z01.818 OTHER SPECIFIED PRE-OPERATIVE EXAMINATION: Primary | ICD-10-CM

## 2021-09-16 RX ORDER — HYDRALAZINE HYDROCHLORIDE 10 MG/1
10 TABLET, FILM COATED ORAL 2 TIMES DAILY
Qty: 60 TABLET | Refills: 1 | Status: SHIPPED | OUTPATIENT
Start: 2021-09-16 | End: 2021-10-28 | Stop reason: SDUPTHER

## 2021-09-21 ENCOUNTER — HOSPITAL ENCOUNTER (EMERGENCY)
Facility: HOSPITAL | Age: 76
Discharge: HOME | End: 2021-09-21
Attending: EMERGENCY MEDICINE | Admitting: EMERGENCY MEDICINE

## 2021-09-21 ENCOUNTER — APPOINTMENT (OUTPATIENT)
Dept: GENERAL RADIOLOGY | Facility: HOSPITAL | Age: 76
End: 2021-09-21

## 2021-09-21 VITALS
TEMPERATURE: 97.7 F | HEART RATE: 85 BPM | RESPIRATION RATE: 17 BRPM | OXYGEN SATURATION: 94 % | BODY MASS INDEX: 34.81 KG/M2 | DIASTOLIC BLOOD PRESSURE: 83 MMHG | SYSTOLIC BLOOD PRESSURE: 130 MMHG | HEIGHT: 64 IN | WEIGHT: 203.9 LBS

## 2021-09-21 DIAGNOSIS — M25.462 EFFUSION OF BURSA OF LEFT KNEE: Primary | ICD-10-CM

## 2021-09-21 DIAGNOSIS — M17.12 OSTEOARTHRITIS OF LEFT KNEE, UNSPECIFIED OSTEOARTHRITIS TYPE: ICD-10-CM

## 2021-09-21 LAB
APPEARANCE FLD: ABNORMAL
COLOR FLD: YELLOW
CRYSTALS FLD MICRO: NORMAL
GLUCOSE FLD-MCNC: 117 MG/DL
LYMPHOCYTES NFR FLD MANUAL: 10 %
METHOD: ABNORMAL
NEUTROPHILS NFR FLD MANUAL: 90 %
NUC CELL # FLD: 774 /MM3
PROT FLD-MCNC: 3.1 G/DL
RBC # FLD AUTO: 6000 /MM3

## 2021-09-21 PROCEDURE — 84157 ASSAY OF PROTEIN OTHER: CPT | Performed by: EMERGENCY MEDICINE

## 2021-09-21 PROCEDURE — 89051 BODY FLUID CELL COUNT: CPT | Performed by: EMERGENCY MEDICINE

## 2021-09-21 PROCEDURE — 82945 GLUCOSE OTHER FLUID: CPT | Performed by: EMERGENCY MEDICINE

## 2021-09-21 PROCEDURE — 89060 EXAM SYNOVIAL FLUID CRYSTALS: CPT | Performed by: EMERGENCY MEDICINE

## 2021-09-21 PROCEDURE — 99283 EMERGENCY DEPT VISIT LOW MDM: CPT

## 2021-09-21 PROCEDURE — 73560 X-RAY EXAM OF KNEE 1 OR 2: CPT

## 2021-09-21 PROCEDURE — 87205 SMEAR GRAM STAIN: CPT | Performed by: EMERGENCY MEDICINE

## 2021-09-21 PROCEDURE — 87070 CULTURE OTHR SPECIMN AEROBIC: CPT | Performed by: EMERGENCY MEDICINE

## 2021-09-21 RX ORDER — HYDROCODONE BITARTRATE AND ACETAMINOPHEN 7.5; 325 MG/1; MG/1
1 TABLET ORAL ONCE
Status: COMPLETED | OUTPATIENT
Start: 2021-09-21 | End: 2021-09-21

## 2021-09-21 RX ORDER — LIDOCAINE HYDROCHLORIDE AND EPINEPHRINE 10; 10 MG/ML; UG/ML
10 INJECTION, SOLUTION INFILTRATION; PERINEURAL ONCE
Status: COMPLETED | OUTPATIENT
Start: 2021-09-21 | End: 2021-09-21

## 2021-09-21 RX ORDER — HYDROCODONE BITARTRATE AND ACETAMINOPHEN 5; 325 MG/1; MG/1
1 TABLET ORAL EVERY 6 HOURS PRN
Qty: 8 TABLET | Refills: 0 | Status: SHIPPED | OUTPATIENT
Start: 2021-09-21 | End: 2022-04-01 | Stop reason: ALTCHOICE

## 2021-09-21 RX ADMIN — LIDOCAINE HYDROCHLORIDE,EPINEPHRINE BITARTRATE 10 ML: 10; .01 INJECTION, SOLUTION INFILTRATION; PERINEURAL at 17:30

## 2021-09-21 RX ADMIN — HYDROCODONE BITARTRATE AND ACETAMINOPHEN 1 TABLET: 7.5; 325 TABLET ORAL at 16:51

## 2021-09-21 NOTE — ED TRIAGE NOTES
Pt arrives via PV. Pt complains of pain behind her left knee that started 3 days ago. Pt denies hx of DVT and she takes Plavix after an MI earlier this year. Pt reports the pain is now radiating into her R hip.     Pt masked on arrival, staff masked

## 2021-09-21 NOTE — ED PROVIDER NOTES
EMERGENCY DEPARTMENT ENCOUNTER    Room Number:  38/38  Date of encounter:  9/21/2021  PCP: Lillie Ji PA  Historian: Patient      HPI:  Chief Complaint: Left knee pain  A complete HPI/ROS/PMH/PSH/SH/FH are unobtainable due to: N/A    Context: Michelle Espinoza is a 76 y.o. female who presents to the ED c/o gradual onset of progressive left knee pain over the past several days.  Her pain started after she rode an exercise bike for 25 minutes.  It is located in left leg, it is severe, it is aching, it does not radiate.  It is exacerbated by palpation, movement or standing.  There are no relieving factors.  She has not had any associated fevers or chills.  No associated injury or trauma to the skin.  She has never had these problems before      The patient was placed in a mask in triage, hand hygiene was performed before and after my interaction with the patient.  I wore a mask, safety glasses and gloves during my entire interaction with the patient.    PAST MEDICAL HISTORY  Active Ambulatory Problems     Diagnosis Date Noted   • Abnormal weight gain 07/19/2016   • Arm pain 07/19/2016   • Arthritis 07/19/2016   • Atonic neurogenic bladder 07/19/2016   • Benign colonic polyp 07/19/2016   • Carotid atherosclerosis 07/19/2016   • Fatigue 07/19/2016   • Hypercalcemia 07/19/2016   • Hypercholesterolemia 07/19/2016   • Weakness of lower extremity 07/19/2016   • Nephrolithiasis 07/19/2016   • Spinal stenosis 07/19/2016   • Urinary tract infection 07/19/2016   • Chronic venous insufficiency 07/19/2016   • B12 deficiency 07/19/2016   • Vitamin D deficiency 07/19/2016   • Degenerative disc disease, lumbar 07/19/2016   • T7 and T10-11 Thoracic spinal stenosis 11/22/2016   • Weakness 12/05/2017   • CKD (chronic kidney disease) 12/06/2017   • MINERVA (acute kidney injury) (CMS/[facility]) 12/06/2017   • Stenosis of right carotid artery 02/26/2019   • Hyperparathyroidism (CMS/[facility]) 04/28/2019   • Type 2 diabetes mellitus with other  circulatory complications (CMS/HCC)  01/08/2020   • Osteoporosis 08/07/2020   • ST elevation myocardial infarction (STEMI) (CMS/HCC) 03/27/2021   • Coronary artery disease involving native coronary artery of native heart without angina pectoris 04/15/2021   • Ischemic cardiomyopathy 04/15/2021   • Asymptomatic PVCs 04/15/2021   • Status post coronary artery stent placement 05/18/2021     Resolved Ambulatory Problems     Diagnosis Date Noted   • Impaired fasting glucose 07/19/2016   • Osteopenia 10/25/2016     Past Medical History:   Diagnosis Date   • Anesthesia complication    • Chronic back pain    • Claustrophobia    • Coronary artery disease 03/27/2021   • DDD (degenerative disc disease), cervical    • DDD (degenerative disc disease), lumbosacral    • Diabetes mellitus (CMS/HCC)    • Frequent UTI    • History of kidney stones 06/2014   • History of MI (myocardial infarction) 03/27/2021   • History of pyelonephritis 06/2014   • Hypercholesteremia    • Leg muscle spasm    • Neuropathy    • Osteoarthritis    • Sepsis (CMS/HCC) 06/2014   • Traumatic rupture of left triceps tendon    • Urinary incontinence          PAST SURGICAL HISTORY  Past Surgical History:   Procedure Laterality Date   • APPENDECTOMY     • BACK SURGERY      MULTIPLE. WITH HARDWARE   • CARDIAC CATHETERIZATION N/A 3/27/2021    Procedure: Left Heart Cath;  Surgeon: Leydi Romo MD;  Location:  [facility];  Service: Cardiovascular;  Laterality: N/A;   • CARDIAC CATHETERIZATION  3/27/2021    Procedure: Percutaneous Manual Thrombectomy;  Surgeon: Leydi Romo MD;  Location:  [facility];  Service: Cardiovascular;;   • CARDIAC CATHETERIZATION N/A 3/27/2021    Procedure: Percutaneous Coronary Intervention;  Surgeon: Leydi Romo MD;  Location:  [facility];  Service: Cardiovascular;  Laterality: N/A;   • CARDIAC CATHETERIZATION N/A 3/27/2021    Procedure: Coronary angiography;  Surgeon:  Leydi Romo MD;  Location:  [facility];  Service: Cardiovascular;  Laterality: N/A;   • CARPAL TUNNEL RELEASE      LEFT X2 RIGHT    • CATARACT EXTRACTION WITH INTRAOCULAR LENS IMPLANT      LEFT AND RIGHT   • CERVICAL SPINE SURGERY      MULTIPLE C3-4 C6-7   •  SECTION      x 2   • COLONOSCOPY  2018   • PARATHYROIDECTOMY Left 3/7/2019    Procedure: PARATHYROIDECTOMY LEFT INFERIOR;  Surgeon: Mason Prather MD;  Location:  [facility];  Service: ENT   • TRICEP TENDON REPAIR Left 2021    Procedure: LEFT OPEN TRICEPS REPAIR;  Surgeon: Luis Cho II, MD;  Location: [facility] MAIN OR;  Service: Orthopedics;  Laterality: Left;         FAMILY HISTORY  Family History   Problem Relation Age of Onset   • Stroke Mother    • Heart disease Mother    • Hypertension Mother    • Clotting disorder Father    • Hypertension Father    • Diabetes Father    • Aneurysm Father    • Hypertension Sister    • Diabetes Sister    • Cervical cancer Sister    • Diabetes Sister    • Cervical cancer Maternal Grandmother 72   • Diabetes Grandchild    • Malig Hyperthermia Neg Hx          SOCIAL HISTORY  Social History     Socioeconomic History   • Marital status:      Spouse name: Not on file   • Number of children: Not on file   • Years of education: Not on file   • Highest education level: Not on file   Tobacco Use   • Smoking status: Former Smoker     Packs/day: 0.50     Years: 15.00     Pack years: 7.50     Types: Cigarettes     Quit date:      Years since quittin.7   • Smokeless tobacco: Never Used   • Tobacco comment: CAFFEINE USE: ICE TEA OCC.   Vaping Use   • Vaping Use: Never used   Substance and Sexual Activity   • Alcohol use: Yes     Comment: Socially.   • Drug use: No   • Sexual activity: Defer         ALLERGIES  Other, Penicillins, Statins, Latex, and Nickel        REVIEW OF SYSTEMS  Review of Systems   Constitutional: Negative for chills and fever.   Respiratory:  Negative for chest tightness.    Cardiovascular: Negative for chest pain.   Gastrointestinal: Negative for abdominal pain, diarrhea, nausea and vomiting.   Musculoskeletal: Positive for gait problem and joint swelling.   Neurological: Negative for numbness.        All systems reviewed and negative except for those discussed in HPI.       PHYSICAL EXAM    I have reviewed the triage vital signs and nursing notes.    ED Triage Vitals [09/21/21 1621]   Temp Heart Rate Resp BP SpO2   97.7 °F (36.5 °C) 98 20 157/100 93 %      Temp src Heart Rate Source Patient Position BP Location FiO2 (%)   Tympanic Monitor Sitting Right arm --       Physical Exam   Constitutional: Pt. is oriented to person, place, and time and well-developed, well-nourished, and in no distress.   HENT: Normocephalic and atraumatic. Oropharynx moist/nonerythematous.  Neck: Normal range of motion.   Cardiovascular: Normal rate, regular rhythm and normal heart sounds. Pulmonary/Chest: Effort normal and breath sounds normal. No stridor. No respiratory distress. No wheezes, no rales.   Abdominal: Soft. Bowel sounds are normal. No distension. There is no tenderness. There is no rebound and no guarding.   Musculoskeletal: There is a left knee effusion that is tender.  Overlying skin is normal.  There is limited range of motion of the left knee due to pain.  Calf is soft and nontender.  There are no palpable cords.  She is neurovascular intact distally with easily palpable dorsalis and pedis pulses.  Remaining extremities exhibit normal range of motion and are without edema, tenderness or deformity.   Neurological: Pt. is alert and oriented to person, place, and time.  She has no focal neurologic deficits  Skin: Skin is warm and dry. No rash noted. Pt. is not diaphoretic. No erythema.   Psychiatric: Mood, affect and judgment normal.  She is pleasant and cooperative.  Nursing note and vitals reviewed.        LAB RESULTS  Recent Results (from the past 24 hour(s))    Crystal Exam, Fluid - Synovial Fluid, Knee, Left    Collection Time: 09/21/21  5:37 PM    Specimen: Knee, Left; Synovial Fluid   Result Value Ref Range    Crystals, Fluid No crystals seen    Glucose, Body Fluid - Body Fluid, Knee, Left    Collection Time: 09/21/21  5:37 PM    Specimen: Knee, Left; Body Fluid   Result Value Ref Range    Glucose, Fluid 117 mg/dL   Protein, Body Fluid - Body Fluid, Knee, Left    Collection Time: 09/21/21  5:37 PM    Specimen: Knee, Left; Body Fluid   Result Value Ref Range    Protein, Total, Fluid 3.1 g/dL   Body fluid cell count - Body Fluid, Knee, Left    Collection Time: 09/21/21  5:37 PM    Specimen: Knee, Left; Body Fluid   Result Value Ref Range    Color, Fluid Yellow     Appearance, Fluid Hazy (A) Clear    RBC, Fluid 6,000 /mm3    Nucleated Cells, Fluid 774 /mm3    Method: Automated Sysmex XN Method    Body fluid differential - Body Fluid, Knee, Left    Collection Time: 09/21/21  5:37 PM    Specimen: Knee, Left; Body Fluid   Result Value Ref Range    Neutrophils, Fluid 90 %    Lymphocytes, Fluid 10 %       Ordered the above labs and independently reviewed the results.        RADIOLOGY  XR Knee 1 or 2 View Left    Result Date: 9/21/2021  2 RADIOGRAPHIC VIEWS OF THE LEFT KNEE  CLINICAL HISTORY: Effusion.  FINDINGS: Two radiographic views of the left knee demonstrate increased density in the region of the suprapatellar bursa suggesting a joint effusion. Tricompartmental osteoarthritic changes are appreciated. Otherwise, no acute abnormality is identified. Incidental note is made of atherosclerotic vascular calcifications.  This report was finalized on 9/21/2021 5:21 PM by Dr. Reagan Alberto M.D.        I ordered the above noted radiological studies. Reviewed by me and discussed with radiologist.  See dictation for official radiology interpretation.      PROCEDURES    Joint Aspiration/Injection    Date/Time: 9/21/2021 5:36 PM  Performed by: Armen Ramos MD  Authorized by:  Armen Ramos MD     Consent:     Consent obtained:  Verbal and written    Consent given by:  Patient    Risks discussed:  Bleeding, infection and pain    Alternatives discussed:  No treatment  Location:     Location:  Knee    Knee:  L knee  Anesthesia (see MAR for exact dosages):     Anesthesia method:  Local infiltration    Local anesthetic:  Lidocaine 1% WITH epi  Procedure details:     Preparation: Patient was prepped and draped in usual sterile fashion      Needle gauge:  18 G    Ultrasound guidance: no      Approach:  Lateral    Aspirate characteristics:  Yellow and blood-tinged    Steroid injected: no      Specimen collected: yes    Post-procedure details:     Dressing:  Adhesive bandage and gauze roll    Patient tolerance of procedure:  Tolerated well, no immediate complications          MEDICATIONS GIVEN IN ER    Medications   HYDROcodone-acetaminophen (NORCO) 7.5-325 MG per tablet 1 tablet (1 tablet Oral Given 9/21/21 1651)   lidocaine 1% - EPINEPHrine 1:825538 (XYLOCAINE W/EPI) 1 %-1:414650 injection 10 mL (10 mL Injection Given by Other 9/21/21 1730)         PROGRESS, DATA ANALYSIS, CONSULTS, AND MEDICAL DECISION MAKING    Any/all labs have been independently reviewed by me.  Any/all radiology studies have been reviewed by me and discussed with radiologist dictating the report.   EKG's independently viewed and interpreted by me.  Discussion below represents my analysis of pertinent findings related to patient's condition, differential diagnosis, treatment plan and final disposition.      ED Course as of Sep 21 2038   Tue Sep 21, 2021   1627 Prior record review-the patient's history of coronary disease/STEMI/stent placement/ischemic cardiomyopathy.  She also has type 2 diabetes and chronic kidney disease.    [WC]   1649 Patient agrees to diagnostic and therapeutic left arthrocentesis.  Risk benefits alternatives were discussed.    [WC]   1731 Left knee x-ray showed tricompartmental osteoarthritic  changes.  An effusion is present as well.    [WC]   2037 Protein, Total, Fluid: 3.1 [WC]   2038 Crystals, Fluid: No crystals seen [WC]   2038 Glucose, Fluid: 117 [WC]   2038 RBC, Fluid: 6,000 [WC]   2038 Nucleated Cells, Fluid: 774 [WC]   2038 Neutrophils, Fluid: 90 [WC]   2038 Lymphocytes, Fluid: 10 [WC]      ED Course User Index  [WC] Armen Ramos MD       AS OF 20:38 EDT VITALS:    BP - 130/83  HR - 85  TEMP - 97.7 °F (36.5 °C) (Tympanic)  02 SATS - 94%        DIAGNOSIS  Final diagnoses:   Effusion of bursa of left knee   Osteoarthritis of left knee, unspecified osteoarthritis type         DISPOSITION  Discharged           Armen Ramos MD  09/21/21 1739       Armen Ramos MD  09/21/21 2038

## 2021-09-22 ENCOUNTER — TELEPHONE (OUTPATIENT)
Dept: CARDIOLOGY | Facility: CLINIC | Age: 76
End: 2021-09-22

## 2021-09-23 ENCOUNTER — PRE-ADMISSION TESTING (OUTPATIENT)
Dept: PREADMISSION TESTING | Facility: HOSPITAL | Age: 76
End: 2021-09-23

## 2021-09-23 VITALS
HEIGHT: 64 IN | RESPIRATION RATE: 16 BRPM | BODY MASS INDEX: 34.15 KG/M2 | OXYGEN SATURATION: 96 % | TEMPERATURE: 97.9 F | WEIGHT: 200 LBS | DIASTOLIC BLOOD PRESSURE: 73 MMHG | SYSTOLIC BLOOD PRESSURE: 131 MMHG | HEART RATE: 74 BPM

## 2021-09-23 LAB
ANION GAP SERPL CALCULATED.3IONS-SCNC: 11 MMOL/L (ref 5–15)
BUN SERPL-MCNC: 34 MG/DL (ref 8–23)
BUN/CREAT SERPL: 33 (ref 7–25)
CALCIUM SPEC-SCNC: 10.1 MG/DL (ref 8.6–10.5)
CHLORIDE SERPL-SCNC: 105 MMOL/L (ref 98–107)
CO2 SERPL-SCNC: 22 MMOL/L (ref 22–29)
CREAT SERPL-MCNC: 1.03 MG/DL (ref 0.57–1)
DEPRECATED RDW RBC AUTO: 47.9 FL (ref 37–54)
ERYTHROCYTE [DISTWIDTH] IN BLOOD BY AUTOMATED COUNT: 14.6 % (ref 12.3–15.4)
GFR SERPL CREATININE-BSD FRML MDRD: 52 ML/MIN/1.73
GLUCOSE SERPL-MCNC: 181 MG/DL (ref 65–99)
HCT VFR BLD AUTO: 37.8 % (ref 34–46.6)
HGB BLD-MCNC: 12.5 G/DL (ref 12–15.9)
MCH RBC QN AUTO: 30.1 PG (ref 26.6–33)
MCHC RBC AUTO-ENTMCNC: 33.1 G/DL (ref 31.5–35.7)
MCV RBC AUTO: 91.1 FL (ref 79–97)
PLATELET # BLD AUTO: 233 10*3/MM3 (ref 140–450)
PMV BLD AUTO: 10.2 FL (ref 6–12)
POTASSIUM SERPL-SCNC: 4.8 MMOL/L (ref 3.5–5.2)
RBC # BLD AUTO: 4.15 10*6/MM3 (ref 3.77–5.28)
SODIUM SERPL-SCNC: 138 MMOL/L (ref 136–145)
WBC # BLD AUTO: 9.15 10*3/MM3 (ref 3.4–10.8)

## 2021-09-23 PROCEDURE — 80048 BASIC METABOLIC PNL TOTAL CA: CPT

## 2021-09-23 PROCEDURE — 36415 COLL VENOUS BLD VENIPUNCTURE: CPT

## 2021-09-23 PROCEDURE — 85027 COMPLETE CBC AUTOMATED: CPT

## 2021-09-23 NOTE — DISCHARGE INSTRUCTIONS
Take the following medications the morning of surgery with a small sip of water:  CARVEDILOL, HYDRALAZINE  FOLLOW DR KEMP INSTRUCTIONS REGARDING PLAVIX AND ASPIRIN    ARRIVAL TIME 0600 ON 9/28/21       If you are on prescription narcotic pain medication to control your pain you may also take that medication the morning of surgery.    General Instructions:  • Do not eat or drink anything after midnight the night before surgery.  • Infants may have breast milk up to four hours before surgery.  • Infants drinking formula may drink formula up to six hours before surgery.   • Patients who avoid smoking, chewing tobacco and alcohol for 4 weeks prior to surgery have a reduced risk of post-operative complications.  Quit smoking as many days before surgery as you can.  • Do not smoke, use chewing tobacco or drink alcohol the day of surgery.   • If applicable bring your C-PAP/ BI-PAP machine.  • Bring any papers given to you in the doctor’s office.  • Wear clean comfortable clothes.  • Do not wear contact lenses, false eyelashes or make-up.  Bring a case for your glasses.   • Bring crutches or walker if applicable.  • Remove all piercings.  Leave jewelry and any other valuables at home.  • Hair extensions with metal clips must be removed prior to surgery.  • The Pre-Admission Testing nurse will instruct you to bring medications if unable to obtain an accurate list in Pre-Admission Testing.            Preventing a Surgical Site Infection:  • For 2 to 3 days before surgery, avoid shaving with a razor because the razor can irritate skin and make it easier to develop an infection.    • Any areas of open skin can increase the risk of a post-operative wound infection by allowing bacteria to enter and travel throughout the body.  Notify your surgeon if you have any skin wounds / rashes even if it is not near the expected surgical site.  The area will need assessed to determine if surgery should be delayed until it is healed.  • The  night prior to surgery shower using a fresh bar of anti-bacterial soap (such as Dial) and clean washcloth.  Sleep in a clean bed with clean clothing.  Do not allow pets to sleep with you.  • Shower on the morning of surgery using a fresh bar of anti-bacterial soap (such as Dial) and clean washcloth.  Dry with a clean towel and dress in clean clothing.  • Ask your surgeon if you will be receiving antibiotics prior to surgery.  • Make sure you, your family, and all healthcare providers clean their hands with soap and water or an alcohol based hand  before caring for you or your wound.    Day of surgery:  Your arrival time is approximately two hours before your scheduled surgery time.  Upon arrival, a Pre-op nurse and Anesthesiologist will review your health history, obtain vital signs, and answer questions you may have.  The only belongings needed at this time will be your home medications and if applicable your C-PAP/BI-PAP machine.  A Pre-op nurse will start an IV and you may receive medication in preparation for surgery, including something to help you relax.      Please be aware that surgery does come with discomfort.  We want to make every effort to control your discomfort so please discuss any uncontrolled symptoms with your nurse.   Your doctor will most likely have prescribed pain medications.      If you are going home after surgery you will receive individualized written care instructions before being discharged.  A responsible adult must drive you to and from the hospital on the day of your surgery and stay with you for 24 hours.  Discharge prescriptions can be filled by the hospital pharmacy during regular pharmacy hours.  If you are having surgery late in the day/evening your prescription may be e-prescribed to your pharmacy.  Please verify your pharmacy hours or chose a 24 hour pharmacy to avoid not having access to your prescription because your pharmacy has closed for the day.    If you are  staying overnight following surgery, you will be transported to your hospital room following the recovery period.  [facility] has all private rooms.    If you have any questions please call Pre-Admission Testing at [phone].  Deductibles and co-payments are collected on the day of service. Please be prepared to pay the required co-pay, deductible or deposit on the day of service as defined by your plan.    Patient Education for Self-Quarantine Process    • Following your COVID testing, we strongly recommend that you wear a mask when you are with other people and practice social distancing.   • Limit your activities to only required outings.  • Wash your hands with soap and water frequently for at least 20 seconds.   • Avoid touching your eyes, nose and mouth with unwashed hands.  • Do not share anything - utensils, drinking glasses, food from the same bowl.   • Sanitize household surfaces daily. Include all high touch areas (door handles, light switches, phones, countertops, etc.)    Call your surgeon immediately if you experience any of the following symptoms:  • Sore Throat  • Shortness of Breath or difficulty breathing  • Cough  • Chills  • Body soreness or muscle pain  • Headache  • Fever  • New loss of taste or smell  • Do not arrive for your surgery ill.  Your procedure will need to be rescheduled to another time.  You will need to call your physician before the day of surgery to avoid any unnecessary exposure to hospital staff as well as other patients.

## 2021-09-24 ENCOUNTER — TELEPHONE (OUTPATIENT)
Dept: CARDIOLOGY | Facility: CLINIC | Age: 76
End: 2021-09-24

## 2021-09-24 ENCOUNTER — CLINICAL SUPPORT (OUTPATIENT)
Dept: CARDIOLOGY | Facility: CLINIC | Age: 76
End: 2021-09-24

## 2021-09-24 DIAGNOSIS — I48.91 ATRIAL FIBRILLATION, UNSPECIFIED TYPE (HCC): ICD-10-CM

## 2021-09-24 DIAGNOSIS — I10 HYPERTENSION, UNSPECIFIED TYPE: Primary | ICD-10-CM

## 2021-09-24 NOTE — TELEPHONE ENCOUNTER
Called and spoke with the patient.  She said she completely forgot to bring in her blood pressure machine.  Her blood pressures are somewhat all over the place.  She states she is really not sure she is using her cuff right as it always slides off of her arm it is not as easy to use as her old cuff.  When she has time in the next several weeks she will bring both of her cuffs and we will check it for accuracy and she knows to call for an appointment for this..  At this point time I do not feel like I can make medicine adjustments as I am not even sure if these are correct blood pressures.  She verbalized understanding of the plan of care.

## 2021-09-24 NOTE — PROGRESS NOTES
Procedure   Procedures   Pt came in today for a BP check. She unfortunately forgot her BP machine at home. She did bring in a BP Log, which I will give to CB for review.   Pt takes her meds as directed. She has no dizziness, and some edema in her LE (bilateral), which she stated was attributed to sitting in a car since 7:30 this morning.    BP today - 142/90, HR 72.    jgaus CMA  9/24/21m

## 2021-09-24 NOTE — TELEPHONE ENCOUNTER
Called and spoke with the patient.  Came in for a blood pressure check and again unfortunately did not bring her cuff as she was advised to do so.  The purpose of her blood pressure checks is to make sure her cuff at home is accurate so we can know what her blood pressures running.  Her blood pressures are all over the place on her log.  She has readings as low as 84/63.  Heart rate as low as 47.  She has readings as high as 176/98.  She had one diastolic reading of 100.  A lot of her readings however looked to be 130s and 140s/70s-80s.  Most heart rates appear to be 70s.  I have asked her to call me back.

## 2021-09-24 NOTE — TELEPHONE ENCOUNTER
Pt called back returning your message. She said that she is finished with physical therapy and will be available for the rest of the day at [phone].    Thanks,  S[place]

## 2021-09-25 ENCOUNTER — LAB (OUTPATIENT)
Dept: LAB | Facility: HOSPITAL | Age: 76
End: 2021-09-25

## 2021-09-25 DIAGNOSIS — Z01.818 OTHER SPECIFIED PRE-OPERATIVE EXAMINATION: ICD-10-CM

## 2021-09-25 LAB — SARS-COV-2 ORF1AB RESP QL NAA+PROBE: NOT DETECTED

## 2021-09-25 PROCEDURE — U0004 COV-19 TEST NON-CDC HGH THRU: HCPCS

## 2021-09-25 PROCEDURE — C9803 HOPD COVID-19 SPEC COLLECT: HCPCS

## 2021-09-25 PROCEDURE — U0005 INFEC AGEN DETEC AMPLI PROBE: HCPCS

## 2021-09-26 LAB
BACTERIA FLD CULT: NORMAL
GRAM STN SPEC: NORMAL
GRAM STN SPEC: NORMAL

## 2021-09-28 ENCOUNTER — APPOINTMENT (OUTPATIENT)
Dept: GENERAL RADIOLOGY | Facility: HOSPITAL | Age: 76
End: 2021-09-28

## 2021-09-28 ENCOUNTER — HOSPITAL ENCOUNTER (OUTPATIENT)
Facility: HOSPITAL | Age: 76
Setting detail: HOSPITAL OUTPATIENT SURGERY
Discharge: HOME | End: 2021-09-28
Attending: UROLOGY | Admitting: UROLOGY

## 2021-09-28 ENCOUNTER — ANESTHESIA (OUTPATIENT)
Dept: PERIOP | Facility: HOSPITAL | Age: 76
End: 2021-09-28

## 2021-09-28 ENCOUNTER — ANESTHESIA EVENT (OUTPATIENT)
Dept: PERIOP | Facility: HOSPITAL | Age: 76
End: 2021-09-28

## 2021-09-28 VITALS
WEIGHT: 189.38 LBS | RESPIRATION RATE: 18 BRPM | BODY MASS INDEX: 33.55 KG/M2 | TEMPERATURE: 97.9 F | HEIGHT: 63 IN | SYSTOLIC BLOOD PRESSURE: 143 MMHG | HEART RATE: 78 BPM | OXYGEN SATURATION: 96 % | DIASTOLIC BLOOD PRESSURE: 56 MMHG

## 2021-09-28 DIAGNOSIS — N20.1 LEFT URETERAL STONE: ICD-10-CM

## 2021-09-28 DIAGNOSIS — N20.1 URETERAL CALCULUS, LEFT: Primary | ICD-10-CM

## 2021-09-28 LAB
GLUCOSE BLDC GLUCOMTR-MCNC: 179 MG/DL (ref 70–130)
GLUCOSE BLDC GLUCOMTR-MCNC: 193 MG/DL (ref 70–130)

## 2021-09-28 PROCEDURE — 82365 CALCULUS SPECTROSCOPY: CPT | Performed by: UROLOGY

## 2021-09-28 PROCEDURE — C2617 STENT, NON-COR, TEM W/O DEL: HCPCS | Performed by: UROLOGY

## 2021-09-28 PROCEDURE — 82962 GLUCOSE BLOOD TEST: CPT

## 2021-09-28 PROCEDURE — 25010000002 PROPOFOL 10 MG/ML EMULSION: Performed by: NURSE ANESTHETIST, CERTIFIED REGISTERED

## 2021-09-28 PROCEDURE — 25010000002 ONDANSETRON PER 1 MG: Performed by: NURSE ANESTHETIST, CERTIFIED REGISTERED

## 2021-09-28 PROCEDURE — C1769 GUIDE WIRE: HCPCS | Performed by: UROLOGY

## 2021-09-28 PROCEDURE — 25010000002 FENTANYL CITRATE (PF) 50 MCG/ML SOLUTION: Performed by: NURSE ANESTHETIST, CERTIFIED REGISTERED

## 2021-09-28 PROCEDURE — 74018 RADEX ABDOMEN 1 VIEW: CPT | Performed by: UROLOGY

## 2021-09-28 PROCEDURE — 25010000002 LEVOFLOXACIN PER 250 MG: Performed by: UROLOGY

## 2021-09-28 PROCEDURE — 25010000002 PHENYLEPHRINE 10 MG/ML SOLUTION: Performed by: NURSE ANESTHETIST, CERTIFIED REGISTERED

## 2021-09-28 PROCEDURE — 76000 FLUOROSCOPY <1 HR PHYS/QHP: CPT | Performed by: UROLOGY

## 2021-09-28 DEVICE — URETERAL STENT
Type: IMPLANTABLE DEVICE | Site: URETER | Status: FUNCTIONAL
Brand: CONTOUR™

## 2021-09-28 RX ORDER — NALOXONE HCL 0.4 MG/ML
0.2 VIAL (ML) INJECTION AS NEEDED
Status: DISCONTINUED | OUTPATIENT
Start: 2021-09-28 | End: 2021-09-28 | Stop reason: HOSPADM

## 2021-09-28 RX ORDER — SODIUM CHLORIDE 0.9 % (FLUSH) 0.9 %
3-10 SYRINGE (ML) INJECTION AS NEEDED
Status: DISCONTINUED | OUTPATIENT
Start: 2021-09-28 | End: 2021-09-28 | Stop reason: HOSPADM

## 2021-09-28 RX ORDER — HYDROCODONE BITARTRATE AND ACETAMINOPHEN 7.5; 325 MG/1; MG/1
1 TABLET ORAL ONCE AS NEEDED
Status: COMPLETED | OUTPATIENT
Start: 2021-09-28 | End: 2021-09-28

## 2021-09-28 RX ORDER — EPHEDRINE SULFATE 50 MG/ML
5 INJECTION, SOLUTION INTRAVENOUS ONCE AS NEEDED
Status: DISCONTINUED | OUTPATIENT
Start: 2021-09-28 | End: 2021-09-28 | Stop reason: HOSPADM

## 2021-09-28 RX ORDER — DIPHENHYDRAMINE HCL 25 MG
25 CAPSULE ORAL
Status: DISCONTINUED | OUTPATIENT
Start: 2021-09-28 | End: 2021-09-28 | Stop reason: HOSPADM

## 2021-09-28 RX ORDER — HYDROCODONE BITARTRATE AND ACETAMINOPHEN 7.5; 325 MG/1; MG/1
1 TABLET ORAL EVERY 6 HOURS PRN
Qty: 20 TABLET | Refills: 0 | Status: SHIPPED | OUTPATIENT
Start: 2021-09-28 | End: 2022-04-01 | Stop reason: ALTCHOICE

## 2021-09-28 RX ORDER — GLYCOPYRROLATE 0.2 MG/ML
INJECTION INTRAMUSCULAR; INTRAVENOUS AS NEEDED
Status: DISCONTINUED | OUTPATIENT
Start: 2021-09-28 | End: 2021-09-28 | Stop reason: SURG

## 2021-09-28 RX ORDER — OXYCODONE AND ACETAMINOPHEN 10; 325 MG/1; MG/1
1 TABLET ORAL EVERY 4 HOURS PRN
Status: DISCONTINUED | OUTPATIENT
Start: 2021-09-28 | End: 2021-09-28 | Stop reason: HOSPADM

## 2021-09-28 RX ORDER — HYDRALAZINE HYDROCHLORIDE 20 MG/ML
5 INJECTION INTRAMUSCULAR; INTRAVENOUS
Status: DISCONTINUED | OUTPATIENT
Start: 2021-09-28 | End: 2021-09-28 | Stop reason: HOSPADM

## 2021-09-28 RX ORDER — CIPROFLOXACIN 500 MG/1
500 TABLET, FILM COATED ORAL 2 TIMES DAILY
Qty: 6 TABLET | Refills: 0 | Status: SHIPPED | OUTPATIENT
Start: 2021-09-28 | End: 2021-10-01

## 2021-09-28 RX ORDER — DIPHENHYDRAMINE HYDROCHLORIDE 50 MG/ML
12.5 INJECTION INTRAMUSCULAR; INTRAVENOUS
Status: DISCONTINUED | OUTPATIENT
Start: 2021-09-28 | End: 2021-09-28 | Stop reason: HOSPADM

## 2021-09-28 RX ORDER — PROPOFOL 10 MG/ML
VIAL (ML) INTRAVENOUS AS NEEDED
Status: DISCONTINUED | OUTPATIENT
Start: 2021-09-28 | End: 2021-09-28 | Stop reason: SURG

## 2021-09-28 RX ORDER — MAGNESIUM HYDROXIDE 1200 MG/15ML
LIQUID ORAL AS NEEDED
Status: DISCONTINUED | OUTPATIENT
Start: 2021-09-28 | End: 2021-09-28 | Stop reason: HOSPADM

## 2021-09-28 RX ORDER — LEVOFLOXACIN 5 MG/ML
500 INJECTION, SOLUTION INTRAVENOUS ONCE
Status: COMPLETED | OUTPATIENT
Start: 2021-09-28 | End: 2021-09-28

## 2021-09-28 RX ORDER — HYDROMORPHONE HYDROCHLORIDE 1 MG/ML
0.5 INJECTION, SOLUTION INTRAMUSCULAR; INTRAVENOUS; SUBCUTANEOUS
Status: DISCONTINUED | OUTPATIENT
Start: 2021-09-28 | End: 2021-09-28 | Stop reason: HOSPADM

## 2021-09-28 RX ORDER — FENTANYL CITRATE 50 UG/ML
50 INJECTION, SOLUTION INTRAMUSCULAR; INTRAVENOUS
Status: DISCONTINUED | OUTPATIENT
Start: 2021-09-28 | End: 2021-09-28 | Stop reason: HOSPADM

## 2021-09-28 RX ORDER — MIDAZOLAM HYDROCHLORIDE 1 MG/ML
0.5 INJECTION INTRAMUSCULAR; INTRAVENOUS
Status: DISCONTINUED | OUTPATIENT
Start: 2021-09-28 | End: 2021-09-28 | Stop reason: HOSPADM

## 2021-09-28 RX ORDER — SODIUM CHLORIDE, SODIUM LACTATE, POTASSIUM CHLORIDE, CALCIUM CHLORIDE 600; 310; 30; 20 MG/100ML; MG/100ML; MG/100ML; MG/100ML
100 INJECTION, SOLUTION INTRAVENOUS CONTINUOUS
Status: DISCONTINUED | OUTPATIENT
Start: 2021-09-28 | End: 2021-09-28 | Stop reason: HOSPADM

## 2021-09-28 RX ORDER — SODIUM CHLORIDE, SODIUM LACTATE, POTASSIUM CHLORIDE, CALCIUM CHLORIDE 600; 310; 30; 20 MG/100ML; MG/100ML; MG/100ML; MG/100ML
9 INJECTION, SOLUTION INTRAVENOUS CONTINUOUS
Status: DISCONTINUED | OUTPATIENT
Start: 2021-09-28 | End: 2021-09-28 | Stop reason: HOSPADM

## 2021-09-28 RX ORDER — LIDOCAINE HYDROCHLORIDE 10 MG/ML
0.5 INJECTION, SOLUTION EPIDURAL; INFILTRATION; INTRACAUDAL; PERINEURAL ONCE AS NEEDED
Status: DISCONTINUED | OUTPATIENT
Start: 2021-09-28 | End: 2021-09-28 | Stop reason: HOSPADM

## 2021-09-28 RX ORDER — PHENYLEPHRINE HYDROCHLORIDE 10 MG/ML
INJECTION INTRAVENOUS AS NEEDED
Status: DISCONTINUED | OUTPATIENT
Start: 2021-09-28 | End: 2021-09-28 | Stop reason: SURG

## 2021-09-28 RX ORDER — LIDOCAINE HYDROCHLORIDE 20 MG/ML
INJECTION, SOLUTION INFILTRATION; PERINEURAL AS NEEDED
Status: DISCONTINUED | OUTPATIENT
Start: 2021-09-28 | End: 2021-09-28 | Stop reason: SURG

## 2021-09-28 RX ORDER — ONDANSETRON 2 MG/ML
4 INJECTION INTRAMUSCULAR; INTRAVENOUS ONCE AS NEEDED
Status: DISCONTINUED | OUTPATIENT
Start: 2021-09-28 | End: 2021-09-28 | Stop reason: HOSPADM

## 2021-09-28 RX ORDER — FAMOTIDINE 10 MG/ML
20 INJECTION, SOLUTION INTRAVENOUS ONCE
Status: COMPLETED | OUTPATIENT
Start: 2021-09-28 | End: 2021-09-28

## 2021-09-28 RX ORDER — LABETALOL HYDROCHLORIDE 5 MG/ML
5 INJECTION, SOLUTION INTRAVENOUS
Status: DISCONTINUED | OUTPATIENT
Start: 2021-09-28 | End: 2021-09-28 | Stop reason: HOSPADM

## 2021-09-28 RX ORDER — IBUPROFEN 600 MG/1
600 TABLET ORAL ONCE AS NEEDED
Status: DISCONTINUED | OUTPATIENT
Start: 2021-09-28 | End: 2021-09-28 | Stop reason: HOSPADM

## 2021-09-28 RX ORDER — FENTANYL CITRATE 50 UG/ML
INJECTION, SOLUTION INTRAMUSCULAR; INTRAVENOUS AS NEEDED
Status: DISCONTINUED | OUTPATIENT
Start: 2021-09-28 | End: 2021-09-28 | Stop reason: SURG

## 2021-09-28 RX ORDER — IPRATROPIUM BROMIDE AND ALBUTEROL SULFATE 2.5; .5 MG/3ML; MG/3ML
3 SOLUTION RESPIRATORY (INHALATION) ONCE AS NEEDED
Status: DISCONTINUED | OUTPATIENT
Start: 2021-09-28 | End: 2021-09-28 | Stop reason: HOSPADM

## 2021-09-28 RX ORDER — SODIUM CHLORIDE 0.9 % (FLUSH) 0.9 %
3 SYRINGE (ML) INJECTION EVERY 12 HOURS SCHEDULED
Status: DISCONTINUED | OUTPATIENT
Start: 2021-09-28 | End: 2021-09-28 | Stop reason: HOSPADM

## 2021-09-28 RX ORDER — FLUMAZENIL 0.1 MG/ML
0.2 INJECTION INTRAVENOUS AS NEEDED
Status: DISCONTINUED | OUTPATIENT
Start: 2021-09-28 | End: 2021-09-28 | Stop reason: HOSPADM

## 2021-09-28 RX ORDER — ONDANSETRON 2 MG/ML
INJECTION INTRAMUSCULAR; INTRAVENOUS AS NEEDED
Status: DISCONTINUED | OUTPATIENT
Start: 2021-09-28 | End: 2021-09-28 | Stop reason: SURG

## 2021-09-28 RX ADMIN — LEVOFLOXACIN 500 MG: 500 INJECTION, SOLUTION INTRAVENOUS at 07:48

## 2021-09-28 RX ADMIN — PHENYLEPHRINE HYDROCHLORIDE 100 MCG: 10 INJECTION, SOLUTION INTRAVENOUS at 08:34

## 2021-09-28 RX ADMIN — FAMOTIDINE 20 MG: 10 INJECTION INTRAVENOUS at 07:25

## 2021-09-28 RX ADMIN — FENTANYL CITRATE 50 MCG: 0.05 INJECTION, SOLUTION INTRAMUSCULAR; INTRAVENOUS at 07:57

## 2021-09-28 RX ADMIN — GLYCOPYRROLATE 0.2 MG: 0.2 INJECTION INTRAMUSCULAR; INTRAVENOUS at 08:03

## 2021-09-28 RX ADMIN — PHENYLEPHRINE HYDROCHLORIDE 100 MCG: 10 INJECTION, SOLUTION INTRAVENOUS at 08:16

## 2021-09-28 RX ADMIN — PHENYLEPHRINE HYDROCHLORIDE 100 MCG: 10 INJECTION, SOLUTION INTRAVENOUS at 08:10

## 2021-09-28 RX ADMIN — PHENYLEPHRINE HYDROCHLORIDE 100 MCG: 10 INJECTION, SOLUTION INTRAVENOUS at 08:07

## 2021-09-28 RX ADMIN — FENTANYL CITRATE 50 MCG: 0.05 INJECTION, SOLUTION INTRAMUSCULAR; INTRAVENOUS at 09:00

## 2021-09-28 RX ADMIN — PHENYLEPHRINE HYDROCHLORIDE 100 MCG: 10 INJECTION, SOLUTION INTRAVENOUS at 08:25

## 2021-09-28 RX ADMIN — HYDROCODONE BITARTRATE AND ACETAMINOPHEN 1 TABLET: 7.5; 325 TABLET ORAL at 09:47

## 2021-09-28 RX ADMIN — PROPOFOL 30 MG: 10 INJECTION, EMULSION INTRAVENOUS at 07:57

## 2021-09-28 RX ADMIN — ONDANSETRON 4 MG: 2 INJECTION INTRAMUSCULAR; INTRAVENOUS at 08:14

## 2021-09-28 RX ADMIN — LIDOCAINE HYDROCHLORIDE 60 MG: 20 INJECTION, SOLUTION INFILTRATION; PERINEURAL at 08:02

## 2021-09-28 RX ADMIN — SODIUM CHLORIDE, POTASSIUM CHLORIDE, SODIUM LACTATE AND CALCIUM CHLORIDE 100 ML/HR: 600; 310; 30; 20 INJECTION, SOLUTION INTRAVENOUS at 09:42

## 2021-09-28 RX ADMIN — PROPOFOL 150 MG: 10 INJECTION, EMULSION INTRAVENOUS at 08:02

## 2021-09-28 RX ADMIN — PHENYLEPHRINE HYDROCHLORIDE 100 MCG: 10 INJECTION, SOLUTION INTRAVENOUS at 08:22

## 2021-09-28 RX ADMIN — LIDOCAINE HYDROCHLORIDE 40 MG: 20 INJECTION, SOLUTION INFILTRATION; PERINEURAL at 07:57

## 2021-09-28 RX ADMIN — PHENYLEPHRINE HYDROCHLORIDE 100 MCG: 10 INJECTION, SOLUTION INTRAVENOUS at 08:42

## 2021-09-28 RX ADMIN — PHENYLEPHRINE HYDROCHLORIDE 100 MCG: 10 INJECTION, SOLUTION INTRAVENOUS at 08:38

## 2021-09-28 RX ADMIN — SODIUM CHLORIDE, POTASSIUM CHLORIDE, SODIUM LACTATE AND CALCIUM CHLORIDE 9 ML/HR: 600; 310; 30; 20 INJECTION, SOLUTION INTRAVENOUS at 07:25

## 2021-09-28 RX ADMIN — PHENYLEPHRINE HYDROCHLORIDE 100 MCG: 10 INJECTION, SOLUTION INTRAVENOUS at 08:52

## 2021-09-28 NOTE — ANESTHESIA POSTPROCEDURE EVALUATION
Patient: Michelle Espinoza    Procedure Summary     Date: 09/28/21 Room / Location: [facility] OR  / [facility] MAIN OR    Anesthesia Start: 0753 Anesthesia Stop: 0907    Procedure: LEFT URETEROSCOPY LASER LITHOTRIPSY, STONE BASKET EXTRACTION STENT (Left ) Diagnosis:     Surgeons: Jose Luis Nelson Jr., MD Provider: Garrison Spence MD    Anesthesia Type: general ASA Status: 3          Anesthesia Type: general    Vitals  Vitals Value Taken Time   /84 09/28/21 0941   Temp 36.6 °C (97.9 °F) 09/28/21 0904   Pulse 82 09/28/21 0951   Resp 16 09/28/21 0940   SpO2 96 % 09/28/21 0951   Vitals shown include unvalidated device data.        Post Anesthesia Care and Evaluation    Patient location during evaluation: PACU  Patient participation: complete - patient participated  Level of consciousness: awake and alert  Pain management: adequate  Airway patency: patent  Anesthetic complications: No anesthetic complications    Cardiovascular status: acceptable  Respiratory status: acceptable  Hydration status: acceptable    Comments: --------------------            09/28/21 0955     --------------------   BP:       139/59     Pulse:      81       Resp:       18       Temp:                SpO2:      96%      --------------------

## 2021-09-28 NOTE — ANESTHESIA PROCEDURE NOTES
Airway  Urgency: elective    Date/Time: 9/28/2021 8:02 AM  Airway not difficult    General Information and Staff    Patient location during procedure: OR  Anesthesiologist: Garrison Spence MD  CRNA: Zenaida Bedoya CRNA    Indications and Patient Condition  Indications for airway management: airway protection    Preoxygenated: yes  MILS maintained throughout  Mask difficulty assessment: 0 - not attempted    Final Airway Details  Final airway type: supraglottic airway      Successful airway: classic  Size 3    Number of attempts at approach: 1  Assessment: lips, teeth, and gum same as pre-op    Additional Comments  Placed with ease. Vent with ease. Teeth as pre-op. Secured to face.

## 2021-09-28 NOTE — ANESTHESIA PREPROCEDURE EVALUATION
Anesthesia Evaluation     Patient summary reviewed   no history of anesthetic complications:  NPO Solid Status: > 6 hours  NPO Liquid Status: > 2 hours           Airway   Neck ROM: limited  Comment: Cormack-Lehane Classification: grade I - full view of glottis  Dental - normal exam     Pulmonary - normal exam   (+) a smoker Former,   Cardiovascular - normal exam    ECG reviewed  PT is on anticoagulation therapy    (+) past MI , cardiac stents (Apr 2021)       Neuro/Psych  GI/Hepatic/Renal/Endo    (+) obesity,   renal disease stones, diabetes mellitus,     Musculoskeletal     Abdominal    Substance History      OB/GYN          Other                        Anesthesia Plan    ASA 3     general       Anesthetic plan, all risks, benefits, and alternatives have been provided, discussed and informed consent has been obtained with: patient.

## 2021-10-01 ENCOUNTER — CLINICAL SUPPORT (OUTPATIENT)
Dept: CARDIOLOGY | Facility: CLINIC | Age: 76
End: 2021-10-01

## 2021-10-01 VITALS — SYSTOLIC BLOOD PRESSURE: 146 MMHG | DIASTOLIC BLOOD PRESSURE: 83 MMHG

## 2021-10-01 RX ORDER — FOLIC ACID 1 MG/1
1 TABLET ORAL
COMMUNITY
Start: 2021-08-12 | End: 2022-03-24

## 2021-10-01 NOTE — PROGRESS NOTES
Pt here today for BP check with both home machines, medications reviewed.. Blood pressure log scanned in after reviewed by Dr. Romo and all Bp readings from today reviewed with Dr. Romo. Pt advised to continue current regimen and keep future appt. Will call back in one week with updated log. bp readings logged into vitals tab.    DA

## 2021-10-04 LAB
CALCIUM OXALATE DIHYDRATE MFR STONE IR: 30 %
COLOR STONE: NORMAL
COM MFR STONE: 60 %
COMPN STONE: NORMAL
HYDROXYAPATITE 24H ENGDIFF UR: 10 %
LABORATORY COMMENT REPORT: NORMAL
Lab: NORMAL
Lab: NORMAL
PHOTO: NORMAL
SIZE STONE: NORMAL MM
SPEC SOURCE SUBJ: NORMAL
WT STONE: 63 MG

## 2021-10-07 ENCOUNTER — TELEPHONE (OUTPATIENT)
Dept: CARDIOLOGY | Facility: CLINIC | Age: 76
End: 2021-10-07

## 2021-10-07 NOTE — TELEPHONE ENCOUNTER
[phone]    Pt called stating she was calling to update on BP readings.  Please advise.    [facility] [place] RMA    Tried to call pt back-no answer lm.

## 2021-10-28 RX ORDER — HYDRALAZINE HYDROCHLORIDE 10 MG/1
10 TABLET, FILM COATED ORAL 2 TIMES DAILY
Qty: 60 TABLET | Refills: 5 | Status: SHIPPED | OUTPATIENT
Start: 2021-10-28 | End: 2022-04-26

## 2021-10-28 NOTE — TELEPHONE ENCOUNTER
Last OV 8/17/21.  Next OV 11/30/21.  Labs 9/23/21.  [facility] [place] DAISY

## 2021-11-30 ENCOUNTER — OFFICE VISIT (OUTPATIENT)
Dept: CARDIOLOGY | Facility: CLINIC | Age: 76
End: 2021-11-30

## 2021-11-30 VITALS
SYSTOLIC BLOOD PRESSURE: 134 MMHG | OXYGEN SATURATION: 97 % | BODY MASS INDEX: 31.89 KG/M2 | WEIGHT: 180 LBS | DIASTOLIC BLOOD PRESSURE: 84 MMHG | HEIGHT: 63 IN | HEART RATE: 73 BPM

## 2021-11-30 DIAGNOSIS — I87.2 CHRONIC VENOUS INSUFFICIENCY: ICD-10-CM

## 2021-11-30 DIAGNOSIS — I49.3 ASYMPTOMATIC PVCS: ICD-10-CM

## 2021-11-30 DIAGNOSIS — Z95.5 STATUS POST CORONARY ARTERY STENT PLACEMENT: ICD-10-CM

## 2021-11-30 DIAGNOSIS — I25.5 ISCHEMIC CARDIOMYOPATHY: ICD-10-CM

## 2021-11-30 DIAGNOSIS — I65.22 ATHEROSCLEROSIS OF LEFT CAROTID ARTERY: ICD-10-CM

## 2021-11-30 DIAGNOSIS — I65.21 STENOSIS OF RIGHT CAROTID ARTERY: ICD-10-CM

## 2021-11-30 DIAGNOSIS — E11.59 TYPE 2 DIABETES MELLITUS WITH OTHER CIRCULATORY COMPLICATIONS (HCC): ICD-10-CM

## 2021-11-30 DIAGNOSIS — I25.10 CORONARY ARTERY DISEASE INVOLVING NATIVE CORONARY ARTERY OF NATIVE HEART WITHOUT ANGINA PECTORIS: Primary | ICD-10-CM

## 2021-11-30 DIAGNOSIS — N18.2 STAGE 2 CHRONIC KIDNEY DISEASE: ICD-10-CM

## 2021-11-30 DIAGNOSIS — I25.2 HISTORY OF INFERIOR WALL MYOCARDIAL INFARCTION: ICD-10-CM

## 2021-11-30 DIAGNOSIS — E78.00 HYPERCHOLESTEROLEMIA: ICD-10-CM

## 2021-11-30 PROCEDURE — 99214 OFFICE O/P EST MOD 30 MIN: CPT | Performed by: INTERNAL MEDICINE

## 2021-11-30 PROCEDURE — 93000 ELECTROCARDIOGRAM COMPLETE: CPT | Performed by: INTERNAL MEDICINE

## 2021-11-30 NOTE — PROGRESS NOTES
Subjective:     Encounter Date:11/30/2021      Patient ID: Michelle Espinoza is a 76 y.o. female.    Chief Complaint:  History of Present Illness    This is a 76-year-old with history of diabetes mellitus type 2, hyperparathyroidism, chronic kidney disease, hyperlipidemia, spinal stenosis, coronary artery disease status post inferior myocardial infarction and drug-eluting stent placement of the proximal, mid, and distal right coronary artery, ischemic cardiomyopathy with an EF of 45-50%, who presents for follow-up.     She presents today for routine follow-up.  Since her last office visit the patient called with complaints of a cough with the lisinopril.  Ended up switching her to losartan but she reported that she continued to have issues with significant cough.  This was discontinued and hydralazine was added in its place.  Fortunately with this change her blood pressures remain well controlled.  She was last seen in the office by RATNA Lopez in 8/2071 at which time she appeared to be doing fairly well overall.    Following an office visit she underwent a repeat echocardiogram which is performed on 8/17/2021 and showed akinetic inferior wall and basal to mid posterior wall with mildly decreased left ventricular systolic function with EF of 46 to 50%.    She presents today for routine follow-up.  She denies any chest pain, shortness of breath, palpitations, orthopnea, near-syncope or syncope, or lower extremity edema.  Her main issue is related to her history of arthritis.  She reports that she was recently switched from methotrexate to an alternative agent by her rheumatologist.  She reports that it should take about a month before she can see any improvement.    Prior History:  I saw the patient initially when she came to the emergency room on 3/27/2021 with an inferior myocardial infarction.  She had presented to the emergency room after a fall.  She reported that she had become suddenly weak after  going to the bathroom.  Following her arrival to the emergency room an EKG was performed showing inferolateral ST elevations system with an inferior myocardial infarction.  She is brought emergently to the cardiac catheterization laboratory where she is found to have an occluded proximal right coronary artery.  After reestablishing flow in the vessel was noted that she had severe stenosis in her proximal, mid, and distal right coronary artery for which she underwent 3 separate drug-eluting stent placements.  The procedure was complicated by distal embolization of thrombus in her posterior descending branch.  She was treated with Integrilin infusion for period of time before complaining of a headache.  Work-up of this headache including a CT of the head was unremarkable.  She also had some issues with bradycardia and hypotension during the procedure that required treatment with dopamine and phenylephrine but both of these had improved by the end of the procedure and she no longer required pressor support.     Following a cardiac catheterization she did well from a cardiac standpoint.  She did have an echocardiogram performed on 3/28/2021 that showed mildly depressed left ventricular systolic function with an EF of 41%, inferior wall motion abnormalities, grade 1 diastolic dysfunction, and no significant valvular disease.     She did have some confusion during the remainder of her hospitalization that was concerning for underlying dementia.  She was seen by neurology who recommended pursuing an MRI but she was unable to tolerate this due to her anxiety.  Is recommended that she disco be performed as an outpatient.  Medicine also evaluated the patient for diabetes management and her noncardiac issues.  She was noted to have elevated liver transaminases and her statin therapy was briefly held.  Right upper quadrant ultrasound was performed and was unremarkable.     Following her discharge she was seen by Sangeeta Og  RATNA on 4/15/2021 at that time she was doing well overall.  Her atorvastatin was restarted at 40 mg daily.  Her carvedilol dosage was increased.     In 5/2021 the patient injured her left tricep resulting in a tear.  She ended up requiring surgical repair by Dr. Linden Cho on 5/25/2021.  Fortunately she only had to hold her clopidogrel for about 1 day before.       Review of Systems   Constitutional: Negative for malaise/fatigue.   HENT: Negative for hearing loss, hoarse voice, nosebleeds and sore throat.    Eyes: Negative for pain.   Cardiovascular: Negative for chest pain, claudication, cyanosis, dyspnea on exertion, irregular heartbeat, leg swelling, near-syncope, orthopnea, palpitations, paroxysmal nocturnal dyspnea and syncope.   Respiratory: Negative for shortness of breath and snoring.    Endocrine: Negative for cold intolerance, heat intolerance, polydipsia, polyphagia and polyuria.   Skin: Negative for itching and rash.   Musculoskeletal: Positive for arthritis, joint pain and joint swelling. Negative for falls, muscle cramps, muscle weakness and myalgias.   Gastrointestinal: Negative for constipation, diarrhea, dysphagia, heartburn, hematemesis, hematochezia, melena, nausea and vomiting.   Genitourinary: Negative for frequency, hematuria and hesitancy.   Neurological: Negative for excessive daytime sleepiness, dizziness, headaches, light-headedness, numbness and weakness.   Psychiatric/Behavioral: Negative for depression. The patient is not nervous/anxious.          Current Outpatient Medications:   •  aspirin 81 MG chewable tablet, Chew 1 tablet Daily. (Patient taking differently: Chew 81 mg Daily. TO CHECK WITH DR KEMP REGARDING HOLD?), Disp: , Rfl:   •  atorvastatin (LIPITOR) 40 MG tablet, Take 40 mg by mouth Daily., Disp: , Rfl:   •  baclofen (LIORESAL) 10 MG tablet, TAKE TWO TABLETS BY MOUTH DAILY (Patient taking differently: Take 20 mg by mouth Daily As Needed.), Disp: 60 tablet, Rfl: 1  •   carvedilol (COREG) 6.25 MG tablet, TAKE ONE TABLET BY MOUTH TWICE A DAY WITH MEALS (Patient taking differently: Take 6.25 mg by mouth 2 (Two) Times a Day With Meals.), Disp: 90 tablet, Rfl: 2  •  Cholecalciferol (VITAMIN D3) 5000 UNITS capsule capsule, Take 10,000 Units by mouth Daily., Disp: , Rfl:   •  clopidogrel (PLAVIX) 75 MG tablet, Take 1 tablet by mouth Daily. (Patient taking differently: Take 75 mg by mouth Daily. TO HOLD FOR 3 DAYS PER CARDIO), Disp: 30 tablet, Rfl: 11  •  COLLAGEN PO, Take 3 tablets by mouth Daily., Disp: , Rfl:   •  folic acid (FOLVITE) 1 MG tablet, Take 1 tablet by mouth., Disp: , Rfl:   •  glimepiride (AMARYL) 4 MG tablet, Take 1 tablet  By mouth  Twice daily (Patient taking differently: Take 4 mg by mouth Every Morning Before Breakfast.), Disp: 60 tablet, Rfl: 2  •  hydrALAZINE (APRESOLINE) 10 MG tablet, Take 1 tablet by mouth 2 (Two) Times a Day., Disp: 60 tablet, Rfl: 5  •  HYDROcodone-acetaminophen (NORCO) 5-325 MG per tablet, Take 1 tablet by mouth Every 6 (Six) Hours As Needed for Moderate Pain ., Disp: 8 tablet, Rfl: 0  •  HYDROcodone-acetaminophen (Norco) 7.5-325 MG per tablet, Take 1 tablet by mouth Every 6 (Six) Hours As Needed for Moderate Pain  for up to 20 doses., Disp: 20 tablet, Rfl: 0  •  magnesium 30 MG tablet, Take 90 mg by mouth Daily., Disp: , Rfl:   •  methotrexate 2.5 MG tablet, Take  by mouth 1 (One) Time Per Week. TAKES 8 TABLETS ON SUNDAYS, Disp: , Rfl:   •  oxybutynin XL (DITROPAN-XL) 10 MG 24 hr tablet, Take 10 mg by mouth 2 (two) times a day., Disp: , Rfl:   •  Probiotic Product (PROBIOTIC PO), Take 1 tablet by mouth Daily., Disp: , Rfl:   •  vitamin C (ASCORBIC ACID) 500 MG tablet, Take 500 mg by mouth Daily. With D3, Disp: , Rfl:   •  vitamin E 1000 UNIT capsule, Take 1,000 Units by mouth Daily. HOLD FOR SURGERY, Disp: , Rfl:     Past Medical History:   Diagnosis Date   • Anesthesia complication     STRONG GAG REFLEX   • Benign colonic polyp    • Chronic  back pain    • Claustrophobia    • Coronary artery disease 2021    PTCA WITH PLACEMENT OF STENT. PLAVIX ONLY TO BE HELD ONE DAY   • DDD (degenerative disc disease), cervical    • DDD (degenerative disc disease), lumbosacral    • Diabetes mellitus (HCC)     TYPE 2   • Frequent UTI    • History of MI (myocardial infarction) 2021   • History of pyelonephritis 2014   • History of sepsis    • Hypercholesteremia    • Left knee pain     DRAINED  21   • Leg muscle spasm    • Neuropathy     LEFT FOOT. AS RESULT OF BACK PROBLEMS   • Osteoarthritis    • Osteoporosis    • Overactive bladder    • Ureteral calculus, left    • Weakness     LOWER EXTREMITES RELATED FROM NERVE DAMAGE FROM BACK       Past Surgical History:   Procedure Laterality Date   • APPENDECTOMY     • BACK SURGERY      MULTIPLE. WITH HARDWARE   • CARDIAC CATHETERIZATION N/A 3/27/2021    Procedure: Left Heart Cath;  Surgeon: Leydi Romo MD;  Location:  [facility];  Service: Cardiovascular;  Laterality: N/A;   • CARDIAC CATHETERIZATION  3/27/2021    Procedure: Percutaneous Manual Thrombectomy;  Surgeon: Leydi Romo MD;  Location:  [facility];  Service: Cardiovascular;;   • CARDIAC CATHETERIZATION N/A 3/27/2021    Procedure: Percutaneous Coronary Intervention;  Surgeon: Leydi Romo MD;  Location:  [facility];  Service: Cardiovascular;  Laterality: N/A;   • CARDIAC CATHETERIZATION N/A 3/27/2021    Procedure: Coronary angiography;  Surgeon: Leydi Romo MD;  Location:  [facility];  Service: Cardiovascular;  Laterality: N/A;   • CARPAL TUNNEL RELEASE      LEFT X2 RIGHT    • CATARACT EXTRACTION WITH INTRAOCULAR LENS IMPLANT      LEFT AND RIGHT   • CERVICAL SPINE SURGERY      MULTIPLE C3-4 C6-7   •  SECTION      x 2   • COLONOSCOPY  2018   • PARATHYROIDECTOMY Left 3/7/2019    Procedure: PARATHYROIDECTOMY LEFT INFERIOR;  Surgeon: Mason Prather  "MD Sudhakar;  Location: [facility];  Service: ENT   • TRICEP TENDON REPAIR Left 2021    Procedure: LEFT OPEN TRICEPS REPAIR;  Surgeon: Luis Cho II, MD;  Location: [facility] OR;  Service: Orthopedics;  Laterality: Left;   • URETEROSCOPY LASER LITHOTRIPSY WITH STENT INSERTION Left 2021    Procedure: LEFT URETEROSCOPY LASER LITHOTRIPSY, STONE BASKET EXTRACTION STENT;  Surgeon: Jose Luis Nelson Jr., MD;  Location: [facility] OR;  Service: Urology;  Laterality: Left;       Family History   Problem Relation Age of Onset   • Stroke Mother    • Heart disease Mother    • Hypertension Mother    • Clotting disorder Father    • Hypertension Father    • Diabetes Father    • Aneurysm Father    • Hypertension Sister    • Diabetes Sister    • Cervical cancer Sister    • Diabetes Sister    • Cervical cancer Maternal Grandmother 72   • Diabetes Grandchild    • Malig Hyperthermia Neg Hx        Social History     Tobacco Use   • Smoking status: Former Smoker     Packs/day: 0.50     Years: 15.00     Pack years: 7.50     Types: Cigarettes     Quit date:      Years since quittin.9   • Smokeless tobacco: Never Used   • Tobacco comment: CAFFEINE USE: ICE TEA OCC.   Vaping Use   • Vaping Use: Never used   Substance Use Topics   • Alcohol use: Yes     Comment: Socially.   • Drug use: Yes     Types: Hydrocodone         ECG 12 Lead    Date/Time: 2021 2:35 PM  Performed by: Leydi Romo MD  Authorized by: Leydi Romo MD   Comparison: compared with previous ECG   Similar to previous ECG  Rhythm: sinus rhythm  Q waves: V1, V2, V3, II, III and aVF    T inversion: III  T flattening: aVF                 Objective:     Visit Vitals  /84 (BP Location: Left arm, Patient Position: Sitting)   Pulse 73   Ht 160 cm (63\")   Wt 81.6 kg (180 lb)   LMP  (LMP Unknown)   SpO2 97%   BMI 31.89 kg/m²         Constitutional:       Appearance: Normal appearance. Well-developed.   Eyes:      General: Lids " are normal.      Conjunctiva/sclera: Conjunctivae normal.      Pupils: Pupils are equal, round, and reactive to light.   HENT:      Head: Normocephalic and atraumatic.   Neck:      Vascular: No carotid bruit or JVD.      Lymphadenopathy: No cervical adenopathy.   Pulmonary:      Effort: Pulmonary effort is normal.      Breath sounds: Normal breath sounds.   Cardiovascular:      Normal rate. Regular rhythm.      No gallop.   Pulses:     Radial: 2+ bilaterally.  Edema:     Peripheral edema absent.   Abdominal:      Palpations: Abdomen is soft.   Musculoskeletal:      Cervical back: Full passive range of motion without pain, normal range of motion and neck supple. Skin:     General: Skin is warm and dry.   Neurological:      Mental Status: Alert and oriented to person, place, and time.             Assessment:          Diagnosis Plan   1. Coronary artery disease involving native coronary artery of native heart without angina pectoris     2. History of inferior wall myocardial infarction     3. Stenosis of right carotid artery     4. Ischemic cardiomyopathy     5. Asymptomatic PVCs     6. Atherosclerosis of left carotid artery     7. Hypercholesterolemia     8. Chronic venous insufficiency     9. Status post coronary artery stent placement     10. Type 2 diabetes mellitus with other circulatory complications (CMS/HCC)      11. Stage 2 chronic kidney disease            Plan:       1.  Coronary artery disease.  Appears to be stable and asymptomatic.  EKG shows no acute changes.  Continue current medical management including dual antiplatelet therapy for at least 1 year.  We will discuss the possibility of discontinuing clopidogrel and year after the stent placement.  2.  Ischemic cardiomyopathy.  Mild with an EF of 46 to 50%.  No evidence of volume overload.  She developed an intolerance to both lisinopril and losartan.  Otherwise she remains on carvedilol.  3.  Hyperlipidemia.  On atorvastatin.  In the past she developed  issues with elevated CK levels.  We will need to monitor for recurrence of this.  4.  Diabetes mellitus type 2  5.  Chronic kidney disease    I will plan on seeing the patient back again in 4 months point we can  if clopidogrel can be stopped.

## 2021-12-02 DIAGNOSIS — E11.59 TYPE 2 DIABETES MELLITUS WITH OTHER CIRCULATORY COMPLICATIONS (HCC): ICD-10-CM

## 2021-12-03 RX ORDER — GLIMEPIRIDE 4 MG/1
TABLET ORAL
Qty: 60 TABLET | Refills: 2 | Status: SHIPPED | OUTPATIENT
Start: 2021-12-03 | End: 2022-03-03 | Stop reason: SDUPTHER

## 2021-12-22 RX ORDER — CARVEDILOL 6.25 MG/1
TABLET ORAL
Qty: 90 TABLET | Refills: 2 | Status: SHIPPED | OUTPATIENT
Start: 2021-12-22 | End: 2022-05-09

## 2022-01-10 DIAGNOSIS — M25.522 LEFT ELBOW PAIN: Primary | ICD-10-CM

## 2022-01-11 RX ORDER — BACLOFEN 10 MG/1
TABLET ORAL
Qty: 60 TABLET | Refills: 1 | OUTPATIENT
Start: 2022-01-11

## 2022-01-11 RX ORDER — OXYBUTYNIN CHLORIDE 5 MG/1
5 TABLET ORAL 4 TIMES DAILY
COMMUNITY
Start: 2022-01-10 | End: 2022-09-20

## 2022-02-15 RX ORDER — BACLOFEN 10 MG/1
10 TABLET ORAL DAILY
Qty: 60 TABLET | Refills: 0 | Status: SHIPPED | OUTPATIENT
Start: 2022-02-15 | End: 2022-09-21 | Stop reason: SDUPTHER

## 2022-03-03 DIAGNOSIS — E11.59 TYPE 2 DIABETES MELLITUS WITH OTHER CIRCULATORY COMPLICATIONS: ICD-10-CM

## 2022-03-03 RX ORDER — GLIMEPIRIDE 4 MG/1
TABLET ORAL
Qty: 180 TABLET | Refills: 0 | Status: SHIPPED | OUTPATIENT
Start: 2022-03-03 | End: 2022-03-04

## 2022-03-04 DIAGNOSIS — E11.59 TYPE 2 DIABETES MELLITUS WITH OTHER CIRCULATORY COMPLICATIONS: ICD-10-CM

## 2022-03-04 RX ORDER — GLIMEPIRIDE 4 MG/1
TABLET ORAL
Qty: 90 TABLET | Refills: 0 | Status: SHIPPED | OUTPATIENT
Start: 2022-03-04 | End: 2022-04-18

## 2022-03-15 ENCOUNTER — TELEPHONE (OUTPATIENT)
Dept: ENDOCRINOLOGY | Age: 77
End: 2022-03-15

## 2022-03-16 RX ORDER — ATORVASTATIN CALCIUM 40 MG/1
40 TABLET, FILM COATED ORAL DAILY
Qty: 30 TABLET | Refills: 0 | Status: SHIPPED | OUTPATIENT
Start: 2022-03-16 | End: 2022-04-01 | Stop reason: SDUPTHER

## 2022-03-24 ENCOUNTER — OFFICE VISIT (OUTPATIENT)
Dept: ENDOCRINOLOGY | Age: 77
End: 2022-03-24

## 2022-03-24 VITALS
DIASTOLIC BLOOD PRESSURE: 81 MMHG | SYSTOLIC BLOOD PRESSURE: 136 MMHG | BODY MASS INDEX: 35.44 KG/M2 | HEIGHT: 63 IN | OXYGEN SATURATION: 96 % | HEART RATE: 75 BPM | WEIGHT: 200 LBS

## 2022-03-24 DIAGNOSIS — E55.9 VITAMIN D DEFICIENCY: ICD-10-CM

## 2022-03-24 DIAGNOSIS — R53.82 CHRONIC FATIGUE: ICD-10-CM

## 2022-03-24 DIAGNOSIS — E11.65 TYPE 2 DIABETES MELLITUS WITH HYPERGLYCEMIA, WITHOUT LONG-TERM CURRENT USE OF INSULIN: Primary | ICD-10-CM

## 2022-03-24 DIAGNOSIS — E11.65 TYPE 2 DIABETES MELLITUS WITH HYPERGLYCEMIA, WITHOUT LONG-TERM CURRENT USE OF INSULIN: ICD-10-CM

## 2022-03-24 DIAGNOSIS — E78.5 HYPERLIPIDEMIA ASSOCIATED WITH TYPE 2 DIABETES MELLITUS: ICD-10-CM

## 2022-03-24 DIAGNOSIS — E11.69 HYPERLIPIDEMIA ASSOCIATED WITH TYPE 2 DIABETES MELLITUS: ICD-10-CM

## 2022-03-24 PROCEDURE — 99214 OFFICE O/P EST MOD 30 MIN: CPT | Performed by: INTERNAL MEDICINE

## 2022-03-24 NOTE — PROGRESS NOTES
"Chief Complaint  Chief Complaint   Patient presents with   • Diabetes     Type 2        Subjective          History of Present Illness    Michelle Espinoza 76 y.o. presents with Type 2 dm and Hypercalcemia     Patient's calcium levels were noted to be elevated on routine blood work up in May 2017.  Her calcium levels have been ranging between 10.5-10.9 mg/dL.  With the latest being her highest-11.1 mg/dL range.     Status post parathyroid surgery in March 2019.  Patient has recovered from the surgery with no complications.  Repeat calcium levels and parathyroid levels have normalized.  Not on calcium supplements but does take vitamin D3      Today in clinic pt reports that her energy levels are still very low, weight has been stable, no recent hx of kidneys stones and no fractures, but did show tore the triceps muscle on her left arm.  Does have significant hx of arthritis.      Type 2 dm - on glimepiride 4 mg po bid with meals. Hx of CAD and since then her BG are running high.   BG - 140 -1 50     Hyperlipidemia - on lipitor 40 mg po daily.     Reviewed primary care physician's/consulting physician documentation and lab results         I have reviewed the patient's allergies, medicines, past medical hx, family hx and social hx in detail.    Objective   Vital Signs:   /81   Pulse 75   Ht 160 cm (63\")   Wt 90.7 kg (200 lb)   LMP  (LMP Unknown)   SpO2 96%   BMI 35.43 kg/m²   Physical Exam   General appearance - no distress  Eyes- anicteric sclera  Ear nose and throat-external ears and nose normal.    Respiratory-normal chest on inspection.  No respiratory distress noted.  Skin-no rashes.  Neuro-alert and oriented x3            Result Review :   The following data was reviewed by: Bethanie Montano MD on 03/24/2022:  Results Encounter on 01/15/2022   Component Date Value Ref Range Status   • TSH 03/17/2022 1.340  0.450 - 4.500 uIU/mL Final   • Free T4 03/17/2022 1.15  0.82 - 1.77 ng/dL Final   • T3, Free " 03/17/2022 3.1  2.0 - 4.4 pg/mL Final   • Glucose 03/17/2022 148 (A) 65 - 99 mg/dL Final   • BUN 03/17/2022 31 (A) 8 - 27 mg/dL Final   • Creatinine 03/17/2022 1.14 (A) 0.57 - 1.00 mg/dL Final   • EGFR Result 03/17/2022 51 (A) >59 mL/min/1.73 Final   • BUN/Creatinine Ratio 03/17/2022 27  12 - 28 Final   • Sodium 03/17/2022 143  134 - 144 mmol/L Final   • Potassium 03/17/2022 5.6 (A) 3.5 - 5.2 mmol/L Final   • Chloride 03/17/2022 106  96 - 106 mmol/L Final   • Total CO2 03/17/2022 22  20 - 29 mmol/L Final   • Calcium 03/17/2022 9.7  8.7 - 10.3 mg/dL Final   • Hemoglobin A1C 03/17/2022 7.5 (A) 4.8 - 5.6 % Final    Comment:          Prediabetes: 5.7 - 6.4           Diabetes: >6.4           Glycemic control for adults with diabetes: <7.0     • Total Cholesterol 03/17/2022 114  100 - 199 mg/dL Final   • Triglycerides 03/17/2022 162 (A) 0 - 149 mg/dL Final   • HDL Cholesterol 03/17/2022 50  >39 mg/dL Final   • VLDL Cholesterol Bruno 03/17/2022 27  5 - 40 mg/dL Final   • LDL Chol Calc (NIH) 03/17/2022 37  0 - 99 mg/dL Final   • Vitamin B-12 03/17/2022 1,633 (A) 232 - 1,245 pg/mL Final   • Folate 03/17/2022 15.7  >3.0 ng/mL Final    Comment: A serum folate concentration of less than 3.1 ng/mL is  considered to represent clinical deficiency.     • 25 Hydroxy, Vitamin D 03/17/2022 58.5  30.0 - 100.0 ng/mL Final    Comment: Vitamin D deficiency has been defined by the [place] of  [employer] and an Endocrine Society practice guideline as a  level of serum 25-OH vitamin D less than 20 ng/mL (1,2).  The Endocrine Society went on to further define vitamin D  insufficiency as a level between 21 and 29 ng/mL (2).  1. IOM ([place] of [employer]). 2010. Dietary reference     intakes for calcium and D. [place]: The     National Academies Press.  2. Hilton MIKE, Margie CUNHA, Lennox CORONEL, et al.     Evaluation, treatment, and prevention of vitamin D     deficiency: an Endocrine Society clinical practice     guideline. JCEM. 2011  "Jul; 96(9):8731-30.     • Interpretation 03/17/2022 Note   Final    Supplemental report is available.     Data reviewed: PCP notes       Results Review:    I reviewed the patient's new clinical results.     Assessment and Plan    Problem List Items Addressed This Visit        Other    Fatigue    Relevant Orders    TSH    T4, Free    T3, Free    Basic Metabolic Panel    Hemoglobin A1c    Lipid Panel    Vitamin B12 & Folate    Vitamin D 25 Hydroxy    Hemoglobin A1c    Vitamin D deficiency    Overview     Description: Summer 2500IU qd, Winter 5000IUqd.           Relevant Orders    Vitamin D 25 Hydroxy    Hemoglobin A1c      Other Visit Diagnoses     Type 2 diabetes mellitus with hyperglycemia, without long-term current use of insulin (HCC)    -  Primary    Relevant Orders    TSH    T4, Free    T3, Free    Basic Metabolic Panel    Hemoglobin A1c    Lipid Panel    Vitamin B12 & Folate    Vitamin D 25 Hydroxy    Hemoglobin A1c    Hyperlipidemia associated with type 2 diabetes mellitus (HCC)        Relevant Orders    TSH    T4, Free    T3, Free    Basic Metabolic Panel    Hemoglobin A1c    Lipid Panel    Vitamin B12 & Folate    Vitamin D 25 Hydroxy    Hemoglobin A1c        Type 2 dm - uncontrolled - worse  Continue glimepiride 4 mg po bid with meals.   Add actos - if a1c is worse     Hyperlipidemia - on lipitor     Interpreted the blood work-up/imaging results performed by the primary care/consulting physician -    Refills sent to pharmacy    Follow Up     Patient was given instructions and counseling regarding her condition or for health maintenance advice. Please see specific information pulled into the AVS if appropriate.       Thank you for asking me to see your patient, Michelle Espinoza in consultation.         Bethanie Montano MD  03/24/22      [facility] Dragon / transcription disclaimer:     \"Dictated utilizing Dragon dictation\".         "

## 2022-04-01 ENCOUNTER — OFFICE VISIT (OUTPATIENT)
Dept: CARDIOLOGY | Facility: CLINIC | Age: 77
End: 2022-04-01

## 2022-04-01 VITALS
HEART RATE: 63 BPM | SYSTOLIC BLOOD PRESSURE: 142 MMHG | DIASTOLIC BLOOD PRESSURE: 84 MMHG | HEIGHT: 63 IN | WEIGHT: 220 LBS | BODY MASS INDEX: 38.98 KG/M2

## 2022-04-01 DIAGNOSIS — Z95.5 STATUS POST CORONARY ARTERY STENT PLACEMENT: ICD-10-CM

## 2022-04-01 DIAGNOSIS — I25.2 HISTORY OF INFERIOR WALL MYOCARDIAL INFARCTION: ICD-10-CM

## 2022-04-01 DIAGNOSIS — I25.10 CORONARY ARTERY DISEASE INVOLVING NATIVE CORONARY ARTERY OF NATIVE HEART WITHOUT ANGINA PECTORIS: Primary | ICD-10-CM

## 2022-04-01 DIAGNOSIS — N18.2 STAGE 2 CHRONIC KIDNEY DISEASE: ICD-10-CM

## 2022-04-01 DIAGNOSIS — E78.00 HYPERCHOLESTEROLEMIA: ICD-10-CM

## 2022-04-01 DIAGNOSIS — E11.59 TYPE 2 DIABETES MELLITUS WITH OTHER CIRCULATORY COMPLICATIONS: ICD-10-CM

## 2022-04-01 DIAGNOSIS — I25.5 ISCHEMIC CARDIOMYOPATHY: ICD-10-CM

## 2022-04-01 PROCEDURE — 99214 OFFICE O/P EST MOD 30 MIN: CPT | Performed by: INTERNAL MEDICINE

## 2022-04-01 PROCEDURE — 93000 ELECTROCARDIOGRAM COMPLETE: CPT | Performed by: INTERNAL MEDICINE

## 2022-04-01 RX ORDER — LANOLIN ALCOHOL/MO/W.PET/CERES
1000 CREAM (GRAM) TOPICAL DAILY
COMMUNITY

## 2022-04-01 RX ORDER — ATORVASTATIN CALCIUM 20 MG/1
20 TABLET, FILM COATED ORAL DAILY
Qty: 30 TABLET | Refills: 5 | Status: SHIPPED | OUTPATIENT
Start: 2022-04-01 | End: 2022-04-06 | Stop reason: SDUPTHER

## 2022-04-01 RX ORDER — HYDROXYCHLOROQUINE SULFATE 200 MG/1
2 TABLET, FILM COATED ORAL DAILY
COMMUNITY
Start: 2021-11-09 | End: 2022-09-20

## 2022-04-01 NOTE — PROGRESS NOTES
Subjective:     Encounter Date:04/01/2022      Patient ID: Michelle Espinoza is a 76 y.o. female.    Chief Complaint:  History of Present Illness    This is a 76-year-old with history of diabetes mellitus type 2, hyperparathyroidism, chronic kidney disease, hyperlipidemia, spinal stenosis, coronary artery disease status post inferior myocardial infarction and drug-eluting stent placement of the proximal, mid, and distal right coronary artery, ischemic cardiomyopathy with an EF of 45-50%, who presents for follow-up.     The patient presents today for routine follow-up.  Since her last office visit the patient reports that she has gained a significant amount of weight.  The weight gain really started happening since the beginning of this year.  She arrived today in her wheelchair and was unable to get weighed in our office.  She suspects that she is over 200 pounds in the weight to documented her vital signs as her estimation of her weight rather than actual measurement.  Patient denies any changes in her diet.  She has been participating in physical therapy.  She suspects that her weight gain is due to statin use.  She tells me today that she has been having a lot of issues with myalgias that she believes is due to the atorvastatin.  This is the first time that she has reported this to me.    She otherwise denies any chest pain, shortness of breath, palpitations, orthopnea, near-syncope or syncope, or lower extremity edema.     Prior History:  I saw the patient initially when she came to the emergency room on 3/27/2021 with an inferior myocardial infarction.  She had presented to the emergency room after a fall.  She reported that she had become suddenly weak after going to the bathroom.  Following her arrival to the emergency room an EKG was performed showing inferolateral ST elevations system with an inferior myocardial infarction.  She is brought emergently to the cardiac catheterization laboratory where she is  found to have an occluded proximal right coronary artery.  After reestablishing flow in the vessel was noted that she had severe stenosis in her proximal, mid, and distal right coronary artery for which she underwent 3 separate drug-eluting stent placements.  The procedure was complicated by distal embolization of thrombus in her posterior descending branch.  She was treated with Integrilin infusion for period of time before complaining of a headache.  Work-up of this headache including a CT of the head was unremarkable.  She also had some issues with bradycardia and hypotension during the procedure that required treatment with dopamine and phenylephrine but both of these had improved by the end of the procedure and she no longer required pressor support.     Following a cardiac catheterization she did well from a cardiac standpoint.  She did have an echocardiogram performed on 3/28/2021 that showed mildly depressed left ventricular systolic function with an EF of 41%, inferior wall motion abnormalities, grade 1 diastolic dysfunction, and no significant valvular disease.     She did have some confusion during the remainder of her hospitalization that was concerning for underlying dementia.  She was seen by neurology who recommended pursuing an MRI but she was unable to tolerate this due to her anxiety.  Is recommended that she disco be performed as an outpatient.  Medicine also evaluated the patient for diabetes management and her noncardiac issues.  She was noted to have elevated liver transaminases and her statin therapy was briefly held.  Right upper quadrant ultrasound was performed and was unremarkable.     Following her discharge she was seen by RATNA Kat on 4/15/2021 at that time she was doing well overall.  Her atorvastatin was restarted at 40 mg daily.  Her carvedilol dosage was increased.     In 5/2021 the patient injured her left tricep resulting in a tear.  She ended up requiring surgical  repair by Dr. Arun Cho on 5/25/2021.  Fortunately she only had to hold her clopidogrel for about 1 day before.       The patient called with complaints of a cough with the lisinopril.  Ended up switching her to losartan but she reported that she continued to have issues with significant cough.  This was discontinued and hydralazine was added in its place.  Fortunately with this change her blood pressures remain well controlled.       Following an office visit she underwent a repeat echocardiogram which is performed on 8/17/2021 and showed akinetic inferior wall and basal to mid posterior wall with mildly decreased left ventricular systolic function with EF of 46 to 50%.       Review of Systems   Constitutional: Positive for malaise/fatigue and weight gain.   HENT: Negative for hearing loss, hoarse voice, nosebleeds and sore throat.    Eyes: Negative for pain.   Cardiovascular: Negative for chest pain, claudication, cyanosis, dyspnea on exertion, irregular heartbeat, leg swelling, near-syncope, orthopnea, palpitations, paroxysmal nocturnal dyspnea and syncope.   Respiratory: Negative for shortness of breath and snoring.    Endocrine: Negative for cold intolerance, heat intolerance, polydipsia, polyphagia and polyuria.   Skin: Negative for itching and rash.   Musculoskeletal: Positive for myalgias. Negative for arthritis, falls, joint pain, joint swelling, muscle cramps and muscle weakness.   Gastrointestinal: Negative for constipation, diarrhea, dysphagia, heartburn, hematemesis, hematochezia, melena, nausea and vomiting.   Genitourinary: Negative for frequency, hematuria and hesitancy.   Neurological: Negative for excessive daytime sleepiness, dizziness, headaches, light-headedness, numbness and weakness.   Psychiatric/Behavioral: Negative for depression. The patient is not nervous/anxious.          Current Outpatient Medications:   •  aspirin 81 MG chewable tablet, Chew 1 tablet Daily. (Patient taking  differently: Chew 81 mg Daily. TO CHECK WITH DR KEMP REGARDING HOLD?), Disp: , Rfl:   •  atorvastatin (LIPITOR) 40 MG tablet, Take 1 tablet by mouth Daily., Disp: 30 tablet, Rfl: 0  •  baclofen (LIORESAL) 10 MG tablet, Take 1 tablet by mouth Daily., Disp: 60 tablet, Rfl: 0  •  carvedilol (COREG) 6.25 MG tablet, TAKE ONE TABLET BY MOUTH TWICE A DAY WITH MEALS, Disp: 90 tablet, Rfl: 2  •  Cholecalciferol (VITAMIN D3) 5000 UNITS capsule capsule, Take 10,000 Units by mouth Daily., Disp: , Rfl:   •  clopidogrel (PLAVIX) 75 MG tablet, Take 1 tablet by mouth Daily. (Patient taking differently: Take 75 mg by mouth Daily. TO HOLD FOR 3 DAYS PER CARDIO), Disp: 30 tablet, Rfl: 11  •  COLLAGEN PO, Take 3 tablets by mouth Daily., Disp: , Rfl:   •  glimepiride (AMARYL) 4 MG tablet, TAKE ONE TABLET BY MOUTH TWICE A DAY, Disp: 90 tablet, Rfl: 0  •  hydrALAZINE (APRESOLINE) 10 MG tablet, Take 1 tablet by mouth 2 (Two) Times a Day., Disp: 60 tablet, Rfl: 5  •  hydroxychloroquine (PLAQUENIL) 200 MG tablet, Take 2 tablets by mouth Daily., Disp: , Rfl:   •  magnesium 30 MG tablet, Take 90 mg by mouth Daily., Disp: , Rfl:   •  oxybutynin (DITROPAN) 5 MG tablet, 5 mg 4 (Four) Times a Day., Disp: , Rfl:   •  Probiotic Product (PROBIOTIC PO), Take 1 tablet by mouth Daily., Disp: , Rfl:   •  vitamin B-12 (CYANOCOBALAMIN) 1000 MCG tablet, Take 1,000 mcg by mouth Daily., Disp: , Rfl:   •  vitamin C (ASCORBIC ACID) 500 MG tablet, Take 500 mg by mouth Daily. With D3, Disp: , Rfl:   •  vitamin E 1000 UNIT capsule, Take 1,000 Units by mouth Daily. HOLD FOR SURGERY, Disp: , Rfl:     Past Medical History:   Diagnosis Date   • Anesthesia complication     STRONG GAG REFLEX   • Benign colonic polyp    • Chronic back pain    • Claustrophobia    • Coronary artery disease 03/27/2021    PTCA WITH PLACEMENT OF STENT. PLAVIX ONLY TO BE HELD ONE DAY   • DDD (degenerative disc disease), cervical    • DDD (degenerative disc disease), lumbosacral    • Diabetes  mellitus (HCC)     TYPE 2   • Frequent UTI    • History of MI (myocardial infarction) 2021   • History of pyelonephritis 2014   • History of sepsis    • Hypercholesteremia    • Left knee pain     DRAINED  21   • Leg muscle spasm    • Neuropathy     LEFT FOOT. AS RESULT OF BACK PROBLEMS   • Osteoarthritis    • Osteoporosis    • Overactive bladder    • Ureteral calculus, left    • Weakness     LOWER EXTREMITES RELATED FROM NERVE DAMAGE FROM BACK       Past Surgical History:   Procedure Laterality Date   • APPENDECTOMY     • BACK SURGERY      MULTIPLE. WITH HARDWARE   • CARDIAC CATHETERIZATION N/A 2021    Procedure: Left Heart Cath;  Surgeon: Leydi Romo MD;  Location:  [facility];  Service: Cardiovascular;  Laterality: N/A;   • CARDIAC CATHETERIZATION  2021    Procedure: Percutaneous Manual Thrombectomy;  Surgeon: Leydi Romo MD;  Location:  [facility];  Service: Cardiovascular;;   • CARDIAC CATHETERIZATION N/A 2021    Procedure: Percutaneous Coronary Intervention;  Surgeon: Leydi Romo MD;  [facility];  Service: Cardiovascular;  Laterality: N/A;   • CARDIAC CATHETERIZATION N/A 2021    Procedure: Coronary angiography;  Surgeon: Leydi Romo MD;  [facility];  Service: Cardiovascular;  Laterality: N/A;   • CARPAL TUNNEL RELEASE      LEFT X2 RIGHT    • CATARACT EXTRACTION WITH INTRAOCULAR LENS IMPLANT      LEFT AND RIGHT   • CERVICAL SPINE SURGERY      MULTIPLE C3-4 C6-7   •  SECTION      x 2   • COLONOSCOPY  2018   • CORONARY STENT PLACEMENT  2021   • PARATHYROIDECTOMY Left 2019    Procedure: PARATHYROIDECTOMY LEFT INFERIOR;  Surgeon: Mason Prather MD;  Location: [facility];  Service: ENT   • TRICEP TENDON REPAIR Left 2021    Procedure: LEFT OPEN TRICEPS REPAIR;  Surgeon: Luis Cho II, MD;  Location: [facility] OR;  " Service: Orthopedics;  Laterality: Left;   • URETEROSCOPY LASER LITHOTRIPSY WITH STENT INSERTION Left 2021    Procedure: LEFT URETEROSCOPY LASER LITHOTRIPSY, STONE BASKET EXTRACTION STENT;  Surgeon: Jose Luis Nelson Jr., MD;  Location: [facility];  Service: Urology;  Laterality: Left;       Family History   Problem Relation Age of Onset   • Stroke Mother    • Heart disease Mother    • Hypertension Mother    • Clotting disorder Father    • Hypertension Father    • Diabetes Father    • Aneurysm Father    • Hypertension Sister    • Diabetes Sister    • Cervical cancer Sister    • Diabetes Sister    • Cervical cancer Maternal Grandmother 72   • Diabetes Grandchild    • Asthma Sister    • Hypertension Sister         Dead   • Malig Hyperthermia Neg Hx        Social History     Tobacco Use   • Smoking status: Former Smoker     Packs/day: 0.00     Years: 15.00     Pack years: 0.00     Types: Cigarettes     Start date: 1965     Quit date: 1992     Years since quittin.2   • Smokeless tobacco: Never Used   • Tobacco comment: CAFFEINE USE: ICE TEA OCC.   Vaping Use   • Vaping Use: Never used   Substance Use Topics   • Alcohol use: Yes     Comment: Socially.   • Drug use: Yes     Types: Hydrocodone         ECG 12 Lead    Date/Time: 2022 4:24 PM  Performed by: Leydi Romo MD  Authorized by: Leydi Romo MD   Comparison: compared with previous ECG   Similar to previous ECG  Rhythm: sinus rhythm  Q waves: III and aVF    T inversion: III and aVF                 Objective:     Visit Vitals  /84   Pulse 63   Ht 160 cm (63\")   Wt 99.8 kg (220 lb)   LMP  (LMP Unknown)   BMI 38.97 kg/m²         Constitutional:       Appearance: Normal appearance. Well-developed.   Eyes:      General: Lids are normal.      Conjunctiva/sclera: Conjunctivae normal.      Pupils: Pupils are equal, round, and reactive to light.   HENT:      Head: Normocephalic and atraumatic.   Neck:      Vascular: No carotid bruit " or JVD.      Lymphadenopathy: No cervical adenopathy.   Pulmonary:      Effort: Pulmonary effort is normal.      Breath sounds: Normal breath sounds.   Cardiovascular:      Normal rate. Regular rhythm.      No gallop.   Pulses:     Radial: 2+ bilaterally.  Edema:     Peripheral edema absent.   Abdominal:      Palpations: Abdomen is soft.   Musculoskeletal:      Cervical back: Full passive range of motion without pain, normal range of motion and neck supple. Skin:     General: Skin is warm and dry.   Neurological:      Mental Status: Alert and oriented to person, place, and time.             Assessment:          Diagnosis Plan   1. Coronary artery disease involving native coronary artery of native heart without angina pectoris     2. Ischemic cardiomyopathy     3. History of inferior wall myocardial infarction     4. Hypercholesterolemia     5. Status post coronary artery stent placement     6. Type 2 diabetes mellitus with other circulatory complications (CMS/HCC)      7. Stage 2 chronic kidney disease            Plan:       1.  Coronary artery disease.  Appears to be stable and asymptomatic.  EKG is unchanged.  Discussed options regarding her antiplatelet therapy with the patient and we decided to stop her clopidogrel since it has been over a year since her stent placement.  2.  Ischemic cardiomyopathy.  Mild with an EF of 45 to 50%.  No evidence of volume overload.  On guideline directed management with carvedilol.  3.  Hyperlipidemia.  Explained to her the importance of statin therapy because of her history of myocardial infarction.  Her most recent lipid panel earlier this month showed that her LDL was pretty low at 37.  I think it is reasonable to back off on her atorvastatin dosage to see if this will help with her myalgias but still keep her LDL around 70.  If her myalgias do not improve with this then we will need to consider switching to an alternative agent.  4.  Hypertension.  Mildly elevated in the  office today but normally well controlled.  Will monitor on her current regimen medications.  5.  Diabetes mellitus type 2  6.  Chronic kidney disease  7.  Arthritis.  On hydroxychloroquine.  Followed by rheumatology.  8.  Weight gain.  I think it is unlikely the statin is responsible for her weight gain.    Patient will call me if there is no improvement in her symptoms with the decreased dose of atorvastatin.  I will otherwise plan on seeing her back again in 6 months.

## 2022-04-06 ENCOUNTER — TELEPHONE (OUTPATIENT)
Dept: ENDOCRINOLOGY | Age: 77
End: 2022-04-06

## 2022-04-06 RX ORDER — ATORVASTATIN CALCIUM 20 MG/1
20 TABLET, FILM COATED ORAL DAILY
Qty: 90 TABLET | Refills: 0 | Status: SHIPPED | OUTPATIENT
Start: 2022-04-06 | End: 2023-03-01

## 2022-04-17 DIAGNOSIS — E11.59 TYPE 2 DIABETES MELLITUS WITH OTHER CIRCULATORY COMPLICATIONS: ICD-10-CM

## 2022-04-18 RX ORDER — GLIMEPIRIDE 4 MG/1
TABLET ORAL
Qty: 180 TABLET | Refills: 1 | Status: SHIPPED | OUTPATIENT
Start: 2022-04-18 | End: 2022-10-18

## 2022-04-26 RX ORDER — HYDRALAZINE HYDROCHLORIDE 10 MG/1
TABLET, FILM COATED ORAL
Qty: 60 TABLET | Refills: 5 | Status: SHIPPED | OUTPATIENT
Start: 2022-04-26 | End: 2022-10-21

## 2022-05-09 RX ORDER — CARVEDILOL 6.25 MG/1
TABLET ORAL
Qty: 90 TABLET | Refills: 2 | Status: SHIPPED | OUTPATIENT
Start: 2022-05-09 | End: 2022-09-21

## 2022-05-09 RX ORDER — BLOOD SUGAR DIAGNOSTIC
STRIP MISCELLANEOUS
OUTPATIENT
Start: 2022-05-09

## 2022-05-10 ENCOUNTER — TELEPHONE (OUTPATIENT)
Dept: ENDOCRINOLOGY | Age: 77
End: 2022-05-10

## 2022-05-10 RX ORDER — BLOOD SUGAR DIAGNOSTIC
STRIP MISCELLANEOUS
Qty: 400 EACH | Refills: 3 | Status: SHIPPED | OUTPATIENT
Start: 2022-05-10

## 2022-05-10 NOTE — TELEPHONE ENCOUNTER
PT CALLED IN FOR A REFILL OF HER MEDS.    ONE TOUCH ULTRA TEST STRIPS      GOING TO:    [facility] [address] [place] - [address] [facility] [facility] AT [facility] - [phone] [facility] [phone]

## 2022-08-17 RX ORDER — BACLOFEN 10 MG/1
TABLET ORAL
Qty: 60 TABLET | Refills: 0 | OUTPATIENT
Start: 2022-08-17

## 2022-08-17 NOTE — TELEPHONE ENCOUNTER
Patient has made multiple appts and cancelled. Has not been seen in over a year. I cannot refill medication. I will refuse- she needs to make and keep appt for further RF.

## 2022-09-20 ENCOUNTER — OFFICE VISIT (OUTPATIENT)
Dept: FAMILY MEDICINE CLINIC | Facility: CLINIC | Age: 77
End: 2022-09-20

## 2022-09-20 VITALS
SYSTOLIC BLOOD PRESSURE: 134 MMHG | BODY MASS INDEX: 38.98 KG/M2 | OXYGEN SATURATION: 95 % | DIASTOLIC BLOOD PRESSURE: 76 MMHG | TEMPERATURE: 97.8 F | HEART RATE: 94 BPM | HEIGHT: 63 IN | WEIGHT: 220 LBS

## 2022-09-20 DIAGNOSIS — D72.828 OTHER ELEVATED WHITE BLOOD CELL (WBC) COUNT: ICD-10-CM

## 2022-09-20 DIAGNOSIS — M48.00 SPINAL STENOSIS, UNSPECIFIED SPINAL REGION: ICD-10-CM

## 2022-09-20 DIAGNOSIS — E11.59 TYPE 2 DIABETES MELLITUS WITH OTHER CIRCULATORY COMPLICATIONS: ICD-10-CM

## 2022-09-20 DIAGNOSIS — R29.898 WEAKNESS OF BOTH LOWER EXTREMITIES: ICD-10-CM

## 2022-09-20 DIAGNOSIS — R09.89 OTHER SPECIFIED SYMPTOMS AND SIGNS INVOLVING THE CIRCULATORY AND RESPIRATORY SYSTEMS: ICD-10-CM

## 2022-09-20 DIAGNOSIS — R79.89 ELEVATED LIVER FUNCTION TESTS: ICD-10-CM

## 2022-09-20 DIAGNOSIS — R74.8 ELEVATED CK: Primary | ICD-10-CM

## 2022-09-20 DIAGNOSIS — E21.3 HYPERPARATHYROIDISM: ICD-10-CM

## 2022-09-20 DIAGNOSIS — E78.00 HYPERCHOLESTEROLEMIA: ICD-10-CM

## 2022-09-20 DIAGNOSIS — M48.04 THORACIC SPINAL STENOSIS: ICD-10-CM

## 2022-09-20 DIAGNOSIS — N18.2 STAGE 2 CHRONIC KIDNEY DISEASE: ICD-10-CM

## 2022-09-20 DIAGNOSIS — E53.8 B12 DEFICIENCY: ICD-10-CM

## 2022-09-20 DIAGNOSIS — S46.309A INJURY OF TENDON OF TRICEPS: ICD-10-CM

## 2022-09-20 DIAGNOSIS — R41.3 MEMORY LOSS: ICD-10-CM

## 2022-09-20 DIAGNOSIS — E55.9 VITAMIN D DEFICIENCY: ICD-10-CM

## 2022-09-20 DIAGNOSIS — E11.22 TYPE 2 DIABETES MELLITUS WITH STAGE 2 CHRONIC KIDNEY DISEASE, WITHOUT LONG-TERM CURRENT USE OF INSULIN: ICD-10-CM

## 2022-09-20 DIAGNOSIS — Z95.5 S/P DRUG ELUTING CORONARY STENT PLACEMENT: ICD-10-CM

## 2022-09-20 DIAGNOSIS — I65.22 ATHEROSCLEROSIS OF LEFT CAROTID ARTERY: ICD-10-CM

## 2022-09-20 DIAGNOSIS — R31.0 GROSS HEMATURIA: ICD-10-CM

## 2022-09-20 DIAGNOSIS — I65.21 STENOSIS OF RIGHT CAROTID ARTERY: ICD-10-CM

## 2022-09-20 DIAGNOSIS — N18.2 TYPE 2 DIABETES MELLITUS WITH STAGE 2 CHRONIC KIDNEY DISEASE, WITHOUT LONG-TERM CURRENT USE OF INSULIN: ICD-10-CM

## 2022-09-20 DIAGNOSIS — N31.2 ATONIC NEUROGENIC BLADDER: ICD-10-CM

## 2022-09-20 DIAGNOSIS — M81.6 LOCALIZED OSTEOPOROSIS (LEQUESNE): ICD-10-CM

## 2022-09-20 DIAGNOSIS — R11.0 NAUSEA: ICD-10-CM

## 2022-09-20 PROBLEM — M19.90 OSTEOARTHRITIS: Status: ACTIVE | Noted: 2021-06-25

## 2022-09-20 PROBLEM — M79.609 PAIN IN LIMB: Status: ACTIVE | Noted: 2022-09-20

## 2022-09-20 PROBLEM — G60.9 HEREDITARY AND IDIOPATHIC NEUROPATHY, UNSPECIFIED: Status: ACTIVE | Noted: 2022-09-20

## 2022-09-20 PROBLEM — L97.529 ULCER OF TOE OF LEFT FOOT: Status: ACTIVE | Noted: 2022-09-20

## 2022-09-20 PROBLEM — E11.42 POLYNEUROPATHY DUE TO TYPE 2 DIABETES MELLITUS (HCC): Status: ACTIVE | Noted: 2022-09-20

## 2022-09-20 PROBLEM — I73.9 PERIPHERAL VASCULAR DISEASE (HCC): Status: ACTIVE | Noted: 2022-09-20

## 2022-09-20 PROBLEM — M20.42 ACQUIRED HAMMER TOE OF LEFT FOOT: Status: ACTIVE | Noted: 2022-09-20

## 2022-09-20 PROBLEM — B35.1 ONYCHOMYCOSIS: Status: ACTIVE | Noted: 2022-09-20

## 2022-09-20 PROCEDURE — 99214 OFFICE O/P EST MOD 30 MIN: CPT | Performed by: PHYSICIAN ASSISTANT

## 2022-09-20 RX ORDER — SOLIFENACIN SUCCINATE 10 MG/1
1 TABLET, FILM COATED ORAL DAILY
COMMUNITY

## 2022-09-20 RX ORDER — OFLOXACIN 3 MG/ML
SOLUTION/ DROPS OPHTHALMIC
COMMUNITY
Start: 2022-09-06

## 2022-09-20 RX ORDER — UBIDECARENONE 75 MG
1 CAPSULE ORAL DAILY
COMMUNITY
End: 2022-10-06 | Stop reason: SDUPTHER

## 2022-09-20 RX ORDER — HYDRALAZINE HYDROCHLORIDE 10 MG/1
1 TABLET, FILM COATED ORAL EVERY 12 HOURS
COMMUNITY
End: 2022-09-20 | Stop reason: SDUPTHER

## 2022-09-20 RX ORDER — SULFASALAZINE 500 MG/1
500 TABLET ORAL 4 TIMES DAILY
COMMUNITY
Start: 2022-07-18

## 2022-09-20 RX ORDER — MAGNESIUM GLUCONATE 30 MG(550)
TABLET ORAL
COMMUNITY
End: 2022-10-06 | Stop reason: SDUPTHER

## 2022-09-20 RX ORDER — OMEGA-3S/DHA/EPA/FISH OIL/D3 300MG-1000
CAPSULE ORAL
COMMUNITY
End: 2022-10-06 | Stop reason: SDUPTHER

## 2022-09-20 RX ORDER — VITAMIN E 268 MG
CAPSULE ORAL
COMMUNITY

## 2022-09-20 RX ORDER — ATORVASTATIN CALCIUM 20 MG/1
1 TABLET, FILM COATED ORAL DAILY
COMMUNITY
End: 2022-09-20 | Stop reason: SDUPTHER

## 2022-09-21 ENCOUNTER — TELEPHONE (OUTPATIENT)
Dept: FAMILY MEDICINE CLINIC | Facility: CLINIC | Age: 77
End: 2022-09-21

## 2022-09-21 RX ORDER — CARVEDILOL 6.25 MG/1
TABLET ORAL
Qty: 90 TABLET | Refills: 2 | Status: SHIPPED | OUTPATIENT
Start: 2022-09-21 | End: 2023-02-03

## 2022-09-21 NOTE — TELEPHONE ENCOUNTER
Caller: Michelle Espinoza    Relationship: Self    Best call back number: [phone]    Requested Prescriptions:   Requested Prescriptions     Pending Prescriptions Disp Refills   • baclofen (LIORESAL) 10 MG tablet 60 tablet 0     Sig: Take 1 tablet by mouth Daily.        Pharmacy where request should be sent: [facility] [address] [place]  [address] [facility]  AT [address] [phone] [facility] [phone]      Additional details provided by patient: PATIENT IS OUT OF THIS MEDICATION    Does the patient have less than a 3 day supply:  [x] Yes  [] No    Denisse Gonzalez Rep   09/21/22 10:21 EDT

## 2022-09-21 NOTE — TELEPHONE ENCOUNTER
Caller: Michelle Espinoza    Relationship: Self    Best call back number: [phone]    What was the call regarding: PATIENT WANTED TO INFORM KATARINA EASON THAT SHE WAS VACCINATED FOR SHINGLES ON 09.08.22    Do you require a callback: NO

## 2022-09-22 RX ORDER — BACLOFEN 10 MG/1
10 TABLET ORAL DAILY
Qty: 60 TABLET | Refills: 0 | Status: SHIPPED | OUTPATIENT
Start: 2022-09-22 | End: 2022-12-28

## 2022-10-06 ENCOUNTER — OFFICE VISIT (OUTPATIENT)
Dept: CARDIOLOGY | Facility: CLINIC | Age: 77
End: 2022-10-06

## 2022-10-06 VITALS
HEIGHT: 63 IN | DIASTOLIC BLOOD PRESSURE: 70 MMHG | HEART RATE: 85 BPM | SYSTOLIC BLOOD PRESSURE: 120 MMHG | BODY MASS INDEX: 38.98 KG/M2

## 2022-10-06 DIAGNOSIS — Z95.5 STATUS POST CORONARY ARTERY STENT PLACEMENT: ICD-10-CM

## 2022-10-06 DIAGNOSIS — I25.10 CORONARY ARTERY DISEASE INVOLVING NATIVE CORONARY ARTERY OF NATIVE HEART WITHOUT ANGINA PECTORIS: ICD-10-CM

## 2022-10-06 DIAGNOSIS — I65.22 ATHEROSCLEROSIS OF LEFT CAROTID ARTERY: ICD-10-CM

## 2022-10-06 DIAGNOSIS — R06.09 DYSPNEA ON EXERTION: ICD-10-CM

## 2022-10-06 DIAGNOSIS — I87.2 CHRONIC VENOUS INSUFFICIENCY: ICD-10-CM

## 2022-10-06 DIAGNOSIS — R07.89 CHEST PAIN, ATYPICAL: Primary | ICD-10-CM

## 2022-10-06 DIAGNOSIS — I25.5 ISCHEMIC CARDIOMYOPATHY: ICD-10-CM

## 2022-10-06 DIAGNOSIS — I73.9 PERIPHERAL VASCULAR DISEASE: ICD-10-CM

## 2022-10-06 DIAGNOSIS — I25.2 HISTORY OF INFERIOR WALL MYOCARDIAL INFARCTION: ICD-10-CM

## 2022-10-06 DIAGNOSIS — I65.21 STENOSIS OF RIGHT CAROTID ARTERY: ICD-10-CM

## 2022-10-06 DIAGNOSIS — E78.00 HYPERCHOLESTEROLEMIA: ICD-10-CM

## 2022-10-06 PROCEDURE — 99214 OFFICE O/P EST MOD 30 MIN: CPT | Performed by: NURSE PRACTITIONER

## 2022-10-06 PROCEDURE — 93000 ELECTROCARDIOGRAM COMPLETE: CPT | Performed by: NURSE PRACTITIONER

## 2022-10-06 NOTE — PROGRESS NOTES
Subjective:     Encounter Date:10/06/2022      Patient ID: Michelle Espinoza is a 77 y.o. female.    Chief Complaint:follow up CAD, ICM  History of Present Illness  This is a 78 y/o female who follows with Dr. Romo and is new to me today. She has a pmhx of coronary artery disease s/p PCI with stent placement to the proximal, mid, and distal right coronary artery following an inferior MI, ischemic cardiomyopathy with an EF of 45-50%, hypertension, hyperlipidemia, CKD, and diabetes.  She was last seen in the office in April 2022. At that time, it was decided to stop clopidogrel since it had been over a year since her stent placement. Statin therapy was reduced to see if it would help with her myalgias. She had also gained a significant amount of weight. Otherwise, she had been doing fairly well.    She is here today for a 6 month follow up visit. She reports that a couple of times a week, she has been having a dull ache in the left side of her chest that lasts about 20 mins and resolves on its own. She has also been having pounding in her throat a couple of times a week that does not occur when the chest discomfort occurs. It also lasts about 20-30 mins and resolves on its own. She denies any associated symptoms with her chest pain and it does not radiate to her arms, neck, jaw or back. She reports that at times, she feels like she can't get a good deep breath. She notes some swelling in her lower extremities that resolves with elevation.She denies any dizziness or syncope. She has been participating in physical therapy since the beginning of the year. She uses a walker to get around at home but continues to have problems with weakness in her legs and balance issues.    She reports that she has been able to tolerate the lower dose statin. She has been off aspirin for an unknown duration. Its unclear if she stopped this when she stopped Plavix or when she had gross hematuria. Regardless, she has been off  antiplatelet therapy for a while now.    I have reviewed and updated as appropriate allergies, current medications, past family history, past medical history, past surgical history and problem list.    Review of Systems   Constitutional: Negative for fever, malaise/fatigue, weight gain and weight loss.   HENT: Negative for congestion, hoarse voice and sore throat.    Eyes: Negative for blurred vision and double vision.   Cardiovascular: Positive for chest pain and dyspnea on exertion. Negative for leg swelling, orthopnea, palpitations and syncope.   Respiratory: Positive for shortness of breath. Negative for cough and wheezing.    Gastrointestinal: Negative for abdominal pain, hematemesis, hematochezia and melena.   Genitourinary: Negative for dysuria and hematuria.   Neurological: Positive for loss of balance and weakness. Negative for dizziness, headaches, light-headedness and numbness.   Psychiatric/Behavioral: Negative for depression. The patient is not nervous/anxious.          Current Outpatient Medications:   •  atorvastatin (LIPITOR) 20 MG tablet, Take 1 tablet by mouth Daily., Disp: 90 tablet, Rfl: 0  •  baclofen (LIORESAL) 10 MG tablet, Take 1 tablet by mouth Daily. (Patient taking differently: Take 10 mg by mouth As Needed.), Disp: 60 tablet, Rfl: 0  •  carvedilol (COREG) 6.25 MG tablet, TAKE ONE TABLET BY MOUTH TWICE A DAY WITH MEALS, Disp: 90 tablet, Rfl: 2  •  Cholecalciferol (VITAMIN D3) 5000 UNITS capsule capsule, Take 10,000 Units by mouth Daily., Disp: , Rfl:   •  COLLAGEN PO, Take 3 tablets by mouth Daily., Disp: , Rfl:   •  glimepiride (AMARYL) 4 MG tablet, TAKE ONE TABLET BY MOUTH TWICE A DAY, Disp: 180 tablet, Rfl: 1  •  glucose blood (OneTouch Ultra) test strip, Dx code E11.59 testing bs 4 x day, Disp: 400 each, Rfl: 3  •  hydrALAZINE (APRESOLINE) 10 MG tablet, TAKE ONE TABLET BY MOUTH TWICE A DAY, Disp: 60 tablet, Rfl: 5  •  magnesium 30 MG tablet, Take 90 mg by mouth Daily., Disp: , Rfl:   •   ofloxacin (OCUFLOX) 0.3 % ophthalmic solution, , Disp: , Rfl:   •  Probiotic Product (PROBIOTIC PO), Take 1 tablet by mouth Daily., Disp: , Rfl:   •  solifenacin (VESICARE) 10 MG tablet, Take 1 tablet by mouth Daily., Disp: , Rfl:   •  sulfaSALAzine (AZULFIDINE) 500 MG tablet, Take 500 mg by mouth 4 (Four) Times a Day., Disp: , Rfl:   •  vitamin B-12 (CYANOCOBALAMIN) 1000 MCG tablet, Take 1,000 mcg by mouth Daily., Disp: , Rfl:   •  vitamin C (ASCORBIC ACID) 500 MG tablet, Take 500 mg by mouth Daily. With D3, Disp: , Rfl:   •  vitamin E 400 UNIT capsule, , Disp: , Rfl:     Past Medical History:   Diagnosis Date   • Anesthesia complication     STRONG GAG REFLEX   • Benign colonic polyp    • Chronic back pain    • Claustrophobia    • Coronary artery disease 03/27/2021    PTCA WITH PLACEMENT OF STENT. PLAVIX ONLY TO BE HELD ONE DAY   • DDD (degenerative disc disease), cervical    • DDD (degenerative disc disease), lumbosacral    • Diabetes mellitus (HCC)     TYPE 2   • Frequent UTI    • History of MI (myocardial infarction) 03/27/2021   • History of pyelonephritis 06/2014   • History of sepsis 2014   • Hypercholesteremia    • Left knee pain     DRAINED  9/21/21   • Leg muscle spasm    • Neuropathy     LEFT FOOT. AS RESULT OF BACK PROBLEMS   • Osteoarthritis    • Osteoporosis    • Overactive bladder    • Ureteral calculus, left    • Weakness     LOWER EXTREMITES RELATED FROM NERVE DAMAGE FROM BACK       Past Surgical History:   Procedure Laterality Date   • APPENDECTOMY     • BACK SURGERY      MULTIPLE. WITH HARDWARE   • CARDIAC CATHETERIZATION N/A 03/27/2021    Procedure: Left Heart Cath;  Surgeon: Leydi Romo MD;  Location:  [facility];  Service: Cardiovascular;  Laterality: N/A;   • CARDIAC CATHETERIZATION  03/27/2021    Procedure: Percutaneous Manual Thrombectomy;  Surgeon: Leydi Romo MD;  Location:  [facility];  Service: Cardiovascular;;   • CARDIAC CATHETERIZATION  N/A 2021    Procedure: Percutaneous Coronary Intervention;  Surgeon: Leydi Romo MD;  Location:  [facility];  Service: Cardiovascular;  Laterality: N/A;   • CARDIAC CATHETERIZATION N/A 2021    Procedure: Coronary angiography;  Surgeon: Leydi Romo MD;  Location:  [facility];  Service: Cardiovascular;  Laterality: N/A;   • CARPAL TUNNEL RELEASE      LEFT X2 RIGHT    • CATARACT EXTRACTION WITH INTRAOCULAR LENS IMPLANT      LEFT AND RIGHT   • CERVICAL SPINE SURGERY      MULTIPLE C3-4 C6-7   •  SECTION      x 2   • COLONOSCOPY  2018   • CORONARY STENT PLACEMENT  2021   • PARATHYROIDECTOMY Left 2019    Procedure: PARATHYROIDECTOMY LEFT INFERIOR;  Surgeon: Mason Prather MD;  Location:  [facility];  Service: ENT   • TRICEP TENDON REPAIR Left 2021    Procedure: LEFT OPEN TRICEPS REPAIR;  Surgeon: Luis Cho II, MD;  Location:  [facility];  Service: Orthopedics;  Laterality: Left;   • URETEROSCOPY LASER LITHOTRIPSY WITH STENT INSERTION Left 2021    Procedure: LEFT URETEROSCOPY LASER LITHOTRIPSY, STONE BASKET EXTRACTION STENT;  Surgeon: Jose Luis Nelson Jr., MD;  Location:  [facility];  Service: Urology;  Laterality: Left;       Family History   Problem Relation Age of Onset   • Stroke Mother    • Heart disease Mother    • Hypertension Mother    • Clotting disorder Father    • Hypertension Father    • Diabetes Father    • Aneurysm Father    • Hypertension Sister    • Diabetes Sister    • Cervical cancer Sister    • Diabetes Sister    • Cervical cancer Maternal Grandmother 72   • Diabetes Grandchild    • Asthma Sister    • Hypertension Sister         Dead   • Malig Hyperthermia Neg Hx        Social History     Tobacco Use   • Smoking status: Former Smoker     Packs/day: 0.00     Years: 15.00     Pack years: 0.00     Types: Cigarettes     Start date: 1965     Quit date: 1992     Years since  "quittin.7   • Smokeless tobacco: Never Used   • Tobacco comment: CAFFEINE USE: ICE TEA OCC.   Vaping Use   • Vaping Use: Never used   Substance Use Topics   • Alcohol use: Yes     Comment: Socially.   • Drug use: Yes     Types: Hydrocodone         ECG 12 Lead    Date/Time: 10/6/2022 12:36 PM  Performed by: Mercedes Kimble APRN  Authorized by: Mercedes Kimbel APRN   Comparison: compared with previous ECG from 2022  Similar to previous ECG  Rhythm: sinus rhythm  Ectopy: multifocal PVCs  Q waves: III, aVF, V3 and V4    Comments: No significant change from previous EKG               Objective:     Visit Vitals  /70   Pulse 85   Ht 160 cm (62.99\")   LMP  (LMP Unknown)   BMI 38.98 kg/m²             Physical Exam  Constitutional:       Appearance: Normal appearance. She is normal weight.   HENT:      Head: Normocephalic.   Neck:      Vascular: No carotid bruit.   Cardiovascular:      Rate and Rhythm: Normal rate and regular rhythm.      Chest Wall: PMI is not displaced.      Pulses: Normal pulses.           Radial pulses are 2+ on the right side and 2+ on the left side.        Posterior tibial pulses are 2+ on the right side and 2+ on the left side.      Heart sounds: Normal heart sounds. No murmur heard.    No friction rub. No gallop.   Pulmonary:      Effort: Pulmonary effort is normal.      Breath sounds: Normal breath sounds.   Abdominal:      General: Bowel sounds are normal. There is no distension.      Palpations: Abdomen is soft.   Musculoskeletal:      Right lower le+ Edema present.      Left lower le+ Edema present.   Skin:     General: Skin is warm and dry.      Capillary Refill: Capillary refill takes less than 2 seconds.   Neurological:      Mental Status: She is alert and oriented to person, place, and time.   Psychiatric:         Mood and Affect: Mood normal.         Behavior: Behavior normal.         Thought Content: Thought content normal.          Lab Review:   Lipid Panel    Lipid " Panel 3/17/22   Total Cholesterol 114   Triglycerides 162 (A)   HDL Cholesterol 50   VLDL Cholesterol 27   LDL Cholesterol  37   (A) Abnormal value                Cardiac Procedures:   1. Echocardiogram 3/27/21:  · Calculated left ventricular EF = 41.3% Estimated left ventricular EF was in agreement with the calculated left ventricular EF. Left ventricular systolic function is moderately decreased.  · The following left ventricular wall segments are hypokinetic: basal inferolateral, mid inferolateral, apical inferior, mid inferior and basal inferior.  · Left ventricular diastolic function is consistent with (grade I) impaired relaxation.  · There is calcification of the aortic valve.  · There is no significant valular stenosis or regurgatation    2. Cardiac catheterization 3/27/21:  Left Main   Large-caliber vessel with 0% stenosis. The vessel bifurcates into left anterior descending and left circumflex arteries.   Left Anterior Descending   Large-caliber vessel with 30% proximal stenosis. Is a 50% mid stenosis. The mid vessel then tapers to a small caliber with 20 to 30% mid stenosis. The distal vessel is severely tortuous and has no significant disease.   Left Circumflex   Moderate caliber vessel with 0% proximal stenosis. Proximal vessel gives rise to a moderate caliber, severely tortuous first obtuse marginal branch with no significant disease. The distal continuation of the circumflex vessel tapers to small caliber with 0% stenosis.   Right Coronary Artery   Large-caliber vessel with severe proximal calcification and 100% thrombotic occlusion.   Prox RCA lesion is 100% stenosed. Culprit lesion. JOVANNY flow is 0. The lesion is type B2.   1.  Inferior myocardial infarction status post drug-eluting stent placement of the proximal, mid, and distal right coronary artery  2.  Coronary artery disease        Assessment:         Diagnoses and all orders for this visit:    1. Chest pain, atypical (Primary)  -     Adult  Transthoracic Echo Complete w/ Color, Spectral and Contrast if Necessary Per Protocol; Future    2. Dyspnea on exertion  -     Adult Transthoracic Echo Complete w/ Color, Spectral and Contrast if Necessary Per Protocol; Future    3. Atherosclerosis of left carotid artery    4. Hypercholesterolemia    5. Chronic venous insufficiency    6. Stenosis of right carotid artery    7. History of inferior wall myocardial infarction    8. Coronary artery disease involving native coronary artery of native heart without angina pectoris    9. Ischemic cardiomyopathy    10. Status post coronary artery stent placement    11. Peripheral vascular disease (HCC)            Plan:         1. CAD with atypical chest pain: s/p PCI with stent x 3 to RCA. EKG is stable with no ischemic changes noted. On statin therapy and beta blocker. While her symptoms are very atypical, I am concerned with the fact that she has not been on antiplatelet therapy for some time now. She was to stop Plavix but unfortunately stopped both Plavix and aspirin. I have resumed aspirin. I am going to start with an echocardiogram to assess for any concerning changes. If anything abnormal shows up we may need to proceed with further workup with a stress test.  2. Dyspnea on exertion: as above  3. HTN: blood pressure has been well controlled on current regimen. No changes  4. HLD: on lower dose statin. Unable to tolerate high dose due to leg pain. Goal LDL < 70  5. Decreased mobility: in PT currently. Hopefully she can get some of her strength back as it will improve her overall health.    Thank you for allowing me to participate in this patient's care. Please call with any questions or concerns. Ms. Espinoza will follow up with Dr. Romo in 3 months.          Your medication list          Accurate as of October 6, 2022 11:10 AM. If you have any questions, ask your nurse or doctor.            CHANGE how you take these medications      Instructions Last Dose Given Next  Dose Due   baclofen 10 MG tablet  Commonly known as: LIORESAL  What changed:   · when to take this  · reasons to take this      Take 1 tablet by mouth Daily.          CONTINUE taking these medications      Instructions Last Dose Given Next Dose Due   atorvastatin 20 MG tablet  Commonly known as: LIPITOR      Take 1 tablet by mouth Daily.       carvedilol 6.25 MG tablet  Commonly known as: COREG      TAKE ONE TABLET BY MOUTH TWICE A DAY WITH MEALS       COLLAGEN PO      Take 3 tablets by mouth Daily.       glimepiride 4 MG tablet  Commonly known as: AMARYL      TAKE ONE TABLET BY MOUTH TWICE A DAY       hydrALAZINE 10 MG tablet  Commonly known as: APRESOLINE      TAKE ONE TABLET BY MOUTH TWICE A DAY       magnesium 30 MG tablet      Take 90 mg by mouth Daily.       ofloxacin 0.3 % ophthalmic solution  Commonly known as: OCUFLOX           OneTouch Ultra test strip  Generic drug: glucose blood      Dx code E11.59 testing bs 4 x day       PROBIOTIC PO      Take 1 tablet by mouth Daily.       solifenacin 10 MG tablet  Commonly known as: VESICARE      Take 1 tablet by mouth Daily.       sulfaSALAzine 500 MG tablet  Commonly known as: AZULFIDINE      Take 500 mg by mouth 4 (Four) Times a Day.       vitamin B-12 1000 MCG tablet  Commonly known as: CYANOCOBALAMIN      Take 1,000 mcg by mouth Daily.       vitamin C 500 MG tablet  Commonly known as: ASCORBIC ACID      Take 500 mg by mouth Daily. With D3       vitamin D3 125 MCG (5000 UT) capsule capsule      Take 10,000 Units by mouth Daily.       vitamin E 400 UNIT capsule                    RATNA Hercules  10/06/22  10:33 AM EDT

## 2022-10-16 DIAGNOSIS — E11.59 TYPE 2 DIABETES MELLITUS WITH OTHER CIRCULATORY COMPLICATIONS: ICD-10-CM

## 2022-10-18 RX ORDER — GLIMEPIRIDE 4 MG/1
TABLET ORAL
Qty: 180 TABLET | Refills: 1 | Status: SHIPPED | OUTPATIENT
Start: 2022-10-18

## 2022-10-21 ENCOUNTER — HOSPITAL ENCOUNTER (OUTPATIENT)
Dept: CARDIOLOGY | Facility: HOSPITAL | Age: 77
Discharge: HOME | End: 2022-10-21
Admitting: NURSE PRACTITIONER

## 2022-10-21 VITALS
DIASTOLIC BLOOD PRESSURE: 103 MMHG | SYSTOLIC BLOOD PRESSURE: 160 MMHG | HEART RATE: 89 BPM | BODY MASS INDEX: 40.48 KG/M2 | WEIGHT: 220 LBS | OXYGEN SATURATION: 95 % | HEIGHT: 62 IN

## 2022-10-21 DIAGNOSIS — R07.89 CHEST PAIN, ATYPICAL: ICD-10-CM

## 2022-10-21 DIAGNOSIS — R06.09 DYSPNEA ON EXERTION: ICD-10-CM

## 2022-10-21 LAB
AORTIC ARCH: 2.7 CM
ASCENDING AORTA: 3.4 CM
BH CV ECHO MEAS - ACS: 2.26 CM
BH CV ECHO MEAS - AO MAX PG: 7.4 MMHG
BH CV ECHO MEAS - AO MEAN PG: 4.2 MMHG
BH CV ECHO MEAS - AO ROOT DIAM: 3.3 CM
BH CV ECHO MEAS - AO V2 MAX: 136.4 CM/SEC
BH CV ECHO MEAS - AO V2 VTI: 26.4 CM
BH CV ECHO MEAS - AVA(I,D): 1.71 CM2
BH CV ECHO MEAS - EDV(CUBED): 104.3 ML
BH CV ECHO MEAS - EDV(MOD-SP2): 86 ML
BH CV ECHO MEAS - EDV(MOD-SP4): 72 ML
BH CV ECHO MEAS - EF(MOD-BP): 56.7 %
BH CV ECHO MEAS - EF(MOD-SP2): 55.8 %
BH CV ECHO MEAS - EF(MOD-SP4): 61.1 %
BH CV ECHO MEAS - ESV(CUBED): 60.1 ML
BH CV ECHO MEAS - ESV(MOD-SP2): 38 ML
BH CV ECHO MEAS - ESV(MOD-SP4): 28 ML
BH CV ECHO MEAS - FS: 16.8 %
BH CV ECHO MEAS - IVS/LVPW: 0.95 CM
BH CV ECHO MEAS - IVSD: 1.21 CM
BH CV ECHO MEAS - LAT PEAK E' VEL: 3.7 CM/SEC
BH CV ECHO MEAS - LV DIASTOLIC VOL/BSA (35-75): 36.2 CM2
BH CV ECHO MEAS - LV MASS(C)D: 223.8 GRAMS
BH CV ECHO MEAS - LV MAX PG: 2.8 MMHG
BH CV ECHO MEAS - LV MEAN PG: 1.78 MMHG
BH CV ECHO MEAS - LV SYSTOLIC VOL/BSA (12-30): 14.1 CM2
BH CV ECHO MEAS - LV V1 MAX: 83.7 CM/SEC
BH CV ECHO MEAS - LV V1 VTI: 14.4 CM
BH CV ECHO MEAS - LVIDD: 4.7 CM
BH CV ECHO MEAS - LVIDS: 3.9 CM
BH CV ECHO MEAS - LVOT AREA: 3.1 CM2
BH CV ECHO MEAS - LVOT DIAM: 2 CM
BH CV ECHO MEAS - LVPWD: 1.28 CM
BH CV ECHO MEAS - MED PEAK E' VEL: 2.6 CM/SEC
BH CV ECHO MEAS - MV A DUR: 0.12 SEC
BH CV ECHO MEAS - MV A MAX VEL: 92.4 CM/SEC
BH CV ECHO MEAS - MV DEC SLOPE: 345.4 CM/SEC2
BH CV ECHO MEAS - MV DEC TIME: 0.19 MSEC
BH CV ECHO MEAS - MV E MAX VEL: 49.2 CM/SEC
BH CV ECHO MEAS - MV E/A: 0.53
BH CV ECHO MEAS - MV MAX PG: 3.4 MMHG
BH CV ECHO MEAS - MV MEAN PG: 1.27 MMHG
BH CV ECHO MEAS - MV P1/2T: 47.1 MSEC
BH CV ECHO MEAS - MV V2 VTI: 18.7 CM
BH CV ECHO MEAS - MVA(P1/2T): 4.7 CM2
BH CV ECHO MEAS - MVA(VTI): 2.41 CM2
BH CV ECHO MEAS - PA ACC TIME: 0.07 SEC
BH CV ECHO MEAS - PA PR(ACCEL): 48.1 MMHG
BH CV ECHO MEAS - PA V2 MAX: 86.9 CM/SEC
BH CV ECHO MEAS - PAPD(PI EDV): 3.4 MMHG
BH CV ECHO MEAS - PI END-D VEL: 92.3 CM/SEC
BH CV ECHO MEAS - PULM A REVS DUR: 0.12 SEC
BH CV ECHO MEAS - PULM A REVS VEL: 33.8 CM/SEC
BH CV ECHO MEAS - PULM DIAS VEL: 35.1 CM/SEC
BH CV ECHO MEAS - PULM S/D: 1.63
BH CV ECHO MEAS - PULM SYS VEL: 57.4 CM/SEC
BH CV ECHO MEAS - QP/QS: 1.36
BH CV ECHO MEAS - RAP SYSTOLE: 3 MMHG
BH CV ECHO MEAS - RV MAX PG: 1.72 MMHG
BH CV ECHO MEAS - RV V1 MAX: 65.6 CM/SEC
BH CV ECHO MEAS - RV V1 VTI: 14.3 CM
BH CV ECHO MEAS - RVOT DIAM: 2.34 CM
BH CV ECHO MEAS - RVSP: 26.1 MMHG
BH CV ECHO MEAS - SI(MOD-SP2): 24.1 ML/M2
BH CV ECHO MEAS - SI(MOD-SP4): 22.1 ML/M2
BH CV ECHO MEAS - SUP REN AO DIAM: 2.1 CM
BH CV ECHO MEAS - SV(LVOT): 45.2 ML
BH CV ECHO MEAS - SV(MOD-SP2): 48 ML
BH CV ECHO MEAS - SV(MOD-SP4): 44 ML
BH CV ECHO MEAS - SV(RVOT): 61.5 ML
BH CV ECHO MEAS - TAPSE (>1.6): 2.03 CM
BH CV ECHO MEAS - TR MAX PG: 23.1 MMHG
BH CV ECHO MEAS - TR MAX VEL: 240.3 CM/SEC
BH CV ECHO MEASUREMENTS AVERAGE E/E' RATIO: 15.62
BH CV XLRA - RV BASE: 2.34 CM
BH CV XLRA - RV LENGTH: 7.2 CM
BH CV XLRA - RV MID: 2.8 CM
BH CV XLRA - TDI S': 8.4 CM/SEC
LEFT ATRIUM VOLUME INDEX: 22.8 ML/M2
MAXIMAL PREDICTED HEART RATE: 145 BPM
SINUS: 2.9 CM
STJ: 3.2 CM
STRESS TARGET HR: 123 BPM

## 2022-10-21 PROCEDURE — 93306 TTE W/DOPPLER COMPLETE: CPT | Performed by: INTERNAL MEDICINE

## 2022-10-21 PROCEDURE — 93306 TTE W/DOPPLER COMPLETE: CPT

## 2022-10-21 PROCEDURE — 25010000002 PERFLUTREN (DEFINITY) 8.476 MG IN SODIUM CHLORIDE (PF) 0.9 % 10 ML INJECTION: Performed by: NURSE PRACTITIONER

## 2022-10-21 RX ORDER — HYDRALAZINE HYDROCHLORIDE 10 MG/1
TABLET, FILM COATED ORAL
Qty: 60 TABLET | Refills: 5 | Status: SHIPPED | OUTPATIENT
Start: 2022-10-21

## 2022-10-21 RX ADMIN — PERFLUTREN 1.5 ML: 6.52 INJECTION, SUSPENSION INTRAVENOUS at 13:59

## 2022-10-24 ENCOUNTER — TELEPHONE (OUTPATIENT)
Dept: CARDIOLOGY | Facility: CLINIC | Age: 77
End: 2022-10-24

## 2022-10-24 NOTE — TELEPHONE ENCOUNTER
Spoke with pt, she verbalized understanding      ----- Message from RATNA Hercules sent at 10/24/2022  8:53 AM EDT -----  Please let patient know her echocardiogram looks good. Her heart function is normal, she has a little leakiness in her valves, likely due to age related changes. Overall, no concerns.

## 2022-10-24 NOTE — PROGRESS NOTES
Please let patient know her echocardiogram looks good. Her heart function is normal, she has a little leakiness in her valves, likely due to age related changes. Overall, no concerns.

## 2022-11-03 ENCOUNTER — OFFICE VISIT (OUTPATIENT)
Dept: ENDOCRINOLOGY | Age: 77
End: 2022-11-03

## 2022-11-03 VITALS
HEIGHT: 62 IN | HEART RATE: 76 BPM | OXYGEN SATURATION: 96 % | WEIGHT: 215 LBS | SYSTOLIC BLOOD PRESSURE: 118 MMHG | BODY MASS INDEX: 39.56 KG/M2 | TEMPERATURE: 97 F | DIASTOLIC BLOOD PRESSURE: 60 MMHG

## 2022-11-03 DIAGNOSIS — E11.65 TYPE 2 DIABETES MELLITUS WITH HYPERGLYCEMIA, WITHOUT LONG-TERM CURRENT USE OF INSULIN: Primary | ICD-10-CM

## 2022-11-03 DIAGNOSIS — E11.69 HYPERLIPIDEMIA ASSOCIATED WITH TYPE 2 DIABETES MELLITUS: ICD-10-CM

## 2022-11-03 DIAGNOSIS — E78.5 HYPERLIPIDEMIA ASSOCIATED WITH TYPE 2 DIABETES MELLITUS: ICD-10-CM

## 2022-11-03 DIAGNOSIS — E83.52 HYPERCALCEMIA: ICD-10-CM

## 2022-11-03 PROCEDURE — 99214 OFFICE O/P EST MOD 30 MIN: CPT | Performed by: INTERNAL MEDICINE

## 2022-11-03 NOTE — PROGRESS NOTES
"Chief Complaint  Chief Complaint   Patient presents with   • Diabetes     Type2: Patient doesn't have meter with her today but states that she checks her bs once daily (am) she has no hx of retinopathy, or neuropathy. She has no hx of kidney diseaser or stroke but states that she had a heart attack a little over a yr ago       Subjective          History of Present Illness    Michelle Espinoza 77 y.o. presents with  presents with Type 2 dm and Hypercalcemia      Patient's calcium levels were noted to be elevated on routine blood work up in May 2017.  Her calcium levels have been ranging between 10.5-10.9 mg/dL.  With the latest being her highest-11.1 mg/dL range.     Status post parathyroid surgery in March 2019.  Patient has recovered from the surgery with no complications.  Repeat calcium levels and parathyroid levels have normalized.  Not on calcium supplements but does take vitamin D3      Today in clinic pt reports that her energy levels are still very low, weight has been stable, no recent hx of kidneys stones and no fractures, but did show tore the triceps muscle on her left arm.  Does have significant hx of arthritis.      Type 2 dm - on glimepiride 4 mg po bid with meals. Hx of CAD and since then her BG are running high.   BG - 180 - 186 mg/dl.      Hyperlipidemia - on lipitor 20 mg po daily.     Reviewed primary care physician's/consulting physician documentation and lab results         I have reviewed the patient's allergies, medicines, past medical hx, family hx and social hx in detail.    Objective   Vital Signs:   /60   Pulse 76   Temp 97 °F (36.1 °C) (Temporal)   Ht 157.5 cm (62\")   Wt 97.5 kg (215 lb)   LMP  (LMP Unknown)   SpO2 96%   BMI 39.32 kg/m²   Physical Exam   General appearance - no distress  Eyes- anicteric sclera  Ear nose and throat-external ears and nose normal.    Respiratory-normal chest on inspection.  No respiratory distress noted.  Skin-no rashes.  Neuro-alert and " oriented x3            Result Review :   The following data was reviewed by: Bethanie Montano MD on 11/03/2022:  Hospital Outpatient Visit on 10/21/2022   Component Date Value Ref Range Status   • Target HR (85%) 10/21/2022 123  bpm Final   • Max. Pred. HR (100%) 10/21/2022 145  bpm Final   • EF(MOD-bp) 10/21/2022 56.7  % Final   • LVIDd 10/21/2022 4.7  cm Final   • LVIDs 10/21/2022 3.9  cm Final   • IVSd 10/21/2022 1.21  cm Final   • LVPWd 10/21/2022 1.28  cm Final   • FS 10/21/2022 16.8  % Final   • IVS/LVPW 10/21/2022 0.95  cm Final   • ESV(cubed) 10/21/2022 60.1  ml Final   • LV Sys Vol (BSA corrected) 10/21/2022 14.1  cm2 Final   • EDV(cubed) 10/21/2022 104.3  ml Final   • LV Escobar Vol (BSA corrected) 10/21/2022 36.2  cm2 Final   • LVOT area 10/21/2022 3.1  cm2 Final   • LV mass(C)d 10/21/2022 223.8  grams Final   • LVOT diam 10/21/2022 2.00  cm Final   • EDV(MOD-sp2) 10/21/2022 86.0  ml Final   • EDV(MOD-sp4) 10/21/2022 72.0  ml Final   • ESV(MOD-sp2) 10/21/2022 38.0  ml Final   • ESV(MOD-sp4) 10/21/2022 28.0  ml Final   • SV(MOD-sp2) 10/21/2022 48.0  ml Final   • SV(MOD-sp4) 10/21/2022 44.0  ml Final   • SI(MOD-sp2) 10/21/2022 24.1  ml/m2 Final   • SI(MOD-sp4) 10/21/2022 22.1  ml/m2 Final   • EF(MOD-sp2) 10/21/2022 55.8  % Final   • EF(MOD-sp4) 10/21/2022 61.1  % Final   • MV E max justin 10/21/2022 49.2  cm/sec Final   • MV A max justin 10/21/2022 92.4  cm/sec Final   • MV dec time 10/21/2022 0.19  msec Final   • MV E/A 10/21/2022 0.53   Final   • Pulm A Revs Dur 10/21/2022 0.12  sec Final   • MV A dur 10/21/2022 0.12  sec Final   • LA ESV Index (BP) 10/21/2022 22.8  ml/m2 Final   • Med Peak E' Justin 10/21/2022 2.6  cm/sec Final   • Lat Peak E' Justin 10/21/2022 3.7  cm/sec Final   • Avg E/e' ratio 10/21/2022 15.62   Final   • SV(LVOT) 10/21/2022 45.2  ml Final   • SV(RVOT) 10/21/2022 61.5  ml Final   • Qp/Qs 10/21/2022 1.36   Final   • RV Base 10/21/2022 2.34  cm Final   • RV Mid 10/21/2022 2.8  cm Final   • RV Length  10/21/2022 7.2  cm Final   • TAPSE (>1.6) 10/21/2022 2.03  cm Final   • RV S' 10/21/2022 8.4  cm/sec Final   • Pulm Sys Justin 10/21/2022 57.4  cm/sec Final   • Pulm Escobar Justin 10/21/2022 35.1  cm/sec Final   • Pulm S/D 10/21/2022 1.63   Final   • Pulm A Revs Justin 10/21/2022 33.8  cm/sec Final   • LV V1 max 10/21/2022 83.7  cm/sec Final   • LV V1 max PG 10/21/2022 2.8  mmHg Final   • LV V1 mean PG 10/21/2022 1.78  mmHg Final   • LV V1 VTI 10/21/2022 14.4  cm Final   • Ao pk justin 10/21/2022 136.4  cm/sec Final   • Ao max PG 10/21/2022 7.4  mmHg Final   • Ao mean PG 10/21/2022 4.2  mmHg Final   • Ao V2 VTI 10/21/2022 26.4  cm Final   • HE(I,D) 10/21/2022 1.71  cm2 Final   • MV max PG 10/21/2022 3.4  mmHg Final   • MV mean PG 10/21/2022 1.27  mmHg Final   • MV V2 VTI 10/21/2022 18.7  cm Final   • MV P1/2t 10/21/2022 47.1  msec Final   • MVA(P1/2t) 10/21/2022 4.7  cm2 Final   • MVA(VTI) 10/21/2022 2.41  cm2 Final   • MV dec slope 10/21/2022 345.4  cm/sec2 Final   • TR max justin 10/21/2022 240.3  cm/sec Final   • TR max PG 10/21/2022 23.1  mmHg Final   • RVSP(TR) 10/21/2022 26.1  mmHg Final   • RAP systole 10/21/2022 3.0  mmHg Final   • RVOT diam 10/21/2022 2.34  cm Final   • RV V1 max PG 10/21/2022 1.72  mmHg Final   • RV V1 max 10/21/2022 65.6  cm/sec Final   • RV V1 VTI 10/21/2022 14.3  cm Final   • PA V2 max 10/21/2022 86.9  cm/sec Final   • PA acc time 10/21/2022 0.07  sec Final   • PA pr(Accel) 10/21/2022 48.1  mmHg Final   • PI end-d justin 10/21/2022 92.3  cm/sec Final   • Ao root diam 10/21/2022 3.3  cm Final   • ACS 10/21/2022 2.26  cm Final   • Sinus 10/21/2022 2.9  cm Final   • STJ 10/21/2022 3.2  cm Final   • PAPd(PI edV) 10/21/2022 3.40  mmHg Final   • Ascending aorta 10/21/2022 3.4  cm Final   • Aortic arch 10/21/2022 2.7  cm Final   • Abdo Ao Diam 10/21/2022 2.1  cm Final     Data reviewed: PCP and endocrine notes       Results Review:    I reviewed the patient's new clinical results.     Assessment and Plan   "  Problem List Items Addressed This Visit        Other    Hypercalcemia    Overview     Description: June 2015 ionized calcium 5.8 with inappropriate parathyroid hormone at 21. 34 hour urine calcium 232. Prior history of kidney stones. Difficult bone density screening because of severe degenerative joint disease. Recommend repeat parathyroid hormone and ionized calcium 12/15.         Relevant Orders    Basic Metabolic Panel    Hemoglobin A1c    Vitamin B12 & Folate    Vitamin D,25-Hydroxy    Lipid Panel    T4, Free    TSH   Other Visit Diagnoses     Type 2 diabetes mellitus with hyperglycemia, without long-term current use of insulin (HCC)    -  Primary    Relevant Orders    Basic Metabolic Panel    Hemoglobin A1c    Vitamin B12 & Folate    Vitamin D,25-Hydroxy    Lipid Panel    T4, Free    TSH    Hyperlipidemia associated with type 2 diabetes mellitus (HCC)        Relevant Orders    Basic Metabolic Panel    Hemoglobin A1c    Vitamin B12 & Folate    Vitamin D,25-Hydroxy    Lipid Panel    T4, Free    TSH        Hypercalcemia-resolved  Check calcium levels.  S/p parathyroid surgery.    Type 2 diabetes mellitus  Check HbA1c  Continue glimepiride 4 mg twice daily with meals.  Could consider adding Januvia if HbA1c is greater than 7.5%.    Hyperlipidemia  Continue Lipitor.    Interpreted the blood work-up/imaging results performed by the primary care/consulting physician -    Refills sent to pharmacy    Follow Up     Patient was given instructions and counseling regarding her condition or for health maintenance advice. Please see specific information pulled into the AVS if appropriate.       Thank you for asking me to see your patient, Michelle Espinoza in consultation.         Bethanie Montano MD  11/03/22      [facility] Dragon / transcription disclaimer:     \"Dictated utilizing Dragon dictation\".         "

## 2022-11-04 LAB
25(OH)D3+25(OH)D2 SERPL-MCNC: 63.3 NG/ML (ref 30–100)
BUN SERPL-MCNC: 36 MG/DL (ref 8–23)
BUN/CREAT SERPL: 36.4 (ref 7–25)
CALCIUM SERPL-MCNC: 9.7 MG/DL (ref 8.6–10.5)
CHLORIDE SERPL-SCNC: 106 MMOL/L (ref 98–107)
CHOLEST SERPL-MCNC: 185 MG/DL (ref 0–200)
CO2 SERPL-SCNC: 22.4 MMOL/L (ref 22–29)
CREAT SERPL-MCNC: 0.99 MG/DL (ref 0.57–1)
EGFRCR SERPLBLD CKD-EPI 2021: 59.6 ML/MIN/1.73
FOLATE SERPL-MCNC: 5.6 NG/ML (ref 4.78–24.2)
GLUCOSE SERPL-MCNC: 127 MG/DL (ref 65–99)
HBA1C MFR BLD: 6.7 % (ref 4.8–5.6)
HDLC SERPL-MCNC: 53 MG/DL (ref 40–60)
IMP & REVIEW OF LAB RESULTS: NORMAL
LDLC SERPL CALC-MCNC: 88 MG/DL (ref 0–100)
POTASSIUM SERPL-SCNC: 4.6 MMOL/L (ref 3.5–5.2)
SODIUM SERPL-SCNC: 139 MMOL/L (ref 136–145)
T4 FREE SERPL-MCNC: 0.92 NG/DL (ref 0.93–1.7)
TRIGL SERPL-MCNC: 263 MG/DL (ref 0–150)
TSH SERPL DL<=0.005 MIU/L-ACNC: 3.18 UIU/ML (ref 0.27–4.2)
VIT B12 SERPL-MCNC: >2000 PG/ML (ref 211–946)
VLDLC SERPL CALC-MCNC: 44 MG/DL (ref 5–40)

## 2022-12-28 RX ORDER — BACLOFEN 10 MG/1
TABLET ORAL
Qty: 90 TABLET | Refills: 0 | Status: SHIPPED | OUTPATIENT
Start: 2022-12-28

## 2023-01-13 ENCOUNTER — OFFICE VISIT (OUTPATIENT)
Dept: CARDIOLOGY | Facility: CLINIC | Age: 78
End: 2023-01-13
Payer: MEDICARE

## 2023-01-13 VITALS
DIASTOLIC BLOOD PRESSURE: 80 MMHG | BODY MASS INDEX: 36.9 KG/M2 | OXYGEN SATURATION: 93 % | SYSTOLIC BLOOD PRESSURE: 122 MMHG | HEIGHT: 64 IN | HEART RATE: 59 BPM

## 2023-01-13 DIAGNOSIS — I73.9 PERIPHERAL VASCULAR DISEASE: ICD-10-CM

## 2023-01-13 DIAGNOSIS — I25.5 ISCHEMIC CARDIOMYOPATHY: ICD-10-CM

## 2023-01-13 DIAGNOSIS — I65.22 ATHEROSCLEROSIS OF LEFT CAROTID ARTERY: ICD-10-CM

## 2023-01-13 DIAGNOSIS — I25.2 HISTORY OF INFERIOR WALL MYOCARDIAL INFARCTION: ICD-10-CM

## 2023-01-13 DIAGNOSIS — I49.3 ASYMPTOMATIC PVCS: ICD-10-CM

## 2023-01-13 DIAGNOSIS — E11.59 TYPE 2 DIABETES MELLITUS WITH OTHER CIRCULATORY COMPLICATIONS: ICD-10-CM

## 2023-01-13 DIAGNOSIS — Z95.5 STATUS POST CORONARY ARTERY STENT PLACEMENT: ICD-10-CM

## 2023-01-13 DIAGNOSIS — I87.2 CHRONIC VENOUS INSUFFICIENCY: ICD-10-CM

## 2023-01-13 DIAGNOSIS — N18.2 STAGE 2 CHRONIC KIDNEY DISEASE: ICD-10-CM

## 2023-01-13 DIAGNOSIS — I25.10 CORONARY ARTERY DISEASE INVOLVING NATIVE CORONARY ARTERY OF NATIVE HEART WITHOUT ANGINA PECTORIS: Primary | ICD-10-CM

## 2023-01-13 DIAGNOSIS — E78.00 HYPERCHOLESTEROLEMIA: ICD-10-CM

## 2023-01-13 PROCEDURE — 93000 ELECTROCARDIOGRAM COMPLETE: CPT | Performed by: INTERNAL MEDICINE

## 2023-01-13 PROCEDURE — 99214 OFFICE O/P EST MOD 30 MIN: CPT | Performed by: INTERNAL MEDICINE

## 2023-01-13 NOTE — PROGRESS NOTES
Subjective:     Encounter Date:01/13/2023      Patient ID: Michelle Espinoza is a 77 y.o. female.    Chief Complaint:  History of Present Illness    This is a 76-year-old with history of diabetes mellitus type 2, hyperparathyroidism, chronic kidney disease, hyperlipidemia, spinal stenosis, coronary artery disease status post inferior myocardial infarction and drug-eluting stent placement of the proximal, mid, and distal right coronary artery, ischemic cardiomyopathy with an EF of 45-50%, who presents for follow-up.     Patient presents today for routine follow-up.  When I saw her last in 4/2022 she indicated that she had gained a significant amount of weight since the start of the year.  She was complaining of issues with myalgias and felt that this was Dr. Mireles statin use.  Recommended that she decrease her dosage to 20 mg daily.  Since that office visit she was seen by RATNA Hercules in 10/2022.  At that time she indicated that she was tolerating the lower dose of statin better.  For some reason she was off of both aspirin and clopidogrel.  She reported some atypical chest pain.  It was recommended that she resume aspirin 81 mg and an echocardiogram was recommended.  This was performed on 10/21/2022 and showed normal left ventricular systolic function wall motion with EF of 57%, grade 1A diastolic dysfunction and no significant valvular disease.    She returns today for routine follow-up.  She has been having issues with her ankle that is preventing her from ambulating.  She arrived today in a motorized wheelchair.  She reports that her ankle started bothering her significantly this morning and she is been in a lot of pain.  She otherwise denies any chest pain, shortness of breath, palpitations, orthopnea, near-syncope or syncope, or significant lower extremity edema.     Prior History:  I saw the patient initially when she came to the emergency room on 3/27/2021 with an inferior myocardial infarction.   She had presented to the emergency room after a fall.  She reported that she had become suddenly weak after going to the bathroom.  Following her arrival to the emergency room an EKG was performed showing inferolateral ST elevations system with an inferior myocardial infarction.  She is brought emergently to the cardiac catheterization laboratory where she is found to have an occluded proximal right coronary artery.  After reestablishing flow in the vessel was noted that she had severe stenosis in her proximal, mid, and distal right coronary artery for which she underwent 3 separate drug-eluting stent placements.  The procedure was complicated by distal embolization of thrombus in her posterior descending branch.  She was treated with Integrilin infusion for period of time before complaining of a headache.  Work-up of this headache including a CT of the head was unremarkable.  She also had some issues with bradycardia and hypotension during the procedure that required treatment with dopamine and phenylephrine but both of these had improved by the end of the procedure and she no longer required pressor support.     Following a cardiac catheterization she did well from a cardiac standpoint.  She did have an echocardiogram performed on 3/28/2021 that showed mildly depressed left ventricular systolic function with an EF of 41%, inferior wall motion abnormalities, grade 1 diastolic dysfunction, and no significant valvular disease.     She did have some confusion during the remainder of her hospitalization that was concerning for underlying dementia.  She was seen by neurology who recommended pursuing an MRI but she was unable to tolerate this due to her anxiety.  Is recommended that she disco be performed as an outpatient.  Medicine also evaluated the patient for diabetes management and her noncardiac issues.  She was noted to have elevated liver transaminases and her statin therapy was briefly held.  Right upper  quadrant ultrasound was performed and was unremarkable.     Following her discharge she was seen by RATNA Kat on 4/15/2021 at that time she was doing well overall.  Her atorvastatin was restarted at 40 mg daily.  Her carvedilol dosage was increased.     In 5/2021 the patient injured her left tricep resulting in a tear.  She ended up requiring surgical repair by Dr. Arun Cho on 5/25/2021.  Fortunately she only had to hold her clopidogrel for about 1 day before.        The patient called with complaints of a cough with the lisinopril.  Ended up switching her to losartan but she reported that she continued to have issues with significant cough.  This was discontinued and hydralazine was added in its place.  Fortunately with this change her blood pressures remain well controlled.       Following an office visit she underwent a repeat echocardiogram which is performed on 8/17/2021 and showed akinetic inferior wall and basal to mid posterior wall with mildly decreased left ventricular systolic function with EF of 46 to 50%.    Review of Systems   Constitutional: Negative for malaise/fatigue.   HENT: Negative for hearing loss, hoarse voice, nosebleeds and sore throat.    Eyes: Negative for pain.   Cardiovascular: Negative for chest pain, claudication, cyanosis, dyspnea on exertion, irregular heartbeat, leg swelling, near-syncope, orthopnea, palpitations, paroxysmal nocturnal dyspnea and syncope.   Respiratory: Negative for shortness of breath and snoring.    Endocrine: Negative for cold intolerance, heat intolerance, polydipsia, polyphagia and polyuria.   Skin: Negative for itching and rash.   Musculoskeletal: Negative for arthritis, falls, joint pain, joint swelling, muscle cramps, muscle weakness and myalgias.   Gastrointestinal: Negative for constipation, diarrhea, dysphagia, heartburn, hematemesis, hematochezia, melena, nausea and vomiting.   Genitourinary: Negative for frequency, hematuria and hesitancy.    Neurological: Negative for excessive daytime sleepiness, dizziness, headaches, light-headedness, numbness and weakness.   Psychiatric/Behavioral: Negative for depression. The patient is not nervous/anxious.          Current Outpatient Medications:   •  atorvastatin (LIPITOR) 20 MG tablet, Take 1 tablet by mouth Daily., Disp: 90 tablet, Rfl: 0  •  baclofen (LIORESAL) 10 MG tablet, TAKE ONE TABLET BY MOUTH DAILY, Disp: 90 tablet, Rfl: 0  •  carvedilol (COREG) 6.25 MG tablet, TAKE ONE TABLET BY MOUTH TWICE A DAY WITH MEALS, Disp: 90 tablet, Rfl: 2  •  Cholecalciferol (VITAMIN D3) 5000 UNITS capsule capsule, Take 10,000 Units by mouth Daily., Disp: , Rfl:   •  COLLAGEN PO, Take 3 tablets by mouth Daily., Disp: , Rfl:   •  glimepiride (AMARYL) 4 MG tablet, TAKE ONE TABLET BY MOUTH TWICE A DAY, Disp: 180 tablet, Rfl: 1  •  glucose blood (OneTouch Ultra) test strip, Dx code E11.59 testing bs 4 x day, Disp: 400 each, Rfl: 3  •  hydrALAZINE (APRESOLINE) 10 MG tablet, TAKE ONE TABLET BY MOUTH TWICE A DAY, Disp: 60 tablet, Rfl: 5  •  magnesium 30 MG tablet, Take 90 mg by mouth Daily., Disp: , Rfl:   •  Misc Natural Products (NEURIVA PO), Take  by mouth., Disp: , Rfl:   •  ofloxacin (OCUFLOX) 0.3 % ophthalmic solution, , Disp: , Rfl:   •  Probiotic Product (PROBIOTIC PO), Take 1 tablet by mouth Daily., Disp: , Rfl:   •  solifenacin (VESICARE) 10 MG tablet, Take 1 tablet by mouth Daily., Disp: , Rfl:   •  sulfaSALAzine (AZULFIDINE) 500 MG tablet, Take 500 mg by mouth 4 (Four) Times a Day., Disp: , Rfl:   •  vitamin B-12 (CYANOCOBALAMIN) 1000 MCG tablet, Take 1,000 mcg by mouth Daily., Disp: , Rfl:   •  vitamin C (ASCORBIC ACID) 500 MG tablet, Take 500 mg by mouth Daily. With D3, Disp: , Rfl:   •  vitamin E 100 UNIT capsule, , Disp: , Rfl:   •  vitamin E 400 UNIT capsule, , Disp: , Rfl:     Past Medical History:   Diagnosis Date   • Anesthesia complication     STRONG GAG REFLEX   • Benign colonic polyp    • Chronic back pain     • Claustrophobia    • Coronary artery disease 2021    PTCA WITH PLACEMENT OF STENT. PLAVIX ONLY TO BE HELD ONE DAY   • DDD (degenerative disc disease), cervical    • DDD (degenerative disc disease), lumbosacral    • Diabetes mellitus (HCC)     TYPE 2   • Frequent UTI    • History of MI (myocardial infarction) 2021   • History of pyelonephritis 2014   • History of sepsis    • Hypercholesteremia    • Left knee pain     DRAINED  21   • Leg muscle spasm    • Neuropathy     LEFT FOOT. AS RESULT OF BACK PROBLEMS   • Osteoarthritis    • Osteoporosis    • Overactive bladder    • Type 2 diabetes mellitus (HCC)    • Ureteral calculus, left    • Weakness     LOWER EXTREMITES RELATED FROM NERVE DAMAGE FROM BACK       Past Surgical History:   Procedure Laterality Date   • APPENDECTOMY     • BACK SURGERY      MULTIPLE. WITH HARDWARE   • CARDIAC CATHETERIZATION N/A 2021    Procedure: Left Heart Cath;  Surgeon: Leydi Romo MD;  Location:  [facility];  Service: Cardiovascular;  Laterality: N/A;   • CARDIAC CATHETERIZATION  2021    Procedure: Percutaneous Manual Thrombectomy;  Surgeon: Leydi Romo MD;  Location:  [facility];  Service: Cardiovascular;;   • CARDIAC CATHETERIZATION N/A 2021    Procedure: Percutaneous Coronary Intervention;  Surgeon: Leydi Romo MD;  Location:  [facility];  Service: Cardiovascular;  Laterality: N/A;   • CARDIAC CATHETERIZATION N/A 2021    Procedure: Coronary angiography;  Surgeon: Leydi Romo MD;  Location:  [facility];  Service: Cardiovascular;  Laterality: N/A;   • CARPAL TUNNEL RELEASE      LEFT X2 RIGHT    • CATARACT EXTRACTION WITH INTRAOCULAR LENS IMPLANT      LEFT AND RIGHT   • CERVICAL SPINE SURGERY      MULTIPLE C3-4 C6-7   •  SECTION      x 2   • COLONOSCOPY  2018   • CORONARY STENT PLACEMENT  2021   • PARATHYROIDECTOMY Left 2019     Procedure: PARATHYROIDECTOMY LEFT INFERIOR;  Surgeon: Mason Prather MD;  Location[facility];  Service: ENT   • TRICEP TENDON REPAIR Left 2021    Procedure: LEFT OPEN TRICEPS REPAIR;  Surgeon: Luis Cho II, MD;  Location: [facility];  Service: Orthopedics;  Laterality: Left;   • URETEROSCOPY LASER LITHOTRIPSY WITH STENT INSERTION Left 2021    Procedure: LEFT URETEROSCOPY LASER LITHOTRIPSY, STONE BASKET EXTRACTION STENT;  Surgeon: Jose Luis Nelson Jr., MD;  Location[facility];  Service: Urology;  Laterality: Left;       Family History   Problem Relation Age of Onset   • Stroke Mother    • Heart disease Mother    • Hypertension Mother            • Clotting disorder Father    • Hypertension Father            • Diabetes Father    • Aneurysm Father    • Hypertension Sister            • Diabetes Sister    • Cervical cancer Sister    • Diabetes Sister    • Cervical cancer Maternal Grandmother 72   • Diabetes Grandchild    • Asthma Sister    • Hypertension Sister         Dead   • Diabetes Sister    • Malig Hyperthermia Neg Hx        Social History     Tobacco Use   • Smoking status: Former     Packs/day: 1.00     Years: 15.00     Pack years: 15.00     Types: Cigarettes     Start date: 1965     Quit date: 1992     Years since quittin.0   • Smokeless tobacco: Never   • Tobacco comments:     CAFFEINE USE: ICE TEA OCC.   Vaping Use   • Vaping Use: Never used   Substance Use Topics   • Alcohol use: Not Currently     Comment: Socially.   • Drug use: Yes     Types: Hydrocodone         ECG 12 Lead    Date/Time: 2023 1:17 PM  Performed by: Leydi Romo MD  Authorized by: Leydi Romo MD   Comparison: compared with previous ECG   Similar to previous ECG  Comparison to previous ECG: PVC frequency increased  Rhythm: sinus rhythm  Ectopy: trigeminy  Q waves: III, aVF, V1, V2, V3, V5 and V4                   Objective:     Visit Vitals  BP  "122/80 (BP Location: Left arm, Patient Position: Sitting, Cuff Size: Adult)   Pulse 59   Ht 162.6 cm (64\")   LMP  (LMP Unknown)   SpO2 93%   BMI 36.90 kg/m²         Constitutional:       Appearance: Normal appearance. Well-developed.   Eyes:      General: Lids are normal.      Conjunctiva/sclera: Conjunctivae normal.      Pupils: Pupils are equal, round, and reactive to light.   HENT:      Head: Normocephalic and atraumatic.   Neck:      Vascular: No carotid bruit or JVD.      Lymphadenopathy: No cervical adenopathy.   Pulmonary:      Effort: Pulmonary effort is normal.      Breath sounds: Normal breath sounds.   Cardiovascular:      Normal rate. Regular rhythm.      No gallop.   Pulses:     Radial: 2+ bilaterally.  Edema:     Peripheral edema absent.   Abdominal:      Palpations: Abdomen is soft.   Musculoskeletal:      Cervical back: Full passive range of motion without pain, normal range of motion and neck supple. Skin:     General: Skin is warm and dry.   Neurological:      Mental Status: Alert and oriented to person, place, and time.             Assessment:          Diagnosis Plan   1. Coronary artery disease involving native coronary artery of native heart without angina pectoris        2. Ischemic cardiomyopathy        3. Asymptomatic PVCs        4. History of inferior wall myocardial infarction        5. Atherosclerosis of left carotid artery        6. Hypercholesterolemia        7. Chronic venous insufficiency        8. Status post coronary artery stent placement        9. Peripheral vascular disease (HCC)        10. Type 2 diabetes mellitus with other circulatory complications (CMS/HCC)         11. Stage 2 chronic kidney disease               Plan:       1.  Coronary artery disease.  Patient stable and asymptomatic.  Her EKG today shows no new ischemic changes although she is in ventricular trigeminy.  Continue current medical management.  There was an issue with the patient taking aspirin and she was " encouraged to resume this at her last office visit.  I forgot to confirm this with the patient at this visit but did call her after her visit and left a message asking her to let us know if she is taking the aspirin 1 month.  If she is not taking it I asked her to go ahead and started of 81 mg.  2.  Ventricular ectopy.  Noted to have isolated PVCs on her last EKG.  Today she is having ventricular trigeminy.  She is asymptomatic.  Recent echocardiogram was unremarkable.  She is not having any symptoms concerning for ischemic heart disease.  Her increase in ventricular ectopy may be due to her weight gain over the last year plus her issues with pain from her ankle.  Discussed options including monitor placement.  We opted to watch her for now and have her return in 3 months.  If she still having frequent PVCs we will need to consider placing a 24-hour monitor at that time.  3.  History of ischemic cardiomyopathy.  Resolved and her most recent echocardiogram shows normalization of her left ventricular function.  4.  Hypertension.  Well-controlled on current regimen medications.  Continue the same.  5.  Hyperlipidemia.  On atorvastatin 20 mg daily.  She had more issues of myalgias on the higher dose.  Since we decreased her dosage to 20 mg her LDL has risen to 88.  However I rather her be on a low-dose of statin with not ideally controlled lipids but not at all.  6.  Peripheral vascular disease  7.  Diabetes mellitus type 2  8.  Chronic kidney disease  9.  Rheumatoid arthritis.    We will see the patient back again in 3 months.

## 2023-01-18 ENCOUNTER — TELEPHONE (OUTPATIENT)
Dept: FAMILY MEDICINE CLINIC | Facility: CLINIC | Age: 78
End: 2023-01-18

## 2023-01-18 NOTE — TELEPHONE ENCOUNTER
Caller: Michelle Espinoza    Relationship: Self    Best call back number: [phone]    What was the call regarding: PATIENT STATED THAT THE ENDOCRINOLOGIST DR RAYMOND HAS LEFT THE GROUP SHE WAS WITH.    SHE STATED SHE WOULD LIKE TO CONTINUE WITH HER BUT DOES NOT KNOW WHERE SHE WENT AFTER SHE LEFT.    SHE WOULD LIKE TO KNOW IF KATARINA EASON IS ABLE TO GET THIS INFORMATION FOR HER AND RETURN THE CALL.    Do you require a callback: YES

## 2023-01-18 NOTE — TELEPHONE ENCOUNTER
I was unaware of this provider leaving the practice. Please let me know what we find out about this.

## 2023-02-03 RX ORDER — CARVEDILOL 6.25 MG/1
TABLET ORAL
Qty: 90 TABLET | Refills: 2 | Status: SHIPPED | OUTPATIENT
Start: 2023-02-03

## 2023-02-09 ENCOUNTER — TELEPHONE (OUTPATIENT)
Dept: ENDOCRINOLOGY | Age: 78
End: 2023-02-09
Payer: MEDICARE

## 2023-02-09 DIAGNOSIS — E11.65 TYPE 2 DIABETES MELLITUS WITH HYPERGLYCEMIA, WITHOUT LONG-TERM CURRENT USE OF INSULIN: Primary | ICD-10-CM

## 2023-02-09 NOTE — TELEPHONE ENCOUNTER
Pt had a lab appointment with us on 2/16 but [facility] Can you put labs in her chart please. I will fax over the orders

## 2023-02-16 DIAGNOSIS — E11.65 TYPE 2 DIABETES MELLITUS WITH HYPERGLYCEMIA, WITHOUT LONG-TERM CURRENT USE OF INSULIN: ICD-10-CM

## 2023-02-17 LAB
ALBUMIN SERPL-MCNC: 3.9 G/DL (ref 3.7–4.7)
ALBUMIN/GLOB SERPL: 1.6 {RATIO} (ref 1.2–2.2)
ALP SERPL-CCNC: 84 IU/L (ref 44–121)
ALT SERPL-CCNC: 20 IU/L (ref 0–32)
AST SERPL-CCNC: 23 IU/L (ref 0–40)
BILIRUB SERPL-MCNC: 0.3 MG/DL (ref 0–1.2)
BUN SERPL-MCNC: 25 MG/DL (ref 8–27)
BUN/CREAT SERPL: 23 (ref 12–28)
CALCIUM SERPL-MCNC: 9.8 MG/DL (ref 8.7–10.3)
CHLORIDE SERPL-SCNC: 102 MMOL/L (ref 96–106)
CHOLEST SERPL-MCNC: 164 MG/DL (ref 100–199)
CO2 SERPL-SCNC: 23 MMOL/L (ref 20–29)
CREAT SERPL-MCNC: 1.09 MG/DL (ref 0.57–1)
EGFRCR SERPLBLD CKD-EPI 2021: 53 ML/MIN/1.73
GLOBULIN SER CALC-MCNC: 2.4 G/DL (ref 1.5–4.5)
GLUCOSE SERPL-MCNC: 186 MG/DL (ref 70–99)
HBA1C MFR BLD: 6.9 % (ref 4.8–5.6)
HDLC SERPL-MCNC: 53 MG/DL
IMP & REVIEW OF LAB RESULTS: NORMAL
LDLC SERPL CALC-MCNC: 77 MG/DL (ref 0–99)
POTASSIUM SERPL-SCNC: 5.1 MMOL/L (ref 3.5–5.2)
PROT SERPL-MCNC: 6.3 G/DL (ref 6–8.5)
REPORT: NORMAL
SODIUM SERPL-SCNC: 138 MMOL/L (ref 134–144)
T3FREE SERPL-MCNC: 2.5 PG/ML (ref 2–4.4)
T4 FREE SERPL-MCNC: 1.32 NG/DL (ref 0.82–1.77)
TRIGL SERPL-MCNC: 208 MG/DL (ref 0–149)
TSH SERPL DL<=0.005 MIU/L-ACNC: 2.46 UIU/ML (ref 0.45–4.5)
UNABLE TO VOID: NORMAL
VLDLC SERPL CALC-MCNC: 34 MG/DL (ref 5–40)

## 2023-03-01 RX ORDER — ATORVASTATIN CALCIUM 20 MG/1
TABLET, FILM COATED ORAL
Qty: 90 TABLET | Refills: 3 | Status: SHIPPED | OUTPATIENT
Start: 2023-03-01

## 2023-03-02 ENCOUNTER — OFFICE VISIT (OUTPATIENT)
Dept: ENDOCRINOLOGY | Age: 78
End: 2023-03-02
Payer: MEDICARE

## 2023-03-02 VITALS
TEMPERATURE: 97.7 F | HEART RATE: 60 BPM | BODY MASS INDEX: 34.15 KG/M2 | HEIGHT: 64 IN | OXYGEN SATURATION: 100 % | WEIGHT: 200 LBS | SYSTOLIC BLOOD PRESSURE: 138 MMHG | DIASTOLIC BLOOD PRESSURE: 74 MMHG

## 2023-03-02 DIAGNOSIS — E11.69 HYPERLIPIDEMIA ASSOCIATED WITH TYPE 2 DIABETES MELLITUS: ICD-10-CM

## 2023-03-02 DIAGNOSIS — E11.65 TYPE 2 DIABETES MELLITUS WITH HYPERGLYCEMIA, WITHOUT LONG-TERM CURRENT USE OF INSULIN: Primary | ICD-10-CM

## 2023-03-02 DIAGNOSIS — E78.5 HYPERLIPIDEMIA ASSOCIATED WITH TYPE 2 DIABETES MELLITUS: ICD-10-CM

## 2023-03-02 PROCEDURE — 99214 OFFICE O/P EST MOD 30 MIN: CPT | Performed by: NURSE PRACTITIONER

## 2023-03-02 RX ORDER — CALCIUM CARBONATE 1250 MG/5ML
15 SUSPENSION ORAL DAILY
COMMUNITY

## 2023-03-08 ENCOUNTER — TELEPHONE (OUTPATIENT)
Dept: FAMILY MEDICINE CLINIC | Facility: CLINIC | Age: 78
End: 2023-03-08

## 2023-03-08 NOTE — TELEPHONE ENCOUNTER
Caller: Michelle Espinoza    Relationship to patient: Self    Best call back number: [phone]     Date of exposure: OVER THE WEEKEND     Date of positive COVID19 test: HOME TEST 03/06/23 SYMPTOMS STARTED ON 03/06/23    Date if possible COVID19 exposure:     COVID19 symptoms: SORE THROAT / COUGH / DRAINAGE / NAUSEA / DIARRHEA     Date of initial quarantine:     Additional information or concerns: PATIENT CALLING WANTING TO KNOW IF THERE IS SOMETHING THAT KATARINA JENARO RECOMMENDS HER TO TAKE SHE IS CURRENTLY USING ROBITUSSIN FOR THE COUGH IT DOESN'T SEEM TO BE HELPING SUPPRESS THE COUGH     What is the patients preferred pharmacy: [facility] [id number] - [place], [place] - [address] - [phone] [facility] [phone]   [phone]

## 2023-03-08 NOTE — TELEPHONE ENCOUNTER
I called and talked to patient and told her that she could take tynelol mucinex and ibuprofen for symptoms. I advised her to get a Pulse OX and if her oxygen goes below 90 to go the Emergency room. Patient verbalized understanding and I told her I would still send this message to you to see if you have any other suggestions.

## 2023-03-08 NOTE — TELEPHONE ENCOUNTER
Caller: Michelle Espinoza    Relationship: Self    Best call back number: [phone]    What is the best time to reach you: ASAP    Who are you requesting to speak with (clinical staff, provider,  specific staff member): CLINICAL STAFF    What was the call regarding: PATIENT CALLED TO CHECK THE STATUS OF THIS REQUEST. PATIENT IS REQUESTING A CALL BACK TO BE ADVISED ON WHAT TO DO.     Do you require a callback: YES

## 2023-03-08 NOTE — TELEPHONE ENCOUNTER
If she would like to consider Paxlovid, she will need to be seen. Otherwise, I agree with symptom treatment and to ER if worsening respiratory symptoms, weakness, or oxygen < 90%.

## 2023-03-09 NOTE — TELEPHONE ENCOUNTER
Hub staff attempted to follow warm transfer process and was unsuccessful     Caller: Michelle Espinoza    Relationship to patient: Self    Best call back number: [phone] (Mobile)    Patient is needing: RETURNING [facility] CALL.        THANKS

## 2023-03-09 NOTE — TELEPHONE ENCOUNTER
"I called and advised patient of Lillie Ji's message and told her that we have a opening at 8:15am tomorrow and that was Lillie's only opening. Patient declined and stated that she would \"ride it out\". I advised patient that if she needs to be seen over the weekend she needs to go to the Urgent Care of ER. Patient verbalized understanding - KL   "

## 2023-04-12 NOTE — PROGRESS NOTES
Subjective:     Encounter Date:04/13/2023      Patient ID: Michelle Espinoza is a 77 y.o. female.    Chief Complaint:follow up CAD, ICM  History of Present Illness  This is a 76 y/o female who follows with Dr. Romo whom I have seen in the past. She has a pmhx of coronary artery disease s/p PCI with stent placement to the proximal, mid, and distal right coronary artery following an inferior MI, ischemic cardiomyopathy with an EF of 45-50%, hypertension, hyperlipidemia, CKD, and diabetes.     She was last seen by Dr. Romo in January 2023 and she was feeling ok from a cardiac standpoint. She was noted to be in ventricular trigeminy. She was asymptomatic with this. She had recently had an echocardiogram at that time that was unremarkable so it was decided to monitor. She was instructed to return in 3 months and if PVCs were persistent, could consider a 24 hour Holter.    She is here today for a follow up visit. She tells me she has been feeling good since she was seen by Dr. Romo. She is still having mobility issues and is in a wheelchair today. She is going to physical therapy twice a week. She denies any chest pain, shortness of breath, palpitations, dizziness or syncope. She denies any lower extremity swelling, orthopnea or PND. She does have some discomfort at times in her left chest that she attributes to always sleeping on that side. No associated symptoms reported and it does not seem to be any worse than before. She reports some fatigue but this is not worse than her normal fatigue. Her blood pressure is well controlled. She is taking a baby aspirin daily as instructed before. And she seems to be tolerating the low dose statin.    Prior history:  Dr. Romo began following the patient when she came to the emergency room on 3/27/2021 with an inferior myocardial infarction. Following her arrival to the emergency room an EKG was performed showing inferolateral ST elevations system with an inferior myocardial  infarction.  She was brought emergently to the cardiac catheterization laboratory where she is found to have an occluded proximal right coronary artery.  After reestablishing flow in the vessel was noted that she had severe stenosis in her proximal, mid, and distal right coronary artery for which she underwent 3 separate drug-eluting stent placements.  The procedure was complicated by distal embolization of thrombus in her posterior descending branch.  She was treated with Integrilin infusion for period of time before complaining of a headache.  Work-up of this headache including a CT of the head was unremarkable.  She also had some issues with bradycardia and hypotension during the procedure that required treatment with dopamine and phenylephrine but both of these had improved by the end of the procedure and she no longer required pressor support.     Following a cardiac catheterization she did well from a cardiac standpoint.  She did have an echocardiogram performed on 3/28/2021 that showed mildly depressed left ventricular systolic function with an EF of 41%, inferior wall motion abnormalities, grade 1 diastolic dysfunction, and no significant valvular disease.    She was seen in the office in April 2022. At that time, it was decided to stop clopidogrel since it had been over a year since her stent placement. Statin therapy was reduced to see if it would help with her myalgias. She had also gained a significant amount of weight. Otherwise, she had been doing fairly well.    I then saw her in October 2022 and she reported a dull ache in the left side of her chest that lasts about 20 mins and resolves on its own. She had also been having pounding in her throat a couple of times a week that did not occur when the chest discomfort occurs. Her EKG was stable with no ischemic changes so we opted to proceed with an echocardiogram. The echocardiogram was unremarkable and no changes were made.     I have reviewed and  updated as appropriate allergies, current medications, past family history, past medical history, past surgical history and problem list.    Review of Systems   Constitutional: Negative for fever, malaise/fatigue, weight gain and weight loss.   HENT: Negative for congestion, hoarse voice and sore throat.    Eyes: Negative for blurred vision and double vision.   Cardiovascular: Positive for chest pain. Negative for dyspnea on exertion, leg swelling, orthopnea, palpitations and syncope.   Respiratory: Negative for cough, shortness of breath and wheezing.    Gastrointestinal: Negative for abdominal pain, hematemesis, hematochezia and melena.   Genitourinary: Negative for dysuria and hematuria.   Neurological: Positive for loss of balance and weakness. Negative for dizziness, headaches, light-headedness and numbness.   Psychiatric/Behavioral: Negative for depression. The patient is not nervous/anxious.          Current Outpatient Medications:   •  atorvastatin (LIPITOR) 20 MG tablet, TAKE ONE TABLET BY MOUTH DAILY, Disp: 90 tablet, Rfl: 3  •  baclofen (LIORESAL) 10 MG tablet, TAKE ONE TABLET BY MOUTH DAILY, Disp: 90 tablet, Rfl: 0  •  Calcium Carbonate 1250 MG/5ML, Take 15 mL by mouth Daily., Disp: , Rfl:   •  carvedilol (COREG) 6.25 MG tablet, TAKE ONE TABLET BY MOUTH TWICE A DAY WITH MEALS, Disp: 90 tablet, Rfl: 2  •  Cholecalciferol (VITAMIN D3) 5000 UNITS capsule capsule, Take 2 capsules by mouth Daily., Disp: , Rfl:   •  COLLAGEN PO, Take 3 tablets by mouth Daily., Disp: , Rfl:   •  glimepiride (AMARYL) 4 MG tablet, TAKE ONE TABLET BY MOUTH TWICE A DAY, Disp: 180 tablet, Rfl: 1  •  Glucos-Chondroit-Hyaluron-MSM (GLUCOSAMINE CHONDROITIN JOINT PO), Take  by mouth 2 (Two) Times a Day. 2000-250mg, Disp: , Rfl:   •  glucose blood (OneTouch Ultra) test strip, Dx code E11.59 testing bs 4 x day, Disp: 400 each, Rfl: 3  •  hydrALAZINE (APRESOLINE) 10 MG tablet, TAKE ONE TABLET BY MOUTH TWICE A DAY, Disp: 60 tablet, Rfl:  5  •  magnesium 30 MG tablet, Take 3 tablets by mouth Daily., Disp: , Rfl:   •  Misc Natural Products (NEURIVA PO), Take  by mouth., Disp: , Rfl:   •  mupirocin (BACTROBAN) 2 % ointment, , Disp: , Rfl:   •  ofloxacin (OCUFLOX) 0.3 % ophthalmic solution, , Disp: , Rfl:   •  Probiotic Product (PROBIOTIC PO), Take 1 tablet by mouth Daily., Disp: , Rfl:   •  solifenacin (VESICARE) 10 MG tablet, Take 1 tablet by mouth Daily., Disp: , Rfl:   •  sulfaSALAzine (AZULFIDINE) 500 MG tablet, Take 1 tablet by mouth 4 (Four) Times a Day., Disp: , Rfl:   •  vitamin B-12 (CYANOCOBALAMIN) 1000 MCG tablet, Take 1 tablet by mouth Daily., Disp: , Rfl:   •  vitamin C (ASCORBIC ACID) 500 MG tablet, Take 1 tablet by mouth Daily. With D3, Disp: , Rfl:   •  vitamin E 100 UNIT capsule, , Disp: , Rfl:   •  vitamin E 400 UNIT capsule, , Disp: , Rfl:     Past Medical History:   Diagnosis Date   • Anesthesia complication     STRONG GAG REFLEX   • Benign colonic polyp    • Chronic back pain    • Claustrophobia    • Coronary artery disease 03/27/2021    PTCA WITH PLACEMENT OF STENT. PLAVIX ONLY TO BE HELD ONE DAY   • DDD (degenerative disc disease), cervical    • DDD (degenerative disc disease), lumbosacral    • Diabetes mellitus     TYPE 2   • Frequent UTI    • History of MI (myocardial infarction) 03/27/2021   • History of pyelonephritis 06/2014   • History of sepsis 2014   • Hypercholesteremia    • Left knee pain     DRAINED  9/21/21   • Leg muscle spasm    • Neuropathy     LEFT FOOT. AS RESULT OF BACK PROBLEMS   • Osteoarthritis    • Osteoporosis    • Overactive bladder    • Type 2 diabetes mellitus    • Ureteral calculus, left    • Weakness     LOWER EXTREMITES RELATED FROM NERVE DAMAGE FROM BACK       Past Surgical History:   Procedure Laterality Date   • APPENDECTOMY     • BACK SURGERY      MULTIPLE. WITH HARDWARE   • CARDIAC CATHETERIZATION N/A 03/27/2021    Procedure: Left Heart Cath;  Surgeon: Leydi Romo MD;  Location: [facility]  INVASIVE LOCATION;  Service: Cardiovascular;  Laterality: N/A;   • CARDIAC CATHETERIZATION  2021    Procedure: Percutaneous Manual Thrombectomy;  Surgeon: Leydi Romo MD;  Location:  [facility] INVASIVE LOCATION;  Service: Cardiovascular;;   • CARDIAC CATHETERIZATION N/A 2021    Procedure: Percutaneous Coronary Intervention;  Surgeon: Leydi Romo MD;  Location:  [facility] INVASIVE LOCATION;  Service: Cardiovascular;  Laterality: N/A;   • CARDIAC CATHETERIZATION N/A 2021    Procedure: Coronary angiography;  Surgeon: Leydi Romo MD;  Location:  [facility] INVASIVE LOCATION;  Service: Cardiovascular;  Laterality: N/A;   • CARPAL TUNNEL RELEASE      LEFT X2 RIGHT    • CATARACT EXTRACTION WITH INTRAOCULAR LENS IMPLANT      LEFT AND RIGHT   • CERVICAL SPINE SURGERY      MULTIPLE C3-4 C6-7   •  SECTION      x 2   • COLONOSCOPY  2018   • CORONARY STENT PLACEMENT  2021   • PARATHYROIDECTOMY Left 2019    Procedure: PARATHYROIDECTOMY LEFT INFERIOR;  Surgeon: Mason Prather MD;  Location: [facility] OR [facility];  Service: ENT   • THYROID SURGERY     • TRICEP TENDON REPAIR Left 2021    Procedure: LEFT OPEN TRICEPS REPAIR;  Surgeon: Luis Cho II, MD;  Location: [facility] MAIN OR;  Service: Orthopedics;  Laterality: Left;   • URETEROSCOPY LASER LITHOTRIPSY WITH STENT INSERTION Left 2021    Procedure: LEFT URETEROSCOPY LASER LITHOTRIPSY, STONE BASKET EXTRACTION STENT;  Surgeon: Jose Luis [facility] Jr., MD;  Location: [facility] MAIN OR;  Service: Urology;  Laterality: Left;       Family History   Problem Relation Age of Onset   • Stroke Mother    • Heart disease Mother    • Hypertension Mother            • Clotting disorder Father    • Hypertension Father            • Diabetes Father    • Aneurysm Father    • Hypertension Sister            • Diabetes Sister    • Cervical cancer Sister    • Obesity Sister    • Diabetes Sister    • Obesity  "Sister    • Cervical cancer Maternal Grandmother 72   • Diabetes Grandchild    • Asthma Sister    • Hypertension Sister         Dead   • Diabetes Sister    • Malig Hyperthermia Neg Hx        Social History     Tobacco Use   • Smoking status: Former     Packs/day: 1.00     Years: 15.00     Pack years: 15.00     Types: Cigarettes     Start date: 1965     Quit date: 1992     Years since quittin.3   • Smokeless tobacco: Never   • Tobacco comments:     CAFFEINE USE: ICE TEA OCC.   Vaping Use   • Vaping Use: Never used   Substance Use Topics   • Alcohol use: Not Currently     Comment: Socially.   • Drug use: Yes     Types: Hydrocodone         ECG 12 Lead    Date/Time: 2023 11:57 AM  Performed by: Mercedes Kimble APRN  Authorized by: Mercedes Kimble APRN   Comparison: compared with previous ECG from 2022  Similar to previous ECG  Rhythm: sinus rhythm  Q waves: aVF, III, V1, V2, V3, V4 and V5    Comments: No significant change from previous EKG               Objective:     Visit Vitals  /60   Pulse 86   Ht 162.6 cm (64\")   LMP  (LMP Unknown)   BMI 34.33 kg/m²             Physical Exam  Constitutional:       Appearance: Normal appearance. She is normal weight.   HENT:      Head: Normocephalic.   Neck:      Vascular: No carotid bruit.   Cardiovascular:      Rate and Rhythm: Normal rate and regular rhythm.      Chest Wall: PMI is not displaced.      Pulses: Normal pulses.           Radial pulses are 2+ on the right side and 2+ on the left side.        Posterior tibial pulses are 2+ on the right side and 2+ on the left side.      Heart sounds: Normal heart sounds. No murmur heard.    No friction rub. No gallop.   Pulmonary:      Effort: Pulmonary effort is normal.      Breath sounds: Normal breath sounds.   Abdominal:      General: Bowel sounds are normal. There is no distension.      Palpations: Abdomen is soft.   Musculoskeletal:      Right lower le+ Edema present.      Left lower le+ " Edema present.   Skin:     General: Skin is warm and dry.      Capillary Refill: Capillary refill takes less than 2 seconds.   Neurological:      Mental Status: She is alert and oriented to person, place, and time.   Psychiatric:         Mood and Affect: Mood normal.         Behavior: Behavior normal.         Thought Content: Thought content normal.          Lab Review:   Lipid Panel        11/3/2022    10:59 2/16/2023    11:00   Lipid Panel   Total Cholesterol 185   164     Triglycerides 263   208     HDL Cholesterol 53   53     VLDL Cholesterol 44   34     LDL Cholesterol  88   77           Cardiac Procedures:   1. Echocardiogram 3/27/21:  · Calculated left ventricular EF = 41.3% Estimated left ventricular EF was in agreement with the calculated left ventricular EF. Left ventricular systolic function is moderately decreased.  · The following left ventricular wall segments are hypokinetic: basal inferolateral, mid inferolateral, apical inferior, mid inferior and basal inferior.  · Left ventricular diastolic function is consistent with (grade I) impaired relaxation.  · There is calcification of the aortic valve.  · There is no significant valular stenosis or regurgatation    2. Cardiac catheterization 3/27/21:  Left Main   Large-caliber vessel with 0% stenosis. The vessel bifurcates into left anterior descending and left circumflex arteries.   Left Anterior Descending   Large-caliber vessel with 30% proximal stenosis. Is a 50% mid stenosis. The mid vessel then tapers to a small caliber with 20 to 30% mid stenosis. The distal vessel is severely tortuous and has no significant disease.   Left Circumflex   Moderate caliber vessel with 0% proximal stenosis. Proximal vessel gives rise to a moderate caliber, severely tortuous first obtuse marginal branch with no significant disease. The distal continuation of the circumflex vessel tapers to small caliber with 0% stenosis.   Right Coronary Artery   Large-caliber vessel  with severe proximal calcification and 100% thrombotic occlusion.   Prox RCA lesion is 100% stenosed. Culprit lesion. JOVANNY flow is 0. The lesion is type B2.   1.  Inferior myocardial infarction status post drug-eluting stent placement of the proximal, mid, and distal right coronary artery  2.  Coronary artery disease        Assessment:         Diagnoses and all orders for this visit:    1. Atherosclerosis of left carotid artery (Primary)    2. Hypercholesterolemia    3. Chronic venous insufficiency    4. Stenosis of right carotid artery    5. History of inferior wall myocardial infarction    6. Coronary artery disease involving native coronary artery of native heart without angina pectoris    7. Ischemic cardiomyopathy    8. Asymptomatic PVCs    9. Status post coronary artery stent placement    10. Peripheral vascular disease            Plan:         1. CAD with atypical chest pain: s/p PCI with stent x 3 to RCA. EKG is stable with no ischemic changes noted. On aspirin, statin and beta blocker. Recent echocardiogram stable.   2. PVCs: previously in trigeminy during last office visit. Today occasional PVCs noted on exam. She continues to remain asymptomatic with this. No changes at this time.  3. HTN: blood pressure has been well controlled on current regimen. No changes  4. HLD: on lower dose statin. Unable to tolerate high dose due to leg pain. Goal LDL < 70  5. History of ischemic cardiomyopathy: normalization of left ventricular function on recent echocardiogram. Appears compensated on exam today.   6. Decreased mobility: Continues with PT.     Thank you for allowing me to participate in this patient's care. Please call with any questions or concerns. Ms. Espinoza will follow up with Dr. Romo in 6 months.          Your medication list          Accurate as of April 13, 2023 11:28 AM. If you have any questions, ask your nurse or doctor.            CONTINUE taking these medications      Instructions Last Dose Given  Next Dose Due   atorvastatin 20 MG tablet  Commonly known as: LIPITOR      TAKE ONE TABLET BY MOUTH DAILY       baclofen 10 MG tablet  Commonly known as: LIORESAL      TAKE ONE TABLET BY MOUTH DAILY       Calcium Carbonate 1250 MG/5ML      Take 15 mL by mouth Daily.       carvedilol 6.25 MG tablet  Commonly known as: COREG      TAKE ONE TABLET BY MOUTH TWICE A DAY WITH MEALS       COLLAGEN PO      Take 3 tablets by mouth Daily.       glimepiride 4 MG tablet  Commonly known as: AMARYL      TAKE ONE TABLET BY MOUTH TWICE A DAY       GLUCOSAMINE CHONDROITIN JOINT PO      Take  by mouth 2 (Two) Times a Day. 2000-250mg       hydrALAZINE 10 MG tablet  Commonly known as: APRESOLINE      TAKE ONE TABLET BY MOUTH TWICE A DAY       magnesium 30 MG tablet      Take 3 tablets by mouth Daily.       mupirocin 2 % ointment  Commonly known as: BACTROBAN           NEURIVA PO      Take  by mouth.       ofloxacin 0.3 % ophthalmic solution  Commonly known as: OCUFLOX           OneTouch Ultra test strip  Generic drug: glucose blood      Dx code E11.59 testing bs 4 x day       PROBIOTIC PO      Take 1 tablet by mouth Daily.       solifenacin 10 MG tablet  Commonly known as: VESICARE      Take 1 tablet by mouth Daily.       sulfaSALAzine 500 MG tablet  Commonly known as: AZULFIDINE      Take 1 tablet by mouth 4 (Four) Times a Day.       vitamin B-12 1000 MCG tablet  Commonly known as: CYANOCOBALAMIN      Take 1 tablet by mouth Daily.       vitamin C 500 MG tablet  Commonly known as: ASCORBIC ACID      Take 1 tablet by mouth Daily. With D3       vitamin D3 125 MCG (5000 UT) capsule capsule      Take 2 capsules by mouth Daily.       vitamin E 400 UNIT capsule           vitamin E 100 UNIT capsule                    RATNA Hercules  04/13/23  10:33 AM EDT

## 2023-04-13 ENCOUNTER — OFFICE VISIT (OUTPATIENT)
Dept: CARDIOLOGY | Facility: CLINIC | Age: 78
End: 2023-04-13
Payer: MEDICARE

## 2023-04-13 VITALS
HEIGHT: 64 IN | SYSTOLIC BLOOD PRESSURE: 112 MMHG | DIASTOLIC BLOOD PRESSURE: 60 MMHG | HEART RATE: 86 BPM | BODY MASS INDEX: 34.33 KG/M2

## 2023-04-13 DIAGNOSIS — I65.22 ATHEROSCLEROSIS OF LEFT CAROTID ARTERY: Primary | ICD-10-CM

## 2023-04-13 DIAGNOSIS — I65.21 STENOSIS OF RIGHT CAROTID ARTERY: ICD-10-CM

## 2023-04-13 DIAGNOSIS — I25.10 CORONARY ARTERY DISEASE INVOLVING NATIVE CORONARY ARTERY OF NATIVE HEART WITHOUT ANGINA PECTORIS: ICD-10-CM

## 2023-04-13 DIAGNOSIS — E78.00 HYPERCHOLESTEROLEMIA: ICD-10-CM

## 2023-04-13 DIAGNOSIS — I87.2 CHRONIC VENOUS INSUFFICIENCY: ICD-10-CM

## 2023-04-13 DIAGNOSIS — I49.3 ASYMPTOMATIC PVCS: ICD-10-CM

## 2023-04-13 DIAGNOSIS — Z95.5 STATUS POST CORONARY ARTERY STENT PLACEMENT: ICD-10-CM

## 2023-04-13 DIAGNOSIS — I73.9 PERIPHERAL VASCULAR DISEASE: ICD-10-CM

## 2023-04-13 DIAGNOSIS — I25.5 ISCHEMIC CARDIOMYOPATHY: ICD-10-CM

## 2023-04-13 DIAGNOSIS — I25.2 HISTORY OF INFERIOR WALL MYOCARDIAL INFARCTION: ICD-10-CM

## 2023-04-13 RX ORDER — ASPIRIN 81 MG/1
81 TABLET, CHEWABLE ORAL DAILY
COMMUNITY

## 2023-04-17 RX ORDER — BACLOFEN 10 MG/1
TABLET ORAL
Qty: 90 TABLET | Refills: 0 | Status: SHIPPED | OUTPATIENT
Start: 2023-04-17

## 2023-04-17 RX ORDER — HYDRALAZINE HYDROCHLORIDE 10 MG/1
TABLET, FILM COATED ORAL
Qty: 60 TABLET | Refills: 11 | Status: SHIPPED | OUTPATIENT
Start: 2023-04-17

## 2023-04-19 DIAGNOSIS — E11.59 TYPE 2 DIABETES MELLITUS WITH OTHER CIRCULATORY COMPLICATIONS: ICD-10-CM

## 2023-04-19 RX ORDER — GLIMEPIRIDE 4 MG/1
4 TABLET ORAL 2 TIMES DAILY
Qty: 180 TABLET | Refills: 1 | Status: SHIPPED | OUTPATIENT
Start: 2023-04-19

## 2023-04-19 NOTE — TELEPHONE ENCOUNTER
Patient called needing a refill on glimepride 4 mg. She has 1 day left.    [facility] on [facility]      Did let her know it may take 24 hours

## 2023-06-15 ENCOUNTER — OFFICE VISIT (OUTPATIENT)
Dept: ENDOCRINOLOGY | Age: 78
End: 2023-06-15
Payer: MEDICARE

## 2023-06-15 VITALS
OXYGEN SATURATION: 97 % | SYSTOLIC BLOOD PRESSURE: 110 MMHG | DIASTOLIC BLOOD PRESSURE: 60 MMHG | HEIGHT: 64 IN | BODY MASS INDEX: 34.31 KG/M2 | TEMPERATURE: 95.5 F | HEART RATE: 62 BPM

## 2023-06-15 DIAGNOSIS — I25.10 CORONARY ARTERY DISEASE INVOLVING NATIVE CORONARY ARTERY OF NATIVE HEART WITHOUT ANGINA PECTORIS: ICD-10-CM

## 2023-06-15 DIAGNOSIS — N18.2 STAGE 2 CHRONIC KIDNEY DISEASE: ICD-10-CM

## 2023-06-15 DIAGNOSIS — E11.65 TYPE 2 DIABETES MELLITUS WITH HYPERGLYCEMIA, WITHOUT LONG-TERM CURRENT USE OF INSULIN: Primary | ICD-10-CM

## 2023-06-15 RX ORDER — CARVEDILOL 6.25 MG/1
TABLET ORAL
Qty: 90 TABLET | Refills: 2 | Status: SHIPPED | OUTPATIENT
Start: 2023-06-15

## 2023-06-15 NOTE — PROGRESS NOTES
"Chief Complaint  Diabetes (Testing bs 1 x day )    Subjective        Michelle Espinoza presents to [facility] ENDOCRINOLOGY  History of Present Illness    I have reviewed PMH, allergies and medications UTD at this visit      Status post parathyroid surgery in March 2019.     Not open to injections for diabetes management    Not agreeable to metformin at this time    Caution with sglt-2 given urinary urgency and frequency    Type 2 dm   Diagnosed about 5-10 years ago.   Today in clinic pt reports being on glimepiride 4mg BIDAC  Reports BS are running in the 200s  Last eye exam - 4 weeks ago  (+) CKD  Dm neuropathy - yes   CAD - yes   Episodes of hypoglycemia - denies   On statin     Objective   Vital Signs:  /60   Pulse 62   Temp 95.5 °F (35.3 °C) (Temporal)   Ht 162.6 cm (64.02\")   SpO2 97%   BMI 34.31 kg/m²   Estimated body mass index is 34.31 kg/m² as calculated from the following:    Height as of this encounter: 162.6 cm (64.02\").    Weight as of 3/2/23: 90.7 kg (200 lb).             Physical Exam  Vitals reviewed.   Constitutional:       General: She is not in acute distress.  HENT:      Head: Normocephalic and atraumatic.   Cardiovascular:      Rate and Rhythm: Normal rate.   Pulmonary:      Effort: Pulmonary effort is normal. No respiratory distress.   Musculoskeletal:         General: No signs of injury. Normal range of motion.      Cervical back: Normal range of motion and neck supple.   Skin:     General: Skin is warm and dry.   Neurological:      Mental Status: She is alert and oriented to person, place, and time. Mental status is at baseline.   Psychiatric:         Mood and Affect: Mood normal.         Behavior: Behavior normal.         Thought Content: Thought content normal.         Judgment: Judgment normal.      Result Review :  The following data was reviewed by: RATNA Garcia on 06/15/2023:  Common labs          11/3/2022    10:59 2/16/2023    11:00   Common Labs "   Glucose 127  186    BUN 36  25    Creatinine 0.99  1.09    Sodium 139  138    Potassium 4.6  5.1    Chloride 106  102    Calcium 9.7  9.8    Total Protein  6.3    Albumin  3.9    Total Bilirubin  0.3    Alkaline Phosphatase  84    AST (SGOT)  23    ALT (SGPT)  20    Total Cholesterol 185  164    Triglycerides 263  208    HDL Cholesterol 53  53    LDL Cholesterol  88  77    Hemoglobin A1C 6.70  6.9                   Assessment and Plan   Diagnoses and all orders for this visit:    1. Type 2 diabetes mellitus with hyperglycemia, without long-term current use of insulin (Primary)  -     Hemoglobin A1c  -     Basic Metabolic Panel    2. Stage 2 chronic kidney disease    3. Coronary artery disease involving native coronary artery of native heart without angina pectoris             Follow Up   Return in about 4 months (around 10/15/2023).    Labs today  Discussed options for treatment if A1c uncontrolled   Higher risk of complications with reports of BS > 200    Patient was given instructions and counseling regarding her condition or for health maintenance advice. Please see specific information pulled into the AVS if appropriate.     Saskia Calderon, APRN

## 2023-06-16 LAB
BUN SERPL-MCNC: 25 MG/DL (ref 8–23)
BUN/CREAT SERPL: 22.9 (ref 7–25)
CALCIUM SERPL-MCNC: 9.9 MG/DL (ref 8.6–10.5)
CHLORIDE SERPL-SCNC: 105 MMOL/L (ref 98–107)
CO2 SERPL-SCNC: 26.2 MMOL/L (ref 22–29)
CREAT SERPL-MCNC: 1.09 MG/DL (ref 0.57–1)
EGFRCR SERPLBLD CKD-EPI 2021: 52.8 ML/MIN/1.73
GLUCOSE SERPL-MCNC: 197 MG/DL (ref 65–99)
HBA1C MFR BLD: 6.6 % (ref 4.8–5.6)
POTASSIUM SERPL-SCNC: 5.2 MMOL/L (ref 3.5–5.2)
SODIUM SERPL-SCNC: 139 MMOL/L (ref 136–145)

## 2023-06-22 ENCOUNTER — TELEPHONE (OUTPATIENT)
Dept: CARDIOLOGY | Facility: CLINIC | Age: 78
End: 2023-06-22

## 2023-06-22 NOTE — TELEPHONE ENCOUNTER
Caller: SERGIO    Relationship to patient: SELF    Best call back number: [phone]     Patient is needing: PT IS HAVING TROUBLE TAKING DEEP BREATHS. SHE WAS ALSO GOING TO PT TO MAKE HER LEGS STRONGER AND THAT HASN'T BEEN WORKING EITHER. PATIENT ADVISED BEEN GOING ON FOR ABOUT 2 WEEKS. ONLY DEEP BREATHS IS REALLY WHEN SHE YAWNS.

## 2023-06-22 NOTE — TELEPHONE ENCOUNTER
Hub staff attempted to follow warm transfer process and was unsuccessful     Caller: Michelle Espinoza    Relationship to patient: Self    Best call back number: [phone]    Patient is needing:   PATIENT CALLED BACK, WAS TRANSFERRED AND THERE WAS NO RESPONSE.  PLEASE REACH OUT TO PATIENT FOR CONSULTATION.

## 2023-06-22 NOTE — TELEPHONE ENCOUNTER
I scheduled pt an appt to see you tomorrow at 14:30.    Thank you,    Liana Lambert RN  Triage [facility]  06/22/23 16:29 EDT

## 2023-06-22 NOTE — TELEPHONE ENCOUNTER
I tried to call Michelle Espinoza but there was no answer.  Left a voicemail asking patient to call back.  Will continue to try to reach pt.    Thank you,    Liana Lambert RN  [place] [facility] [place]  06/22/23 14:30 EDT

## 2023-06-22 NOTE — TELEPHONE ENCOUNTER
"Pt called to report difficulty taking a deep breath and weak legs.  She describes it as more of a shallow breathing.  She also shares that she feels a little bit of pressure in her chest all of the time, but denies chest pain, just describes it as \"weight\".  She describes it as in the middle across the top, not associated with activity.  She states that this has been ongoing for 2 weeks.  She endorses feeling some nausea yesterday.  She denies arm pain, jaw pain, SOA, or diaphoresis.    She's concerned about how she can't take a deep breath.  She states that her legs are weaker just going up the stairs because \"the legs are weak\" not because she's out of breath.  She has not reached out to her PCP regarding these symptoms.    Do you have any recommendations for this patient?    Thank you,    Liana Lambert RN  [facility] [place] Triage  06/22/23  15:53 EDT    "

## 2023-10-13 ENCOUNTER — OFFICE VISIT (OUTPATIENT)
Age: 78
End: 2023-10-13
Payer: MEDICARE

## 2023-10-13 VITALS
SYSTOLIC BLOOD PRESSURE: 124 MMHG | DIASTOLIC BLOOD PRESSURE: 74 MMHG | BODY MASS INDEX: 34.15 KG/M2 | HEART RATE: 93 BPM | HEIGHT: 64 IN | WEIGHT: 200 LBS

## 2023-10-13 DIAGNOSIS — E11.59 TYPE 2 DIABETES MELLITUS WITH OTHER CIRCULATORY COMPLICATIONS: ICD-10-CM

## 2023-10-13 DIAGNOSIS — I25.810 CORONARY ARTERY DISEASE INVOLVING CORONARY BYPASS GRAFT OF NATIVE HEART WITHOUT ANGINA PECTORIS: Primary | ICD-10-CM

## 2023-10-13 RX ORDER — GLIMEPIRIDE 4 MG/1
4 TABLET ORAL 2 TIMES DAILY
Qty: 180 TABLET | Refills: 1 | OUTPATIENT
Start: 2023-10-13

## 2023-10-13 NOTE — PROGRESS NOTES
Subjective:     Encounter Date:10/13/2023      Patient ID: Michelle Espinoza is a 78 y.o. female.    Chief Complaint:follow up CAD, ICM  History of Present Illness  This is a 78 y/o female who follows with Dr. Romo whom I have seen in the past. She has a pmhx of coronary artery disease s/p PCI with stent placement to the proximal, mid, and distal right coronary artery following an inferior MI, ischemic cardiomyopathy with an EF of 45-50%, hypertension, hyperlipidemia, CKD, and diabetes.     I last saw her in June 2023 at which time she was having complaints of feeling like she couldn't take a deep breath and was struggling with progressive weakness. She would have episodes where she would not be doing anything and would have a brief moment of shortness of breath, she would yawn and it would resolve. She was quite concerned about arrhythmia as her sister has afib. I had a proBNP drawn in office that day that was normal. I also had ordered a 24 holter monitor but unfortunately, this was not placed. I also asked for the patient to be contacted to return for placement and she was never contacted.    She returns today for her 3 month follow up. Her symptoms are unchanged. She continues to have random episodes of shortness of breath that are unchanged. She denies any chest pain, palpitations, swelling in her legs, orthopnea or PND. She is very frustrated with how her health has declined over the last few years. She has difficulty ambulating and uses a walker at home. She is in a wheelchair here in the office.    Prior history:  Dr. Romo began following the patient when she came to the emergency room on 3/27/2021 with an inferior myocardial infarction. Following her arrival to the emergency room an EKG was performed showing inferolateral ST elevations system with an inferior myocardial infarction.  She was brought emergently to the cardiac catheterization laboratory where she is found to have an occluded proximal  right coronary artery.  After reestablishing flow in the vessel was noted that she had severe stenosis in her proximal, mid, and distal right coronary artery for which she underwent 3 separate drug-eluting stent placements.  The procedure was complicated by distal embolization of thrombus in her posterior descending branch.  She was treated with Integrilin infusion for period of time before complaining of a headache.  Work-up of this headache including a CT of the head was unremarkable.  She also had some issues with bradycardia and hypotension during the procedure that required treatment with dopamine and phenylephrine but both of these had improved by the end of the procedure and she no longer required pressor support.     Following a cardiac catheterization she did well from a cardiac standpoint.  She did have an echocardiogram performed on 3/28/2021 that showed mildly depressed left ventricular systolic function with an EF of 41%, inferior wall motion abnormalities, grade 1 diastolic dysfunction, and no significant valvular disease.    She was seen in the office in April 2022. At that time, it was decided to stop clopidogrel since it had been over a year since her stent placement. Statin therapy was reduced to see if it would help with her myalgias. She had also gained a significant amount of weight. Otherwise, she had been doing fairly well.    I then saw her in October 2022 and she reported a dull ache in the left side of her chest that lasts about 20 mins and resolves on its own. She had also been having pounding in her throat a couple of times a week that did not occur when the chest discomfort occurs. Her EKG was stable with no ischemic changes so we opted to proceed with an echocardiogram. The echocardiogram was unremarkable and no changes were made.     She was last seen by Dr. Romo in January 2023 and she was feeling ok from a cardiac standpoint. She was noted to be in ventricular trigeminy. She was  asymptomatic with this. She had recently had an echocardiogram at that time that was unremarkable so it was decided to monitor. She was instructed to return in 3 months and if PVCs were persistent, could consider a 24 hour Holter.    I have reviewed and updated as appropriate allergies, current medications, past family history, past medical history, past surgical history and problem list.    Review of Systems   Constitutional: Negative for fever, weight gain and weight loss.   HENT:  Negative for congestion, hoarse voice and sore throat.    Eyes:  Negative for blurred vision and double vision.   Cardiovascular:  Negative for chest pain, dyspnea on exertion, leg swelling, orthopnea, palpitations and syncope.   Respiratory:  Positive for shortness of breath. Negative for cough and wheezing.    Gastrointestinal:  Negative for abdominal pain, hematemesis, hematochezia and melena.   Genitourinary:  Negative for dysuria and hematuria.   Neurological:  Positive for weakness. Negative for dizziness, headaches, light-headedness, loss of balance and numbness.   Psychiatric/Behavioral:  Negative for depression. The patient is not nervous/anxious.          Current Outpatient Medications:     aspirin 81 MG chewable tablet, Chew 1 tablet Daily., Disp: , Rfl:     atorvastatin (LIPITOR) 20 MG tablet, TAKE ONE TABLET BY MOUTH DAILY, Disp: 90 tablet, Rfl: 3    baclofen (LIORESAL) 10 MG tablet, TAKE ONE TABLET BY MOUTH DAILY, Disp: 90 tablet, Rfl: 0    carvedilol (COREG) 6.25 MG tablet, TAKE ONE TABLET BY MOUTH TWICE A DAY WITH MEALS, Disp: 90 tablet, Rfl: 2    Cholecalciferol (VITAMIN D3) 5000 UNITS capsule capsule, Take 2 capsules by mouth Daily., Disp: , Rfl:     COLLAGEN PO, Take 3 tablets by mouth Daily., Disp: , Rfl:     glimepiride (AMARYL) 4 MG tablet, Take 1 tablet by mouth 2 (Two) Times a Day., Disp: 180 tablet, Rfl: 1    Glucos-Chondroit-Hyaluron-MSM (GLUCOSAMINE CHONDROITIN JOINT PO), Take  by mouth 2 (Two) Times a Day.  2000-250mg, Disp: , Rfl:     glucose blood (OneTouch Ultra) test strip, Dx code E11.59 testing bs 4 x day, Disp: 400 each, Rfl: 3    hydrALAZINE (APRESOLINE) 10 MG tablet, TAKE ONE TABLET BY MOUTH TWICE A DAY, Disp: 60 tablet, Rfl: 11    magnesium 30 MG tablet, Take 3 tablets by mouth Daily., Disp: , Rfl:     Misc Natural Products (NEURIVA PO), Take  by mouth., Disp: , Rfl:     ofloxacin (OCUFLOX) 0.3 % ophthalmic solution, , Disp: , Rfl:     Probiotic Product (PROBIOTIC PO), Take 1 tablet by mouth Daily., Disp: , Rfl:     solifenacin (VESICARE) 10 MG tablet, Take 1 tablet by mouth Daily., Disp: , Rfl:     sulfaSALAzine (AZULFIDINE) 500 MG tablet, Take 1 tablet by mouth 4 (Four) Times a Day., Disp: , Rfl:     vitamin B-12 (CYANOCOBALAMIN) 1000 MCG tablet, Take 1 tablet by mouth Daily., Disp: , Rfl:     vitamin E 400 UNIT capsule, , Disp: , Rfl:     Calcium Carbonate 1250 MG/5ML, Take 15 mL by mouth Daily., Disp: , Rfl:     mupirocin (BACTROBAN) 2 % ointment, , Disp: , Rfl:     vitamin C (ASCORBIC ACID) 500 MG tablet, Take 1 tablet by mouth Daily. With D3, Disp: , Rfl:     vitamin E 100 UNIT capsule, , Disp: , Rfl:     Past Medical History:   Diagnosis Date    Anesthesia complication     STRONG GAG REFLEX    Benign colonic polyp     Chronic back pain     Claustrophobia     Coronary artery disease 03/27/2021    PTCA WITH PLACEMENT OF STENT. PLAVIX ONLY TO BE HELD ONE DAY    DDD (degenerative disc disease), cervical     DDD (degenerative disc disease), lumbosacral     Diabetes mellitus     TYPE 2    Frequent UTI     History of MI (myocardial infarction) 03/27/2021    History of pyelonephritis 06/2014    History of sepsis 2014    Hypercholesteremia     Left knee pain     DRAINED  9/21/21    Leg muscle spasm     Neuropathy     LEFT FOOT. AS RESULT OF BACK PROBLEMS    Osteoarthritis     Osteoporosis     Overactive bladder     Type 2 diabetes mellitus     Ureteral calculus, left     Weakness     LOWER EXTREMITES RELATED  FROM NERVE DAMAGE FROM BACK       Past Surgical History:   Procedure Laterality Date    APPENDECTOMY      BACK SURGERY      MULTIPLE. WITH HARDWARE    CARDIAC CATHETERIZATION N/A 2021    Procedure: Left Heart Cath;  Surgeon: Leydi Romo MD;  Location[facility] CATH [facility]ASIVE LOC[facility];  Service: Cardiovascular;  Laterality: N/A;    CARDIAC CATHETERIZATION  2021    Procedure: Percutaneous Manual Thrombectomy;  Surgeon: Leydi Romo MD;  Location[facility] CATH [facility] LOC[facility];  Service: Cardiovascular;;    CARDIAC CATHETERIZATION N/A 2021    Procedure: Percutaneous Coronary Intervention;  Surgeon: Leydi Romo MD;  Location:  [facility] CATH INVASIVE LOCATION;  Service: Cardiovascular;  Laterality: N/A;    CARDIAC CATHETERIZATION N/A 2021    Procedure: Coronary angiography;  Surgeon: Leydi Romo MD;  Location: [facility] CATH [facility]VASIVE LOCATION;  Service: Cardiovascular;  Laterality: N/A;    CARPAL TUNNEL RELEASE      LEFT X2 RIGHT     CATARACT EXTRACTION WITH INTRAOCULAR LENS IMPLANT      LEFT AND RIGHT    CERVICAL SPINE SURGERY      MULTIPLE C3-4 C6-7     SECTION      x 2    COLONOSCOPY  2018    CORONARY STENT PLACEMENT  2021    PARATHYROIDECTOMY Left 2019    Procedure: PARATHYROIDECTOMY LEFT INFERIOR;  Surgeon: Mason Prather MD;  Location: [facility] OR [facility];  Service: ENT    THYROID SURGERY      TRICEP TENDON REPAIR Left 2021    Procedure: LEFT OPEN TRICEPS REPAIR;  Surgeon: Luis Cho II, MD;  Location: [facility] MAIN OR;  Service: Orthopedics;  Laterality: Left;    URETEROSCOPY LASER LITHOTRIPSY WITH STENT INSERTION Left 2021    Procedure: LEFT URETEROSCOPY LASER LITHOTRIPSY, STONE BASKET EXTRACTION STENT;  Surgeon: Jose Luis Nelson Jr., MD;  Location: [facility] MAIN OR;  Service: Urology;  Laterality: Left;       Family History   Problem Relation Age of Onset    Stroke Mother     Heart disease Mother     Hypertension Mother          "    Clotting disorder Father     Hypertension Father             Diabetes Father     Aneurysm Father     Hypertension Sister             Diabetes Sister     Cervical cancer Sister     Obesity Sister     Diabetes Sister     Obesity Sister     Cervical cancer Maternal Grandmother 72    Diabetes Grandchild     Asthma Sister     Hypertension Sister         Dead    Diabetes Sister     Malig Hyperthermia Neg Hx        Social History     Tobacco Use    Smoking status: Former     Packs/day: 1.00     Years: 15.00     Additional pack years: 0.00     Total pack years: 15.00     Types: Cigarettes     Start date: 1965     Quit date: 1992     Years since quittin.8    Smokeless tobacco: Never    Tobacco comments:     CAFFEINE USE: ICE TEA OCC.   Vaping Use    Vaping Use: Never used   Substance Use Topics    Alcohol use: Not Currently     Comment: Socially.    Drug use: Yes     Types: Hydrocodone         ECG 12 Lead    Date/Time: 10/13/2023 3:09 PM  Performed by: Mercedes Kimble APRN    Authorized by: Mercedes Kimble APRN  Comparison: compared with previous ECG from 2023  Similar to previous ECG  Rhythm: sinus rhythm  Ectopy: multifocal PVCs             Objective:     Visit Vitals  /74   Pulse 93   Ht 162.6 cm (64\")   Wt 90.7 kg (200 lb)   LMP  (LMP Unknown)   BMI 34.33 kg/mý             Physical Exam  Constitutional:       Appearance: Normal appearance. She is obese.   HENT:      Head: Normocephalic.   Neck:      Vascular: No carotid bruit.   Cardiovascular:      Rate and Rhythm: Normal rate and regular rhythm.      Chest Wall: PMI is not displaced.      Pulses: Normal pulses.           Radial pulses are 2+ on the right side and 2+ on the left side.        Posterior tibial pulses are 2+ on the right side and 2+ on the left side.      Heart sounds: Normal heart sounds. No murmur heard.     No friction rub. No gallop.   Pulmonary:      Effort: Pulmonary effort is normal.      Breath " sounds: Normal breath sounds.   Abdominal:      General: Bowel sounds are normal. There is no distension.      Palpations: Abdomen is soft.   Musculoskeletal:      Right lower le+ No edema.      Left lower le+ No edema.   Skin:     General: Skin is warm and dry.      Capillary Refill: Capillary refill takes less than 2 seconds.   Neurological:      Mental Status: She is alert and oriented to person, place, and time.   Psychiatric:         Mood and Affect: Mood normal.         Behavior: Behavior normal.         Thought Content: Thought content normal.          Lab Review:   Lipid Panel          11/3/2022    10:59 2023    11:00   Lipid Panel   Total Cholesterol 185  164    Triglycerides 263  208    HDL Cholesterol 53  53    VLDL Cholesterol 44  34    LDL Cholesterol  88  77          Cardiac Procedures:   Echocardiogram 3/27/21:  Calculated left ventricular EF = 41.3% Estimated left ventricular EF was in agreement with the calculated left ventricular EF. Left ventricular systolic function is moderately decreased.  The following left ventricular wall segments are hypokinetic: basal inferolateral, mid inferolateral, apical inferior, mid inferior and basal inferior.  Left ventricular diastolic function is consistent with (grade I) impaired relaxation.  There is calcification of the aortic valve.  There is no significant valular stenosis or regurgatation    Cardiac catheterization 3/27/21:  Left Main   Large-caliber vessel with 0% stenosis. The vessel bifurcates into left anterior descending and left circumflex arteries.   Left Anterior Descending   Large-caliber vessel with 30% proximal stenosis. Is a 50% mid stenosis. The mid vessel then tapers to a small caliber with 20 to 30% mid stenosis. The distal vessel is severely tortuous and has no significant disease.   Left Circumflex   Moderate caliber vessel with 0% proximal stenosis. Proximal vessel gives rise to a moderate caliber, severely tortuous first  obtuse marginal branch with no significant disease. The distal continuation of the circumflex vessel tapers to small caliber with 0% stenosis.   Right Coronary Artery   Large-caliber vessel with severe proximal calcification and 100% thrombotic occlusion.   Prox RCA lesion is 100% stenosed. Culprit lesion. JOVANNY flow is 0. The lesion is type B2.   1.  Inferior myocardial infarction status post drug-eluting stent placement of the proximal, mid, and distal right coronary artery  2.  Coronary artery disease        Assessment:         There are no diagnoses linked to this encounter.          Plan:       SOA: Will place the 24 hour holter today to assess for potential arrhythmia as the cause of her shortness of breath. If this is normal, I would recommend proceeding with a stress test.  CAD: s/p PCI with stent x 3 to RCA. EKG is stable with no ischemic changes noted. On aspirin, statin and beta blocker. Recent echocardiogram stable.   PVCs: Will assess burden on Holter. She does have a history of trigeminy.   HTN: blood pressure has been well controlled on current regimen. No changes  HLD: on lower dose statin. Unable to tolerate high dose due to leg pain. Goal LDL < 70  History of ischemic cardiomyopathy: normalization of left ventricular function on recent echocardiogram. Appears compensated on exam today.     Thank you for allowing me to participate in this patient's care. Please call with any questions or concerns. Ms. Espinoza will follow up with Dr. Romo in 3 months.          Your medication list            Accurate as of October 13, 2023  2:29 PM. If you have any questions, ask your nurse or doctor.                CONTINUE taking these medications        Instructions Last Dose Given Next Dose Due   aspirin 81 MG chewable tablet      Chew 1 tablet Daily.       atorvastatin 20 MG tablet  Commonly known as: LIPITOR      TAKE ONE TABLET BY MOUTH DAILY       baclofen 10 MG tablet  Commonly known as: LIORESAL      TAKE  ONE TABLET BY MOUTH DAILY       Calcium Carbonate 1250 MG/5ML      Take 15 mL by mouth Daily.       carvedilol 6.25 MG tablet  Commonly known as: COREG      TAKE ONE TABLET BY MOUTH TWICE A DAY WITH MEALS       COLLAGEN PO      Take 3 tablets by mouth Daily.       glimepiride 4 MG tablet  Commonly known as: AMARYL      Take 1 tablet by mouth 2 (Two) Times a Day.       GLUCOSAMINE CHONDROITIN JOINT PO      Take  by mouth 2 (Two) Times a Day. 2000-250mg       hydrALAZINE 10 MG tablet  Commonly known as: APRESOLINE      TAKE ONE TABLET BY MOUTH TWICE A DAY       magnesium 30 MG tablet      Take 3 tablets by mouth Daily.       mupirocin 2 % ointment  Commonly known as: BACTROBAN           NEURIVA PO      Take  by mouth.       ofloxacin 0.3 % ophthalmic solution  Commonly known as: OCUFLOX           OneTouch Ultra test strip  Generic drug: glucose blood      Dx code E11.59 testing bs 4 x day       PROBIOTIC PO      Take 1 tablet by mouth Daily.       solifenacin 10 MG tablet  Commonly known as: VESICARE      Take 1 tablet by mouth Daily.       sulfaSALAzine 500 MG tablet  Commonly known as: AZULFIDINE      Take 1 tablet by mouth 4 (Four) Times a Day.       vitamin B-12 1000 MCG tablet  Commonly known as: CYANOCOBALAMIN      Take 1 tablet by mouth Daily.       vitamin C 500 MG tablet  Commonly known as: ASCORBIC ACID      Take 1 tablet by mouth Daily. With D3       vitamin D3 125 MCG (5000 UT) capsule capsule      Take 2 capsules by mouth Daily.       vitamin E 400 UNIT capsule           vitamin E 100 UNIT capsule                      RATNA Hercules  10/13/23  10:33 AM EDT

## 2023-10-15 DIAGNOSIS — E11.59 TYPE 2 DIABETES MELLITUS WITH OTHER CIRCULATORY COMPLICATIONS: ICD-10-CM

## 2023-10-16 RX ORDER — GLIMEPIRIDE 4 MG/1
4 TABLET ORAL 2 TIMES DAILY
Qty: 180 TABLET | Refills: 1 | OUTPATIENT
Start: 2023-10-16

## 2023-10-19 ENCOUNTER — TELEPHONE (OUTPATIENT)
Dept: CARDIOLOGY | Facility: CLINIC | Age: 78
End: 2023-10-19
Payer: MEDICARE

## 2023-10-19 DIAGNOSIS — I49.3 PVC'S (PREMATURE VENTRICULAR CONTRACTIONS): Primary | ICD-10-CM

## 2023-10-19 NOTE — TELEPHONE ENCOUNTER
----- Message from RATNA Hercules sent at 10/19/2023 10:38 AM EDT -----  LVM for patient to return call regarding monitor results.     Triage-She has a pretty significant burden of ventricular ectopy and this could be causing her SOA. We need to get an echocardiogram to determine our next steps in treating this. I have placed the order for echo.

## 2023-10-19 NOTE — TELEPHONE ENCOUNTER
Pt returned call.  I gave her test results and transferred her to the scheduling dep't to have echo scheduled.  She verbalized understanding and has no questions at this time.    Thank you,    Liana MOHAN , RN  Triage [facility]  10/19/23 10:43 EDT

## 2023-10-26 ENCOUNTER — HOSPITAL ENCOUNTER (OUTPATIENT)
Dept: CARDIOLOGY | Facility: HOSPITAL | Age: 78
Discharge: HOME | End: 2023-10-26
Admitting: NURSE PRACTITIONER
Payer: MEDICARE

## 2023-10-26 VITALS
BODY MASS INDEX: 34.15 KG/M2 | WEIGHT: 200 LBS | SYSTOLIC BLOOD PRESSURE: 126 MMHG | HEART RATE: 62 BPM | DIASTOLIC BLOOD PRESSURE: 76 MMHG | HEIGHT: 64 IN | OXYGEN SATURATION: 93 %

## 2023-10-26 DIAGNOSIS — I49.3 PVC'S (PREMATURE VENTRICULAR CONTRACTIONS): ICD-10-CM

## 2023-10-26 LAB
AORTIC ARCH: 2.7 CM
ASCENDING AORTA: 3.2 CM
BH CV ECHO MEAS - ACS: 1.6 CM
BH CV ECHO MEAS - AO MAX PG: 13.4 MMHG
BH CV ECHO MEAS - AO MEAN PG: 6 MMHG
BH CV ECHO MEAS - AO ROOT DIAM: 3.2 CM
BH CV ECHO MEAS - AO V2 MAX: 183 CM/SEC
BH CV ECHO MEAS - AO V2 VTI: 25.6 CM
BH CV ECHO MEAS - AVA(I,D): 1.67 CM2
BH CV ECHO MEAS - EDV(CUBED): 74.1 ML
BH CV ECHO MEAS - EDV(MOD-SP2): 85 ML
BH CV ECHO MEAS - EDV(MOD-SP4): 83 ML
BH CV ECHO MEAS - EF(MOD-BP): 51.4 %
BH CV ECHO MEAS - EF(MOD-SP2): 49.4 %
BH CV ECHO MEAS - EF(MOD-SP4): 53 %
BH CV ECHO MEAS - ESV(MOD-SP2): 43 ML
BH CV ECHO MEAS - ESV(MOD-SP4): 39 ML
BH CV ECHO MEAS - IVS/LVPW: 1.09 CM
BH CV ECHO MEAS - IVSD: 1.2 CM
BH CV ECHO MEAS - LAT PEAK E' VEL: 5.2 CM/SEC
BH CV ECHO MEAS - LV DIASTOLIC VOL/BSA (35-75): 42.4 CM2
BH CV ECHO MEAS - LV MASS(C)D: 167.4 GRAMS
BH CV ECHO MEAS - LV MAX PG: 3.5 MMHG
BH CV ECHO MEAS - LV MEAN PG: 2.05 MMHG
BH CV ECHO MEAS - LV SYSTOLIC VOL/BSA (12-30): 19.9 CM2
BH CV ECHO MEAS - LV V1 MAX: 94 CM/SEC
BH CV ECHO MEAS - LV V1 VTI: 13.5 CM
BH CV ECHO MEAS - LVIDD: 4.2 CM
BH CV ECHO MEAS - LVOT AREA: 3.2 CM2
BH CV ECHO MEAS - LVOT DIAM: 2 CM
BH CV ECHO MEAS - LVPWD: 1.1 CM
BH CV ECHO MEAS - MED PEAK E' VEL: 4.2 CM/SEC
BH CV ECHO MEAS - MR MAX PG: 13.4 MMHG
BH CV ECHO MEAS - MR MAX VEL: 183.2 CM/SEC
BH CV ECHO MEAS - MV A DUR: 0.1 SEC
BH CV ECHO MEAS - MV A MAX VEL: 70.3 CM/SEC
BH CV ECHO MEAS - MV DEC SLOPE: 227.9 CM/SEC2
BH CV ECHO MEAS - MV DEC TIME: 0.27 SEC
BH CV ECHO MEAS - MV E MAX VEL: 48.8 CM/SEC
BH CV ECHO MEAS - MV E/A: 0.69
BH CV ECHO MEAS - MV MAX PG: 3.8 MMHG
BH CV ECHO MEAS - MV MEAN PG: 1.21 MMHG
BH CV ECHO MEAS - MV P1/2T: 85.5 MSEC
BH CV ECHO MEAS - MV V2 VTI: 27.6 CM
BH CV ECHO MEAS - MVA(P1/2T): 2.6 CM2
BH CV ECHO MEAS - MVA(VTI): 1.54 CM2
BH CV ECHO MEAS - PA ACC TIME: 0.12 SEC
BH CV ECHO MEAS - PA V2 MAX: 86.9 CM/SEC
BH CV ECHO MEAS - PULM A REVS DUR: 0.09 SEC
BH CV ECHO MEAS - PULM A REVS VEL: 30.5 CM/SEC
BH CV ECHO MEAS - PULM DIAS VEL: 34.3 CM/SEC
BH CV ECHO MEAS - PULM S/D: 1.26
BH CV ECHO MEAS - PULM SYS VEL: 43.3 CM/SEC
BH CV ECHO MEAS - QP/QS: 0.62
BH CV ECHO MEAS - RAP SYSTOLE: 3 MMHG
BH CV ECHO MEAS - RV MAX PG: 0.99 MMHG
BH CV ECHO MEAS - RV V1 MAX: 49.7 CM/SEC
BH CV ECHO MEAS - RV V1 VTI: 9.2 CM
BH CV ECHO MEAS - RVOT DIAM: 1.92 CM
BH CV ECHO MEAS - RVSP: 20.5 MMHG
BH CV ECHO MEAS - SI(MOD-SP2): 21.5 ML/M2
BH CV ECHO MEAS - SI(MOD-SP4): 22.5 ML/M2
BH CV ECHO MEAS - SUP REN AO DIAM: 2 CM
BH CV ECHO MEAS - SV(LVOT): 42.7 ML
BH CV ECHO MEAS - SV(MOD-SP2): 42 ML
BH CV ECHO MEAS - SV(MOD-SP4): 44 ML
BH CV ECHO MEAS - SV(RVOT): 26.6 ML
BH CV ECHO MEAS - TAPSE (>1.6): 1.99 CM
BH CV ECHO MEAS - TR MAX PG: 17.5 MMHG
BH CV ECHO MEAS - TR MAX VEL: 209 CM/SEC
BH CV ECHO MEASUREMENTS AVERAGE E/E' RATIO: 10.38
BH CV XLRA - RV BASE: 2.8 CM
BH CV XLRA - RV LENGTH: 7.8 CM
BH CV XLRA - RV MID: 3.2 CM
BH CV XLRA - TDI S': 13.5 CM/SEC
LEFT ATRIUM VOLUME INDEX: 13.2 ML/M2
SINUS: 2.9 CM
STJ: 2.8 CM

## 2023-10-26 PROCEDURE — 25510000001 PERFLUTREN (DEFINITY) 8.476 MG IN SODIUM CHLORIDE (PF) 0.9 % 10 ML INJECTION: Performed by: NURSE PRACTITIONER

## 2023-10-26 PROCEDURE — 93306 TTE W/DOPPLER COMPLETE: CPT

## 2023-10-26 RX ADMIN — PERFLUTREN 1.5 ML: 6.52 INJECTION, SUSPENSION INTRAVENOUS at 13:53

## 2023-10-30 RX ORDER — CARVEDILOL 6.25 MG/1
6.25 TABLET ORAL 2 TIMES DAILY WITH MEALS
Qty: 90 TABLET | Refills: 0 | Status: SHIPPED | OUTPATIENT
Start: 2023-10-30

## 2023-11-06 RX ORDER — BACLOFEN 10 MG/1
TABLET ORAL
Qty: 90 TABLET | Refills: 0 | OUTPATIENT
Start: 2023-11-06

## 2023-12-11 RX ORDER — CARVEDILOL 6.25 MG/1
TABLET ORAL
Qty: 90 TABLET | Refills: 0 | Status: SHIPPED | OUTPATIENT
Start: 2023-12-11

## 2023-12-19 RX ORDER — HYDRALAZINE HYDROCHLORIDE 10 MG/1
10 TABLET, FILM COATED ORAL 2 TIMES DAILY
Qty: 180 TABLET | Refills: 1 | Status: SHIPPED | OUTPATIENT
Start: 2023-12-19

## 2023-12-20 RX ORDER — ATORVASTATIN CALCIUM 20 MG/1
TABLET, FILM COATED ORAL
Qty: 90 TABLET | Refills: 3 | Status: SHIPPED | OUTPATIENT
Start: 2023-12-20

## 2024-01-11 ENCOUNTER — HOSPITAL ENCOUNTER (INPATIENT)
Facility: HOSPITAL | Age: 79
LOS: 1 days | Discharge: HOME | End: 2024-01-14
Attending: EMERGENCY MEDICINE | Admitting: HOSPITALIST
Payer: MEDICARE

## 2024-01-11 DIAGNOSIS — L03.115 CELLULITIS OF RIGHT LOWER EXTREMITY: Primary | ICD-10-CM

## 2024-01-11 DIAGNOSIS — R54 AGE-RELATED PHYSICAL DEBILITY: ICD-10-CM

## 2024-01-11 DIAGNOSIS — E11.65 UNCONTROLLED TYPE 2 DIABETES MELLITUS WITH HYPERGLYCEMIA: ICD-10-CM

## 2024-01-11 LAB
ALBUMIN SERPL-MCNC: 4 G/DL (ref 3.5–5.2)
ALBUMIN/GLOB SERPL: 1.3 G/DL
ALP SERPL-CCNC: 90 U/L (ref 39–117)
ALT SERPL W P-5'-P-CCNC: 20 U/L (ref 1–33)
ANION GAP SERPL CALCULATED.3IONS-SCNC: 12 MMOL/L (ref 5–15)
AST SERPL-CCNC: 18 U/L (ref 1–32)
BASOPHILS # BLD AUTO: 0.03 10*3/MM3 (ref 0–0.2)
BASOPHILS NFR BLD AUTO: 0.3 % (ref 0–1.5)
BILIRUB SERPL-MCNC: 0.2 MG/DL (ref 0–1.2)
BUN SERPL-MCNC: 26 MG/DL (ref 8–23)
BUN/CREAT SERPL: 27.4 (ref 7–25)
CALCIUM SPEC-SCNC: 9.6 MG/DL (ref 8.6–10.5)
CHLORIDE SERPL-SCNC: 104 MMOL/L (ref 98–107)
CO2 SERPL-SCNC: 22 MMOL/L (ref 22–29)
CREAT SERPL-MCNC: 0.95 MG/DL (ref 0.57–1)
DEPRECATED RDW RBC AUTO: 41.4 FL (ref 37–54)
EGFRCR SERPLBLD CKD-EPI 2021: 62.2 ML/MIN/1.73
EOSINOPHIL # BLD AUTO: 0.14 10*3/MM3 (ref 0–0.4)
EOSINOPHIL NFR BLD AUTO: 1.5 % (ref 0.3–6.2)
ERYTHROCYTE [DISTWIDTH] IN BLOOD BY AUTOMATED COUNT: 12.2 % (ref 12.3–15.4)
GLOBULIN UR ELPH-MCNC: 3.2 GM/DL
GLUCOSE SERPL-MCNC: 288 MG/DL (ref 65–99)
HCT VFR BLD AUTO: 37.5 % (ref 34–46.6)
HGB BLD-MCNC: 12.2 G/DL (ref 12–15.9)
HOLD SPECIMEN: NORMAL
HOLD SPECIMEN: NORMAL
IMM GRANULOCYTES # BLD AUTO: 0.06 10*3/MM3 (ref 0–0.05)
IMM GRANULOCYTES NFR BLD AUTO: 0.7 % (ref 0–0.5)
LYMPHOCYTES # BLD AUTO: 1.37 10*3/MM3 (ref 0.7–3.1)
LYMPHOCYTES NFR BLD AUTO: 14.9 % (ref 19.6–45.3)
MCH RBC QN AUTO: 30.7 PG (ref 26.6–33)
MCHC RBC AUTO-ENTMCNC: 32.5 G/DL (ref 31.5–35.7)
MCV RBC AUTO: 94.2 FL (ref 79–97)
MONOCYTES # BLD AUTO: 0.91 10*3/MM3 (ref 0.1–0.9)
MONOCYTES NFR BLD AUTO: 9.9 % (ref 5–12)
NEUTROPHILS NFR BLD AUTO: 6.69 10*3/MM3 (ref 1.7–7)
NEUTROPHILS NFR BLD AUTO: 72.7 % (ref 42.7–76)
NRBC BLD AUTO-RTO: 0.1 /100 WBC (ref 0–0.2)
PLATELET # BLD AUTO: 210 10*3/MM3 (ref 140–450)
PMV BLD AUTO: 9.6 FL (ref 6–12)
POTASSIUM SERPL-SCNC: 4.8 MMOL/L (ref 3.5–5.2)
PROT SERPL-MCNC: 7.2 G/DL (ref 6–8.5)
RBC # BLD AUTO: 3.98 10*6/MM3 (ref 3.77–5.28)
SODIUM SERPL-SCNC: 138 MMOL/L (ref 136–145)
WBC NRBC COR # BLD AUTO: 9.2 10*3/MM3 (ref 3.4–10.8)
WHOLE BLOOD HOLD COAG: NORMAL
WHOLE BLOOD HOLD SPECIMEN: NORMAL

## 2024-01-11 PROCEDURE — 85025 COMPLETE CBC W/AUTO DIFF WBC: CPT

## 2024-01-11 PROCEDURE — 81001 URINALYSIS AUTO W/SCOPE: CPT | Performed by: PHYSICIAN ASSISTANT

## 2024-01-11 PROCEDURE — 84145 PROCALCITONIN (PCT): CPT

## 2024-01-11 PROCEDURE — 99285 EMERGENCY DEPT VISIT HI MDM: CPT

## 2024-01-11 PROCEDURE — 87086 URINE CULTURE/COLONY COUNT: CPT | Performed by: PHYSICIAN ASSISTANT

## 2024-01-11 PROCEDURE — 80053 COMPREHEN METABOLIC PANEL: CPT

## 2024-01-11 PROCEDURE — 36415 COLL VENOUS BLD VENIPUNCTURE: CPT

## 2024-01-12 PROBLEM — I10 ESSENTIAL HYPERTENSION, BENIGN: Status: ACTIVE | Noted: 2024-01-12

## 2024-01-12 PROBLEM — I50.32 CHRONIC DIASTOLIC CHF (CONGESTIVE HEART FAILURE): Status: ACTIVE | Noted: 2024-01-12

## 2024-01-12 PROBLEM — N17.9 AKI (ACUTE KIDNEY INJURY): Status: ACTIVE | Noted: 2024-01-12

## 2024-01-12 PROBLEM — L03.115 CELLULITIS OF RIGHT LOWER EXTREMITY: Status: ACTIVE | Noted: 2024-01-12

## 2024-01-12 LAB
ALBUMIN SERPL-MCNC: 3.4 G/DL (ref 3.5–5.2)
ALBUMIN/GLOB SERPL: 1.2 G/DL
ALP SERPL-CCNC: 83 U/L (ref 39–117)
ALT SERPL W P-5'-P-CCNC: 18 U/L (ref 1–33)
ANION GAP SERPL CALCULATED.3IONS-SCNC: 14 MMOL/L (ref 5–15)
AST SERPL-CCNC: 16 U/L (ref 1–32)
BACTERIA UR QL AUTO: ABNORMAL /HPF
BASOPHILS # BLD AUTO: 0.03 10*3/MM3 (ref 0–0.2)
BASOPHILS NFR BLD AUTO: 0.3 % (ref 0–1.5)
BILIRUB SERPL-MCNC: 0.2 MG/DL (ref 0–1.2)
BILIRUB UR QL STRIP: NEGATIVE
BUN SERPL-MCNC: 21 MG/DL (ref 8–23)
BUN/CREAT SERPL: 15 (ref 7–25)
CALCIUM SPEC-SCNC: 9 MG/DL (ref 8.6–10.5)
CHLORIDE SERPL-SCNC: 103 MMOL/L (ref 98–107)
CLARITY UR: ABNORMAL
CO2 SERPL-SCNC: 19 MMOL/L (ref 22–29)
COLOR UR: YELLOW
CREAT SERPL-MCNC: 1.4 MG/DL (ref 0.57–1)
CRP SERPL-MCNC: 1.83 MG/DL (ref 0–0.5)
DEPRECATED RDW RBC AUTO: 42.4 FL (ref 37–54)
EGFRCR SERPLBLD CKD-EPI 2021: 39.1 ML/MIN/1.73
EOSINOPHIL # BLD AUTO: 0.16 10*3/MM3 (ref 0–0.4)
EOSINOPHIL NFR BLD AUTO: 1.7 % (ref 0.3–6.2)
ERYTHROCYTE [DISTWIDTH] IN BLOOD BY AUTOMATED COUNT: 12.3 % (ref 12.3–15.4)
ERYTHROCYTE [SEDIMENTATION RATE] IN BLOOD: 51 MM/HR (ref 0–30)
GLOBULIN UR ELPH-MCNC: 2.9 GM/DL
GLUCOSE BLDC GLUCOMTR-MCNC: 162 MG/DL (ref 70–130)
GLUCOSE BLDC GLUCOMTR-MCNC: 263 MG/DL (ref 70–130)
GLUCOSE BLDC GLUCOMTR-MCNC: 287 MG/DL (ref 70–130)
GLUCOSE SERPL-MCNC: 301 MG/DL (ref 65–99)
GLUCOSE UR STRIP-MCNC: ABNORMAL MG/DL
HCT VFR BLD AUTO: 35.9 % (ref 34–46.6)
HGB BLD-MCNC: 11.7 G/DL (ref 12–15.9)
HGB UR QL STRIP.AUTO: NEGATIVE
HYALINE CASTS UR QL AUTO: ABNORMAL /LPF
IMM GRANULOCYTES # BLD AUTO: 0.05 10*3/MM3 (ref 0–0.05)
IMM GRANULOCYTES NFR BLD AUTO: 0.5 % (ref 0–0.5)
KETONES UR QL STRIP: NEGATIVE
LEUKOCYTE ESTERASE UR QL STRIP.AUTO: ABNORMAL
LYMPHOCYTES # BLD AUTO: 1.4 10*3/MM3 (ref 0.7–3.1)
LYMPHOCYTES NFR BLD AUTO: 14.7 % (ref 19.6–45.3)
MCH RBC QN AUTO: 31 PG (ref 26.6–33)
MCHC RBC AUTO-ENTMCNC: 32.6 G/DL (ref 31.5–35.7)
MCV RBC AUTO: 95.2 FL (ref 79–97)
MONOCYTES # BLD AUTO: 1.18 10*3/MM3 (ref 0.1–0.9)
MONOCYTES NFR BLD AUTO: 12.4 % (ref 5–12)
NEUTROPHILS NFR BLD AUTO: 6.68 10*3/MM3 (ref 1.7–7)
NEUTROPHILS NFR BLD AUTO: 70.4 % (ref 42.7–76)
NITRITE UR QL STRIP: NEGATIVE
NRBC BLD AUTO-RTO: 0 /100 WBC (ref 0–0.2)
NT-PROBNP SERPL-MCNC: 620 PG/ML (ref 0–1800)
PH UR STRIP.AUTO: 5.5 [PH] (ref 5–8)
PLATELET # BLD AUTO: 181 10*3/MM3 (ref 140–450)
PMV BLD AUTO: 10.5 FL (ref 6–12)
POTASSIUM SERPL-SCNC: 4.2 MMOL/L (ref 3.5–5.2)
PROCALCITONIN SERPL-MCNC: 0.08 NG/ML (ref 0–0.25)
PROT SERPL-MCNC: 6.3 G/DL (ref 6–8.5)
PROT UR QL STRIP: ABNORMAL
RBC # BLD AUTO: 3.77 10*6/MM3 (ref 3.77–5.28)
RBC # UR STRIP: ABNORMAL /HPF
REF LAB TEST METHOD: ABNORMAL
SODIUM SERPL-SCNC: 136 MMOL/L (ref 136–145)
SP GR UR STRIP: 1.03 (ref 1–1.03)
SQUAMOUS #/AREA URNS HPF: ABNORMAL /HPF
UROBILINOGEN UR QL STRIP: ABNORMAL
WBC # UR STRIP: ABNORMAL /HPF
WBC NRBC COR # BLD AUTO: 9.5 10*3/MM3 (ref 3.4–10.8)

## 2024-01-12 PROCEDURE — 25010000002 CEFAZOLIN IN DEXTROSE 2-4 GM/100ML-% SOLUTION: Performed by: PHYSICIAN ASSISTANT

## 2024-01-12 PROCEDURE — 25810000003 SODIUM CHLORIDE 0.9 % SOLUTION: Performed by: PHYSICIAN ASSISTANT

## 2024-01-12 PROCEDURE — G0378 HOSPITAL OBSERVATION PER HR: HCPCS

## 2024-01-12 PROCEDURE — 25010000002 VANCOMYCIN 10 G RECONSTITUTED SOLUTION: Performed by: PHYSICIAN ASSISTANT

## 2024-01-12 PROCEDURE — 25010000002 FUROSEMIDE PER 20 MG

## 2024-01-12 PROCEDURE — 82948 REAGENT STRIP/BLOOD GLUCOSE: CPT

## 2024-01-12 PROCEDURE — 63710000001 INSULIN LISPRO (HUMAN) PER 5 UNITS: Performed by: NURSE PRACTITIONER

## 2024-01-12 PROCEDURE — 63710000001 INSULIN GLARGINE PER 5 UNITS: Performed by: STUDENT IN AN ORGANIZED HEALTH CARE EDUCATION/TRAINING PROGRAM

## 2024-01-12 PROCEDURE — 25010000002 ENOXAPARIN PER 10 MG: Performed by: STUDENT IN AN ORGANIZED HEALTH CARE EDUCATION/TRAINING PROGRAM

## 2024-01-12 PROCEDURE — 85652 RBC SED RATE AUTOMATED: CPT | Performed by: NURSE PRACTITIONER

## 2024-01-12 PROCEDURE — 83880 ASSAY OF NATRIURETIC PEPTIDE: CPT

## 2024-01-12 PROCEDURE — 86140 C-REACTIVE PROTEIN: CPT | Performed by: NURSE PRACTITIONER

## 2024-01-12 PROCEDURE — 80053 COMPREHEN METABOLIC PANEL: CPT | Performed by: NURSE PRACTITIONER

## 2024-01-12 PROCEDURE — 87040 BLOOD CULTURE FOR BACTERIA: CPT | Performed by: PHYSICIAN ASSISTANT

## 2024-01-12 PROCEDURE — 99214 OFFICE O/P EST MOD 30 MIN: CPT

## 2024-01-12 PROCEDURE — 85025 COMPLETE CBC W/AUTO DIFF WBC: CPT | Performed by: NURSE PRACTITIONER

## 2024-01-12 PROCEDURE — 36415 COLL VENOUS BLD VENIPUNCTURE: CPT

## 2024-01-12 RX ORDER — BISACODYL 5 MG/1
5 TABLET, DELAYED RELEASE ORAL DAILY PRN
Status: DISCONTINUED | OUTPATIENT
Start: 2024-01-12 | End: 2024-01-14 | Stop reason: HOSPADM

## 2024-01-12 RX ORDER — ACETAMINOPHEN 650 MG/1
650 SUPPOSITORY RECTAL EVERY 4 HOURS PRN
Status: DISCONTINUED | OUTPATIENT
Start: 2024-01-12 | End: 2024-01-12

## 2024-01-12 RX ORDER — ONDANSETRON 2 MG/ML
4 INJECTION INTRAMUSCULAR; INTRAVENOUS EVERY 6 HOURS PRN
Status: DISCONTINUED | OUTPATIENT
Start: 2024-01-12 | End: 2024-01-14 | Stop reason: HOSPADM

## 2024-01-12 RX ORDER — ACETAMINOPHEN 160 MG/5ML
650 SOLUTION ORAL EVERY 4 HOURS PRN
Status: DISCONTINUED | OUTPATIENT
Start: 2024-01-12 | End: 2024-01-14 | Stop reason: HOSPADM

## 2024-01-12 RX ORDER — NICOTINE POLACRILEX 4 MG
15 LOZENGE BUCCAL
Status: DISCONTINUED | OUTPATIENT
Start: 2024-01-12 | End: 2024-01-14 | Stop reason: HOSPADM

## 2024-01-12 RX ORDER — HYDROCODONE BITARTRATE AND ACETAMINOPHEN 5; 325 MG/1; MG/1
1 TABLET ORAL ONCE
Status: COMPLETED | OUTPATIENT
Start: 2024-01-12 | End: 2024-01-12

## 2024-01-12 RX ORDER — ACETAMINOPHEN 325 MG/1
650 TABLET ORAL EVERY 4 HOURS PRN
Status: DISCONTINUED | OUTPATIENT
Start: 2024-01-12 | End: 2024-01-12

## 2024-01-12 RX ORDER — CARVEDILOL 6.25 MG/1
6.25 TABLET ORAL 2 TIMES DAILY WITH MEALS
Status: DISCONTINUED | OUTPATIENT
Start: 2024-01-12 | End: 2024-01-13

## 2024-01-12 RX ORDER — DEXTROSE MONOHYDRATE 25 G/50ML
25 INJECTION, SOLUTION INTRAVENOUS
Status: DISCONTINUED | OUTPATIENT
Start: 2024-01-12 | End: 2024-01-14 | Stop reason: HOSPADM

## 2024-01-12 RX ORDER — ENOXAPARIN SODIUM 100 MG/ML
30 INJECTION SUBCUTANEOUS EVERY 24 HOURS
Status: DISCONTINUED | OUTPATIENT
Start: 2024-01-12 | End: 2024-01-13

## 2024-01-12 RX ORDER — SODIUM CHLORIDE 9 MG/ML
40 INJECTION, SOLUTION INTRAVENOUS AS NEEDED
Status: DISCONTINUED | OUTPATIENT
Start: 2024-01-12 | End: 2024-01-14 | Stop reason: HOSPADM

## 2024-01-12 RX ORDER — POLYETHYLENE GLYCOL 3350 17 G/17G
17 POWDER, FOR SOLUTION ORAL DAILY PRN
Status: DISCONTINUED | OUTPATIENT
Start: 2024-01-12 | End: 2024-01-14 | Stop reason: HOSPADM

## 2024-01-12 RX ORDER — ACETAMINOPHEN 650 MG/1
650 SUPPOSITORY RECTAL EVERY 4 HOURS PRN
Status: DISCONTINUED | OUTPATIENT
Start: 2024-01-12 | End: 2024-01-14 | Stop reason: HOSPADM

## 2024-01-12 RX ORDER — SULFASALAZINE 500 MG/1
500 TABLET ORAL 4 TIMES DAILY
Status: DISCONTINUED | OUTPATIENT
Start: 2024-01-12 | End: 2024-01-14 | Stop reason: HOSPADM

## 2024-01-12 RX ORDER — HYDRALAZINE HYDROCHLORIDE 10 MG/1
10 TABLET, FILM COATED ORAL 2 TIMES DAILY
Status: DISCONTINUED | OUTPATIENT
Start: 2024-01-12 | End: 2024-01-14 | Stop reason: HOSPADM

## 2024-01-12 RX ORDER — INSULIN LISPRO 100 [IU]/ML
2-7 INJECTION, SOLUTION INTRAVENOUS; SUBCUTANEOUS
Status: DISCONTINUED | OUTPATIENT
Start: 2024-01-12 | End: 2024-01-14 | Stop reason: HOSPADM

## 2024-01-12 RX ORDER — MELATONIN
10000 DAILY
Status: DISCONTINUED | OUTPATIENT
Start: 2024-01-12 | End: 2024-01-14 | Stop reason: HOSPADM

## 2024-01-12 RX ORDER — EMPAGLIFLOZIN AND METFORMIN HYDROCHLORIDE 5; 1000 MG/1; MG/1
1 TABLET ORAL DAILY
COMMUNITY
Start: 2023-11-30

## 2024-01-12 RX ORDER — BACLOFEN 10 MG/1
10 TABLET ORAL DAILY
Status: DISCONTINUED | OUTPATIENT
Start: 2024-01-12 | End: 2024-01-14 | Stop reason: HOSPADM

## 2024-01-12 RX ORDER — ATORVASTATIN CALCIUM 20 MG/1
20 TABLET, FILM COATED ORAL DAILY
Status: DISCONTINUED | OUTPATIENT
Start: 2024-01-12 | End: 2024-01-14 | Stop reason: HOSPADM

## 2024-01-12 RX ORDER — BISACODYL 10 MG
10 SUPPOSITORY, RECTAL RECTAL DAILY PRN
Status: DISCONTINUED | OUTPATIENT
Start: 2024-01-12 | End: 2024-01-14 | Stop reason: HOSPADM

## 2024-01-12 RX ORDER — SODIUM CHLORIDE 0.9 % (FLUSH) 0.9 %
10 SYRINGE (ML) INJECTION AS NEEDED
Status: DISCONTINUED | OUTPATIENT
Start: 2024-01-12 | End: 2024-01-14 | Stop reason: HOSPADM

## 2024-01-12 RX ORDER — SODIUM CHLORIDE 0.9 % (FLUSH) 0.9 %
10 SYRINGE (ML) INJECTION EVERY 12 HOURS SCHEDULED
Status: DISCONTINUED | OUTPATIENT
Start: 2024-01-12 | End: 2024-01-14 | Stop reason: HOSPADM

## 2024-01-12 RX ORDER — ACETAMINOPHEN 500 MG
1000 TABLET ORAL EVERY 4 HOURS PRN
Status: DISCONTINUED | OUTPATIENT
Start: 2024-01-12 | End: 2024-01-14 | Stop reason: HOSPADM

## 2024-01-12 RX ORDER — AMOXICILLIN 250 MG
2 CAPSULE ORAL 2 TIMES DAILY
Status: DISCONTINUED | OUTPATIENT
Start: 2024-01-12 | End: 2024-01-14 | Stop reason: HOSPADM

## 2024-01-12 RX ORDER — IBUPROFEN 600 MG/1
1 TABLET ORAL
Status: DISCONTINUED | OUTPATIENT
Start: 2024-01-12 | End: 2024-01-14 | Stop reason: HOSPADM

## 2024-01-12 RX ORDER — FUROSEMIDE 10 MG/ML
40 INJECTION INTRAMUSCULAR; INTRAVENOUS ONCE
Status: COMPLETED | OUTPATIENT
Start: 2024-01-12 | End: 2024-01-12

## 2024-01-12 RX ORDER — CEFAZOLIN SODIUM 2 G/100ML
2000 INJECTION, SOLUTION INTRAVENOUS ONCE
Status: COMPLETED | OUTPATIENT
Start: 2024-01-12 | End: 2024-01-12

## 2024-01-12 RX ORDER — ACETAMINOPHEN 160 MG/5ML
650 SOLUTION ORAL EVERY 4 HOURS PRN
Status: DISCONTINUED | OUTPATIENT
Start: 2024-01-12 | End: 2024-01-12

## 2024-01-12 RX ADMIN — BACLOFEN 10 MG: 10 TABLET ORAL at 12:51

## 2024-01-12 RX ADMIN — HYDRALAZINE HYDROCHLORIDE 10 MG: 10 TABLET, FILM COATED ORAL at 12:51

## 2024-01-12 RX ADMIN — ATORVASTATIN CALCIUM 20 MG: 20 TABLET, FILM COATED ORAL at 12:51

## 2024-01-12 RX ADMIN — FUROSEMIDE 40 MG: 10 INJECTION, SOLUTION INTRAMUSCULAR; INTRAVENOUS at 17:18

## 2024-01-12 RX ADMIN — Medication 10 ML: at 22:11

## 2024-01-12 RX ADMIN — SULFASALAZINE 500 MG: 500 TABLET ORAL at 12:55

## 2024-01-12 RX ADMIN — CARVEDILOL 6.25 MG: 6.25 TABLET, FILM COATED ORAL at 17:18

## 2024-01-12 RX ADMIN — INSULIN LISPRO 4 UNITS: 100 INJECTION, SOLUTION INTRAVENOUS; SUBCUTANEOUS at 17:15

## 2024-01-12 RX ADMIN — ACETAMINOPHEN 650 MG: 325 TABLET, FILM COATED ORAL at 15:27

## 2024-01-12 RX ADMIN — Medication 10000 UNITS: at 22:00

## 2024-01-12 RX ADMIN — INSULIN LISPRO 5 UNITS: 100 INJECTION, SOLUTION INTRAVENOUS; SUBCUTANEOUS at 09:12

## 2024-01-12 RX ADMIN — HYDRALAZINE HYDROCHLORIDE 10 MG: 10 TABLET, FILM COATED ORAL at 21:09

## 2024-01-12 RX ADMIN — CEFAZOLIN SODIUM 2000 MG: 2 INJECTION, SOLUTION INTRAVENOUS at 03:35

## 2024-01-12 RX ADMIN — INSULIN LISPRO 4 UNITS: 100 INJECTION, SOLUTION INTRAVENOUS; SUBCUTANEOUS at 22:07

## 2024-01-12 RX ADMIN — ACETAMINOPHEN 650 MG: 325 TABLET, FILM COATED ORAL at 09:31

## 2024-01-12 RX ADMIN — Medication 10 ML: at 03:13

## 2024-01-12 RX ADMIN — ACETAMINOPHEN 1000 MG: 500 TABLET ORAL at 21:06

## 2024-01-12 RX ADMIN — INSULIN GLARGINE 10 UNITS: 100 INJECTION, SOLUTION SUBCUTANEOUS at 22:06

## 2024-01-12 RX ADMIN — ENOXAPARIN SODIUM 30 MG: 100 INJECTION SUBCUTANEOUS at 17:16

## 2024-01-12 RX ADMIN — SULFASALAZINE 500 MG: 500 TABLET ORAL at 17:18

## 2024-01-12 RX ADMIN — VANCOMYCIN HYDROCHLORIDE 1750 MG: 10 INJECTION, POWDER, LYOPHILIZED, FOR SOLUTION INTRAVENOUS at 01:15

## 2024-01-12 RX ADMIN — HYDROCODONE BITARTRATE AND ACETAMINOPHEN 1 TABLET: 5; 325 TABLET ORAL at 03:12

## 2024-01-12 NOTE — PLAN OF CARE
Goal Outcome Evaluation:  Plan of Care Reviewed With: patient        Progress: no change  Outcome Evaluation: Admitted for ble swelling, vss, cardio consulted, abx d/c at this time r/t minimal evidence for infection.  wocn orders in for wound care.  Diuretics x1 per cardio.  Tylenol only for pain control per pt request at this time. Lovenox for dvt prophylaxis

## 2024-01-12 NOTE — ED NOTES
Nursing report ED to floor  Michelle Espinoza  78 y.o.  female    HPI :   Chief Complaint   Patient presents with    Wound Check     R posterior lower leg has inflammed skin with drainage.        Admitting doctor:   Mariam Romero MD    Admitting diagnosis:   The primary encounter diagnosis was Cellulitis of right lower extremity. A diagnosis of Uncontrolled type 2 diabetes mellitus with hyperglycemia was also pertinent to this visit.    Code status:   Current Code Status       Date Active Code Status Order ID Comments User Context       Prior            Allergies:   Other, Penicillins, Statins, Latex, and Nickel    Isolation:   No active isolations    Intake and Output    Intake/Output Summary (Last 24 hours) at 1/12/2024 0607  Last data filed at 1/12/2024 0456  Gross per 24 hour   Intake 600 ml   Output --   Net 600 ml       Weight:       01/11/24 2225   Weight: 81.6 kg (180 lb)       Most recent vitals:   Vitals:    01/11/24 2223 01/11/24 2225 01/12/24 0146 01/12/24 0201   BP: 170/86  (!) 176/101 164/82   Pulse:   88 90   Resp:   16    Temp:       SpO2:   93% 92%   Weight:  81.6 kg (180 lb)     Height:           Active LDAs/IV Access:   Lines, Drains & Airways       Active LDAs       Name Placement date Placement time Site Days    Peripheral IV 01/12/24 0114 Right Antecubital 01/12/24 0114  Antecubital  less than 1                    Labs (abnormal labs have a star):   Labs Reviewed   COMPREHENSIVE METABOLIC PANEL - Abnormal; Notable for the following components:       Result Value    Glucose 288 (*)     BUN 26 (*)     BUN/Creatinine Ratio 27.4 (*)     All other components within normal limits    Narrative:     GFR Normal >60  Chronic Kidney Disease <60  Kidney Failure <15    The GFR formula is only valid for adults with stable renal function between ages 18 and 70.   CBC WITH AUTO DIFFERENTIAL - Abnormal; Notable for the following components:    RDW 12.2 (*)     Lymphocyte % 14.9 (*)     Immature Grans %  "0.7 (*)     Monocytes, Absolute 0.91 (*)     Immature Grans, Absolute 0.06 (*)     All other components within normal limits   URINALYSIS W/ MICROSCOPIC IF INDICATED (NO CULTURE) - Abnormal; Notable for the following components:    Appearance, UA Cloudy (*)     Glucose, UA >=1000 mg/dL (3+) (*)     Protein, UA Trace (*)     Leuk Esterase, UA Trace (*)     All other components within normal limits   URINALYSIS, MICROSCOPIC ONLY - Abnormal; Notable for the following components:    WBC, UA 6-10 (*)     Bacteria, UA 1+ (*)     All other components within normal limits   PROCALCITONIN - Normal    Narrative:     As a Marker for Sepsis (Non-Neonates):    1. <0.5 ng/mL represents a low risk of severe sepsis and/or septic shock.  2. >2 ng/mL represents a high risk of severe sepsis and/or septic shock.    As a Marker for Lower Respiratory Tract Infections that require antibiotic therapy:    PCT on Admission    Antibiotic Therapy       6-12 Hrs later    >0.5                Strongly Recommended  >0.25 - <0.5        Recommended   0.1 - 0.25          Discouraged              Remeasure/reassess PCT  <0.1                Strongly Discouraged     Remeasure/reassess PCT    As 28 day mortality risk marker: \"Change in Procalcitonin Result\" (>80% or <=80%) if Day 0 (or Day 1) and Day 4 values are available. Refer to http://www.[facility]-pct-calculator.com    Change in PCT <=80%  A decrease of PCT levels below or equal to 80% defines a positive change in PCT test result representing a higher risk for 28-day all-cause mortality of patients diagnosed with severe sepsis for septic shock.    Change in PCT >80%  A decrease of PCT levels of more than 80% defines a negative change in PCT result representing a lower risk for 28-day all-cause mortality of patients diagnosed with severe sepsis or septic shock.      BLOOD CULTURE   BLOOD CULTURE   URINE CULTURE   RAINBOW DRAW    Narrative:     The following orders were created for panel order [place] " Draw.  Procedure                               Abnormality         Status                     ---------                               -----------         ------                     Green Top (Gel)[[id number]]                                  Final result               Lavender Top[[id number]]                                     Final result               Gold Top - SST[[id number]]                                   Final result               Light Blue Top[[id number]]                                   Final result                 Please view results for these tests on the individual orders.   CBC WITH AUTO DIFFERENTIAL   COMPREHENSIVE METABOLIC PANEL   SEDIMENTATION RATE   C-REACTIVE PROTEIN   POCT GLUCOSE FINGERSTICK   POCT GLUCOSE FINGERSTICK   POCT GLUCOSE FINGERSTICK   POCT GLUCOSE FINGERSTICK   CBC AND DIFFERENTIAL    Narrative:     The following orders were created for panel order CBC & Differential.  Procedure                               Abnormality         Status                     ---------                               -----------         ------                     CBC Auto Differential[[id number]]        Abnormal            Final result                 Please view results for these tests on the individual orders.   GREEN TOP   LAVENDER TOP   GOLD TOP - SST   LIGHT BLUE TOP       EKG:   No orders to display       Meds given in ED:   Medications   sodium chloride 0.9 % flush 10 mL (10 mL Intravenous Given 1/12/24 0313)   sodium chloride 0.9 % flush 10 mL (has no administration in time range)   sodium chloride 0.9 % infusion 40 mL (has no administration in time range)   sennosides-docusate (PERICOLACE) 8.6-50 MG per tablet 2 tablet (has no administration in time range)     And   polyethylene glycol (MIRALAX) packet 17 g (has no administration in time range)     And   bisacodyl (DULCOLAX) EC tablet 5 mg (has no administration in time range)     And   bisacodyl (DULCOLAX) suppository 10 mg (has no administration  in time range)   dextrose (GLUTOSE) oral gel 15 g (has no administration in time range)   dextrose (D50W) (25 g/50 mL) IV injection 25 g (has no administration in time range)   glucagon (GLUCAGEN) injection 1 mg (has no administration in time range)   insulin lispro (HUMALOG/ADMELOG) injection 2-7 Units (has no administration in time range)   Pharmacy to dose vancomycin (has no administration in time range)   acetaminophen (TYLENOL) tablet 650 mg (has no administration in time range)     Or   acetaminophen (TYLENOL) 160 MG/5ML oral solution 650 mg (has no administration in time range)     Or   acetaminophen (TYLENOL) suppository 650 mg (has no administration in time range)   ondansetron (ZOFRAN) injection 4 mg (has no administration in time range)   vancomycin 750 mg in sodium chloride 0.9 % 250 mL IVPB-VTB (has no administration in time range)   ceFAZolin in dextrose (ANCEF) IVPB solution 2,000 mg (0 mg Intravenous Stopped 24 0456)   vancomycin 1750 mg/500 mL 0.9% NS IVPB ([facility]) (0 mg Intravenous Stopped 24 0308)   HYDROcodone-acetaminophen (NORCO) 5-325 MG per tablet 1 tablet (1 tablet Oral Given 24 0312)       Imaging results:  No radiology results for the last day    Ambulatory status:   - with assist x2    Social issues:   Social History     Socioeconomic History    Marital status:    Tobacco Use    Smoking status: Former     Packs/day: 1.00     Years: 15.00     Additional pack years: 0.00     Total pack years: 15.00     Types: Cigarettes     Start date: 1965     Quit date: 1992     Years since quittin.0    Smokeless tobacco: Never    Tobacco comments:     CAFFEINE USE: ICE TEA OCC.   Vaping Use    Vaping Use: Never used   Substance and Sexual Activity    Alcohol use: Not Currently     Comment: Socially.    Drug use: Yes     Types: Hydrocodone    Sexual activity: Not Currently     Partners: Male     Birth control/protection: Post-menopausal       NIH Stroke Scale:       Sonyia  GRAHAM Blue  01/12/24 06:07 EST

## 2024-01-12 NOTE — PROGRESS NOTES
"[facility] Clinical Pharmacy Services: Vancomycin Monitoring Note    Michelle Espinoza is a 78 y.o. female who is on day 1/5 of pharmacy to dose vancomycin for Skin and Soft Tissue.    Previous Vancomycin Dose:   Pt has received 1750mg IVPB x 1 in ED  Updated Cultures and Sensitivities: Blood and urine cultures pending      Vitals/Labs  Ht: 160 cm (63\"); Wt: 81.6 kg (180 lb)   Temp Readings from Last 1 Encounters:   01/11/24 97.8 °F (36.6 °C)     Estimated Creatinine Clearance: 34.6 mL/min (A) (by C-G formula based on SCr of 1.4 mg/dL (H)).     Results from last 7 days   Lab Units 01/12/24  0618 01/11/24  2233   CREATININE mg/dL 1.40* 0.95   WBC 10*3/mm3 9.50 9.20     Assessment/Plan    SCr increase significantly since initial vancomycin dosing was completed.   Will change to doing by levels for now due to unstable renal function. No further doses until level assess.   Next Level Date and Time: Vanc Random on 1/12 at 1330  We will continue to monitor patient changes and renal function     Thank you for involving pharmacy in this patient's care. Please contact pharmacy with any questions or concerns.       Laura Goddard, PharmD, BCPS, [facility]  Clinical Pharmacy Specialist, Emergency Medicine   Phone: [phone]         "

## 2024-01-12 NOTE — CONSULTS
Date of Consultation: 24    Referral Provider: Armen Archuleta MD     Reason for Consultation: Fluid overload     Encounter Provider: RATNA Cohen    Group of Service: [facility]     Patient Name: Michelle Espinoza    :1945    Chief complaint: Cellulitis of right lower extremity      History of Present Illness:  Michelle Espinoza is a 78 year old who follows with Dr. Romo and has a past medical history that is significant for coronary artery disease s/p PCI with stent placement to the proximal, mid, and distal RCA following an inferior MI in 2021, ischemic cardiomyopathy with an EF of 45-50%, hypertension, hyperlipidemia, CKD, and diabetes.     She was last seen in the office by RATNA Hercules in 2023. At that time she had complaints of random episodes of shortness of breath. She worse a Holter monitor which revealed a 20% ventricular ectopy burden. Her echocardiogram at that time showed an EF of 51.4%, left ventricular diastolic function with grade I impaired relaxation, no aortic valve stenosis, mild mitral valve regurgitation, and normal right ventricular systolic pressure.     She presented to the ED on 24 with complaints of increased erythema and pain of the right lower extremity for around one week. Her right lower extremity has had increased swelling and now has blistering and weeping. She denies chest pain or discomfort, shortness of breath, orthopnea, fever, or chills. There was concern for cellulitis of the right leg and she has been started on IV antibiotics. There is also concern for volume overload which cardiology has been asked to evaluate.     ECHO 10/26/23    Left ventricular systolic function is normal. Calculated left ventricular EF = 51.4% Normal left ventricular cavity size noted. Left ventricular wall thickness is consistent with borderline concentric hypertrophy. All left ventricular wall segments contract normally. Left  ventricular diastolic function is consistent with (grade I) impaired relaxation.    No aortic valve regurgitation or stenosis is present. The aortic valve is abnormal in structure. There is mild thickening of the right coronary cusp(s) of the aortic valve.    Mild mitral valve regurgitation is present. No significant mitral valve stenosis is present.    Trace tricuspid valve regurgitation is present. Estimated right ventricular systolic pressure from tricuspid regurgitation is normal (<35 mmHg). Calculated right ventricular systolic pressure from tricuspid regurgitation is 20.5 mmHg.    Cardiac Catheterization 3/27/21  1.  Inferior myocardial infarction status post drug-eluting stent placement of the proximal, mid, and distal right coronary artery  2.  Coronary artery disease    Holter Monitor 10/13/23  Patient diary submitted. No symptoms reported during the monitoring period. No complications noted. The predominant rhythm noted during the testing period was sinus rhythm. Premature atrial contractions occured rarely. There were 2 episodes of nonsustained supraventricular tachycardia, longest lasting 4 beats and the fastest with a rate of 140 bpm. Premature ventricular contractions occured frequently. Ventricular couplets, triplets, bigeminy and trigeminy. Total ventricular ectopy burden was 20%. There were 13 episodes of nonsustained ventricular tachycardia with the longest lasting 3 beats and the fastest with a rate of 115 bpm. Sinoatrial node conduction was normal. No atrioventricular block noted.     Past Medical History:   Diagnosis Date    Anesthesia complication     STRONG GAG REFLEX    Benign colonic polyp     Chronic back pain     Claustrophobia     Coronary artery disease 03/27/2021    PTCA WITH PLACEMENT OF STENT. PLAVIX ONLY TO BE HELD ONE DAY    DDD (degenerative disc disease), cervical     DDD (degenerative disc disease), lumbosacral     Diabetes mellitus     TYPE 2    Frequent UTI     History of MI  (myocardial infarction) 2021    History of pyelonephritis 2014    History of sepsis 2014    Hypercholesteremia     Left knee pain     DRAINED  21    Leg muscle spasm     Neuropathy     LEFT FOOT. AS RESULT OF BACK PROBLEMS    Osteoarthritis     Osteoporosis     Overactive bladder     Type 2 diabetes mellitus     Ureteral calculus, left     Weakness     LOWER EXTREMITES RELATED FROM NERVE DAMAGE FROM BACK         Past Surgical History:   Procedure Laterality Date    APPENDECTOMY      BACK SURGERY      MULTIPLE. WITH HARDWARE    CARDIAC CATHETERIZATION N/A 2021    Procedure: Left Heart Cath;  Surgeon: Leydi Romo MD;  Location:  [facility];  Service: Cardiovascular;  Laterality: N/A;    CARDIAC CATHETERIZATION  2021    Procedure: Percutaneous Manual Thrombectomy;  Surgeon: Leydi Romo MD;  Location:  [facility];  Service: Cardiovascular;;    CARDIAC CATHETERIZATION N/A 2021    Procedure: Percutaneous Coronary Intervention;  Surgeon: Leydi Romo MD;  Location:  [facility];  Service: Cardiovascular;  Laterality: N/A;    CARDIAC CATHETERIZATION N/A 2021    Procedure: Coronary angiography;  Surgeon: Leydi Romo MD;  Location:  [facility];  Service: Cardiovascular;  Laterality: N/A;    CARPAL TUNNEL RELEASE      LEFT X2 RIGHT     CATARACT EXTRACTION WITH INTRAOCULAR LENS IMPLANT      LEFT AND RIGHT    CERVICAL SPINE SURGERY      MULTIPLE C3-4 C6-7     SECTION      x 2    COLONOSCOPY  2018    CORONARY STENT PLACEMENT  2021    PARATHYROIDECTOMY Left 2019    Procedure: PARATHYROIDECTOMY LEFT INFERIOR;  Surgeon: Mason Prather MD;  Location: [facility] OR [facility];  Service: ENT    THYROID SURGERY      TRICEP TENDON REPAIR Left 2021    Procedure: LEFT OPEN TRICEPS REPAIR;  Surgeon: Luis Cho II, MD;  Location: [facility] MAIN OR;  Service: Orthopedics;   Laterality: Left;    URETEROSCOPY LASER LITHOTRIPSY WITH STENT INSERTION Left 09/28/2021    Procedure: LEFT URETEROSCOPY LASER LITHOTRIPSY, STONE BASKET EXTRACTION STENT;  Surgeon: Jose Luis Nelson Jr., MD;  Location: [facility] OR;  Service: Urology;  Laterality: Left;         Allergies   Allergen Reactions    Other Nausea And Vomiting     The mercury in [place]    Penicillins Hives    Statins Myalgia    Latex Rash    Nickel Rash         No current facility-administered medications on file prior to encounter.     Current Outpatient Medications on File Prior to Encounter   Medication Sig Dispense Refill    atorvastatin (LIPITOR) 20 MG tablet TAKE ONE TABLET BY MOUTH DAILY 90 tablet 3    baclofen (LIORESAL) 10 MG tablet TAKE ONE TABLET BY MOUTH DAILY 90 tablet 0    carvedilol (COREG) 6.25 MG tablet TAKE 1 TABLET BY MOUTH TWICE A DAY WITH A MEAL 90 tablet 0    Cholecalciferol (VITAMIN D3) 5000 UNITS capsule capsule Take 2 capsules by mouth Daily.      COLLAGEN PO Take 3 tablets by mouth Daily.      glimepiride (AMARYL) 4 MG tablet Take 1 tablet by mouth 2 (Two) Times a Day. 180 tablet 1    hydrALAZINE (APRESOLINE) 10 MG tablet Take 1 tablet by mouth 2 (Two) Times a Day. 180 tablet 1    solifenacin (VESICARE) 10 MG tablet Take 1 tablet by mouth Daily.      sulfaSALAzine (AZULFIDINE) 500 MG tablet Take 1 tablet by mouth 4 (Four) Times a Day.      Synjardy 5-1000 MG tablet Take 1 tablet by mouth Daily.      aspirin 81 MG chewable tablet Chew 1 tablet Daily.      Calcium Carbonate 1250 MG/5ML Take 15 mL by mouth Daily.      Glucos-Chondroit-Hyaluron-MSM (GLUCOSAMINE CHONDROITIN JOINT PO) Take  by mouth 2 (Two) Times a Day. 2000-250mg      glucose blood (OneTouch Ultra) test strip Dx code E11.59 testing bs 4 x day 400 each 3    magnesium 30 MG tablet Take 3 tablets by mouth Daily.      Misc Natural Products (NEURIVA PO) Take  by mouth.      mupirocin (BACTROBAN) 2 % ointment       ofloxacin (OCUFLOX) 0.3 % ophthalmic  solution       Probiotic Product (PROBIOTIC PO) Take 1 tablet by mouth Daily.      vitamin E 100 UNIT capsule       vitamin E 400 UNIT capsule       [DISCONTINUED] vitamin B-12 (CYANOCOBALAMIN) 1000 MCG tablet Take 1 tablet by mouth Daily.      [DISCONTINUED] vitamin C (ASCORBIC ACID) 500 MG tablet Take 1 tablet by mouth Daily. With D3           Social History     Socioeconomic History    Marital status:    Tobacco Use    Smoking status: Former     Packs/day: 1.00     Years: 15.00     Additional pack years: 0.00     Total pack years: 15.00     Types: Cigarettes     Start date: 1965     Quit date: 1992     Years since quittin.0    Smokeless tobacco: Never    Tobacco comments:     CAFFEINE USE: ICE TEA OCC.   Vaping Use    Vaping Use: Never used   Substance and Sexual Activity    Alcohol use: Not Currently     Comment: Socially.    Drug use: Yes     Types: Hydrocodone    Sexual activity: Not Currently     Partners: Male     Birth control/protection: Post-menopausal         Family History   Problem Relation Age of Onset    Stroke Mother     Heart disease Mother     Hypertension Mother             Clotting disorder Father     Hypertension Father             Diabetes Father     Aneurysm Father     Hypertension Sister             Diabetes Sister     Cervical cancer Sister     Obesity Sister     Diabetes Sister     Obesity Sister     Cervical cancer Maternal Grandmother 72    Diabetes Grandchild     Asthma Sister     Hypertension Sister         Dead    Diabetes Sister     Malig Hyperthermia Neg Hx        REVIEW OF SYSTEMS:   12 point ROS was performed and is negative except as outlined in HPI       Objective:     Vitals:    24 0719 24 0720 24 0830 24 1205   BP:   151/90 150/93   BP Location:   Right arm Right arm   Patient Position:   Lying Lying   Pulse: 92 98 97 93   Resp:   16 16   Temp:   97.9 °F (36.6 °C) 97.7 °F (36.5 °C)   TempSrc:   Oral Oral  "  SpO2: (!) 89% (!) 88%     Weight:       Height:         Body mass index is 31.89 kg/m².  Flowsheet Rows      Flowsheet Row First Filed Value   Admission Height 160 cm (63\") Documented at 2024   Admission Weight 81.6 kg (180 lb) Documented at 20245          Physical Exam  Constitutional:       General: She is not in acute distress.  HENT:      Head: Normocephalic.      Nose: Nose normal.   Eyes:      Extraocular Movements: Extraocular movements intact.      Pupils: Pupils are equal, round, and reactive to light.   Cardiovascular:      Rate and Rhythm: Normal rate and regular rhythm.      Pulses: Normal pulses.      Heart sounds: Normal heart sounds. Heart sounds not distant. No murmur heard.     No friction rub. No gallop. No S3 or S4 sounds.   Pulmonary:      Effort: Pulmonary effort is normal.      Breath sounds: Normal breath sounds.   Abdominal:      General: Abdomen is flat. Bowel sounds are normal.      Palpations: Abdomen is soft.   Musculoskeletal:      Right lower le+ Pitting Edema present.      Left lower le+ Pitting Edema present.   Skin:     General: Skin is warm and dry.   Neurological:      General: No focal deficit present.      Mental Status: She is alert and oriented to person, place, and time. Mental status is at baseline.   Psychiatric:         Mood and Affect: Mood normal.         Behavior: Behavior normal.         Lab Review:                Results from last 7 days   Lab Units 24  0618   SODIUM mmol/L 136   POTASSIUM mmol/L 4.2   CHLORIDE mmol/L 103   CO2 mmol/L 19.0*   BUN mg/dL 21   CREATININE mg/dL 1.40*   GLUCOSE mg/dL 301*   CALCIUM mg/dL 9.0         Results from last 7 days   Lab Units 24  0618   WBC 10*3/mm3 9.50   HEMOGLOBIN g/dL 11.7*   HEMATOCRIT % 35.9   PLATELETS 10*3/mm3 181                       EKG (reviewed by me personally):      Assessment/Plan:   Bilateral lower extremity edema R > L - her legs had been wrapped when I saw her and she " asked that I do not remove the wrap. She has 2 + pitting edema from her knee up to her hip bilaterally. She reports that the right leg has significant redness, blistering, and weeping. There is concern for cellulitis of the right lower extremity and she has been started on IV antibiotics.   Acute on chronic HFpEF - EF 51.4% on echo in October 2023, this also showed left ventricular wall thickness is consistent with borderline concentric hypertrophy, left ventricular diastolic function consistent with grade I impaired relaxation, trace tricuspid regurgitation with normal RVSP, and mild mitral valve regurgitation.   Coronary artery disease - s/p PCI with stent placement to the proximal, mid, and distal RCA following an inferior MI in March 2021  Ischemic cardiomyopathy - EF 51.4% on echocardiogram in October 2023   Hypertension   Hyperlipidemia   MINERVA with CKD - creatinine 1.4 today  Diabetes mellitus - type II, not on insulin at home    She has significant bilateral lower extremity - will give a dose of IV furosemide. Her previous proBNP in June 2023 was normal, will repeat this today. Assess volume status and renal function tomorrow to determine further need for diuretics.     Jessica Matias, APRN   01/12/24

## 2024-01-12 NOTE — ED PROVIDER NOTES
EMERGENCY DEPARTMENT ENCOUNTER      PCP: Lillie Ji PA  Patient Care Team:  Lillie Ji PA as PCP - General (Family Medicine)  Jose Luis Nelson Jr., MD as Consulting Physician (Urology)  Bon Gardiner MD as Consulting Physician (Hand Surgery)  Milly Crow MD as Consulting Physician (Neurology)  Sacha Garcia APRN as Nurse Practitioner (Neurology)  Johnnie Soni MD as Consulting Physician (Neurology)  Mahamed Massey MD as Consulting Physician (Obstetrics and Gynecology)  Clement Tierney MD as Surgeon (Neurosurgery)  Donna Olsen MA (Inactive) as Medical Assistant  Mahamed Massey MD as Consulting Physician (Obstetrics and Gynecology)  Milly Crow MD as Consulting Physician (Neurology)  Jeffry Bhagat MD as Consulting Physician (Ophthalmology)  Leydi Romo MD as Consulting Physician (Cardiology)  Sangeeta Raman MD as Consulting Physician (Rheumatology)   Independent Historians: Patient    HPI:  Chief Complaint: Leg swelling, wound check  A complete HPI/ROS/PMH/PSH/SH/FH are unobtainable due to: None    Chronic or social conditions impacting patient care (social determinants of health): History of uncontrolled type 2 diabetes    Context: Michelle Espinoza is a 78 y.o. female who presents to the ED c/o increased erythema and pain to right lower extremity over the past week.  She reports increased swelling which is led to blistering and weeping of the right leg which is new.  She has not had any fevers or chills but states the redness has been worsening.  She has had some redness to the left lower extremity but not as bad as the right.  No chest pain or increased shortness of breath from baseline.  She has history of diabetes.  She does not typically wear compression stockings.    Review of prior external notes and/or external test results outside of this encounter: Office visit with endocrinology Dr. Montano on 11/30/2023.  History of uncontrolled type 2 diabetes.  Hemoglobin A1c on  11/24/2023 was 8.0.      PAST MEDICAL HISTORY  Active Ambulatory Problems     Diagnosis Date Noted    Abnormal weight gain 07/19/2016    Arm pain 07/19/2016    Arthritis 07/19/2016    Atonic neurogenic bladder 07/19/2016    Benign colonic polyp 07/19/2016    Carotid atherosclerosis 07/19/2016    Fatigue 07/19/2016    Hypercalcemia 07/19/2016    Hypercholesterolemia 07/19/2016    Weakness of lower extremity 07/19/2016    Nephrolithiasis 07/19/2016    Spinal stenosis 07/19/2016    Urinary tract infection 07/19/2016    Chronic venous insufficiency 07/19/2016    B12 deficiency 07/19/2016    Vitamin D deficiency 07/19/2016    Degenerative disc disease, lumbar 07/19/2016    T7 and T10-11 Thoracic spinal stenosis 11/22/2016    Weakness 12/05/2017    CKD (chronic kidney disease) 12/06/2017    MINERVA (acute kidney injury) 12/06/2017    Stenosis of right carotid artery 02/26/2019    Hyperparathyroidism 04/28/2019    Type 2 diabetes mellitus with other circulatory complications 01/08/2020    Osteoporosis 08/07/2020    History of inferior wall myocardial infarction 03/27/2021    Coronary artery disease involving native coronary artery of native heart without angina pectoris 04/15/2021    Ischemic cardiomyopathy 04/15/2021    Asymptomatic PVCs 04/15/2021    Status post coronary artery stent placement 05/18/2021    Acquired hammer toe of left foot 09/20/2022    Hereditary and idiopathic neuropathy, unspecified 09/20/2022    Onychomycosis 09/20/2022    Peripheral vascular disease 09/20/2022    Polyneuropathy due to type 2 diabetes mellitus 09/20/2022    Ulcer of toe of left foot 09/20/2022    Pain in limb 09/20/2022    Osteoarthritis 06/25/2021     Resolved Ambulatory Problems     Diagnosis Date Noted    Impaired fasting glucose 07/19/2016    Osteopenia 10/25/2016     Past Medical History:   Diagnosis Date    Anesthesia complication     Chronic back pain     Claustrophobia     Coronary artery disease 03/27/2021    DDD (degenerative  disc disease), cervical     DDD (degenerative disc disease), lumbosacral     Diabetes mellitus     Frequent UTI     History of MI (myocardial infarction) 2021    History of pyelonephritis 2014    History of sepsis     Hypercholesteremia     Left knee pain     Leg muscle spasm     Neuropathy     Overactive bladder     Type 2 diabetes mellitus     Ureteral calculus, left        The patient has started, but not completed, their COVID-19 vaccination series.    PAST SURGICAL HISTORY  Past Surgical History:   Procedure Laterality Date    APPENDECTOMY      BACK SURGERY      MULTIPLE. WITH HARDWARE    CARDIAC CATHETERIZATION N/A 2021    Procedure: Left Heart Cath;  Surgeon: Leydi Romo MD;  Location:  [facility];  Service: Cardiovascular;  Laterality: N/A;    CARDIAC CATHETERIZATION  2021    Procedure: Percutaneous Manual Thrombectomy;  Surgeon: Leydi Romo MD;  Location:  [facility];  Service: Cardiovascular;;    CARDIAC CATHETERIZATION N/A 2021    Procedure: Percutaneous Coronary Intervention;  Surgeon: Leydi Romo MD;  Location:  [facility];  Service: Cardiovascular;  Laterality: N/A;    CARDIAC CATHETERIZATION N/A 2021    Procedure: Coronary angiography;  Surgeon: Leydi Romo MD;  Location:  [facility];  Service: Cardiovascular;  Laterality: N/A;    CARPAL TUNNEL RELEASE      LEFT X2 RIGHT     CATARACT EXTRACTION WITH INTRAOCULAR LENS IMPLANT      LEFT AND RIGHT    CERVICAL SPINE SURGERY      MULTIPLE C3-4 C6-7     SECTION      x 2    COLONOSCOPY  2018    CORONARY STENT PLACEMENT  2021    PARATHYROIDECTOMY Left 2019    Procedure: PARATHYROIDECTOMY LEFT INFERIOR;  Surgeon: Mason Prather MD;  Location:  [facility];  Service: ENT    THYROID SURGERY      TRICEP TENDON REPAIR Left 2021    Procedure: LEFT OPEN TRICEPS REPAIR;  Surgeon: Luis Cho  MD WESLEY;  Location: [facility];  Service: Orthopedics;  Laterality: Left;    URETEROSCOPY LASER LITHOTRIPSY WITH STENT INSERTION Left 2021    Procedure: LEFT URETEROSCOPY LASER LITHOTRIPSY, STONE BASKET EXTRACTION STENT;  Surgeon: Jose Luis Nelson Jr., MD;  Location: [facility];  Service: Urology;  Laterality: Left;         FAMILY HISTORY  Family History   Problem Relation Age of Onset    Stroke Mother     Heart disease Mother     Hypertension Mother             Clotting disorder Father     Hypertension Father             Diabetes Father     Aneurysm Father     Hypertension Sister             Diabetes Sister     Cervical cancer Sister     Obesity Sister     Diabetes Sister     Obesity Sister     Cervical cancer Maternal Grandmother 72    Diabetes Grandchild     Asthma Sister     Hypertension Sister         Dead    Diabetes Sister     Malig Hyperthermia Neg Hx          SOCIAL HISTORY  Social History     Socioeconomic History    Marital status:    Tobacco Use    Smoking status: Former     Packs/day: 1.00     Years: 15.00     Additional pack years: 0.00     Total pack years: 15.00     Types: Cigarettes     Start date: 1965     Quit date: 1992     Years since quittin.0    Smokeless tobacco: Never    Tobacco comments:     CAFFEINE USE: ICE TEA OCC.   Vaping Use    Vaping Use: Never used   Substance and Sexual Activity    Alcohol use: Not Currently     Comment: Socially.    Drug use: Yes     Types: Hydrocodone    Sexual activity: Not Currently     Partners: Male     Birth control/protection: Post-menopausal         ALLERGIES  Other, Penicillins, Statins, Latex, and Nickel        REVIEW OF SYSTEMS  Review of Systems   Constitutional:  Negative for fever.   Respiratory:  Negative for shortness of breath.    Cardiovascular:  Positive for leg swelling. Negative for chest pain.   Skin:  Positive for color change (Erythema right lower extremity).        All systems  reviewed and negative except for those discussed in HPI.       PHYSICAL EXAM    I have reviewed the triage vital signs and nursing notes.    ED Triage Vitals   Temp Heart Rate Resp BP SpO2   01/11/24 2213 01/11/24 2213 01/11/24 2213 01/11/24 2223 01/11/24 2213   97.8 °F (36.6 °C) 92 16 170/86 95 %      Temp src Heart Rate Source Patient Position BP Location FiO2 (%)   -- -- -- -- --              Physical Exam  GENERAL: alert, no acute distress  SKIN: Warm, dry  HENT: Normocephalic, atraumatic  EYES: no scleral icterus  CV: regular rhythm, regular rate  RESPIRATORY: normal effort, lungs clear  ABDOMEN: soft, nontender, nondistended  MUSCULOSKELETAL: 2+ pitting edema to right lower extremity with overlying erythema and blistering with some skin sloughing to the posterior aspect, warm and slightly tender to palpation  NEURO: alert, moves all extremities, follows commands          LAB RESULTS  Recent Results (from the past 24 hour(s))   Comprehensive Metabolic Panel    Collection Time: 01/11/24 10:33 PM    Specimen: Arm, Left; Blood   Result Value Ref Range    Glucose 288 (H) 65 - 99 mg/dL    BUN 26 (H) 8 - 23 mg/dL    Creatinine 0.95 0.57 - 1.00 mg/dL    Sodium 138 136 - 145 mmol/L    Potassium 4.8 3.5 - 5.2 mmol/L    Chloride 104 98 - 107 mmol/L    CO2 22.0 22.0 - 29.0 mmol/L    Calcium 9.6 8.6 - 10.5 mg/dL    Total Protein 7.2 6.0 - 8.5 g/dL    Albumin 4.0 3.5 - 5.2 g/dL    ALT (SGPT) 20 1 - 33 U/L    AST (SGOT) 18 1 - 32 U/L    Alkaline Phosphatase 90 39 - 117 U/L    Total Bilirubin 0.2 0.0 - 1.2 mg/dL    Globulin 3.2 gm/dL    A/G Ratio 1.3 g/dL    BUN/Creatinine Ratio 27.4 (H) 7.0 - 25.0    Anion Gap 12.0 5.0 - 15.0 mmol/L    eGFR 62.2 >60.0 mL/min/1.73   Procalcitonin    Collection Time: 01/11/24 10:33 PM    Specimen: Arm, Left; Blood   Result Value Ref Range    Procalcitonin 0.08 0.00 - 0.25 ng/mL   CBC Auto Differential    Collection Time: 01/11/24 10:33 PM    Specimen: Arm, Left; Blood   Result Value Ref Range     WBC 9.20 3.40 - 10.80 10*3/mm3    RBC 3.98 3.77 - 5.28 10*6/mm3    Hemoglobin 12.2 12.0 - 15.9 g/dL    Hematocrit 37.5 34.0 - 46.6 %    MCV 94.2 79.0 - 97.0 fL    MCH 30.7 26.6 - 33.0 pg    MCHC 32.5 31.5 - 35.7 g/dL    RDW 12.2 (L) 12.3 - 15.4 %    RDW-SD 41.4 37.0 - 54.0 fl    MPV 9.6 6.0 - 12.0 fL    Platelets 210 140 - 450 10*3/mm3    Neutrophil % 72.7 42.7 - 76.0 %    Lymphocyte % 14.9 (L) 19.6 - 45.3 %    Monocyte % 9.9 5.0 - 12.0 %    Eosinophil % 1.5 0.3 - 6.2 %    Basophil % 0.3 0.0 - 1.5 %    Immature Grans % 0.7 (H) 0.0 - 0.5 %    Neutrophils, Absolute 6.69 1.70 - 7.00 10*3/mm3    Lymphocytes, Absolute 1.37 0.70 - 3.10 10*3/mm3    Monocytes, Absolute 0.91 (H) 0.10 - 0.90 10*3/mm3    Eosinophils, Absolute 0.14 0.00 - 0.40 10*3/mm3    Basophils, Absolute 0.03 0.00 - 0.20 10*3/mm3    Immature Grans, Absolute 0.06 (H) 0.00 - 0.05 10*3/mm3    nRBC 0.1 0.0 - 0.2 /100 WBC   Green Top (Gel)    Collection Time: 01/11/24 10:33 PM   Result Value Ref Range    Extra Tube Hold for add-ons.    Lavender Top    Collection Time: 01/11/24 10:33 PM   Result Value Ref Range    Extra Tube hold for add-on    Gold Top - SST    Collection Time: 01/11/24 10:33 PM   Result Value Ref Range    Extra Tube Hold for add-ons.    Light Blue Top    Collection Time: 01/11/24 10:33 PM   Result Value Ref Range    Extra Tube Hold for add-ons.    Urinalysis With Microscopic If Indicated (No Culture) - Urine, Clean Catch    Collection Time: 01/11/24 11:58 PM    Specimen: Urine, Clean Catch   Result Value Ref Range    Color, UA Yellow Yellow, Straw    Appearance, UA Cloudy (A) Clear    pH, UA 5.5 5.0 - 8.0    Specific Gravity, UA 1.026 1.005 - 1.030    Glucose, UA >=1000 mg/dL (3+) (A) Negative    Ketones, UA Negative Negative    Bilirubin, UA Negative Negative    Blood, UA Negative Negative    Protein, UA Trace (A) Negative    Leuk Esterase, UA Trace (A) Negative    Nitrite, UA Negative Negative    Urobilinogen, UA 0.2 E.U./dL 0.2 - 1.0  E.U./dL   Urinalysis, Microscopic Only - Urine, Clean Catch    Collection Time: 01/11/24 11:58 PM    Specimen: Urine, Clean Catch   Result Value Ref Range    RBC, UA 0-2 None Seen, 0-2 /HPF    WBC, UA 6-10 (A) None Seen, 0-2 /HPF    Bacteria, UA 1+ (A) None Seen /HPF    Squamous Epithelial Cells, UA 0-2 None Seen, 0-2 /HPF    Hyaline Casts, UA None Seen None Seen /LPF    Methodology Manual Light Microscopy        Ordered the above labs and independently reviewed and interpreted the results.        RADIOLOGY  No Radiology Exams Resulted Within Past 24 Hours    I ordered the above noted radiological studies. Independently reviewed and interpreted by me.  See dictation for official radiology interpretation.      PROCEDURES    Procedures      MEDICATIONS GIVEN IN ER    Medications   ceFAZolin in dextrose (ANCEF) IVPB solution 2,000 mg (has no administration in time range)   vancomycin 1750 mg/500 mL 0.9% NS IVPB ([facility]) (has no administration in time range)         PROGRESS, DATA ANALYSIS, CONSULTS, AND MEDICAL DECISION MAKING    All labs have been independently reviewed and interpreted by me.  All radiology studies have been independently reviewed and interpreted by me and discussed with radiologist dictating the report.   EKG's independently reviewed and interpreted by me.  Discussion below represents my analysis of pertinent findings related to patient's condition, differential diagnosis, treatment plan and final disposition.    Differential diagnosis: Cellulitis, vascular insufficiency, CHF    ED Course as of 01/12/24 0102   u Jan 11, 2024   2358 WBC: 9.20 [DC]   2358 Hemoglobin: 12.2 [DC]   2358 Glucose(!): 288 [DC]   2358 Creatinine: 0.95 [DC]   2358 Sodium: 138 [DC]   2358 Potassium: 4.8 [DC]   Fri Jan 12, 2024   0048 Discussed case with BRYON Ramos, who will accept patient for admission under Dr. Parham. [DC]      ED Course User Index  [DC] Sapphire Lubin PA             AS OF 01:02 EST VITALS:    BP -  170/86  HR - 92  TEMP - 97.8 °F (36.6 °C)  O2 SATS - 95%        DIAGNOSIS  Final diagnoses:   Cellulitis of right lower extremity   Uncontrolled type 2 diabetes mellitus with hyperglycemia         DISPOSITION  ED Disposition       ED Disposition   Decision to Admit    Condition   --    Comment   Level of Care: Med/Surg [1]   Diagnosis: Cellulitis of right lower extremity [789118]   Admitting Physician: SEBASTIAN PAINTING [[id number]]   Attending Physician: SEBASTIAN PAINTING [[id number]]                    Note Disclaimer: At [facility], we believe that sharing information builds trust and better relationships. You are receiving this note because you recently visited [facility]. It is possible you will see health information before a provider has talked with you about it. This kind of information can be easy to misunderstand. To help you fully understand what it means for your health, we urge you to discuss this note with your provider.         Sapphire Lubin PA  01/12/24 0103

## 2024-01-12 NOTE — ED PROVIDER NOTES
MD ATTESTATION NOTE    The CLEMENCIA and I have discussed this patient's history, physical exam, and treatment plan.  I have reviewed the documentation and personally had a face to face interaction with the patient. I affirm the documentation and agree with the treatment and plan.  The attached note describes my personal findings.      I provided a substantive portion of the care of the patient.  I personally performed the physical exam in its entirety, and below are my findings.      Brief HPI: Patient complains of right leg redness, swelling, and discomfort for the past week. She noticed some weeping blisters on her leg earlier today.  Denies fever, chills, chest pain, or shortness of breath.  She has a history of diabetes.    PHYSICAL EXAM  ED Triage Vitals   Temp Heart Rate Resp BP SpO2   01/11/24 2213 01/11/24 2213 01/11/24 2213 01/11/24 2223 01/11/24 2213   97.8 °F (36.6 °C) 92 16 170/86 95 %      Temp src Heart Rate Source Patient Position BP Location FiO2 (%)   -- -- -- -- --                GENERAL: Awake, alert, oriented x 3.  Well-developed, well-nourished and nontoxic-appearing elderly female.  Resting comfortably in no acute distress  HENT: nares patent  EYES: no scleral icterus  CV: regular rhythm, normal rate  RESPIRATORY: normal effort, clear to auscultation bilaterally  ABDOMEN: soft, nontender  MUSCULOSKELETAL: There is edema of the right lower leg.    NEURO: alert, moves all extremities, follows commands  PSYCH:  calm, cooperative  SKIN: There is diffuse erythema and warmth of the right lower leg.  There are some skin sloughing over the right calf.  No lymphangitis.  No crepitus    Vital signs and nursing notes reviewed.        Plan: Obtain labs.  Patient will be started on IV antibiotics for suspected cellulitis.    MDM: Patient appears to have cellulitis on her right leg.  White blood cell count and procalcitonin are normal.  Patient has been given IV vancomycin.  She will be admitted to the  hospitalist.  Symptoms could also be due to venous insufficiency.  DVT is less likely.    ED Course as of 01/12/24 0127   Thu Jan 11, 2024 2358 WBC: 9.20 [DC]   2358 Hemoglobin: 12.2 [DC]   2358 Glucose(!): 288 [DC]   2358 Creatinine: 0.95 [DC]   2358 Sodium: 138 [DC]   2358 Potassium: 4.8 [DC]   Fri Jan 12, 2024   0048 Discussed case with BRYON Ramos, who will accept patient for admission under Dr. Parham. [DC]      ED Course User Index  [DC] Sapphire Lubin PA Holland, William D, MD  01/12/24 0128

## 2024-01-12 NOTE — NURSING NOTE
WOCN consult received BLE's. Patient had a blister to right posterior calf, It has unroofed and is currently not weeping. Legs dry and scaly, numerous scabs, appears venous related. Faint pedal pulses 1-2 plus edema.  Recommend to apply roll gauze and ace wraps to legs bilateral and change 3 times per week. Instructed patient she needs to obtain compression hose to prevent further blistering and edema. Explained she could obtain and be measured at [facility]. She is not happy with [facility]. [facility]ed [facility] also offers measuring and buying compression hose. Patient and  verbalize understanding.

## 2024-01-12 NOTE — PROGRESS NOTES
"[facility] Clinical Pharmacy Services: Vancomycin Pharmacokinetic Initial Consult Note    Michelle Espinoza is a 78 y.o. female who is on day 1 of pharmacy to dose vancomycin.    Indication: Skin and Soft Tissue  Consulting Provider: Rachel Beckman APRN   Planned Duration of Therapy: 5d  Loading Dose Ordered or Given: 1750 mg on 1/12 at 0115  MRSA PCR performed: none  Culture/Source: blood  Target: -600 mg/L.hr   Pertinent Vanc Dosing History: None  Other Antimicrobials: Cefazolin    Vitals/Labs  Ht: 160 cm (63\"); Wt: 81.6 kg (180 lb)  Temp Readings from Last 1 Encounters:   01/11/24 97.8 °F (36.6 °C)    Estimated Creatinine Clearance: 51 mL/min (by C-G formula based on SCr of 0.95 mg/dL).       Results from last 7 days   Lab Units 01/11/24  2233   CREATININE mg/dL 0.95   WBC 10*3/mm3 9.20     Assessment/Plan:    Vancomycin Dose:   750 mg IV every  12  hours  Predictive AUC level for the dose ordered is 529 mg/L.hr, which is within the target of 400-600 mg/L.hr  Vanc Trough has been ordered for 1/13 at 1230     Pharmacy will follow patient's kidney function and will adjust doses and obtain levels as necessary. Thank you for involving pharmacy in this patient's care. Please contact pharmacy with any questions or concerns.                           Telly Ayers [facility]  Clinical Pharmacist    "

## 2024-01-12 NOTE — PROGRESS NOTES
Discharge Planning Assessment  [facility]     Patient Name: Michelle Espinoza  MRN: [id number]  Today's Date: 1/12/2024    Admit Date: 1/11/2024    Plan: Home with spouse, no needs........Sarabjit [facility]   Discharge Needs Assessment       Row Name 01/12/24 1022       Living Environment    People in Home spouse    Current Living Arrangements home    Potentially Unsafe Housing Conditions none    In the past 12 months has the electric, gas, oil, or water company threatened to shut off services in your home? No    Primary Care Provided by self    Provides Primary Care For no one    Family Caregiver if Needed spouse    Able to Return to Prior Arrangements yes       Resource/Environmental Concerns    Resource/Environmental Concerns none       Transportation Needs    In the past 12 months, has lack of transportation kept you from medical appointments or from getting medications? no    In the past 12 months, has lack of transportation kept you from meetings, work, or from getting things needed for daily living? No       Discharge Needs Assessment    Equipment Currently Used at Home wheelchair;walker, rolling    Concerns to be Addressed denies needs/concerns at this time                   Discharge Plan       Row Name 01/12/24 1025       Plan    Plan Home with spouse, no needs........Sarabjit STEVENSON    Patient/Family in Agreement with Plan yes    Plan Comments Met with patient and spouse at bedside, introduced self and role. Pt. lives with spouse in a two-story house with ramp to enter. Pt. states bedroom is on second floor of home, has not been able to sleep in her bedroom for last 3 weeks due to limited mobility and arthritis in hands and legs. Pt. states family room she has set-up to sleep in and has bathroom on first floor as well, has WC and walker on first floor for use as well. Pt. has used [facility] in the past after surgery. Pt. has been to subacute rehab for a very short stay in the past, unsure of name of  facility and would not wish to return there any how. Pt. goes to outpatient therapy at [facility]. PCP is currently Lillie Ji, states she has been working to change to a provider closer to their home but she is still seeing this PCP currently. Fills scripts at [facility] and states no issues affording medications, states one medication has a high cost and is a new med Synjardy, she is able to afford the co-pay ($127 for 90 days supply). Plans home with spouse upon DC, denies needs at this time. Provided with CCP contact card should needs arise........Sarabjit STEVENSON                  Continued Care and Services - Admitted Since 1/11/2024    Coordination has not been started for this encounter.          Demographic Summary    No documentation.                  Functional Status       Row Name 01/12/24 1022       Functional Status    Usual Activity Tolerance fair    Current Activity Tolerance poor       Physical Activity    On average, how many days per week do you engage in moderate to strenuous exercise (like a brisk walk)? 2 days       Functional Status, IADL    Medications independent    Meal Preparation independent    Housekeeping independent    Laundry independent    Shopping independent                   Psychosocial    No documentation.                  Abuse/Neglect    No documentation.                  Legal    No documentation.                  Substance Abuse    No documentation.                  Patient Forms    No documentation.                     Saskia Oliveira, RN

## 2024-01-12 NOTE — H&P
"    Patient Name:  Michelle Espinoza  YOB: 1945  MRN:  [id number]  Admit Date:  1/11/2024  Patient Care Team:  Lillie Ji PA as PCP - General (Family Medicine)  Jose Luis Nelson Jr., MD as Consulting Physician (Urology)  Bon Gardiner MD as Consulting Physician (Hand Surgery)  Milly Crow MD as Consulting Physician (Neurology)  Sacha Garcia APRN as Nurse Practitioner (Neurology)  Johnnie Soni MD as Consulting Physician (Neurology)  Mahamed Massey MD as Consulting Physician (Obstetrics and Gynecology)  Clement Tierney MD as Surgeon (Neurosurgery)  Donna Olsen MA (Inactive) as Medical Assistant  Mahamed Massey MD as Consulting Physician (Obstetrics and Gynecology)  Milly Crow MD as Consulting Physician (Neurology)  Jeffry Bhagat MD as Consulting Physician (Ophthalmology)  Leydi Romo MD as Consulting Physician (Cardiology)  Sangeeta Raman MD as Consulting Physician (Rheumatology)      Subjective   History Present Illness     Chief Complaint   Patient presents with    Wound Check     R posterior lower leg has inflammed skin with drainage.        Ms. Espinzoa is a 78 y.o. former smoker with a history of chronic diastolic CHF, type 2 diabetes, hyperlipidemia, CAD with history of PCI, ischemic cardiomyopathy, hypertension that presents to [facility] complaining of right lower extremity redness and swelling      History of Present Illness  78-year-old procal with history as above who presents for 3 to 4 weeks increasing redness and swelling of the legs.  Patient reported particularly on the right lower extremity she noted her skin started to get red and \"bumpy\".  She thought it was related to dry skin so she attempted to treat this with lotion.  Last week she noted that the bottom portion of her right Leg was wet due to weeping fluid from her leg.  She has had difficulties with weakness and falling.  Denies fevers, chills, chest pain, nausea, " vomiting.    Review of Systems   Constitutional:  Positive for fatigue. Negative for fever.   Respiratory:  Negative for cough and shortness of breath.    Cardiovascular:  Positive for leg swelling. Negative for chest pain and palpitations.   Gastrointestinal:  Negative for abdominal pain, nausea and vomiting.   Neurological:  Positive for weakness.        Personal History     Past Medical History:   Diagnosis Date    Anesthesia complication     STRONG GAG REFLEX    Benign colonic polyp     Chronic back pain     Claustrophobia     Coronary artery disease 03/27/2021    PTCA WITH PLACEMENT OF STENT. PLAVIX ONLY TO BE HELD ONE DAY    DDD (degenerative disc disease), cervical     DDD (degenerative disc disease), lumbosacral     Diabetes mellitus     TYPE 2    Frequent UTI     History of MI (myocardial infarction) 03/27/2021    History of pyelonephritis 06/2014    History of sepsis 2014    Hypercholesteremia     Left knee pain     DRAINED  9/21/21    Leg muscle spasm     Neuropathy     LEFT FOOT. AS RESULT OF BACK PROBLEMS    Osteoarthritis     Osteoporosis     Overactive bladder     Type 2 diabetes mellitus     Ureteral calculus, left     Weakness     LOWER EXTREMITES RELATED FROM NERVE DAMAGE FROM BACK     Past Surgical History:   Procedure Laterality Date    APPENDECTOMY      BACK SURGERY      MULTIPLE. WITH HARDWARE    CARDIAC CATHETERIZATION N/A 03/27/2021    Procedure: Left Heart Cath;  Surgeon: Leydi Romo MD;  Location:  [facility];  Service: Cardiovascular;  Laterality: N/A;    CARDIAC CATHETERIZATION  03/27/2021    Procedure: Percutaneous Manual Thrombectomy;  Surgeon: Leydi Romo MD;  Location:  [facility];  Service: Cardiovascular;;    CARDIAC CATHETERIZATION N/A 03/27/2021    Procedure: Percutaneous Coronary Intervention;  Surgeon: Leydi Romo MD;  Location:  [facility];  Service: Cardiovascular;  Laterality: N/A;    CARDIAC CATHETERIZATION  N/A 2021    Procedure: Coronary angiography;  Surgeon: Leydi Romo MD;  Location:  [facility];  Service: Cardiovascular;  Laterality: N/A;    CARPAL TUNNEL RELEASE      LEFT X2 RIGHT     CATARACT EXTRACTION WITH INTRAOCULAR LENS IMPLANT      LEFT AND RIGHT    CERVICAL SPINE SURGERY      MULTIPLE C3-4 C6-7     SECTION      x 2    COLONOSCOPY  2018    CORONARY STENT PLACEMENT  2021    PARATHYROIDECTOMY Left 2019    Procedure: PARATHYROIDECTOMY LEFT INFERIOR;  Surgeon: Mason Prather MD;  Location:  [facility];  Service: ENT    THYROID SURGERY      TRICEP TENDON REPAIR Left 2021    Procedure: LEFT OPEN TRICEPS REPAIR;  Surgeon: Luis Cho II, MD;  Location: [facility];  Service: Orthopedics;  Laterality: Left;    URETEROSCOPY LASER LITHOTRIPSY WITH STENT INSERTION Left 2021    Procedure: LEFT URETEROSCOPY LASER LITHOTRIPSY, STONE BASKET EXTRACTION STENT;  Surgeon: Jose Luis Nelson Jr., MD;  Location: [facility] OR;  Service: Urology;  Laterality: Left;     Family History   Problem Relation Age of Onset    Stroke Mother     Heart disease Mother     Hypertension Mother             Clotting disorder Father     Hypertension Father             Diabetes Father     Aneurysm Father     Hypertension Sister             Diabetes Sister     Cervical cancer Sister     Obesity Sister     Diabetes Sister     Obesity Sister     Cervical cancer Maternal Grandmother 72    Diabetes Grandchild     Asthma Sister     Hypertension Sister         Dead    Diabetes Sister     Malig Hyperthermia Neg Hx      Social History     Tobacco Use    Smoking status: Former     Packs/day: 1.00     Years: 15.00     Additional pack years: 0.00     Total pack years: 15.00     Types: Cigarettes     Start date: 1965     Quit date: 1992     Years since quittin.0    Smokeless tobacco: Never    Tobacco comments:     CAFFEINE USE: ICE TEA  OCC.   Vaping Use    Vaping Use: Never used   Substance Use Topics    Alcohol use: Not Currently     Comment: Socially.    Drug use: Yes     Types: Hydrocodone     No current facility-administered medications on file prior to encounter.     Current Outpatient Medications on File Prior to Encounter   Medication Sig Dispense Refill    atorvastatin (LIPITOR) 20 MG tablet TAKE ONE TABLET BY MOUTH DAILY 90 tablet 3    baclofen (LIORESAL) 10 MG tablet TAKE ONE TABLET BY MOUTH DAILY 90 tablet 0    carvedilol (COREG) 6.25 MG tablet TAKE 1 TABLET BY MOUTH TWICE A DAY WITH A MEAL 90 tablet 0    Cholecalciferol (VITAMIN D3) 5000 UNITS capsule capsule Take 2 capsules by mouth Daily.      COLLAGEN PO Take 3 tablets by mouth Daily.      glimepiride (AMARYL) 4 MG tablet Take 1 tablet by mouth 2 (Two) Times a Day. 180 tablet 1    hydrALAZINE (APRESOLINE) 10 MG tablet Take 1 tablet by mouth 2 (Two) Times a Day. 180 tablet 1    solifenacin (VESICARE) 10 MG tablet Take 1 tablet by mouth Daily.      sulfaSALAzine (AZULFIDINE) 500 MG tablet Take 1 tablet by mouth 4 (Four) Times a Day.      Synjardy 5-1000 MG tablet Take 1 tablet by mouth Daily.      aspirin 81 MG chewable tablet Chew 1 tablet Daily.      Calcium Carbonate 1250 MG/5ML Take 15 mL by mouth Daily.      Glucos-Chondroit-Hyaluron-MSM (GLUCOSAMINE CHONDROITIN JOINT PO) Take  by mouth 2 (Two) Times a Day. 2000-250mg      glucose blood (OneTouch Ultra) test strip Dx code E11.59 testing bs 4 x day 400 each 3    magnesium 30 MG tablet Take 3 tablets by mouth Daily.      Misc Natural Products (NEURIVA PO) Take  by mouth.      mupirocin (BACTROBAN) 2 % ointment       ofloxacin (OCUFLOX) 0.3 % ophthalmic solution       Probiotic Product (PROBIOTIC PO) Take 1 tablet by mouth Daily.      vitamin E 100 UNIT capsule       vitamin E 400 UNIT capsule       [DISCONTINUED] vitamin B-12 (CYANOCOBALAMIN) 1000 MCG tablet Take 1 tablet by mouth Daily.      [DISCONTINUED] vitamin C (ASCORBIC  ACID) 500 MG tablet Take 1 tablet by mouth Daily. With D3       Allergies   Allergen Reactions    Other Nausea And Vomiting     The mercury in Scallops    Penicillins Hives    Statins Myalgia    Latex Rash    Nickel Rash       Objective    Objective     Vital Signs  Temp:  [97.8 °F (36.6 °C)-97.9 °F (36.6 °C)] 97.9 °F (36.6 °C)  Heart Rate:  [88-98] 97  Resp:  [16-18] 16  BP: (147-176)/() 151/90  SpO2:  [88 %-95 %] 88 %  on  Flow (L/min):  [2] 2;   Device (Oxygen Therapy): nasal cannula  Body mass index is 31.89 kg/m².    Physical Exam  Constitutional:       General: She is not in acute distress.     Appearance: She is ill-appearing (chronically).   Cardiovascular:      Rate and Rhythm: Normal rate and regular rhythm.   Pulmonary:      Effort: Pulmonary effort is normal. No respiratory distress.      Breath sounds: Normal breath sounds. No wheezing.   Abdominal:      General: Bowel sounds are normal.      Palpations: Abdomen is soft.      Tenderness: There is no abdominal tenderness.   Musculoskeletal:         General: No swelling or tenderness.      Right lower leg: Edema present.      Left lower leg: Edema present.      Comments: Dependent/pitting edema up to mid thigh;   Chronic venous stasis bilaterally, posterior calf has area which appears like de-roofed blister.    Skin:     General: Skin is warm and dry.   Neurological:      General: No focal deficit present.      Mental Status: She is alert and oriented to person, place, and time. Mental status is at baseline.       Results Review:  I reviewed the patient's new clinical results.  I reviewed the patient's new imaging results and agree with the interpretation.  I reviewed the patient's other test results and agree with the interpretation  I personally viewed and interpreted the patient's EKG/Telemetry data    Lab Results (last 24 hours)       Procedure Component Value Units Date/Time    CBC & Differential [[id number]]  (Abnormal) Collected: 01/11/24 7446     Specimen: Blood from Arm, Left Updated: 01/11/24 2242    Narrative:      The following orders were created for panel order CBC & Differential.  Procedure                               Abnormality         Status                     ---------                               -----------         ------                     CBC Auto Differential[[id number]]        Abnormal            Final result                 Please view results for these tests on the individual orders.    Comprehensive Metabolic Panel [[id number]]  (Abnormal) Collected: 01/11/24 2233    Specimen: Blood from Arm, Left Updated: 01/11/24 2300     Glucose 288 mg/dL      BUN 26 mg/dL      Creatinine 0.95 mg/dL      Sodium 138 mmol/L      Potassium 4.8 mmol/L      Chloride 104 mmol/L      CO2 22.0 mmol/L      Calcium 9.6 mg/dL      Total Protein 7.2 g/dL      Albumin 4.0 g/dL      ALT (SGPT) 20 U/L      AST (SGOT) 18 U/L      Alkaline Phosphatase 90 U/L      Total Bilirubin 0.2 mg/dL      Globulin 3.2 gm/dL      A/G Ratio 1.3 g/dL      BUN/Creatinine Ratio 27.4     Anion Gap 12.0 mmol/L      eGFR 62.2 mL/min/1.73     Narrative:      GFR Normal >60  Chronic Kidney Disease <60  Kidney Failure <15    The GFR formula is only valid for adults with stable renal function between ages 18 and 70.    Procalcitonin [[id number]]  (Normal) Collected: 01/11/24 2233    Specimen: Blood from Arm, Left Updated: 01/12/24 0012     Procalcitonin 0.08 ng/mL     Narrative:      As a Marker for Sepsis (Non-Neonates):    1. <0.5 ng/mL represents a low risk of severe sepsis and/or septic shock.  2. >2 ng/mL represents a high risk of severe sepsis and/or septic shock.    As a Marker for Lower Respiratory Tract Infections that require antibiotic therapy:    PCT on Admission    Antibiotic Therapy       6-12 Hrs later    >0.5                Strongly Recommended  >0.25 - <0.5        Recommended   0.1 - 0.25          Discouraged              Remeasure/reassess PCT  <0.1                 "Strongly Discouraged     Remeasure/reassess PCT    As 28 day mortality risk marker: \"Change in Procalcitonin Result\" (>80% or <=80%) if Day 0 (or Day 1) and Day 4 values are available. Refer to http://[email or web address]-pct-[email or web address]    Change in PCT <=80%  A decrease of PCT levels below or equal to 80% defines a positive change in PCT test result representing a higher risk for 28-day all-cause mortality of patients diagnosed with severe sepsis for septic shock.    Change in PCT >80%  A decrease of PCT levels of more than 80% defines a negative change in PCT result representing a lower risk for 28-day all-cause mortality of patients diagnosed with severe sepsis or septic shock.       CBC Auto Differential [[id number]]  (Abnormal) Collected: 01/11/24 2233    Specimen: Blood from Arm, Left Updated: 01/11/24 2242     WBC 9.20 10*3/mm3      RBC 3.98 10*6/mm3      Hemoglobin 12.2 g/dL      Hematocrit 37.5 %      MCV 94.2 fL      MCH 30.7 pg      MCHC 32.5 g/dL      RDW 12.2 %      RDW-SD 41.4 fl      MPV 9.6 fL      Platelets 210 10*3/mm3      Neutrophil % 72.7 %      Lymphocyte % 14.9 %      Monocyte % 9.9 %      Eosinophil % 1.5 %      Basophil % 0.3 %      Immature Grans % 0.7 %      Neutrophils, Absolute 6.69 10*3/mm3      Lymphocytes, Absolute 1.37 10*3/mm3      Monocytes, Absolute 0.91 10*3/mm3      Eosinophils, Absolute 0.14 10*3/mm3      Basophils, Absolute 0.03 10*3/mm3      Immature Grans, Absolute 0.06 10*3/mm3      nRBC 0.1 /100 WBC     Urinalysis With Microscopic If Indicated (No Culture) - Urine, Clean Catch [[id number]]  (Abnormal) Collected: 01/11/24 2358    Specimen: Urine, Clean Catch Updated: 01/12/24 0012     Color, UA Yellow     Appearance, UA Cloudy     pH, UA 5.5     Specific Gravity, UA 1.026     Glucose, UA >=1000 mg/dL (3+)     Ketones, UA Negative     Bilirubin, UA Negative     Blood, UA Negative     Protein, UA Trace     Leuk Esterase, UA Trace     Nitrite, UA Negative     Urobilinogen, UA 0.2 " E.U./dL    Urinalysis, Microscopic Only - Urine, Clean Catch [[id number]]  (Abnormal) Collected: 01/11/24 2358    Specimen: Urine, Clean Catch Updated: 01/12/24 0020     RBC, UA 0-2 /HPF      WBC, UA 6-10 /HPF      Bacteria, UA 1+ /HPF      Squamous Epithelial Cells, UA 0-2 /HPF      Hyaline Casts, UA None Seen /LPF      Methodology Manual Light Microscopy    Urine Culture - Urine, Urine, Clean Catch [[id number]] Collected: 01/11/24 2358    Specimen: Urine, Clean Catch Updated: 01/12/24 0203    Blood Culture - Blood, Arm, Right [[id number]] Collected: 01/12/24 0031    Specimen: Blood from Arm, Right Updated: 01/12/24 0037    Blood Culture - Blood, Arm, Left [[id number]] Collected: 01/12/24 0042    Specimen: Blood from Arm, Left Updated: 01/12/24 0044    CBC Auto Differential [[id number]]  (Abnormal) Collected: 01/12/24 0618    Specimen: Blood Updated: 01/12/24 0649     WBC 9.50 10*3/mm3      RBC 3.77 10*6/mm3      Hemoglobin 11.7 g/dL      Hematocrit 35.9 %      MCV 95.2 fL      MCH 31.0 pg      MCHC 32.6 g/dL      RDW 12.3 %      RDW-SD 42.4 fl      MPV 10.5 fL      Platelets 181 10*3/mm3      Neutrophil % 70.4 %      Lymphocyte % 14.7 %      Monocyte % 12.4 %      Eosinophil % 1.7 %      Basophil % 0.3 %      Immature Grans % 0.5 %      Neutrophils, Absolute 6.68 10*3/mm3      Lymphocytes, Absolute 1.40 10*3/mm3      Monocytes, Absolute 1.18 10*3/mm3      Eosinophils, Absolute 0.16 10*3/mm3      Basophils, Absolute 0.03 10*3/mm3      Immature Grans, Absolute 0.05 10*3/mm3      nRBC 0.0 /100 WBC     Comprehensive Metabolic Panel [[id number]]  (Abnormal) Collected: 01/12/24 0618    Specimen: Blood Updated: 01/12/24 0707     Glucose 301 mg/dL      BUN 21 mg/dL      Creatinine 1.40 mg/dL      Sodium 136 mmol/L      Potassium 4.2 mmol/L      Chloride 103 mmol/L      CO2 19.0 mmol/L      Calcium 9.0 mg/dL      Total Protein 6.3 g/dL      Albumin 3.4 g/dL      ALT (SGPT) 18 U/L      AST (SGOT) 16 U/L      Alkaline Phosphatase  83 U/L      Total Bilirubin 0.2 mg/dL      Globulin 2.9 gm/dL      A/G Ratio 1.2 g/dL      BUN/Creatinine Ratio 15.0     Anion Gap 14.0 mmol/L      eGFR 39.1 mL/min/1.73     Narrative:      GFR Normal >60  Chronic Kidney Disease <60  Kidney Failure <15    The GFR formula is only valid for adults with stable renal function between ages 18 and 70.    Sedimentation Rate [[id number]]  (Abnormal) Collected: 01/12/24 0618    Specimen: Blood Updated: 01/12/24 0700     Sed Rate 51 mm/hr     C-reactive Protein [[id number]]  (Abnormal) Collected: 01/12/24 0618    Specimen: Blood Updated: 01/12/24 0707     C-Reactive Protein 1.83 mg/dL     POC Glucose Once [[id number]]  (Abnormal) Collected: 01/12/24 1056    Specimen: Blood Updated: 01/12/24 1058     Glucose 162 mg/dL             Imaging Results (Last 24 Hours)       ** No results found for the last 24 hours. **            Results for orders placed during the hospital encounter of 10/26/23    Adult Transthoracic Echo Complete w/ Color, Spectral and Contrast if Necessary Per Protocol    Interpretation Summary    Left ventricular systolic function is normal. Calculated left ventricular EF = 51.4% Normal left ventricular cavity size noted. Left ventricular wall thickness is consistent with borderline concentric hypertrophy. All left ventricular wall segments contract normally. Left ventricular diastolic function is consistent with (grade I) impaired relaxation.    No aortic valve regurgitation or stenosis is present. The aortic valve is abnormal in structure. There is mild thickening of the right coronary cusp(s) of the aortic valve.    Mild mitral valve regurgitation is present. No significant mitral valve stenosis is present.    Trace tricuspid valve regurgitation is present. Estimated right ventricular systolic pressure from tricuspid regurgitation is normal (<35 mmHg). Calculated right ventricular systolic pressure from tricuspid regurgitation is 20.5 mmHg.      No orders to  "display        Assessment/Plan     Active Hospital Problems    Diagnosis  POA    **Cellulitis of right lower extremity [L03.115]  Yes    Type 2 diabetes mellitus with other circulatory complications [E11.59]  Yes    CKD (chronic kidney disease) [N18.9]  Yes      Resolved Hospital Problems   No resolved problems to display.       Ms. Espinoza is a 78 y.o. former smoker with a history of chronic diastolic CHF, type 2 diabetes, hyperlipidemia, CAD with history of PCI, ischemic cardiomyopathy, hypertension who presents with 3 to 4 weeks lower extremity edema/erythema.     Chronic diastolic CHF  CAD s/p PCI  Ischemic cardiomyopathy  HTN  HLD  -Patient admitted for cellulitis of right lower extremity, on exam patient has bilateral erythema more consistent with chronic stasis dermatitis,  Her procal is negative and she has no white count; hold on further antibiotics  -Patient reports that she had an appointment to see the cardiologist but is going to miss it because of his hospitalization, she has significant volume overload with dependent edema up to her mid thigh, she reports that she does not take any diuretic at baseline and is worried because she said she \"already pees all the time\"  - continue home meds: atorvastatin, coreg, hydralazine  - lasix per cardiology    MINERVA  - Cr 0.9 on admission, trended up to 1.4 overnight  - daily BMP while admitted  - pt getting lasix per cardiology for volume overload    Type 2 DM  - continue SSI, start lantus nightly  - hold home oral meds    Hypercalcemia s/p parathyroidectomy    I discussed the patient's findings and my recommendations with patient, family, and nursing staff.    VTE Prophylaxis - Lovenox 30 mg SC daily.  Code Status - Full code.       Mariam Romero MD  [facility]  01/12/24  11:30 EST    "

## 2024-01-13 PROBLEM — L03.90 CELLULITIS: Status: ACTIVE | Noted: 2024-01-13

## 2024-01-13 LAB
ANION GAP SERPL CALCULATED.3IONS-SCNC: 11.4 MMOL/L (ref 5–15)
BACTERIA SPEC AEROBE CULT: NORMAL
BASOPHILS # BLD AUTO: 0.02 10*3/MM3 (ref 0–0.2)
BASOPHILS NFR BLD AUTO: 0.2 % (ref 0–1.5)
BUN SERPL-MCNC: 18 MG/DL (ref 8–23)
BUN/CREAT SERPL: 20 (ref 7–25)
CALCIUM SPEC-SCNC: 9.1 MG/DL (ref 8.6–10.5)
CHLORIDE SERPL-SCNC: 104 MMOL/L (ref 98–107)
CO2 SERPL-SCNC: 22.6 MMOL/L (ref 22–29)
CREAT SERPL-MCNC: 0.9 MG/DL (ref 0.57–1)
DEPRECATED RDW RBC AUTO: 44.7 FL (ref 37–54)
EGFRCR SERPLBLD CKD-EPI 2021: 66.4 ML/MIN/1.73
EOSINOPHIL # BLD AUTO: 0.15 10*3/MM3 (ref 0–0.4)
EOSINOPHIL NFR BLD AUTO: 1.5 % (ref 0.3–6.2)
ERYTHROCYTE [DISTWIDTH] IN BLOOD BY AUTOMATED COUNT: 12.5 % (ref 12.3–15.4)
GLUCOSE BLDC GLUCOMTR-MCNC: 136 MG/DL (ref 70–130)
GLUCOSE BLDC GLUCOMTR-MCNC: 169 MG/DL (ref 70–130)
GLUCOSE BLDC GLUCOMTR-MCNC: 192 MG/DL (ref 70–130)
GLUCOSE BLDC GLUCOMTR-MCNC: 277 MG/DL (ref 70–130)
GLUCOSE SERPL-MCNC: 199 MG/DL (ref 65–99)
HCT VFR BLD AUTO: 38.1 % (ref 34–46.6)
HGB BLD-MCNC: 12.2 G/DL (ref 12–15.9)
IMM GRANULOCYTES # BLD AUTO: 0.04 10*3/MM3 (ref 0–0.05)
IMM GRANULOCYTES NFR BLD AUTO: 0.4 % (ref 0–0.5)
LYMPHOCYTES # BLD AUTO: 1.23 10*3/MM3 (ref 0.7–3.1)
LYMPHOCYTES NFR BLD AUTO: 12.5 % (ref 19.6–45.3)
MAGNESIUM SERPL-MCNC: 1.9 MG/DL (ref 1.6–2.4)
MCH RBC QN AUTO: 31.2 PG (ref 26.6–33)
MCHC RBC AUTO-ENTMCNC: 32 G/DL (ref 31.5–35.7)
MCV RBC AUTO: 97.4 FL (ref 79–97)
MONOCYTES # BLD AUTO: 1.05 10*3/MM3 (ref 0.1–0.9)
MONOCYTES NFR BLD AUTO: 10.7 % (ref 5–12)
NEUTROPHILS NFR BLD AUTO: 7.36 10*3/MM3 (ref 1.7–7)
NEUTROPHILS NFR BLD AUTO: 74.7 % (ref 42.7–76)
NRBC BLD AUTO-RTO: 0 /100 WBC (ref 0–0.2)
PHOSPHATE SERPL-MCNC: 2.8 MG/DL (ref 2.5–4.5)
PLATELET # BLD AUTO: 195 10*3/MM3 (ref 140–450)
PMV BLD AUTO: 10.1 FL (ref 6–12)
POTASSIUM SERPL-SCNC: 4.1 MMOL/L (ref 3.5–5.2)
RBC # BLD AUTO: 3.91 10*6/MM3 (ref 3.77–5.28)
SODIUM SERPL-SCNC: 138 MMOL/L (ref 136–145)
TSH SERPL DL<=0.05 MIU/L-ACNC: 1.6 UIU/ML (ref 0.27–4.2)
WBC NRBC COR # BLD AUTO: 9.85 10*3/MM3 (ref 3.4–10.8)

## 2024-01-13 PROCEDURE — 25010000002 FUROSEMIDE PER 20 MG: Performed by: STUDENT IN AN ORGANIZED HEALTH CARE EDUCATION/TRAINING PROGRAM

## 2024-01-13 PROCEDURE — 84100 ASSAY OF PHOSPHORUS: CPT | Performed by: STUDENT IN AN ORGANIZED HEALTH CARE EDUCATION/TRAINING PROGRAM

## 2024-01-13 PROCEDURE — 82948 REAGENT STRIP/BLOOD GLUCOSE: CPT

## 2024-01-13 PROCEDURE — 25010000002 ENOXAPARIN PER 10 MG: Performed by: STUDENT IN AN ORGANIZED HEALTH CARE EDUCATION/TRAINING PROGRAM

## 2024-01-13 PROCEDURE — 63710000001 INSULIN LISPRO (HUMAN) PER 5 UNITS: Performed by: NURSE PRACTITIONER

## 2024-01-13 PROCEDURE — 63710000001 INSULIN GLARGINE PER 5 UNITS: Performed by: STUDENT IN AN ORGANIZED HEALTH CARE EDUCATION/TRAINING PROGRAM

## 2024-01-13 PROCEDURE — 93010 ELECTROCARDIOGRAM REPORT: CPT | Performed by: INTERNAL MEDICINE

## 2024-01-13 PROCEDURE — 93005 ELECTROCARDIOGRAM TRACING: CPT | Performed by: INTERNAL MEDICINE

## 2024-01-13 PROCEDURE — 97530 THERAPEUTIC ACTIVITIES: CPT

## 2024-01-13 PROCEDURE — 97162 PT EVAL MOD COMPLEX 30 MIN: CPT

## 2024-01-13 PROCEDURE — 83735 ASSAY OF MAGNESIUM: CPT | Performed by: STUDENT IN AN ORGANIZED HEALTH CARE EDUCATION/TRAINING PROGRAM

## 2024-01-13 PROCEDURE — 84443 ASSAY THYROID STIM HORMONE: CPT | Performed by: INTERNAL MEDICINE

## 2024-01-13 PROCEDURE — 99232 SBSQ HOSP IP/OBS MODERATE 35: CPT | Performed by: INTERNAL MEDICINE

## 2024-01-13 PROCEDURE — 85025 COMPLETE CBC W/AUTO DIFF WBC: CPT | Performed by: STUDENT IN AN ORGANIZED HEALTH CARE EDUCATION/TRAINING PROGRAM

## 2024-01-13 PROCEDURE — 99214 OFFICE O/P EST MOD 30 MIN: CPT | Performed by: INTERNAL MEDICINE

## 2024-01-13 PROCEDURE — 80048 BASIC METABOLIC PNL TOTAL CA: CPT | Performed by: STUDENT IN AN ORGANIZED HEALTH CARE EDUCATION/TRAINING PROGRAM

## 2024-01-13 RX ORDER — CARVEDILOL 12.5 MG/1
12.5 TABLET ORAL 2 TIMES DAILY WITH MEALS
Status: DISCONTINUED | OUTPATIENT
Start: 2024-01-14 | End: 2024-01-14 | Stop reason: HOSPADM

## 2024-01-13 RX ORDER — ASPIRIN 81 MG/1
81 TABLET ORAL DAILY
Status: DISCONTINUED | OUTPATIENT
Start: 2024-01-13 | End: 2024-01-14 | Stop reason: HOSPADM

## 2024-01-13 RX ORDER — FUROSEMIDE 10 MG/ML
40 INJECTION INTRAMUSCULAR; INTRAVENOUS ONCE
Status: COMPLETED | OUTPATIENT
Start: 2024-01-13 | End: 2024-01-13

## 2024-01-13 RX ORDER — ENOXAPARIN SODIUM 100 MG/ML
40 INJECTION SUBCUTANEOUS EVERY 24 HOURS
Status: DISCONTINUED | OUTPATIENT
Start: 2024-01-13 | End: 2024-01-14 | Stop reason: HOSPADM

## 2024-01-13 RX ADMIN — INSULIN LISPRO 2 UNITS: 100 INJECTION, SOLUTION INTRAVENOUS; SUBCUTANEOUS at 11:39

## 2024-01-13 RX ADMIN — INSULIN GLARGINE 10 UNITS: 100 INJECTION, SOLUTION SUBCUTANEOUS at 22:47

## 2024-01-13 RX ADMIN — HYDRALAZINE HYDROCHLORIDE 10 MG: 10 TABLET, FILM COATED ORAL at 22:33

## 2024-01-13 RX ADMIN — ATORVASTATIN CALCIUM 20 MG: 20 TABLET, FILM COATED ORAL at 08:42

## 2024-01-13 RX ADMIN — ENOXAPARIN SODIUM 40 MG: 100 INJECTION SUBCUTANEOUS at 17:35

## 2024-01-13 RX ADMIN — Medication 10 ML: at 08:44

## 2024-01-13 RX ADMIN — BACLOFEN 10 MG: 10 TABLET ORAL at 08:42

## 2024-01-13 RX ADMIN — SULFASALAZINE 500 MG: 500 TABLET ORAL at 02:09

## 2024-01-13 RX ADMIN — SULFASALAZINE 500 MG: 500 TABLET ORAL at 12:30

## 2024-01-13 RX ADMIN — CARVEDILOL 6.25 MG: 6.25 TABLET, FILM COATED ORAL at 17:35

## 2024-01-13 RX ADMIN — Medication 10000 UNITS: at 08:43

## 2024-01-13 RX ADMIN — HYDRALAZINE HYDROCHLORIDE 10 MG: 10 TABLET, FILM COATED ORAL at 08:43

## 2024-01-13 RX ADMIN — SULFASALAZINE 500 MG: 500 TABLET ORAL at 08:43

## 2024-01-13 RX ADMIN — INSULIN LISPRO 4 UNITS: 100 INJECTION, SOLUTION INTRAVENOUS; SUBCUTANEOUS at 22:47

## 2024-01-13 RX ADMIN — INSULIN LISPRO 2 UNITS: 100 INJECTION, SOLUTION INTRAVENOUS; SUBCUTANEOUS at 16:54

## 2024-01-13 RX ADMIN — ASPIRIN 81 MG: 81 TABLET, COATED ORAL at 18:05

## 2024-01-13 RX ADMIN — FUROSEMIDE 40 MG: 10 INJECTION, SOLUTION INTRAMUSCULAR; INTRAVENOUS at 16:53

## 2024-01-13 RX ADMIN — SULFASALAZINE 500 MG: 500 TABLET ORAL at 21:23

## 2024-01-13 RX ADMIN — ACETAMINOPHEN 1000 MG: 500 TABLET ORAL at 22:33

## 2024-01-13 RX ADMIN — Medication 10 ML: at 21:24

## 2024-01-13 RX ADMIN — SULFASALAZINE 500 MG: 500 TABLET ORAL at 17:35

## 2024-01-13 RX ADMIN — ACETAMINOPHEN 1000 MG: 500 TABLET ORAL at 02:10

## 2024-01-13 RX ADMIN — CARVEDILOL 6.25 MG: 6.25 TABLET, FILM COATED ORAL at 08:43

## 2024-01-13 NOTE — PROGRESS NOTES
Name: Michelle Espinoza ADMIT: 2024   : 1945  PCP: Lillie Ji PA    MRN: [id number] LOS: 0 days   AGE/SEX: 78 y.o. female  ROOM: [facility]     Subjective   Subjective   Resting in bed, urine output 3250 yesterday after single dose of IV Lasix.  Distal lower extremities wrapped however edema appears improved at distal thighs.       Objective   Objective   Vital Signs  Temp:  [97.1 °F (36.2 °C)-99 °F (37.2 °C)] 99 °F (37.2 °C)  Heart Rate:  [] 92  Resp:  [16-20] 18  BP: (118-156)/(60-85) 135/85  SpO2:  [91 %-96 %] 95 %  on  Flow (L/min):  [2] 2;   Device (Oxygen Therapy): nasal cannula  Body mass index is 31.29 kg/m².  Physical Exam  Constitutional:       General: She is not in acute distress.     Appearance: Normal appearance. She is not ill-appearing.   Cardiovascular:      Rate and Rhythm: Normal rate and regular rhythm.   Pulmonary:      Effort: Pulmonary effort is normal. No respiratory distress.      Breath sounds: Normal breath sounds. No wheezing.   Abdominal:      Palpations: Abdomen is soft.      Tenderness: There is no abdominal tenderness.   Musculoskeletal:         General: No swelling or tenderness.      Right lower leg: Edema present.      Left lower leg: Edema present.   Skin:     General: Skin is warm and dry.   Neurological:      General: No focal deficit present.      Mental Status: She is alert and oriented to person, place, and time. Mental status is at baseline.         Results Review     I reviewed the patient's new clinical results.  Results from last 7 days   Lab Units 24  1108 24  0618 24  2233   WBC 10*3/mm3 9.85 9.50 9.20   HEMOGLOBIN g/dL 12.2 11.7* 12.2   PLATELETS 10*3/mm3 195 181 210     Results from last 7 days   Lab Units 24  1108 24  0618 24  2233   SODIUM mmol/L 138 136 138   POTASSIUM mmol/L 4.1 4.2 4.8   CHLORIDE mmol/L 104 103 104   CO2 mmol/L 22.6 19.0* 22.0   BUN mg/dL 18 21 26*   CREATININE mg/dL 0.90 1.40* 0.95    GLUCOSE mg/dL 199* 301* 288*   Estimated Creatinine Clearance: 54.5 mL/min (by C-G formula based on SCr of 0.9 mg/dL).  Results from last 7 days   Lab Units 01/12/24  0618 01/11/24  2233   ALBUMIN g/dL 3.4* 4.0   BILIRUBIN mg/dL 0.2 0.2   ALK PHOS U/L 83 90   AST (SGOT) U/L 16 18   ALT (SGPT) U/L 18 20     Results from last 7 days   Lab Units 01/13/24  1108 01/12/24  0618 01/11/24  2233   CALCIUM mg/dL 9.1 9.0 9.6   ALBUMIN g/dL  --  3.4* 4.0   MAGNESIUM mg/dL 1.9  --   --    PHOSPHORUS mg/dL 2.8  --   --      Results from last 7 days   Lab Units 01/11/24  2233   PROCALCITONIN ng/mL 0.08     COVID19   Date Value Ref Range Status   09/25/2021 Not Detected Not Detected - Ref. Range Final     SARS-CoV-2 PCR   Date Value Ref Range Status   05/22/2021 Not Detected Not Detected Final     Comment:     Nucleic acid specific to SARS-CoV-2 (COVID-19) virus was not detected in  this sample by the TaqPath (TM) COVID-19 Combo Kit.          SARS-CoV-2 (COVID-19) nucleic acid testing performed using [employer] Aptima (R) SARS-CoV-2 Assay or [employer] TaqPath (TM)  COVID-19 Combo Kit as indicated above under Emergency Use Authorization (EUA) from the FDA. Aptima (R) and TaqPath (TM) test performance  characteristics verified by [facility] in accordance with the FDAs Guidance Document (Policy for Diagnostic Tests for Coronavirus Disease-2019  during the Public Health Emergency) issued on March 16, 2020. The laboratory is regulated under CLIA as qualified to perform high-complexity testing  Unless otherwise noted, all testing was performed at [facility], CLIA No. [id number], [place] State Licensee No. [id number]     Glucose   Date/Time Value Ref Range Status   01/13/2024 1104 192 (H) 70 - 130 mg/dL Final   01/13/2024 0605 136 (H) 70 - 130 mg/dL Final   01/12/2024 2136 287 (H) 70 - 130 mg/dL Final   01/12/2024 1547 263 (H) 70 - 130 mg/dL Final   01/12/2024 1056 162 (H) 70 - 130 mg/dL Final       Adult Transthoracic Echo  Complete w/ Color, Spectral and Contrast if Necessary Per Protocol    Left ventricular systolic function is normal. Calculated left   ventricular EF = 51.4% Normal left ventricular cavity size noted. Left   ventricular wall thickness is consistent with borderline concentric   hypertrophy. All left ventricular wall segments contract normally. Left   ventricular diastolic function is consistent with (grade I) impaired   relaxation.    No aortic valve regurgitation or stenosis is present. The aortic valve   is abnormal in structure. There is mild thickening of the right coronary   cusp(s) of the aortic valve.    Mild mitral valve regurgitation is present. No significant mitral valve   stenosis is present.    Trace tricuspid valve regurgitation is present. Estimated right   ventricular systolic pressure from tricuspid regurgitation is normal (<35   mmHg). Calculated right ventricular systolic pressure from tricuspid   regurgitation is 20.5 mmHg.    Scheduled Medications  atorvastatin, 20 mg, Oral, Daily  baclofen, 10 mg, Oral, Daily  carvedilol, 6.25 mg, Oral, BID With Meals  cholecalciferol, 10,000 Units, Oral, Daily  enoxaparin, 40 mg, Subcutaneous, Q24H  furosemide, 40 mg, Intravenous, Once  hydrALAZINE, 10 mg, Oral, BID  insulin glargine, 10 Units, Subcutaneous, Nightly  insulin lispro, 2-7 Units, Subcutaneous, 4x Daily AC & at Bedtime  senna-docusate sodium, 2 tablet, Oral, BID  sodium chloride, 10 mL, Intravenous, Q12H  sulfaSALAzine, 500 mg, Oral, 4x Daily    Infusions   Diet  Diet: Diabetic Diets; Consistent Carbohydrate; Texture: Regular Texture (IDDSI 7); Fluid Consistency: Thin (IDDSI 0)         I have personally reviewed:  [x]  Laboratory   []  Microbiology   []  Radiology   [x]  EKG/Telemetry   []  Cardiology/Vascular   []  Pathology   []  Records    Assessment/Plan     Active Hospital Problems    Diagnosis  POA    **Cellulitis of right lower extremity [L03.115]  Yes    Chronic diastolic CHF (congestive  "heart failure) [I50.32]  Unknown    Essential hypertension, benign [I10]  Unknown    MINERVA (acute kidney injury) [N17.9]  Unknown    CAD (coronary artery disease) [I25.10]  Unknown    Type 2 diabetes mellitus with other circulatory complications [E11.59]  Yes    CKD (chronic kidney disease) [N18.9]  Yes    HLD (hyperlipidemia) [E78.5]  Unknown      Resolved Hospital Problems   No resolved problems to display.     Ms. Espinoza is a 78 y.o. former smoker with a history of chronic diastolic CHF, type 2 diabetes, hyperlipidemia, CAD with history of PCI, ischemic cardiomyopathy, hypertension who presents with 3 to 4 weeks lower extremity edema/erythema.      Chronic diastolic CHF  CAD s/p PCI  Ischemic cardiomyopathy  HTN  HLD  -Patient admitted for cellulitis of right lower extremity, on exam patient has bilateral erythema more consistent with chronic stasis dermatitis,  Her procal is negative and she has no white count; hold on further antibiotics  -Patient reports that she had an appointment to see the cardiologist but is going to miss it because of his hospitalization, she has significant volume overload with dependent edema up to her mid thigh, she reports that she does not take any diuretic at baseline and is worried because she said she \"already pees all the time\"  - continue home meds: atorvastatin, coreg, hydralazine  - lasix 40 IV today for spot dosing; legs wrapped     MINERVA- resolved  - Cr 0.9 on admission, trended up to 1.4 overnight  - daily BMP while admitted  - Cr back down to 0.9     Type 2 DM  - continue SSI, start lantus nightly  - hold home oral meds     Hypercalcemia s/p parathyroidectomy    Lovenox 40 mg SC daily for DVT prophylaxis.  Full code.  Discussed with patient and nursing staff.  Anticipate discharge home timing yet to be determined. Vs SNF- pending further PT evals.       Mariam Romero MD  [facility]  01/13/24  15:42 EST    I wore protective equipment throughout " this patient encounter including gloves.  Hand hygiene was performed before donning protective equipment and after removal when leaving the room.         Copied text in this note has been reviewed and is accurate as of 01/13/24.

## 2024-01-13 NOTE — PROGRESS NOTES
LOS: 0 days   Patient Care Team:  Lillie Ji PA as PCP - General (Family Medicine)  Jose Luis Nelson Jr., MD as Consulting Physician (Urology)  Bon Gardiner MD as Consulting Physician (Hand Surgery)  Milly Crow MD as Consulting Physician (Neurology)  Sacha Garcia APRN as Nurse Practitioner (Neurology)  Johnnie Soni MD as Consulting Physician (Neurology)  Mahamed Massey MD as Consulting Physician (Obstetrics and Gynecology)  Clement Tierney MD as Surgeon (Neurosurgery)  Donna Olsen MA (Inactive) as Medical Assistant  Mahamed Massey MD as Consulting Physician (Obstetrics and Gynecology)  Milly Crow MD as Consulting Physician (Neurology)  Jeffry Bhagat MD as Consulting Physician (Ophthalmology)  Leydi Romo MD as Consulting Physician (Cardiology)  Sangeeta Raman MD as Consulting Physician (Rheumatology)    Chief Complaint: Follow-up edema, acute on chronic diastolic CHF,  coronary artery disease.    Interval History: She is doing slightly better but is still volume overloaded.  Lower extremities are wrapped this morning.  No chest pain or new shortness of breath.    Vital Signs:  Temp:  [97.1 °F (36.2 °C)-99 °F (37.2 °C)] 99 °F (37.2 °C)  Heart Rate:  [] 110  Resp:  [16-20] 18  BP: (118-156)/(60-85) 135/85    Intake/Output Summary (Last 24 hours) at 1/13/2024 1736  Last data filed at 1/13/2024 0607  Gross per 24 hour   Intake 120 ml   Output 2450 ml   Net -2330 ml       Physical Exam:   General Appearance:    No acute distress, alert and oriented x4   Lungs:     Clear to auscultation bilaterally     Heart:    Regular rhythm and normal rate.  No murmurs, gallops, or   rubs.   Abdomen:     Soft, nontender, nondistended.    Extremities:   2+ edema of the lower extremities bilaterally.     Results Review:    Results from last 7 days   Lab Units 01/13/24  1108   SODIUM mmol/L 138   POTASSIUM mmol/L 4.1   CHLORIDE mmol/L 104   CO2 mmol/L 22.6   BUN mg/dL 18   CREATININE mg/dL  0.90   GLUCOSE mg/dL 199*   CALCIUM mg/dL 9.1         Results from last 7 days   Lab Units 01/13/24  1108   WBC 10*3/mm3 9.85   HEMOGLOBIN g/dL 12.2   HEMATOCRIT % 38.1   PLATELETS 10*3/mm3 195             Results from last 7 days   Lab Units 01/13/24  1108   MAGNESIUM mg/dL 1.9           I reviewed the patient's new clinical results.        Assessment:  1.  Acute on chronic diastolic CHF, last EF 51% on 10/26/2023  2.  Significant lower extremity edema with chronic stasis dermatitis  3.  Acute kidney injury, resolved  4.  Coronary artery disease with acute inferior MI and placement of 3 drug-eluting stents to the proximal to distal RCA on 3/27/2021  5.  Hypertension  6.  Type 2 diabetes  7.  Hypercalcemia status post parathyroidectomy  8.  Statin intolerance - on Lipitor 20 mg    Plan:  -Agree with Dr. Romero.  She does need further diuresis.  She diuresed over 3 L after 1 dose of IV Lasix yesterday.  Lasix 40 mg IV has been ordered again today.  I suspect that she will need more diuretics tomorrow.    -Her blood pressure is not well-controlled in general.  This may have exacerbated the diastolic CHF.  It is not entirely clear as to whether this is the entire etiology.  Her heart rate is also mildly elevated.  I am going to check an EKG and increase her Coreg to 12.5 mg twice daily.  She is also on hydralazine 10 mg twice daily.    -Check TSH to ensure this is not contributing to tachycardia and CHF.    -Also agree with Dr. Romero that this is most likely venous stasis dermatitis and not cellulitis.  She is holding antibiotics at this time.    -No evidence of ACS currently.  Continue Lipitor 20 mg/day.  Resume aspirin 81 mg.    Chance Lopez MD  01/13/24  17:36 EST

## 2024-01-13 NOTE — THERAPY EVALUATION
Patient Name: Michelle Espinoza  : 1945    MRN: [id number]                              Today's Date: 2024       Admit Date: 2024    Visit Dx:     ICD-10-CM ICD-9-CM   1. Cellulitis of right lower extremity  L03.115 682.6   2. Uncontrolled type 2 diabetes mellitus with hyperglycemia  E11.65 250.02     Patient Active Problem List   Diagnosis    Abnormal weight gain    Arm pain    Arthritis    Atonic neurogenic bladder    Benign colonic polyp    Carotid atherosclerosis    Fatigue    Hypercalcemia    HLD (hyperlipidemia)    Weakness of lower extremity    Nephrolithiasis    Spinal stenosis    Urinary tract infection    Chronic venous insufficiency    B12 deficiency    Vitamin D deficiency    Degenerative disc disease, lumbar    T7 and T10-11 Thoracic spinal stenosis    Weakness    CKD (chronic kidney disease)    MINERVA (acute kidney injury)    Stenosis of right carotid artery    Hyperparathyroidism    Type 2 diabetes mellitus with other circulatory complications    Osteoporosis    History of inferior wall myocardial infarction    CAD (coronary artery disease)    Ischemic cardiomyopathy    Asymptomatic PVCs    Status post coronary artery stent placement    Acquired hammer toe of left foot    Hereditary and idiopathic neuropathy, unspecified    Onychomycosis    Peripheral vascular disease    Polyneuropathy due to type 2 diabetes mellitus    Ulcer of toe of left foot    Pain in limb    Osteoarthritis    Cellulitis of right lower extremity    Chronic diastolic CHF (congestive heart failure)    Essential hypertension, benign    MINERVA (acute kidney injury)     Past Medical History:   Diagnosis Date    Anesthesia complication     STRONG GAG REFLEX    Benign colonic polyp     Chronic back pain     Claustrophobia     Coronary artery disease 2021    PTCA WITH PLACEMENT OF STENT. PLAVIX ONLY TO BE HELD ONE DAY    DDD (degenerative disc disease), cervical     DDD (degenerative disc disease), lumbosacral      Diabetes mellitus     TYPE 2    Essential hypertension, benign 2024    Frequent UTI     History of MI (myocardial infarction) 2021    History of pyelonephritis 2014    History of sepsis     Hypercholesteremia     Left knee pain     DRAINED  21    Leg muscle spasm     Neuropathy     LEFT FOOT. AS RESULT OF BACK PROBLEMS    Osteoarthritis     Osteoporosis     Overactive bladder     Type 2 diabetes mellitus     Ureteral calculus, left     Weakness     LOWER EXTREMITES RELATED FROM NERVE DAMAGE FROM BACK     Past Surgical History:   Procedure Laterality Date    APPENDECTOMY      BACK SURGERY      MULTIPLE. WITH HARDWARE    CARDIAC CATHETERIZATION N/A 2021    Procedure: Left Heart Cath;  Surgeon: Leydi Romo MD;  [facility];  Service: Cardiovascular;  Laterality: N/A;    CARDIAC CATHETERIZATION  2021    Procedure: Percutaneous Manual Thrombectomy;  Surgeon: Leydi Romo MD;  Location:  [facility] CATH INVASIVE LOCATION;  Service: Cardiovascular;;    CARDIAC CATHETERIZATION N/A 2021    Procedure: Percutaneous Coronary Intervention;  Surgeon: Leydi Romo MD;  [facility];  Service: Cardiovascular;  Laterality: N/A;    CARDIAC CATHETERIZATION N/A 2021    Procedure: Coronary angiography;  Surgeon: Leydi Romo MD;  Location:  [facility] CATH INVASIVE LOCATION;  Service: Cardiovascular;  Laterality: N/A;    CARPAL TUNNEL RELEASE      LEFT X2 RIGHT     CATARACT EXTRACTION WITH INTRAOCULAR LENS IMPLANT      LEFT AND RIGHT    CERVICAL SPINE SURGERY      MULTIPLE C3-4 C6-7     SECTION      x 2    COLONOSCOPY  2018    CORONARY STENT PLACEMENT  2021    PARATHYROIDECTOMY Left 2019    Procedure: PARATHYROIDECTOMY LEFT INFERIOR;  Surgeon: Mason Prather MD;  Location:  [facility] OR [facility];  Service: ENT    THYROID SURGERY      TRICEP TENDON REPAIR Left 2021    Procedure: LEFT OPEN TRICEPS REPAIR;   Surgeon: Luis Cho II, MD;  Location: [facility] OR;  Service: Orthopedics;  Laterality: Left;    URETEROSCOPY LASER LITHOTRIPSY WITH STENT INSERTION Left 09/28/2021    Procedure: LEFT URETEROSCOPY LASER LITHOTRIPSY, STONE BASKET EXTRACTION STENT;  Surgeon: Jose Luis Nelson Jr., MD;  Location: [facility] MAIN OR;  Service: Urology;  Laterality: Left;      General Information       Row Name 01/13/24 1323          Physical Therapy Time and Intention    Document Type evaluation  -RS     Mode of Treatment individual therapy;physical therapy  -RS       Row Name 01/13/24 1323          General Information    Patient Profile Reviewed yes  -RS     Prior Level of Function min assist:;mod assist:;ADL's  -RS     Existing Precautions/Restrictions fall  -RS     Barriers to Rehab medically complex;previous functional deficit;environmental barriers  -RS       Row Name 01/13/24 1323          Living Environment    People in Home spouse  -RS       Row Name 01/13/24 1323          Home Main Entrance    Number of Stairs, Main Entrance three  -RS     Stair Railings, Main Entrance railings safe and in good condition  -RS       Row Name 01/13/24 1323          Stairs Within Home, Primary    Number of Stairs, Within Home, Primary none  -RS       Row Name 01/13/24 1323          Cognition    Orientation Status (Cognition) oriented x 3  -RS       Row Name 01/13/24 1323          Safety Issues, Functional Mobility    Safety Issues Affecting Function (Mobility) insight into deficits/self-awareness;problem-solving;judgment  -RS     Impairments Affecting Function (Mobility) balance;pain;postural/trunk control;range of motion (ROM);strength;endurance/activity tolerance  -RS               User Key  (r) = Recorded By, (t) = Taken By, (c) = Cosigned By      Initials Name Provider Type    RS Adamaris Holguin PT Physical Therapist                   Mobility       Row Name 01/13/24 1324          Bed Mobility    Bed Mobility supine-sit  -RS      Supine-Sit Dodge (Bed Mobility) moderate assist (50% patient effort)  -RS     Assistive Device (Bed Mobility) bed rails;head of bed elevated  -RS       Row Name 01/13/24 1324          Bed-Chair Transfer    Bed-Chair Dodge (Transfers) moderate assist (50% patient effort)  -RS     Assistive Device (Bed-Chair Transfers) walker, front-wheeled  -RS       Row Name 01/13/24 1324          Sit-Stand Transfer    Sit-Stand Dodge (Transfers) moderate assist (50% patient effort)  -RS     Assistive Device (Sit-Stand Transfers) walker, front-wheeled  -RS       Row Name 01/13/24 1324          Gait/Stairs (Locomotion)    Dodge Level (Gait) minimum assist (75% patient effort)  -RS     Assistive Device (Gait) walker, front-wheeled  -RS     Distance in Feet (Gait) 3  -RS     Deviations/Abnormal Patterns (Gait) festinating/shuffling  -RS     Bilateral Gait Deviations forward flexed posture;heel strike decreased  -RS               User Key  (r) = Recorded By, (t) = Taken By, (c) = Cosigned By      Initials Name Provider Type    RS Adamaris Holguin PT Physical Therapist                   Obj/Interventions       Row Name 01/13/24 1324          Range of Motion Comprehensive    General Range of Motion no range of motion deficits identified  -       Row Name 01/13/24 1324          Strength Comprehensive (MMT)    Comment, General Manual Muscle Testing (MMT) Assessment grossly 4-5 based on observed functional mobility  -RS       Row Name 01/13/24 1324          Balance    Balance Assessment sitting static balance;standing static balance  -RS     Static Sitting Balance modified independence  -RS     Static Standing Balance contact guard  -RS     Position/Device Used, Standing Balance walker, rolling  -RS       Row Name 01/13/24 1324          Sensory Assessment (Somatosensory)    Sensory Assessment (Somatosensory) unable/difficult to assess  LE wrapped d/t cellulitis  -RS               User Key  (r) = Recorded  By, (t) = Taken By, (c) = Cosigned By      Initials Name Provider Type    RS Adamaris Holguin PT Physical Therapist                   Goals/Plan       Row Name 01/13/24 1326          Bed Mobility Goal 1 (PT)    Activity/Assistive Device (Bed Mobility Goal 1, PT) bed mobility activities, all  -RS     Garrison Level/Cues Needed (Bed Mobility Goal 1, PT) standby assist  -RS     Time Frame (Bed Mobility Goal 1, PT) short term goal (STG);2 days  -RS       Row Name 01/13/24 1326          Transfer Goal 1 (PT)    Activity/Assistive Device (Transfer Goal 1, PT) sit-to-stand/stand-to-sit;bed-to-chair/chair-to-bed;toilet  -RS     Garrison Level/Cues Needed (Transfer Goal 1, PT) contact guard required  -RS     Time Frame (Transfer Goal 1, PT) long term goal (LTG);by discharge  -RS       Row Name 01/13/24 1326          Gait Training Goal 1 (PT)    Activity/Assistive Device (Gait Training Goal 1, PT) gait (walking locomotion);assistive device use;walker, rolling  -RS     Garrison Level (Gait Training Goal 1, PT) contact guard required  -RS     Distance (Gait Training Goal 1, PT) 8  -RS     Time Frame (Gait Training Goal 1, PT) long term goal (LTG);by discharge  -RS       Row Name 01/13/24 1326          Therapy Assessment/Plan (PT)    Planned Therapy Interventions (PT) balance training;bed mobility training;gait training;home exercise program;manual therapy techniques;patient/family education;strengthening;transfer training;ROM (range of motion)  -RS               User Key  (r) = Recorded By, (t) = Taken By, (c) = Cosigned By      Initials Name Provider Type    RS Adamaris Holguin, PT Physical Therapist                   Clinical Impression       Row Name 01/13/24 1326          Pain    Pretreatment Pain Rating 0/10 - no pain  -RS       Row Name 01/13/24 1325          Plan of Care Review    Plan of Care Reviewed With patient  -RS     Outcome Evaluation Pt is a 78 y.o. female admitted to [facility] with c/o swelling and pain  in R lower leg on 1/11/2024. Work up reveals cellulitis. Pt presents today with decreased functional strength, and decreased functional mobility. Pt required modA for bed mobility, modA for STS transfers, and Johnny for ambulation with RW for 3 ft. Pt will benefit from skilled PT to address the previous deficits and improve overall safety with functional mobility.  Anticipate pt will D/C to SNU. Pt reports would prefer to DC to home with HHPT however pt has 3 CARLA home and does not demonstrate adequate functional mobility or strength to navigate 3 steps at this time.  -RS       Row Name 01/13/24 1325          Therapy Assessment/Plan (PT)    Patient/Family Therapy Goals Statement (PT) go home  -RS     Rehab Potential (PT) good, to achieve stated therapy goals  -RS     Criteria for Skilled Interventions Met (PT) yes  -RS     Therapy Frequency (PT) 5 times/wk  -RS     Predicted Duration of Therapy Intervention (PT) 1 week  -RS       Row Name 01/13/24 1325          Positioning and Restraints    Pre-Treatment Position in bed  -RS     Post Treatment Position chair  -RS     In Chair reclined;call light within reach;exit alarm on;encouraged to call for assist  -RS               User Key  (r) = Recorded By, (t) = Taken By, (c) = Cosigned By      Initials Name Provider Type    RS Adamaris Holguin, PT Physical Therapist                   Outcome Measures       Row Name 01/13/24 1327 01/13/24 0840       How much help from another person do you currently need...    Turning from your back to your side while in flat bed without using bedrails? 3  -RS 3  -CH    Moving from lying on back to sitting on the side of a flat bed without bedrails? 2  -RS 3  -CH    Moving to and from a bed to a chair (including a wheelchair)? 2  -RS 2  -CH    Standing up from a chair using your arms (e.g., wheelchair, bedside chair)? 3  -RS 2  -CH    Climbing 3-5 steps with a railing? 2  -RS 2  -CH    To walk in hospital room? 2  -RS 2  -CH    AM-PAC 6  Clicks Score (PT) 14  -RS 14  -CH    Highest Level of Mobility Goal 4 --> Transfer to chair/commode  -RS 4 --> Transfer to chair/commode  -      Row Name 01/13/24 1327          Functional Assessment    Outcome Measure Options AM-PAC 6 Clicks Basic Mobility (PT)  -               User Key  (r) = Recorded By, (t) = Taken By, (c) = Cosigned By      Initials Name Provider Type     Kenyatta Maya, RN Registered Nurse    Adamaris Mcmillan PT Physical Therapist                                 Physical Therapy Education       Title: PT OT SLP Therapies (In Progress)       Topic: Physical Therapy (In Progress)       Point: Mobility training (In Progress)       Learning Progress Summary             Patient Acceptance, E, NR by RS at 1/13/2024 1328                         Point: Home exercise program (In Progress)       Learning Progress Summary             Patient Acceptance, E, NR by RS at 1/13/2024 1328                         Point: Body mechanics (In Progress)       Learning Progress Summary             Patient Acceptance, E, NR by RS at 1/13/2024 1328                         Point: Precautions (In Progress)       Learning Progress Summary             Patient Acceptance, E, NR by RS at 1/13/2024 1328                                         User Key       Initials Effective Dates Name Provider Type Discipline     06/16/21 -  Adamaris Holguin PT Physical Therapist PT                  PT Recommendation and Plan  Planned Therapy Interventions (PT): balance training, bed mobility training, gait training, home exercise program, manual therapy techniques, patient/family education, strengthening, transfer training, ROM (range of motion)  Plan of Care Reviewed With: patient  Outcome Evaluation: Pt is a 78 y.o. female admitted to [facility] with c/o swelling and pain in R lower leg on 1/11/2024. Work up reveals cellulitis. Pt presents today with decreased functional strength, and decreased functional mobility. Pt  required modA for bed mobility, modA for STS transfers, and Johnny for ambulation with RW for 3 ft. Pt will benefit from skilled PT to address the previous deficits and improve overall safety with functional mobility.  Anticipate pt will D/C to SNU. Pt reports would prefer to DC to home with HHPT however pt has 3 CARLA home and does not demonstrate adequate functional mobility or strength to navigate 3 steps at this time.     Time Calculation:   PT Evaluation Complexity  History, PT Evaluation Complexity: 1-2 personal factors and/or comorbidities  Examination of Body Systems (PT Eval Complexity): total of 3 or more elements  Clinical Presentation (PT Evaluation Complexity): evolving  Clinical Decision Making (PT Evaluation Complexity): moderate complexity  Overall Complexity (PT Evaluation Complexity): moderate complexity     PT Charges       Row Name 01/13/24 1328             Time Calculation    Start Time 1231  -RS      Stop Time 1250  -RS      Time Calculation (min) 19 min  -RS      PT Received On 01/13/24  -RS      PT - Next Appointment 01/15/24  -RS      PT Goal Re-Cert Due Date 01/20/24  -RS         Time Calculation- PT    Total Timed Code Minutes- PT 12 minute(s)  -RS         Timed Charges    01338 - PT Therapeutic Activity Minutes 12  -RS         Untimed Charges    PT Eval/Re-eval Minutes 7  -RS         Total Minutes    Timed Charges Total Minutes 12  -RS      Untimed Charges Total Minutes 7  -RS       Total Minutes 19  -RS                User Key  (r) = Recorded By, (t) = Taken By, (c) = Cosigned By      Initials Name Provider Type    RS Adamaris Holguin, PT Physical Therapist                      PT G-Codes  Outcome Measure Options: AM-PAC 6 Clicks Basic Mobility (PT)  AM-PAC 6 Clicks Score (PT): 14  PT Discharge Summary  Anticipated Discharge Disposition (PT): skilled nursing facility    Adamaris Holguin PT  1/13/2024

## 2024-01-13 NOTE — PLAN OF CARE
Goal Outcome Evaluation:  Plan of Care Reviewed With: patient        Progress: no change  Outcome Evaluation: BLE wrapped and elevated.  c/o posterior right calf pain this afternoon but declined need for pain med.  worked with PT.  up to chair.  one time dose IV lasix given this afternoon.  blood sugar monitored and SSI provided.  bed/chair alarm activated for safety.

## 2024-01-13 NOTE — PLAN OF CARE
Problem: Adult Inpatient Plan of Care  Goal: Plan of Care Review  Outcome: Ongoing, Progressing  Flowsheets (Taken 1/13/2024 0431)  Plan of Care Reviewed With: patient  Outcome Evaluation: alert and oriented, BLE wrapped with kerlix and ace wrap, CDI, BLE elevated on pillows, Pt c/o Rt hip pain and spasm of Lt calf medicated with Tylenol x2, repositioned, made comfortable in bed, external cath in place, large amount of urine output noted, falls precaution in place, accumax in bed, was able to tolerate SCD's, on 2L of oxygen to keep sats above 92%, slept between care   Goal Outcome Evaluation:  Plan of Care Reviewed With: patient           Outcome Evaluation: alert and oriented, BLE wrapped with kerlix and ace wrap, CDI, BLE elevated on pillows, Pt c/o Rt hip pain and spasm of Lt calf medicated with Tylenol x2, repositioned, made comfortable in bed, external cath in place, large amount of urine output noted, falls precaution in place, accumax in bed, was able to tolerate SCD's, on 2L of oxygen to keep sats above 92%, slept between care

## 2024-01-14 ENCOUNTER — READMISSION MANAGEMENT (OUTPATIENT)
Dept: CALL CENTER | Facility: HOSPITAL | Age: 79
End: 2024-01-14
Payer: MEDICARE

## 2024-01-14 VITALS
HEART RATE: 83 BPM | SYSTOLIC BLOOD PRESSURE: 128 MMHG | WEIGHT: 180 LBS | RESPIRATION RATE: 16 BRPM | HEIGHT: 64 IN | TEMPERATURE: 97 F | OXYGEN SATURATION: 96 % | BODY MASS INDEX: 30.73 KG/M2 | DIASTOLIC BLOOD PRESSURE: 81 MMHG

## 2024-01-14 LAB
ANION GAP SERPL CALCULATED.3IONS-SCNC: 9 MMOL/L (ref 5–15)
BASOPHILS # BLD AUTO: 0.03 10*3/MM3 (ref 0–0.2)
BASOPHILS NFR BLD AUTO: 0.3 % (ref 0–1.5)
BUN SERPL-MCNC: 19 MG/DL (ref 8–23)
BUN/CREAT SERPL: 18.6 (ref 7–25)
CALCIUM SPEC-SCNC: 9.1 MG/DL (ref 8.6–10.5)
CHLORIDE SERPL-SCNC: 103 MMOL/L (ref 98–107)
CO2 SERPL-SCNC: 26 MMOL/L (ref 22–29)
CREAT SERPL-MCNC: 1.02 MG/DL (ref 0.57–1)
DEPRECATED RDW RBC AUTO: 44.9 FL (ref 37–54)
EGFRCR SERPLBLD CKD-EPI 2021: 57.1 ML/MIN/1.73
EOSINOPHIL # BLD AUTO: 0.15 10*3/MM3 (ref 0–0.4)
EOSINOPHIL NFR BLD AUTO: 1.5 % (ref 0.3–6.2)
ERYTHROCYTE [DISTWIDTH] IN BLOOD BY AUTOMATED COUNT: 12.6 % (ref 12.3–15.4)
GLUCOSE BLDC GLUCOMTR-MCNC: 130 MG/DL (ref 70–130)
GLUCOSE BLDC GLUCOMTR-MCNC: 237 MG/DL (ref 70–130)
GLUCOSE SERPL-MCNC: 145 MG/DL (ref 65–99)
HCT VFR BLD AUTO: 37.1 % (ref 34–46.6)
HGB BLD-MCNC: 12.2 G/DL (ref 12–15.9)
IMM GRANULOCYTES # BLD AUTO: 0.03 10*3/MM3 (ref 0–0.05)
IMM GRANULOCYTES NFR BLD AUTO: 0.3 % (ref 0–0.5)
LYMPHOCYTES # BLD AUTO: 1.63 10*3/MM3 (ref 0.7–3.1)
LYMPHOCYTES NFR BLD AUTO: 16.6 % (ref 19.6–45.3)
MAGNESIUM SERPL-MCNC: 1.9 MG/DL (ref 1.6–2.4)
MCH RBC QN AUTO: 31.9 PG (ref 26.6–33)
MCHC RBC AUTO-ENTMCNC: 32.9 G/DL (ref 31.5–35.7)
MCV RBC AUTO: 97.1 FL (ref 79–97)
MONOCYTES # BLD AUTO: 1.08 10*3/MM3 (ref 0.1–0.9)
MONOCYTES NFR BLD AUTO: 11 % (ref 5–12)
NEUTROPHILS NFR BLD AUTO: 6.88 10*3/MM3 (ref 1.7–7)
NEUTROPHILS NFR BLD AUTO: 70.3 % (ref 42.7–76)
NRBC BLD AUTO-RTO: 0 /100 WBC (ref 0–0.2)
PHOSPHATE SERPL-MCNC: 3.4 MG/DL (ref 2.5–4.5)
PLATELET # BLD AUTO: 193 10*3/MM3 (ref 140–450)
PMV BLD AUTO: 10.3 FL (ref 6–12)
POTASSIUM SERPL-SCNC: 4 MMOL/L (ref 3.5–5.2)
QT INTERVAL: 343 MS
QTC INTERVAL: 465 MS
RBC # BLD AUTO: 3.82 10*6/MM3 (ref 3.77–5.28)
SODIUM SERPL-SCNC: 138 MMOL/L (ref 136–145)
WBC NRBC COR # BLD AUTO: 9.8 10*3/MM3 (ref 3.4–10.8)

## 2024-01-14 PROCEDURE — 63710000001 INSULIN LISPRO (HUMAN) PER 5 UNITS: Performed by: NURSE PRACTITIONER

## 2024-01-14 PROCEDURE — 80048 BASIC METABOLIC PNL TOTAL CA: CPT | Performed by: STUDENT IN AN ORGANIZED HEALTH CARE EDUCATION/TRAINING PROGRAM

## 2024-01-14 PROCEDURE — 84100 ASSAY OF PHOSPHORUS: CPT | Performed by: STUDENT IN AN ORGANIZED HEALTH CARE EDUCATION/TRAINING PROGRAM

## 2024-01-14 PROCEDURE — 82948 REAGENT STRIP/BLOOD GLUCOSE: CPT

## 2024-01-14 PROCEDURE — 83735 ASSAY OF MAGNESIUM: CPT | Performed by: STUDENT IN AN ORGANIZED HEALTH CARE EDUCATION/TRAINING PROGRAM

## 2024-01-14 PROCEDURE — 85025 COMPLETE CBC W/AUTO DIFF WBC: CPT | Performed by: STUDENT IN AN ORGANIZED HEALTH CARE EDUCATION/TRAINING PROGRAM

## 2024-01-14 RX ORDER — FUROSEMIDE 40 MG/1
40 TABLET ORAL DAILY
Qty: 30 TABLET | Refills: 0 | Status: SHIPPED | OUTPATIENT
Start: 2024-01-14

## 2024-01-14 RX ORDER — CARVEDILOL 12.5 MG/1
12.5 TABLET ORAL 2 TIMES DAILY WITH MEALS
Qty: 60 TABLET | Refills: 0 | Status: SHIPPED | OUTPATIENT
Start: 2024-01-14

## 2024-01-14 RX ADMIN — Medication 10000 UNITS: at 08:34

## 2024-01-14 RX ADMIN — HYDRALAZINE HYDROCHLORIDE 10 MG: 10 TABLET, FILM COATED ORAL at 08:34

## 2024-01-14 RX ADMIN — ASPIRIN 81 MG: 81 TABLET, COATED ORAL at 08:34

## 2024-01-14 RX ADMIN — BACLOFEN 10 MG: 10 TABLET ORAL at 08:34

## 2024-01-14 RX ADMIN — ACETAMINOPHEN 1000 MG: 500 TABLET ORAL at 04:36

## 2024-01-14 RX ADMIN — INSULIN LISPRO 3 UNITS: 100 INJECTION, SOLUTION INTRAVENOUS; SUBCUTANEOUS at 11:57

## 2024-01-14 RX ADMIN — CARVEDILOL 12.5 MG: 12.5 TABLET, FILM COATED ORAL at 08:34

## 2024-01-14 RX ADMIN — ACETAMINOPHEN 1000 MG: 500 TABLET ORAL at 10:48

## 2024-01-14 RX ADMIN — ATORVASTATIN CALCIUM 20 MG: 20 TABLET, FILM COATED ORAL at 08:34

## 2024-01-14 NOTE — PLAN OF CARE
Problem: Adult Inpatient Plan of Care  Goal: Plan of Care Review  Outcome: Ongoing, Progressing  Flowsheets (Taken 1/14/2024 2389)  Progress: no change  Plan of Care Reviewed With: patient  Outcome Evaluation: doing well, medicated with Tylenol for generalized pain with good relief, slept well, external cath placed for the night, voiding, falls precaution maintained, oxygen 2L for the night, blood sugar monitored, Insulin coverage given, HR improving   Goal Outcome Evaluation:  Plan of Care Reviewed With: patient        Progress: no change  Outcome Evaluation: doing well, medicated with Tylenol for generalized pain with good relief, slept well, external cath placed for the night, voiding, falls precaution maintained, oxygen 2L for the night, blood sugar monitored, Insulin coverage given, HR improving

## 2024-01-14 NOTE — OUTREACH NOTE
Prep Survey      Flowsheet Row Responses   [facility] patient discharged from? [place]   Is LACE score < 7 ? No   Eligibility [facility]   Date of Admission 01/11/24   Date of Discharge 01/14/24   Discharge Disposition Home or Self Care   Discharge diagnosis Cellulitis of right lower extremity, MINERVA on CKD   Does the patient have one of the following disease processes/diagnoses(primary or secondary)? Other   Does the patient have Home health ordered? No   Is there a DME ordered? No   Prep survey completed? Yes            Caitlin Abarca Registered Nurse

## 2024-01-14 NOTE — CASE MANAGEMENT/SOCIAL WORK
Case Management Discharge Note      Final Note: home         Selected Continued Care - Discharged on 1/14/2024 Admission date: 1/11/2024 - Discharge disposition: Home or Self Care      Destination    No services have been selected for the patient.                Durable Medical Equipment    No services have been selected for the patient.                Dialysis/Infusion    No services have been selected for the patient.                Home Medical Care    No services have been selected for the patient.                Therapy    No services have been selected for the patient.                Community Resources    No services have been selected for the patient.                Community & DME    No services have been selected for the patient.                         Final Discharge Disposition Code: 01 - home or self-care

## 2024-01-14 NOTE — DISCHARGE SUMMARY
Patient Name: Michelle Espinoza  : 1945  MRN: [id number]    Date of Admission: 2024  Date of Discharge:  2024  Primary Care Physician: Lillie Ji PA      Chief Complaint:   Wound Check (R posterior lower leg has inflammed skin with drainage. 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        Hospital Course     Ms. Espinoza is a 78 y.o. female with a history of chronic diastolic CHF, type 2 diabetes, hyperlipidemia, CAD with history of PCI, ischemic cardiomyopathy, hypertension  who presented to [facility] initially complaining of lower extremity redness/swelling.  Please see the admitting history and physical for further details.  She was found to have chronic venous stasis with volume overload from chronic heart failure and was admitted to the hospital for further evaluation and treatment.  The patient received an initial dose of antibiotics in the ER however on exam/history the patient's bilateral lower extremity redness/swelling was more consistent with chronic venous stasis changes with evidence of volume overload.  The patient has history of chronic diastolic CHF and does not take diuretics at home.  Cardiology consulted who agreed with IV diuresis while admitted.  She has been transition to oral Lasix at the time of discharge and will follow-up with cardiology in approximately 2 weeks. She should also follow up with  her PCP in a week for post hospital follow up.  The patient worked with physical therapy while admitted, they recommended either discharging to SNF versus home health PT however the patient is requesting outpatient physical therapy referral.  Order has been placed.    Day of Discharge     Subjective:  Resting in bed, tells me she would like to go home.     Physical Exam:  Temp:  [97 °F (36.1 °C)-100.5 °F (38.1 °C)] 97 °F (36.1 °C)  Heart Rate:  [] 83  Resp:  [16-18] 16  BP: (125-146)/(74-87) 128/81  Body mass index is 31.29 kg/m².  Physical Exam  Constitutional:       General: She is not in acute distress.     Appearance: Normal appearance. She is not ill-appearing.   Cardiovascular:      Rate and Rhythm: Normal rate and regular rhythm.   Pulmonary:      Effort: Pulmonary effort is normal. No respiratory distress.      Breath sounds: Normal breath sounds. No wheezing.   Abdominal:      Palpations: Abdomen is soft.      Tenderness: There is no abdominal tenderness.   Musculoskeletal:         General: No swelling or tenderness.      Right lower leg: Edema present.      Left lower leg: Edema present.      Comments: Edema improving   Skin:     General: Skin is warm and dry.   Neurological:      General: No focal deficit present.      Mental Status: She is alert and oriented to person, place, and time. Mental status is at baseline.         Consultants     Consult Orders (all) (From admission, onward)       Start     Ordered    01/12/24 1333  Inpatient Cardiology Consult  Once        Specialty:  Cardiology  Provider:  Leydi Romo MD    01/12/24 1332    01/12/24 1143  Inpatient Case Management  Consult  Once        Provider:  (Not yet assigned)    01/12/24 1143    01/12/24 0026  [facility] (on-call MD unless specified) Details  Once        Specialty:  Hospitalist  Provider:  Anibal Parham MD    01/12/24 0025                  Procedures     Imaging Results (All)       None            Pertinent Labs  "    Results from last 7 days   Lab Units 01/14/24 0413 01/13/24  1108 01/12/24 0618 01/11/24  2233   WBC 10*3/mm3 9.80 9.85 9.50 9.20   HEMOGLOBIN g/dL 12.2 12.2 11.7* 12.2   PLATELETS 10*3/mm3 193 195 181 210     Results from last 7 days   Lab Units 01/14/24 0413 01/13/24  1108 01/12/24 0618 01/11/24  2233   SODIUM mmol/L 138 138 136 138   POTASSIUM mmol/L 4.0 4.1 4.2 4.8   CHLORIDE mmol/L 103 104 103 104   CO2 mmol/L 26.0 22.6 19.0* 22.0   BUN mg/dL 19 18 21 26*   CREATININE mg/dL 1.02* 0.90 1.40* 0.95   GLUCOSE mg/dL 145* 199* 301* 288*   Estimated Creatinine Clearance: 48.1 mL/min (A) (by C-G formula based on SCr of 1.02 mg/dL (H)).  Results from last 7 days   Lab Units 01/12/24 0618 01/11/24  2233   ALBUMIN g/dL 3.4* 4.0   BILIRUBIN mg/dL 0.2 0.2   ALK PHOS U/L 83 90   AST (SGOT) U/L 16 18   ALT (SGPT) U/L 18 20     Results from last 7 days   Lab Units 01/14/24 0413 01/13/24 1108 01/12/24 0618 01/11/24  2233   CALCIUM mg/dL 9.1 9.1 9.0 9.6   ALBUMIN g/dL  --   --  3.4* 4.0   MAGNESIUM mg/dL 1.9 1.9  --   --    PHOSPHORUS mg/dL 3.4 2.8  --   --        Results from last 7 days   Lab Units 01/12/24  1603   PROBNP pg/mL 620.0           Invalid input(s): \"LDLCALC\"  Results from last 7 days   Lab Units 01/12/24  0042 01/12/24  0031 01/11/24  2358   BLOODCX  No growth at 2 days No growth at 2 days  --    URINECX   --   --  50,000 CFU/mL Normal Urogenital Janet       Test Results Pending at Discharge     Pending Labs       Order Current Status    Blood Culture - Blood, Arm, Left Preliminary result    Blood Culture - Blood, Arm, Right Preliminary result            Discharge Details        Discharge Medications        New Medications        Instructions Start Date   furosemide 40 MG tablet  Commonly known as: Lasix   40 mg, Oral, Daily             Changes to Medications        Instructions Start Date   carvedilol 12.5 MG tablet  Commonly known as: COREG  What changed:   medication strength  See the new " instructions.   12.5 mg, Oral, 2 Times Daily With Meals             Continue These Medications        Instructions Start Date   aspirin 81 MG chewable tablet   81 mg, Oral, Daily      atorvastatin 20 MG tablet  Commonly known as: LIPITOR   TAKE ONE TABLET BY MOUTH DAILY      baclofen 10 MG tablet  Commonly known as: LIORESAL   TAKE ONE TABLET BY MOUTH DAILY      COLLAGEN PO   3 tablets, Oral, Daily      glimepiride 4 MG tablet  Commonly known as: AMARYL   4 mg, Oral, 2 Times Daily      GLUCOSAMINE CHONDROITIN JOINT PO   Oral, 2 Times Daily, 2000-250mg      hydrALAZINE 10 MG tablet  Commonly known as: APRESOLINE   10 mg, Oral, 2 Times Daily      magnesium 30 MG tablet   90 mg, Oral, Daily      NEURIVA PO   Oral      ofloxacin 0.3 % ophthalmic solution  Commonly known as: OCUFLOX   No dose, route, or frequency recorded.      OneTouch Ultra test strip  Generic drug: glucose blood   Dx code E11.59 testing bs 4 x day      PROBIOTIC PO   1 tablet, Oral, Daily      solifenacin 10 MG tablet  Commonly known as: VESICARE   1 tablet, Oral, Daily      sulfaSALAzine 500 MG tablet  Commonly known as: AZULFIDINE   500 mg, Oral, 4 Times Daily      Synjardy 5-1000 MG tablet  Generic drug: Empagliflozin-metFORMIN HCl   1 tablet, Oral, Daily      vitamin D3 125 MCG (5000 UT) capsule capsule   10,000 Units, Oral, Daily             Stop These Medications      Calcium Carbonate 1250 MG/5ML     mupirocin 2 % ointment  Commonly known as: BACTROBAN     vitamin E 100 UNIT capsule     vitamin E 400 UNIT capsule              Allergies   Allergen Reactions    Other Nausea And Vomiting     The mercury in [place]    Penicillins Hives    Statins Myalgia    Latex Rash    Nickel Rash         Discharge Disposition:  Home or Self Care    Discharge Diet:  Diet Order   Procedures    Diet: Diabetic Diets; Consistent Carbohydrate; Texture: Regular Texture (IDDSI 7); Fluid Consistency: Thin (IDDSI 0)       Discharge Activity:   Activity Instructions        Activity as Tolerated              CODE STATUS:    Code Status and Medical Interventions:   Ordered at: 01/12/24 1332     Code Status (Patient has no pulse and is not breathing):    CPR (Attempt to Resuscitate)     Medical Interventions (Patient has pulse or is breathing):    Full Support       Future Appointments   Date Time Provider Department Center   1/16/2024  2:15 PM Mya Pineda APRN [facility]   1/26/2024 11:45 AM Lillie Ji PA [facility]   2/2/2024  1:30 PM Mercedes Thompson APRN [facility]     Additional Instructions for the Follow-ups that You Need to Schedule       Ambulatory Referral to Physical Therapy Evaluate and treat   As directed      Specialty needed: Evaluate and treat   Follow-up needed: Yes        Discharge Follow-up with PCP   As directed       Currently Documented PCP:    Lillie Ji PA    PCP Phone Number:    [phone]     Follow Up Details: post-hospital follow up        Discharge Follow-up with Specified Provider: Cardiology on 2/2 as scheduled   As directed      To: Cardiology on 2/2 as scheduled               Follow-up Information       Lillie Ji PA .    Specialty: Family Medicine  Why: post-hospital follow up  Contact information:  [address] [place], [place] [id number] [phone]                             Additional Instructions for the Follow-ups that You Need to Schedule       Ambulatory Referral to Physical Therapy Evaluate and treat   As directed      Specialty needed: Evaluate and treat   Follow-up needed: Yes        Discharge Follow-up with PCP   As directed       Currently Documented PCP:    Lillie Ji PA    PCP Phone Number:    [phone]     Follow Up Details: post-hospital follow up        Discharge Follow-up with Specified Provider: Cardiology on 2/2 as scheduled   As directed      To: Cardiology on 2/2 as scheduled            Time Spent on Discharge:  I spent greater than 30 minutes on this discharge activity which  included: face-to-face encounter with the patient, reviewing the data in the system, coordination of the care with the nursing staff as well as consultants, documentation, and entering orders.       Mariam Romero MD  [facility]  01/14/24  09:46 EST

## 2024-01-15 ENCOUNTER — TRANSITIONAL CARE MANAGEMENT TELEPHONE ENCOUNTER (OUTPATIENT)
Dept: CALL CENTER | Facility: HOSPITAL | Age: 79
End: 2024-01-15
Payer: MEDICARE

## 2024-01-15 NOTE — OUTREACH NOTE
Call Center TCM Note      Flowsheet Row Responses   [facility] patient discharged from? [place]   Does the patient have one of the following disease processes/diagnoses(primary or secondary)? Other   TCM attempt successful? No   Unsuccessful attempts Attempt 2            Kristen Benedict RN    1/15/2024, 12:27 EST

## 2024-01-15 NOTE — OUTREACH NOTE
Call Center TCM Note      Flowsheet Row Responses   [facility] patient discharged from? [place]   Does the patient have one of the following disease processes/diagnoses(primary or secondary)? Other   TCM attempt successful? No   Unsuccessful attempts Attempt 1   Call Status Left message            Kristen Benedict RN    1/15/2024, 11:15 EST

## 2024-01-16 ENCOUNTER — TRANSITIONAL CARE MANAGEMENT TELEPHONE ENCOUNTER (OUTPATIENT)
Dept: CALL CENTER | Facility: HOSPITAL | Age: 79
End: 2024-01-16
Payer: MEDICARE

## 2024-01-16 ENCOUNTER — OFFICE VISIT (OUTPATIENT)
Dept: FAMILY MEDICINE CLINIC | Facility: CLINIC | Age: 79
End: 2024-01-16
Payer: MEDICARE

## 2024-01-16 VITALS
HEART RATE: 95 BPM | BODY MASS INDEX: 30.73 KG/M2 | OXYGEN SATURATION: 95 % | TEMPERATURE: 98.3 F | SYSTOLIC BLOOD PRESSURE: 120 MMHG | WEIGHT: 180 LBS | RESPIRATION RATE: 16 BRPM | HEIGHT: 64 IN | DIASTOLIC BLOOD PRESSURE: 80 MMHG

## 2024-01-16 DIAGNOSIS — I10 ESSENTIAL HYPERTENSION, BENIGN: Primary | ICD-10-CM

## 2024-01-16 DIAGNOSIS — E55.9 VITAMIN D DEFICIENCY: ICD-10-CM

## 2024-01-16 DIAGNOSIS — E78.5 HYPERLIPIDEMIA, UNSPECIFIED HYPERLIPIDEMIA TYPE: ICD-10-CM

## 2024-01-16 DIAGNOSIS — E21.3 HYPERPARATHYROIDISM: ICD-10-CM

## 2024-01-16 DIAGNOSIS — I87.8 CHRONIC VENOUS STASIS: Primary | ICD-10-CM

## 2024-01-16 DIAGNOSIS — M62.838 MUSCLE SPASMS OF BOTH LOWER EXTREMITIES: ICD-10-CM

## 2024-01-16 DIAGNOSIS — I50.32 CHRONIC DIASTOLIC CHF (CONGESTIVE HEART FAILURE): ICD-10-CM

## 2024-01-16 DIAGNOSIS — E11.59 TYPE 2 DIABETES MELLITUS WITH OTHER CIRCULATORY COMPLICATIONS: ICD-10-CM

## 2024-01-16 DIAGNOSIS — E53.8 B12 DEFICIENCY: ICD-10-CM

## 2024-01-16 PROBLEM — G56.03 BILATERAL CARPAL TUNNEL SYNDROME: Status: ACTIVE | Noted: 2024-01-16

## 2024-01-16 PROBLEM — G62.9 POLYNEUROPATHY: Status: ACTIVE | Noted: 2024-01-16

## 2024-01-16 PROBLEM — M54.12 CERVICAL RADICULOPATHY: Status: ACTIVE | Noted: 2024-01-16

## 2024-01-16 PROBLEM — M25.561 ARTHRALGIA OF RIGHT KNEE: Status: ACTIVE | Noted: 2023-11-17

## 2024-01-16 RX ORDER — BACLOFEN 5 MG/1
5 TABLET ORAL DAILY PRN
Qty: 30 TABLET | Refills: 0 | Status: SHIPPED | OUTPATIENT
Start: 2024-01-16

## 2024-01-16 NOTE — OUTREACH NOTE
Call Center TCM Note      Flowsheet Row Responses   [facility] patient discharged from? [place]   Does the patient have one of the following disease processes/diagnoses(primary or secondary)? Other   TCM attempt successful? No   Unsuccessful attempts Attempt 3            Wendy Sommers RN    1/16/2024, 09:12 EST

## 2024-01-16 NOTE — PROGRESS NOTES
Subjective     Michelle Espinoza is a 78 y.o.. female.     Patient here today to become established.     Patient here today for [facility] follow up [facility] with c/o lower extremity redness/swelling. She received an initial dose of antibiotics in the ER but was found to have chronic venous stasis with volume overload and CHF. Patient was admitted. The patient with history of chronic diastolic CHF.  Cardiology was consulted, Patient was given IV diuresis and transition to oral Lasix at discharge. Patient to follow-up with cardiology in 2 weeks. The patient was referred to physical therapy outpatient before discharge.      Patient stating she always uses the wheel chair when out in public. Patient stating she uses a walker to ambulate in her house.    Patient admits to not eating healthy. Patient stating she does drinks water during the day. Patient stating she drinks 5 hour energy drinks a couple times a week. Patient stating she drinks sparkling ice water as well. Patient stating she drinks decaf tea 5 times a week.    Patient stating she is going to [facility] 2 times a week for physical therapy on her legs and arms.    Patient stating she goes to [facility] for diabetes. Patient stating her home blood sugars are close to 200.     Patient stating she goes to Dr. Causey cardiologist. Patient stating her next appt is 2/2.     Patient stating she has chronic lower leg muscle spasms. Patient stating she has been on baclofen and needing a refill. Patient's labs from hospital show a K+ level of 4.0 and magnesium level of 1.9.      The following portions of the patient's history were reviewed and updated as appropriate: allergies, current medications, past family history, past medical history, past social history, past surgical history and problem list.    Past Medical History:   Diagnosis Date    Anesthesia complication     STRONG GAG REFLEX    Benign colonic polyp     Chronic  back pain     Claustrophobia     Coronary artery disease 2021    PTCA WITH PLACEMENT OF STENT. PLAVIX ONLY TO BE HELD ONE DAY    DDD (degenerative disc disease), cervical     DDD (degenerative disc disease), lumbosacral     Diabetes mellitus     TYPE 2    Essential hypertension, benign 2024    Frequent UTI     History of MI (myocardial infarction) 2021    History of pyelonephritis 2014    History of sepsis     Hypercholesteremia     Left knee pain     DRAINED  21    Leg muscle spasm     Neuropathy     LEFT FOOT. AS RESULT OF BACK PROBLEMS    Osteoarthritis     Osteoporosis     Overactive bladder     Type 2 diabetes mellitus     Ureteral calculus, left     Weakness     LOWER EXTREMITES RELATED FROM NERVE DAMAGE FROM BACK       Past Surgical History:   Procedure Laterality Date    APPENDECTOMY      BACK SURGERY      MULTIPLE. WITH HARDWARE    CARDIAC CATHETERIZATION N/A 2021    Procedure: Left Heart Cath;  Surgeon: Leydi Romo MD;  Location:  [facility];  Service: Cardiovascular;  Laterality: N/A;    CARDIAC CATHETERIZATION  2021    Procedure: Percutaneous Manual Thrombectomy;  Surgeon: Leydi Romo MD;  Location:  [facility];  Service: Cardiovascular;;    CARDIAC CATHETERIZATION N/A 2021    Procedure: Percutaneous Coronary Intervention;  Surgeon: Leydi Romo MD;  Location:  [facility];  Service: Cardiovascular;  Laterality: N/A;    CARDIAC CATHETERIZATION N/A 2021    Procedure: Coronary angiography;  Surgeon: Leydi Romo MD;  Location:  [facility];  Service: Cardiovascular;  Laterality: N/A;    CARPAL TUNNEL RELEASE      LEFT X2 RIGHT     CATARACT EXTRACTION WITH INTRAOCULAR LENS IMPLANT      LEFT AND RIGHT    CERVICAL SPINE SURGERY      MULTIPLE C3-4 C6-7     SECTION      x 2    COLONOSCOPY  2018    CORONARY STENT PLACEMENT  2021    PARATHYROIDECTOMY Left  "03/07/2019    Procedure: PARATHYROIDECTOMY LEFT INFERIOR;  Surgeon: Mason Prather MD;  Location: [facility];  Service: ENT    THYROID SURGERY      TRICEP TENDON REPAIR Left 05/25/2021    Procedure: LEFT OPEN TRICEPS REPAIR;  Surgeon: Luis Cho II, MD;  Location: [facility];  Service: Orthopedics;  Laterality: Left;    URETEROSCOPY LASER LITHOTRIPSY WITH STENT INSERTION Left 09/28/2021    Procedure: LEFT URETEROSCOPY LASER LITHOTRIPSY, STONE BASKET EXTRACTION STENT;  Surgeon: Jose Luis Nelson Jr., MD;  Location: [facility] OR;  Service: Urology;  Laterality: Left;       Review of Systems   Constitutional: Negative.    Respiratory: Negative.     Cardiovascular: Negative.    Musculoskeletal:  Positive for arthralgias and myalgias (muscle spasms).       Allergies   Allergen Reactions    Other Nausea And Vomiting     The mercury in Scall[place]    Penicillins Hives    Statins Myalgia    Latex Rash    Nickel Rash       Objective     Vitals:    01/16/24 1412 01/16/24 1447   BP: 144/68 120/80   Pulse: 95    Resp: 16    Temp: 98.3 °F (36.8 °C)    SpO2: 95%    Weight: 81.6 kg (180 lb)    Height: 161.5 cm (63.58\")      Body mass index is 31.3 kg/m².    Physical Exam  Vitals reviewed.   HENT:      Head: Normocephalic.   Eyes:      Pupils: Pupils are equal, round, and reactive to light.   Cardiovascular:      Rate and Rhythm: Normal rate and regular rhythm.      Pulses: Normal pulses.   Pulmonary:      Effort: Pulmonary effort is normal.      Breath sounds: Normal breath sounds.   Musculoskeletal:      Right lower leg: Edema (+1) present.      Left lower leg: Edema (trace) present.   Skin:     General: Skin is warm and dry.      Comments: lower extremities with light red coloration and thick texture   Neurological:      Mental Status: She is alert and oriented to person, place, and time.   Psychiatric:         Behavior: Behavior normal.           Current Outpatient Medications:     aspirin 81 MG " chewable tablet, Chew 1 tablet Daily., Disp: , Rfl:     atorvastatin (LIPITOR) 20 MG tablet, TAKE ONE TABLET BY MOUTH DAILY, Disp: 90 tablet, Rfl: 3    carvedilol (COREG) 12.5 MG tablet, Take 1 tablet by mouth 2 (Two) Times a Day With Meals., Disp: 60 tablet, Rfl: 0    Cholecalciferol (VITAMIN D3) 5000 UNITS capsule capsule, Take 2 capsules by mouth Daily., Disp: , Rfl:     COLLAGEN PO, Take 3 tablets by mouth Daily., Disp: , Rfl:     furosemide (Lasix) 40 MG tablet, Take 1 tablet by mouth Daily., Disp: 30 tablet, Rfl: 0    glimepiride (AMARYL) 4 MG tablet, Take 1 tablet by mouth 2 (Two) Times a Day., Disp: 180 tablet, Rfl: 1    Glucos-Chondroit-Hyaluron-MSM (GLUCOSAMINE CHONDROITIN JOINT PO), Take  by mouth 2 (Two) Times a Day. 2000-250mg, Disp: , Rfl:     glucose blood (OneTouch Ultra) test strip, Dx code E11.59 testing bs 4 x day, Disp: 400 each, Rfl: 3    hydrALAZINE (APRESOLINE) 10 MG tablet, Take 1 tablet by mouth 2 (Two) Times a Day., Disp: 180 tablet, Rfl: 1    magnesium 30 MG tablet, Take 3 tablets by mouth Daily., Disp: , Rfl:     Misc Natural Products (NEURIVA PO), Take  by mouth., Disp: , Rfl:     ofloxacin (OCUFLOX) 0.3 % ophthalmic solution, , Disp: , Rfl:     Probiotic Product (PROBIOTIC PO), Take 1 tablet by mouth Daily., Disp: , Rfl:     solifenacin (VESICARE) 10 MG tablet, Take 1 tablet by mouth Daily., Disp: , Rfl:     sulfaSALAzine (AZULFIDINE) 500 MG tablet, Take 1 tablet by mouth 4 (Four) Times a Day., Disp: , Rfl:     Synjardy 5-1000 MG tablet, Take 1 tablet by mouth Daily., Disp: , Rfl:     Baclofen (LIORESAL) 5 MG tablet, Take 1 tablet by mouth Daily As Needed (muscle spasm to legs)., Disp: 30 tablet, Rfl: 0    Recent Results (from the past 84 hour(s))   POC Glucose Once    Collection Time: 01/13/24  6:05 AM    Specimen: Blood   Result Value Ref Range    Glucose 136 (H) 70 - 130 mg/dL   POC Glucose Once    Collection Time: 01/13/24 11:04 AM    Specimen: Blood   Result Value Ref Range     Glucose 192 (H) 70 - 130 mg/dL   Basic Metabolic Panel    Collection Time: 01/13/24 11:08 AM    Specimen: Blood   Result Value Ref Range    Glucose 199 (H) 65 - 99 mg/dL    BUN 18 8 - 23 mg/dL    Creatinine 0.90 0.57 - 1.00 mg/dL    Sodium 138 136 - 145 mmol/L    Potassium 4.1 3.5 - 5.2 mmol/L    Chloride 104 98 - 107 mmol/L    CO2 22.6 22.0 - 29.0 mmol/L    Calcium 9.1 8.6 - 10.5 mg/dL    BUN/Creatinine Ratio 20.0 7.0 - 25.0    Anion Gap 11.4 5.0 - 15.0 mmol/L    eGFR 66.4 >60.0 mL/min/1.73   Magnesium    Collection Time: 01/13/24 11:08 AM    Specimen: Blood   Result Value Ref Range    Magnesium 1.9 1.6 - 2.4 mg/dL   Phosphorus    Collection Time: 01/13/24 11:08 AM    Specimen: Blood   Result Value Ref Range    Phosphorus 2.8 2.5 - 4.5 mg/dL   CBC Auto Differential    Collection Time: 01/13/24 11:08 AM    Specimen: Blood   Result Value Ref Range    WBC 9.85 3.40 - 10.80 10*3/mm3    RBC 3.91 3.77 - 5.28 10*6/mm3    Hemoglobin 12.2 12.0 - 15.9 g/dL    Hematocrit 38.1 34.0 - 46.6 %    MCV 97.4 (H) 79.0 - 97.0 fL    MCH 31.2 26.6 - 33.0 pg    MCHC 32.0 31.5 - 35.7 g/dL    RDW 12.5 12.3 - 15.4 %    RDW-SD 44.7 37.0 - 54.0 fl    MPV 10.1 6.0 - 12.0 fL    Platelets 195 140 - 450 10*3/mm3    Neutrophil % 74.7 42.7 - 76.0 %    Lymphocyte % 12.5 (L) 19.6 - 45.3 %    Monocyte % 10.7 5.0 - 12.0 %    Eosinophil % 1.5 0.3 - 6.2 %    Basophil % 0.2 0.0 - 1.5 %    Immature Grans % 0.4 0.0 - 0.5 %    Neutrophils, Absolute 7.36 (H) 1.70 - 7.00 10*3/mm3    Lymphocytes, Absolute 1.23 0.70 - 3.10 10*3/mm3    Monocytes, Absolute 1.05 (H) 0.10 - 0.90 10*3/mm3    Eosinophils, Absolute 0.15 0.00 - 0.40 10*3/mm3    Basophils, Absolute 0.02 0.00 - 0.20 10*3/mm3    Immature Grans, Absolute 0.04 0.00 - 0.05 10*3/mm3    nRBC 0.0 0.0 - 0.2 /100 WBC   TSH    Collection Time: 01/13/24 11:08 AM    Specimen: Blood   Result Value Ref Range    TSH 1.600 0.270 - 4.200 uIU/mL   POC Glucose Once    Collection Time: 01/13/24  4:01 PM    Specimen: Blood    Result Value Ref Range    Glucose 169 (H) 70 - 130 mg/dL   ECG 12 Lead Tachycardia    Collection Time: 01/13/24  6:00 PM   Result Value Ref Range    QT Interval 343 ms    QTC Interval 465 ms   POC Glucose Once    Collection Time: 01/13/24 10:44 PM    Specimen: Blood   Result Value Ref Range    Glucose 277 (H) 70 - 130 mg/dL   Phosphorus    Collection Time: 01/14/24  4:13 AM    Specimen: Blood   Result Value Ref Range    Phosphorus 3.4 2.5 - 4.5 mg/dL   Magnesium    Collection Time: 01/14/24  4:13 AM    Specimen: Blood   Result Value Ref Range    Magnesium 1.9 1.6 - 2.4 mg/dL   Basic Metabolic Panel    Collection Time: 01/14/24  4:13 AM    Specimen: Blood   Result Value Ref Range    Glucose 145 (H) 65 - 99 mg/dL    BUN 19 8 - 23 mg/dL    Creatinine 1.02 (H) 0.57 - 1.00 mg/dL    Sodium 138 136 - 145 mmol/L    Potassium 4.0 3.5 - 5.2 mmol/L    Chloride 103 98 - 107 mmol/L    CO2 26.0 22.0 - 29.0 mmol/L    Calcium 9.1 8.6 - 10.5 mg/dL    BUN/Creatinine Ratio 18.6 7.0 - 25.0    Anion Gap 9.0 5.0 - 15.0 mmol/L    eGFR 57.1 (L) >60.0 mL/min/1.73   CBC Auto Differential    Collection Time: 01/14/24  4:13 AM    Specimen: Blood   Result Value Ref Range    WBC 9.80 3.40 - 10.80 10*3/mm3    RBC 3.82 3.77 - 5.28 10*6/mm3    Hemoglobin 12.2 12.0 - 15.9 g/dL    Hematocrit 37.1 34.0 - 46.6 %    MCV 97.1 (H) 79.0 - 97.0 fL    MCH 31.9 26.6 - 33.0 pg    MCHC 32.9 31.5 - 35.7 g/dL    RDW 12.6 12.3 - 15.4 %    RDW-SD 44.9 37.0 - 54.0 fl    MPV 10.3 6.0 - 12.0 fL    Platelets 193 140 - 450 10*3/mm3    Neutrophil % 70.3 42.7 - 76.0 %    Lymphocyte % 16.6 (L) 19.6 - 45.3 %    Monocyte % 11.0 5.0 - 12.0 %    Eosinophil % 1.5 0.3 - 6.2 %    Basophil % 0.3 0.0 - 1.5 %    Immature Grans % 0.3 0.0 - 0.5 %    Neutrophils, Absolute 6.88 1.70 - 7.00 10*3/mm3    Lymphocytes, Absolute 1.63 0.70 - 3.10 10*3/mm3    Monocytes, Absolute 1.08 (H) 0.10 - 0.90 10*3/mm3    Eosinophils, Absolute 0.15 0.00 - 0.40 10*3/mm3    Basophils, Absolute 0.03 0.00 -  0.20 10*3/mm3    Immature Grans, Absolute 0.03 0.00 - 0.05 10*3/mm3    nRBC 0.0 0.0 - 0.2 /100 WBC   POC Glucose Once    Collection Time: 01/14/24  5:07 AM    Specimen: Blood   Result Value Ref Range    Glucose 130 70 - 130 mg/dL   POC Glucose Once    Collection Time: 01/14/24 11:11 AM    Specimen: Blood   Result Value Ref Range    Glucose 237 (H) 70 - 130 mg/dL       Adult Transthoracic Echo Complete w/ Color, Spectral and Contrast if Necessary Per Protocol    Left ventricular systolic function is normal. Calculated left   ventricular EF = 51.4% Normal left ventricular cavity size noted. Left   ventricular wall thickness is consistent with borderline concentric   hypertrophy. All left ventricular wall segments contract normally. Left   ventricular diastolic function is consistent with (grade I) impaired   relaxation.    No aortic valve regurgitation or stenosis is present. The aortic valve   is abnormal in structure. There is mild thickening of the right coronary   cusp(s) of the aortic valve.    Mild mitral valve regurgitation is present. No significant mitral valve   stenosis is present.    Trace tricuspid valve regurgitation is present. Estimated right   ventricular systolic pressure from tricuspid regurgitation is normal (<35   mmHg). Calculated right ventricular systolic pressure from tricuspid   regurgitation is 20.5 mmHg.        Diagnoses and all orders for this visit:    1. Chronic venous stasis (Primary)  Comments:  melanei. lower extremities    2. Chronic diastolic CHF (congestive heart failure)    3. Muscle spasms of both lower extremities  -     Baclofen (LIORESAL) 5 MG tablet; Take 1 tablet by mouth Daily As Needed (muscle spasm to legs).  Dispense: 30 tablet; Refill: 0      Patient Instructions   Keep cardiologist appt for 2/2/24.  Continue with physical therapy twice a week/as directed  Recommend increasing water intake and eating a heart healthy low fat low sugar diet.  Recommend taking maintenance  medications as prescribed.    Return for fasting labs, [employer] [facility].

## 2024-01-16 NOTE — PATIENT INSTRUCTIONS
Keep cardiologist appt for 2/2/24.  Continue with physical therapy twice a week/as directed  Recommend increasing water intake and eating a heart healthy low fat low sugar diet.  Recommend taking maintenance medications as prescribed.

## 2024-01-17 ENCOUNTER — PATIENT MESSAGE (OUTPATIENT)
Dept: FAMILY MEDICINE CLINIC | Facility: CLINIC | Age: 79
End: 2024-01-17
Payer: MEDICARE

## 2024-01-17 ENCOUNTER — PATIENT ROUNDING (BHMG ONLY) (OUTPATIENT)
Dept: FAMILY MEDICINE CLINIC | Facility: CLINIC | Age: 79
End: 2024-01-17
Payer: MEDICARE

## 2024-01-17 LAB
BACTERIA SPEC AEROBE CULT: NORMAL
BACTERIA SPEC AEROBE CULT: NORMAL

## 2024-02-02 ENCOUNTER — OFFICE VISIT (OUTPATIENT)
Age: 79
End: 2024-02-02
Payer: MEDICARE

## 2024-02-02 ENCOUNTER — HOSPITAL ENCOUNTER (OUTPATIENT)
Dept: CARDIOLOGY | Facility: HOSPITAL | Age: 79
Discharge: HOME | End: 2024-02-02
Payer: MEDICARE

## 2024-02-02 ENCOUNTER — TELEPHONE (OUTPATIENT)
Dept: CARDIOLOGY | Facility: CLINIC | Age: 79
End: 2024-02-02
Payer: MEDICARE

## 2024-02-02 ENCOUNTER — TELEPHONE (OUTPATIENT)
Age: 79
End: 2024-02-02

## 2024-02-02 VITALS
SYSTOLIC BLOOD PRESSURE: 128 MMHG | BODY MASS INDEX: 33.66 KG/M2 | HEIGHT: 63 IN | WEIGHT: 190 LBS | HEART RATE: 92 BPM | DIASTOLIC BLOOD PRESSURE: 76 MMHG

## 2024-02-02 DIAGNOSIS — I25.2 HISTORY OF INFERIOR WALL MYOCARDIAL INFARCTION: ICD-10-CM

## 2024-02-02 DIAGNOSIS — I87.2 CHRONIC VENOUS INSUFFICIENCY: ICD-10-CM

## 2024-02-02 DIAGNOSIS — I50.33 ACUTE ON CHRONIC DIASTOLIC CHF (CONGESTIVE HEART FAILURE): ICD-10-CM

## 2024-02-02 DIAGNOSIS — I50.32 CHRONIC DIASTOLIC CHF (CONGESTIVE HEART FAILURE): ICD-10-CM

## 2024-02-02 DIAGNOSIS — I73.9 PERIPHERAL VASCULAR DISEASE: ICD-10-CM

## 2024-02-02 DIAGNOSIS — I65.21 STENOSIS OF RIGHT CAROTID ARTERY: ICD-10-CM

## 2024-02-02 DIAGNOSIS — I50.33 ACUTE ON CHRONIC DIASTOLIC CHF (CONGESTIVE HEART FAILURE): Primary | ICD-10-CM

## 2024-02-02 DIAGNOSIS — I25.5 ISCHEMIC CARDIOMYOPATHY: ICD-10-CM

## 2024-02-02 DIAGNOSIS — E78.5 HYPERLIPIDEMIA, UNSPECIFIED HYPERLIPIDEMIA TYPE: ICD-10-CM

## 2024-02-02 DIAGNOSIS — I10 ESSENTIAL HYPERTENSION, BENIGN: ICD-10-CM

## 2024-02-02 DIAGNOSIS — Z95.5 STATUS POST CORONARY ARTERY STENT PLACEMENT: ICD-10-CM

## 2024-02-02 DIAGNOSIS — I65.22 ATHEROSCLEROSIS OF LEFT CAROTID ARTERY: ICD-10-CM

## 2024-02-02 DIAGNOSIS — I25.10 CORONARY ARTERY DISEASE INVOLVING NATIVE CORONARY ARTERY OF NATIVE HEART, UNSPECIFIED WHETHER ANGINA PRESENT: ICD-10-CM

## 2024-02-02 LAB
ANION GAP SERPL CALCULATED.3IONS-SCNC: 11.9 MMOL/L (ref 5–15)
BUN SERPL-MCNC: 24 MG/DL (ref 8–23)
BUN/CREAT SERPL: 25.8 (ref 7–25)
CALCIUM SPEC-SCNC: 9.9 MG/DL (ref 8.6–10.5)
CHLORIDE SERPL-SCNC: 104 MMOL/L (ref 98–107)
CO2 SERPL-SCNC: 25.1 MMOL/L (ref 22–29)
CREAT SERPL-MCNC: 0.93 MG/DL (ref 0.57–1)
EGFRCR SERPLBLD CKD-EPI 2021: 63.8 ML/MIN/1.73
GLUCOSE SERPL-MCNC: 111 MG/DL (ref 65–99)
NT-PROBNP SERPL-MCNC: 590 PG/ML (ref 0–1800)
POTASSIUM SERPL-SCNC: 4.1 MMOL/L (ref 3.5–5.2)
SODIUM SERPL-SCNC: 141 MMOL/L (ref 136–145)

## 2024-02-02 PROCEDURE — 96374 THER/PROPH/DIAG INJ IV PUSH: CPT

## 2024-02-02 PROCEDURE — 36415 COLL VENOUS BLD VENIPUNCTURE: CPT

## 2024-02-02 PROCEDURE — 80048 BASIC METABOLIC PNL TOTAL CA: CPT | Performed by: NURSE PRACTITIONER

## 2024-02-02 PROCEDURE — 25010000002 FUROSEMIDE PER 20 MG: Performed by: NURSE PRACTITIONER

## 2024-02-02 PROCEDURE — 83880 ASSAY OF NATRIURETIC PEPTIDE: CPT | Performed by: NURSE PRACTITIONER

## 2024-02-02 RX ORDER — FUROSEMIDE 10 MG/ML
80 INJECTION INTRAMUSCULAR; INTRAVENOUS ONCE
Status: COMPLETED | OUTPATIENT
Start: 2024-02-02 | End: 2024-02-02

## 2024-02-02 RX ADMIN — FUROSEMIDE 80 MG: 10 INJECTION, SOLUTION INTRAMUSCULAR; INTRAVENOUS at 14:23

## 2024-02-02 NOTE — TELEPHONE ENCOUNTER
Called and left VM. Will continue to try to reach patient. HUB transfer call to triage.     Tamiko Lozoya RN  Triage [facility] [place]

## 2024-02-02 NOTE — PROGRESS NOTES
Subjective:     Encounter Date:02/02/2024      Patient ID: Michelle Espinoza is a 78 y.o. female.    Chief Complaint:follow up CAD, ICM  History of Present Illness  This is a 78 y/o female who follows with Dr. Romo and is known to me.  She has a pmhx of coronary artery disease s/p PCI with stent placement to the proximal, mid, and distal right coronary artery following an inferior MI, ischemic cardiomyopathy with an EF of 45-50%, hypertension, hyperlipidemia, CKD, and diabetes.     She presented to the ED on 1/11 with complaints of increased erythema and pain of the right lower extremity. She had swelling, blistering and weeping of the right leg. Initially there was concern for cellulitis but it was then felt that this was most likely due to volume overload. She received IV diuresis and discharged home on PO furosemide. Her Coreg was also increased due to mildly elevated heart rates and blood pressure.     She is here today for a follow up visit. Her legs are still pretty swollen and red. She said her blisters opened last night. She has not been goo about taking her furosemide. In fact, she has only taken it twice since being discharged on 1/14. She goes to physical therapy on Tuesday and Friday so she doesn't want to take it then. She has trouble getting to the restroom at home because of her limited mobility. She reports eating saltier foods recently as well. She reports some difficulty taking a good deep breath. No chest pain, palpitations, dizziness or syncope. She sleeps in her wheelchair because she cannot make it upstairs where her bedroom is. She doesn't elevate her feet often but does pedal pushing exercises. She says that her massage therapist/function medicine doctor instructed her to not wear compression socks because they cause DVTs.    Prior history:  Dr. Romo began following the patient when she came to the emergency room on 3/27/2021 with an inferior myocardial infarction. Following her arrival  to the emergency room an EKG was performed showing inferolateral ST elevations system with an inferior myocardial infarction.  She was brought emergently to the cardiac catheterization laboratory where she is found to have an occluded proximal right coronary artery.  After reestablishing flow in the vessel was noted that she had severe stenosis in her proximal, mid, and distal right coronary artery for which she underwent 3 separate drug-eluting stent placements.  The procedure was complicated by distal embolization of thrombus in her posterior descending branch.  She was treated with Integrilin infusion for period of time before complaining of a headache.  Work-up of this headache including a CT of the head was unremarkable.  She also had some issues with bradycardia and hypotension during the procedure that required treatment with dopamine and phenylephrine but both of these had improved by the end of the procedure and she no longer required pressor support.     Following a cardiac catheterization she did well from a cardiac standpoint.  She did have an echocardiogram performed on 3/28/2021 that showed mildly depressed left ventricular systolic function with an EF of 41%, inferior wall motion abnormalities, grade 1 diastolic dysfunction, and no significant valvular disease.    She was seen in the office in April 2022. At that time, it was decided to stop clopidogrel since it had been over a year since her stent placement. Statin therapy was reduced to see if it would help with her myalgias. She had also gained a significant amount of weight. Otherwise, she had been doing fairly well.    I then saw her in October 2022 and she reported a dull ache in the left side of her chest that lasts about 20 mins and resolves on its own. She had also been having pounding in her throat a couple of times a week that did not occur when the chest discomfort occurs. Her EKG was stable with no ischemic changes so we opted to proceed  with an echocardiogram. The echocardiogram was unremarkable and no changes were made.     She was last seen by Dr. Romo in January 2023 and she was feeling ok from a cardiac standpoint. She was noted to be in ventricular trigeminy. She was asymptomatic with this. She had recently had an echocardiogram at that time that was unremarkable so it was decided to monitor. She was instructed to return in 3 months and if PVCs were persistent, could consider a 24 hour Holter.    I then saw her in June 2023 at which time she was having complaints of feeling like she couldn't take a deep breath and was struggling with progressive weakness. She would have episodes where she would not be doing anything and would have a brief moment of shortness of breath, she would yawn and it would resolve. She was quite concerned about arrhythmia as her sister has afib. I had a proBNP drawn in office that day that was normal. I also had ordered a 24 holter monitor but unfortunately, this was not placed. I also asked for the patient to be contacted to return for placement and she was never contacted    I saw her in October and she was doing about the same. She was frustrated with how much her health had declined over the last few years. She was also having worsening shortness of breath. I placed a 24 hour Holter to assess her rhythm and PVC burden. This showed a 20% burden of ventricular ectopy. I then obtained an echocardiogram that showed a normal LV systolic function, EF 51.4%, grade 1 diastolic dysfunction, mild mitral regurgitation.     I have reviewed and updated as appropriate allergies, current medications, past family history, past medical history, past surgical history and problem list.    Review of Systems   Constitutional: Positive for malaise/fatigue. Negative for fever, weight gain and weight loss.   HENT:  Negative for congestion, hoarse voice and sore throat.    Eyes:  Negative for blurred vision and double vision.    Cardiovascular:  Positive for leg swelling. Negative for chest pain, dyspnea on exertion, orthopnea, palpitations and syncope.   Respiratory:  Positive for shortness of breath. Negative for cough and wheezing.    Gastrointestinal:  Negative for abdominal pain, hematemesis, hematochezia and melena.   Genitourinary:  Negative for dysuria and hematuria.   Neurological:  Positive for weakness. Negative for dizziness, headaches, light-headedness, loss of balance and numbness.   Psychiatric/Behavioral:  Negative for depression. The patient is not nervous/anxious.          Current Outpatient Medications:     aspirin 81 MG chewable tablet, Chew 1 tablet Daily., Disp: , Rfl:     atorvastatin (LIPITOR) 20 MG tablet, TAKE ONE TABLET BY MOUTH DAILY, Disp: 90 tablet, Rfl: 3    Baclofen (LIORESAL) 5 MG tablet, Take 1 tablet by mouth Daily As Needed (muscle spasm to legs)., Disp: 30 tablet, Rfl: 0    carvedilol (COREG) 12.5 MG tablet, Take 1 tablet by mouth 2 (Two) Times a Day With Meals., Disp: 60 tablet, Rfl: 0    Cholecalciferol (VITAMIN D3) 5000 UNITS capsule capsule, Take 2 capsules by mouth Daily., Disp: , Rfl:     COLLAGEN PO, Take 3 tablets by mouth Daily., Disp: , Rfl:     furosemide (Lasix) 40 MG tablet, Take 1 tablet by mouth Daily., Disp: 30 tablet, Rfl: 0    glimepiride (AMARYL) 4 MG tablet, Take 1 tablet by mouth 2 (Two) Times a Day., Disp: 180 tablet, Rfl: 1    Glucos-Chondroit-Hyaluron-MSM (GLUCOSAMINE CHONDROITIN JOINT PO), Take  by mouth 2 (Two) Times a Day. 2000-250mg, Disp: , Rfl:     glucose blood (OneTouch Ultra) test strip, Dx code E11.59 testing bs 4 x day, Disp: 400 each, Rfl: 3    hydrALAZINE (APRESOLINE) 10 MG tablet, Take 1 tablet by mouth 2 (Two) Times a Day., Disp: 180 tablet, Rfl: 1    magnesium 30 MG tablet, Take 3 tablets by mouth Daily., Disp: , Rfl:     Misc Natural Products (NEURIVA PO), Take  by mouth., Disp: , Rfl:     ofloxacin (OCUFLOX) 0.3 % ophthalmic solution, , Disp: , Rfl:      Probiotic Product (PROBIOTIC PO), Take 1 tablet by mouth Daily., Disp: , Rfl:     solifenacin (VESICARE) 10 MG tablet, Take 1 tablet by mouth Daily., Disp: , Rfl:     sulfaSALAzine (AZULFIDINE) 500 MG tablet, Take 1 tablet by mouth 4 (Four) Times a Day., Disp: , Rfl:     Synjardy 5-1000 MG tablet, Take 1 tablet by mouth Daily., Disp: , Rfl:   No current facility-administered medications for this visit.    Past Medical History:   Diagnosis Date    Anesthesia complication     STRONG GAG REFLEX    Benign colonic polyp     Chronic back pain     Claustrophobia     Coronary artery disease 03/27/2021    PTCA WITH PLACEMENT OF STENT. PLAVIX ONLY TO BE HELD ONE DAY    DDD (degenerative disc disease), cervical     DDD (degenerative disc disease), lumbosacral     Diabetes mellitus     TYPE 2    Essential hypertension, benign 1/12/2024    Frequent UTI     History of MI (myocardial infarction) 03/27/2021    History of pyelonephritis 06/2014    History of sepsis 2014    Hypercholesteremia     Left knee pain     DRAINED  9/21/21    Leg muscle spasm     Neuropathy     LEFT FOOT. AS RESULT OF BACK PROBLEMS    Osteoarthritis     Osteoporosis     Overactive bladder     Type 2 diabetes mellitus     Ureteral calculus, left     Weakness     LOWER EXTREMITES RELATED FROM NERVE DAMAGE FROM BACK       Past Surgical History:   Procedure Laterality Date    APPENDECTOMY      BACK SURGERY      MULTIPLE. WITH HARDWARE    CARDIAC CATHETERIZATION N/A 03/27/2021    Procedure: Left Heart Cath;  Surgeon: Leydi Romo MD;  Location:  [facility];  Service: Cardiovascular;  Laterality: N/A;    CARDIAC CATHETERIZATION  03/27/2021    Procedure: Percutaneous Manual Thrombectomy;  Surgeon: Leydi Romo MD;  Location:  [facility];  Service: Cardiovascular;;    CARDIAC CATHETERIZATION N/A 03/27/2021    Procedure: Percutaneous Coronary Intervention;  Surgeon: Leydi Romo MD;  Location:  [facility];  Service: Cardiovascular;  Laterality: N/A;    CARDIAC CATHETERIZATION N/A 2021    Procedure: Coronary angiography;  Surgeon: Leydi Romo MD;  Location:  [facility];  Service: Cardiovascular;  Laterality: N/A;    CARPAL TUNNEL RELEASE      LEFT X2 RIGHT     CATARACT EXTRACTION WITH INTRAOCULAR LENS IMPLANT      LEFT AND RIGHT    CERVICAL SPINE SURGERY      MULTIPLE C3-4 C6-7     SECTION      x 2    COLONOSCOPY  2018    CORONARY STENT PLACEMENT  2021    PARATHYROIDECTOMY Left 2019    Procedure: PARATHYROIDECTOMY LEFT INFERIOR;  Surgeon: Mason Prather MD;  Location:  [facility];  Service: ENT    THYROID SURGERY      TRICEP TENDON REPAIR Left 2021    Procedure: LEFT OPEN TRICEPS REPAIR;  Surgeon: Luis Cho II, MD;  Location: [facility] OR;  Service: Orthopedics;  Laterality: Left;    URETEROSCOPY LASER LITHOTRIPSY WITH STENT INSERTION Left 2021    Procedure: LEFT URETEROSCOPY LASER LITHOTRIPSY, STONE BASKET EXTRACTION STENT;  Surgeon: Jose Luis Nelson Jr., MD;  Location: [facility] OR;  Service: Urology;  Laterality: Left;       Family History   Problem Relation Age of Onset    Stroke Mother     Heart disease Mother     Hypertension Mother             Clotting disorder Father     Hypertension Father             Diabetes Father     Aneurysm Father     Hypertension Sister             Diabetes Sister     Cervical cancer Sister     Obesity Sister     Diabetes Sister     Obesity Sister     Cervical cancer Maternal Grandmother 72    Diabetes Grandchild     Asthma Sister     Hypertension Sister         Dead    Diabetes Sister     Malig Hyperthermia Neg Hx        Social History     Tobacco Use    Smoking status: Former     Packs/day: 1.00     Years: 15.00     Additional pack years: 0.00     Total pack years: 15.00     Types: Cigarettes     Start date: 1965     Quit date: 1992     Years since  "quittin.1    Smokeless tobacco: Never    Tobacco comments:     CAFFEINE USE: ICE TEA OCC.   Vaping Use    Vaping Use: Never used   Substance Use Topics    Alcohol use: Not Currently     Comment: Socially.    Drug use: Yes     Types: Hydrocodone         ECG 12 Lead    Date/Time: 2024 3:26 PM  Performed by: Mercedes Thompson APRN    Authorized by: Mercedes Thompson APRN  Comparison: compared with previous ECG from 10/13/2023  Similar to previous ECG  Rhythm: sinus rhythm  Ectopy: multifocal PVCs  Comments: No significant change from previous EKG             Objective:     Visit Vitals  /76   Pulse 92   Ht 160 cm (63\")   Wt 86.2 kg (190 lb)   LMP  (LMP Unknown)   BMI 33.66 kg/m²             Physical Exam  Constitutional:       Appearance: Normal appearance. She is obese.   HENT:      Head: Normocephalic.   Neck:      Vascular: No carotid bruit.   Cardiovascular:      Rate and Rhythm: Normal rate and regular rhythm.      Chest Wall: PMI is not displaced.      Pulses: Normal pulses.           Radial pulses are 2+ on the right side and 2+ on the left side.        Posterior tibial pulses are 2+ on the right side and 2+ on the left side.      Heart sounds: Normal heart sounds. No murmur heard.     No friction rub. No gallop.   Pulmonary:      Effort: Pulmonary effort is normal.      Breath sounds: Normal breath sounds.   Abdominal:      General: Bowel sounds are normal. There is no distension.      Palpations: Abdomen is soft.   Musculoskeletal:      Right lower le+ Edema present.      Left lower le+ Edema present.   Skin:     General: Skin is warm and dry.      Capillary Refill: Capillary refill takes less than 2 seconds.   Neurological:      Mental Status: She is alert and oriented to person, place, and time.   Psychiatric:         Mood and Affect: Mood normal.         Behavior: Behavior normal.         Thought Content: Thought content normal.          Lab Review:   Lipid Panel          2023    " 11:00   Lipid Panel   Total Cholesterol 164    Triglycerides 208    HDL Cholesterol 53    VLDL Cholesterol 34    LDL Cholesterol  77          Cardiac Procedures:   Echocardiogram 3/27/21:  Calculated left ventricular EF = 41.3% Estimated left ventricular EF was in agreement with the calculated left ventricular EF. Left ventricular systolic function is moderately decreased.  The following left ventricular wall segments are hypokinetic: basal inferolateral, mid inferolateral, apical inferior, mid inferior and basal inferior.  Left ventricular diastolic function is consistent with (grade I) impaired relaxation.  There is calcification of the aortic valve.  There is no significant valular stenosis or regurgatation    Cardiac catheterization 3/27/21:  Left Main   Large-caliber vessel with 0% stenosis. The vessel bifurcates into left anterior descending and left circumflex arteries.   Left Anterior Descending   Large-caliber vessel with 30% proximal stenosis. Is a 50% mid stenosis. The mid vessel then tapers to a small caliber with 20 to 30% mid stenosis. The distal vessel is severely tortuous and has no significant disease.   Left Circumflex   Moderate caliber vessel with 0% proximal stenosis. Proximal vessel gives rise to a moderate caliber, severely tortuous first obtuse marginal branch with no significant disease. The distal continuation of the circumflex vessel tapers to small caliber with 0% stenosis.   Right Coronary Artery   Large-caliber vessel with severe proximal calcification and 100% thrombotic occlusion.   Prox RCA lesion is 100% stenosed. Culprit lesion. JOVANNY flow is 0. The lesion is type B2.   1.  Inferior myocardial infarction status post drug-eluting stent placement of the proximal, mid, and distal right coronary artery  2.  Coronary artery disease        Assessment:         Diagnoses and all orders for this visit:    1. Acute on chronic diastolic CHF (congestive heart failure) (Primary)  -     Basic  Metabolic Panel; Future  -     proBNP; Future  -     furosemide (LASIX) injection 80 mg    2. Coronary artery disease involving native coronary artery of native heart, unspecified whether angina present    3. Atherosclerosis of left carotid artery    4. Chronic diastolic CHF (congestive heart failure)    5. Chronic venous insufficiency    6. Essential hypertension, benign    7. History of inferior wall myocardial infarction    8. Hyperlipidemia, unspecified hyperlipidemia type    9. Ischemic cardiomyopathy    10. Peripheral vascular disease    11. Status post coronary artery stent placement    12. Stenosis of right carotid artery              Plan:       Acute on chronic diastolic CHF: she appears volume overloaded again. All of her recent weights are reported estimated weights from her due to being unable to stand on a scale. So we cannot tell how much she is gaining. She still has significant swelling in her legs. I am going to give her a dose of IV furosemide in [facility] and obtain labs. I have asked her to start taking her furosemide as prescribed. I also have asked that she try to elevate her legs more and wear compression socks.   CAD: s/p PCI with stent x 3 to RCA. EKG is stable with no ischemic changes noted. On aspirin, statin and beta blocker. Recent echocardiogram stable.   PVCs: 20% burden on monitor. Carvedilol recently increased.   HTN: blood pressure has been well controlled on current regimen. No changes  HLD: on lower dose statin. Unable to tolerate high dose due to leg pain. Goal LDL < 70  History of ischemic cardiomyopathy: normalization of left ventricular function on recent echocardiogram.    Thank you for allowing me to participate in this patient's care. Please call with any questions or concerns. Ms. Espinoza will follow up with Dr. Room in 6 weeks          Your medication list            Accurate as of February 2, 2024  3:15 PM. If you have any questions, ask your nurse or doctor.                 CONTINUE taking these medications        Instructions Last Dose Given Next Dose Due   aspirin 81 MG chewable tablet      Chew 1 tablet Daily.       atorvastatin 20 MG tablet  Commonly known as: LIPITOR      TAKE ONE TABLET BY MOUTH DAILY       Baclofen 5 MG tablet  Commonly known as: LIORESAL      Take 1 tablet by mouth Daily As Needed (muscle spasm to legs).       carvedilol 12.5 MG tablet  Commonly known as: COREG      Take 1 tablet by mouth 2 (Two) Times a Day With Meals.       COLLAGEN PO      Take 3 tablets by mouth Daily.       furosemide 40 MG tablet  Commonly known as: Lasix      Take 1 tablet by mouth Daily.       glimepiride 4 MG tablet  Commonly known as: AMARYL      Take 1 tablet by mouth 2 (Two) Times a Day.       GLUCOSAMINE CHONDROITIN JOINT PO      Take  by mouth 2 (Two) Times a Day. 2000-250mg       hydrALAZINE 10 MG tablet  Commonly known as: APRESOLINE      Take 1 tablet by mouth 2 (Two) Times a Day.       magnesium 30 MG tablet      Take 3 tablets by mouth Daily.       NEURIVA PO      Take  by mouth.       ofloxacin 0.3 % ophthalmic solution  Commonly known as: OCUFLOX           OneTouch Ultra test strip  Generic drug: glucose blood      Dx code E11.59 testing bs 4 x day       PROBIOTIC PO      Take 1 tablet by mouth Daily.       solifenacin 10 MG tablet  Commonly known as: VESICARE      Take 1 tablet by mouth Daily.       sulfaSALAzine 500 MG tablet  Commonly known as: AZULFIDINE      Take 1 tablet by mouth 4 (Four) Times a Day.       Synjardy 5-1000 MG tablet  Generic drug: Empagliflozin-metFORMIN HCl      Take 1 tablet by mouth Daily.       vitamin D3 125 MCG (5000 UT) capsule capsule      Take 2 capsules by mouth Daily.                  RATNA Fonseca  02/02/24  10:33 AM EDT

## 2024-02-02 NOTE — TELEPHONE ENCOUNTER
----- Message from RATNA Fonseca sent at 2/2/2024  3:32 PM EST -----  Please let patient know her labs look good. I really want her to try to take her furosemide every day and see if this helps with her swelling.

## 2024-02-05 NOTE — TELEPHONE ENCOUNTER
Notified patient of results/recommendations. Patient verbalized understanding. (Late to chart, spoke to patient on 1/2/24)    aTmiko Lozoya RN  [place] [facility] [place]

## 2024-02-18 DIAGNOSIS — M62.838 MUSCLE SPASMS OF BOTH LOWER EXTREMITIES: ICD-10-CM

## 2024-02-19 RX ORDER — BACLOFEN 5 MG/1
TABLET ORAL
Qty: 30 TABLET | Refills: 0 | Status: SHIPPED | OUTPATIENT
Start: 2024-02-19

## 2024-02-19 NOTE — TELEPHONE ENCOUNTER
Rx Refill Note  Requested Prescriptions     Pending Prescriptions Disp Refills    Baclofen (LIORESAL) 5 MG tablet [Pharmacy Med Name: BACLOFEN 5 MG TABLET] 30 tablet 0     Sig: TAKE 1 TABLET BY MOUTH DAILY AS NEEDED FOR MUSCLE SPASM IN LEGS      Last office visit with prescribing clinician: 1/16/2024   Last telemedicine visit with prescribing clinician: Visit date not found   Next office visit with prescribing clinician: 2/29/2024

## 2024-02-23 RX ORDER — CARVEDILOL 12.5 MG/1
12.5 TABLET ORAL 2 TIMES DAILY WITH MEALS
Qty: 60 TABLET | Refills: 0 | Status: SHIPPED | OUTPATIENT
Start: 2024-02-23

## 2024-02-23 NOTE — TELEPHONE ENCOUNTER
Caller: Michelle Espinoza    Relationship to patient: Self    Best call back number: [phone]    Patient is needing: YOU CAN DISREGARD THIS REQUEST.  SHE HAD HER CARDIOLOGIST FILL IT.

## 2024-02-23 NOTE — TELEPHONE ENCOUNTER
Caller: Alexis Michelle ALFREDO    Relationship: Self    Best call back number: [phone]     Requested Prescriptions:   Requested Prescriptions     Pending Prescriptions Disp Refills    carvedilol (COREG) 12.5 MG tablet 60 tablet 0     Sig: Take 1 tablet by mouth 2 (Two) Times a Day With Meals.        Pharmacy where request should be sent: [facility] [id number] [place] [id number] [facility]  AT [address] [phone] [facility] [phone]      Last office visit with prescribing clinician: 1/16/2024   Last telemedicine visit with prescribing clinician: Visit date not found   Next office visit with prescribing clinician: 2/29/2024     Additional details provided by patient: PATIENT STATED THE MG OF THIS MEDICATION WAS CHANGED WHILE SHE WAS IN HOSPITAL TO 12.5 MG TABLETS.    PATIENT STATED SHE HAS A TWO DAY SUPPLY REMAINING.    Does the patient have less than a 3 day supply:  [x] Yes  [] No    Would you like a call back once the refill request has been completed: [x] Yes [] No    If the office needs to give you a call back, can they leave a voicemail: [x] Yes [] No    Denisse Correa Rep   02/23/24 14:38 EST

## 2024-02-28 NOTE — PROGRESS NOTES
The ABCs of the Annual Wellness Visit  Subsequent Medicare Wellness Visit    Subjective      Michelle Espinoza is a 78 y.o. female who presents for a Subsequent Medicare Wellness Visit.    The following portions of the patient's history were reviewed and   updated as appropriate: allergies, current medications, past family history, past medical history, past social history, past surgical history, and problem list.    Compared to one year ago, the patient feels her physical   health is worse.    Compared to one year ago, the patient feels her mental   health is the same.    Recent Hospitalizations:  This patient has had a [facility] admission record on file within the last 365 days.    Current Medical Providers:  Patient Care Team:  Mya Pineda APRN as PCP - General (Family Medicine)  Jose Luis Nelson Jr., MD as Consulting Physician (Urology)  Bon Gardiner MD as Consulting Physician (Hand Surgery)  Milly Crow MD as Consulting Physician (Neurology)  Sacha Garcia APRN as Nurse Practitioner (Neurology)  Johnnie Soni MD as Consulting Physician (Neurology)  Mahamed Massey MD as Consulting Physician (Obstetrics and Gynecology)  Clement Tierney MD as Surgeon (Neurosurgery)  Donna Olsen MA (Inactive) as Medical Assistant  Mahamed Massey MD as Consulting Physician (Obstetrics and Gynecology)  Milly Crow MD as Consulting Physician (Neurology)  Jeffry Bhagat MD as Consulting Physician (Ophthalmology)  Leydi Romo MD as Consulting Physician (Cardiology)  Sangeeta Raman MD as Consulting Physician (Rheumatology)    Outpatient Medications Prior to Visit   Medication Sig Dispense Refill    aspirin 81 MG chewable tablet Chew 1 tablet Daily.      atorvastatin (LIPITOR) 20 MG tablet TAKE ONE TABLET BY MOUTH DAILY 90 tablet 3    Baclofen (LIORESAL) 5 MG tablet TAKE 1 TABLET BY MOUTH DAILY AS NEEDED FOR MUSCLE SPASM IN LEGS 30 tablet 0    carvedilol (COREG) 12.5 MG tablet Take 1 tablet by mouth  2 (Two) Times a Day With Meals. 60 tablet 0    Cholecalciferol (VITAMIN D3) 5000 UNITS capsule capsule Take 2 capsules by mouth Daily.      COLLAGEN PO Take 3 tablets by mouth Daily.      glimepiride (AMARYL) 4 MG tablet Take 1 tablet by mouth 2 (Two) Times a Day. 180 tablet 1    Glucos-Chondroit-Hyaluron-MSM (GLUCOSAMINE CHONDROITIN JOINT PO) Take  by mouth 2 (Two) Times a Day. 2000-250mg      glucose blood (OneTouch Ultra) test strip Dx code E11.59 testing bs 4 x day 400 each 3    hydrALAZINE (APRESOLINE) 10 MG tablet Take 1 tablet by mouth 2 (Two) Times a Day. 180 tablet 1    magnesium 30 MG tablet Take 3 tablets by mouth Daily.      Misc Natural Products (NEURIVA PO) Take  by mouth.      ofloxacin (OCUFLOX) 0.3 % ophthalmic solution       Probiotic Product (PROBIOTIC PO) Take 1 tablet by mouth Daily.      solifenacin (VESICARE) 10 MG tablet Take 1 tablet by mouth Daily.      sulfaSALAzine (AZULFIDINE) 500 MG tablet Take 1 tablet by mouth 4 (Four) Times a Day.      Synjardy 5-1000 MG tablet Take 1 tablet by mouth Daily.      furosemide (Lasix) 40 MG tablet Take 1 tablet by mouth Daily. 30 tablet 0     No facility-administered medications prior to visit.       No opioid medication identified on active medication list. I have reviewed chart for other potential  high risk medication/s and harmful drug interactions in the elderly.        Aspirin is on active medication list. Aspirin use is indicated based on review of current medical condition/s. Pros and cons of this therapy have been discussed today. Benefits of this medication outweigh potential harm.  Patient has been encouraged to continue taking this medication.  .      Patient Active Problem List   Diagnosis    Abnormal weight gain    Arm pain    Arthritis    Atonic neurogenic bladder    Benign colonic polyp    Carotid atherosclerosis    Fatigue    Hypercalcemia    HLD (hyperlipidemia)    Weakness of lower extremity    Nephrolithiasis    Spinal stenosis     "Urinary tract infection    Chronic venous insufficiency    B12 deficiency    Vitamin D deficiency    Degenerative disc disease, lumbar    T7 and T10-11 Thoracic spinal stenosis    Weakness    CKD (chronic kidney disease)    MINERVA (acute kidney injury)    Stenosis of right carotid artery    Hyperparathyroidism    Type 2 diabetes mellitus with other circulatory complications    Osteoporosis    History of inferior wall myocardial infarction    CAD (coronary artery disease)    Ischemic cardiomyopathy    Asymptomatic PVCs    Status post coronary artery stent placement    Acquired hammer toe of left foot    Hereditary and idiopathic neuropathy, unspecified    Onychomycosis    Peripheral vascular disease    Polyneuropathy due to type 2 diabetes mellitus    Ulcer of toe of left foot    Pain in limb    Osteoarthritis    Cellulitis of right lower extremity    Chronic diastolic CHF (congestive heart failure)    Essential hypertension, benign    MINERVA (acute kidney injury)    Cellulitis    Arthralgia of right knee    Bilateral carpal tunnel syndrome    Cervical radiculopathy    Polyneuropathy    Muscle spasms of both lower extremities    Chronic venous stasis     Advance Care Planning   Advance Care Planning     Advance Directive is on file.  ACP discussion was held with the patient during this visit. Patient has an advance directive in EMR which is still valid.      Objective    Vitals:    02/29/24 1053   BP: 140/70; 134/70   Pulse: 82   Resp: 16   Temp: 98.1 °F (36.7 °C)   SpO2: 98%   Weight: 86.2 kg (190 lb)   Height: 160 cm (62.99\")     Estimated body mass index is 33.67 kg/m² as calculated from the following:    Height as of this encounter: 160 cm (62.99\").    Weight as of this encounter: 86.2 kg (190 lb).       Does the patient have evidence of cognitive impairment?   No    Recent Results (from the past 504 hour(s))   CBC & Differential    Collection Time: 02/15/24  9:56 AM    Specimen: Blood   Result Value Ref Range    WBC " 7.75 3.40 - 10.80 10*3/mm3    RBC 4.04 3.77 - 5.28 10*6/mm3    Hemoglobin 12.5 12.0 - 15.9 g/dL    Hematocrit 38.2 34.0 - 46.6 %    MCV 94.6 79.0 - 97.0 fL    MCH 30.9 26.6 - 33.0 pg    MCHC 32.7 31.5 - 35.7 g/dL    RDW 12.9 12.3 - 15.4 %    Platelets 225 140 - 450 10*3/mm3    Neutrophil Rel % 72.1 42.7 - 76.0 %    Lymphocyte Rel % 14.3 (L) 19.6 - 45.3 %    Monocyte Rel % 11.0 5.0 - 12.0 %    Eosinophil Rel % 1.9 0.3 - 6.2 %    Basophil Rel % 0.4 0.0 - 1.5 %    Neutrophils Absolute 5.59 1.70 - 7.00 10*3/mm3    Lymphocytes Absolute 1.11 0.70 - 3.10 10*3/mm3    Monocytes Absolute 0.85 0.10 - 0.90 10*3/mm3    Eosinophils Absolute 0.15 0.00 - 0.40 10*3/mm3    Basophils Absolute 0.03 0.00 - 0.20 10*3/mm3    Immature Granulocyte Rel % 0.3 0.0 - 0.5 %    Immature Grans Absolute 0.02 0.00 - 0.05 10*3/mm3    nRBC 0.0 0.0 - 0.2 /100 WBC   Lipid Panel With LDL / HDL Ratio    Collection Time: 02/15/24  9:56 AM    Specimen: Blood   Result Value Ref Range    Total Cholesterol 148 0 - 200 mg/dL    Triglycerides 95 0 - 150 mg/dL    HDL Cholesterol 62 (H) 40 - 60 mg/dL    VLDL Cholesterol Bruno 18 5 - 40 mg/dL    LDL Chol Calc (NIH) 68 0 - 100 mg/dL    LDL/HDL RATIO 1.08    Comprehensive Metabolic Panel    Collection Time: 02/15/24  9:56 AM    Specimen: Blood   Result Value Ref Range    Glucose 225 (H) 65 - 99 mg/dL    BUN 27 (H) 8 - 23 mg/dL    Creatinine 1.03 (H) 0.57 - 1.00 mg/dL    EGFR Result 56.5 (L) >60.0 mL/min/1.73    BUN/Creatinine Ratio 26.2 (H) 7.0 - 25.0    Sodium 139 136 - 145 mmol/L    Potassium 5.3 (H) 3.5 - 5.2 mmol/L    Chloride 102 98 - 107 mmol/L    Total CO2 22.8 22.0 - 29.0 mmol/L    Calcium 9.8 8.6 - 10.5 mg/dL    Total Protein 6.8 6.0 - 8.5 g/dL    Albumin 4.1 3.5 - 5.2 g/dL    Globulin 2.7 gm/dL    A/G Ratio 1.5 g/dL    Total Bilirubin 0.3 0.0 - 1.2 mg/dL    Alkaline Phosphatase 89 39 - 117 U/L    AST (SGOT) 17 1 - 32 U/L    ALT (SGPT) 19 1 - 33 U/L   Hemoglobin A1c    Collection Time: 02/15/24  9:56 AM     Specimen: Blood   Result Value Ref Range    Hemoglobin A1C 7.20 (H) 4.80 - 5.60 %   MicroAlbumin, Urine, Random - Urine, Clean Catch    Collection Time: 02/15/24  9:56 AM    Specimen: Urine, Clean Catch   Result Value Ref Range    Microalbumin, Urine 73.2 Not Estab. ug/mL   TSH    Collection Time: 02/15/24  9:56 AM    Specimen: Blood   Result Value Ref Range    TSH 2.810 0.270 - 4.200 uIU/mL   Vitamin B12    Collection Time: 02/15/24  9:56 AM    Specimen: Blood   Result Value Ref Range    Vitamin B-12 >2000 (H) 211 - 946 pg/mL   PTH, Intact    Collection Time: 02/15/24  9:56 AM    Specimen: Blood   Result Value Ref Range    PTH, Intact 28 15 - 65 pg/mL   Vitamin D,25-Hydroxy    Collection Time: 02/15/24  9:56 AM    Specimen: Blood   Result Value Ref Range    25 Hydroxy, Vitamin D 103.0 (H) 30.0 - 100.0 ng/ml               HEALTH RISK ASSESSMENT    Smoking Status:  Social History     Tobacco Use   Smoking Status Former    Current packs/day: 0.00    Average packs/day: 1 pack/day for 27.0 years (27.0 ttl pk-yrs)    Types: Cigarettes    Start date: 1965    Quit date: 1992    Years since quittin.1   Smokeless Tobacco Never   Tobacco Comments    CAFFEINE USE: ICE TEA OCC.     Alcohol Consumption:  Social History     Substance and Sexual Activity   Alcohol Use Not Currently    Comment: Socially.     Fall Risk Screen:    CARLAADI Fall Risk Assessment was completed, and patient is at MODERATE risk for falls. Assessment completed on:2024    Depression Screenin/29/2024    10:56 AM   PHQ-2/PHQ-9 Depression Screening   Little Interest or Pleasure in Doing Things 0-->not at all   Feeling Down, Depressed or Hopeless 0-->not at all   PHQ-9: Brief Depression Severity Measure Score 0       Health Habits and Functional and Cognitive Screenin/29/2024    10:57 AM   Functional & Cognitive Status   Do you have difficulty preparing food and eating? No   Do you have difficulty bathing yourself, getting  dressed or grooming yourself? No   Do you have difficulty using the toilet? No   Do you have difficulty moving around from place to place? No   Do you have trouble with steps or getting out of a bed or a chair? No   Current Diet Well Balanced Diet   Dental Exam Up to date   Eye Exam Up to date   Exercise (times per week) 2 times per week   Current Exercises Include Walking;Other   Do you need help using the phone?  No   Are you deaf or do you have serious difficulty hearing?  No   Do you need help to go to places out of walking distance? No   Do you need help shopping? No   Do you need help preparing meals?  No   Do you need help with housework?  No   Do you need help with laundry? No   Do you need help taking your medications? No   Do you need help managing money? No   Do you ever drive or ride in a car without wearing a seat belt? No   Have you felt unusual stress, anger or loneliness in the last month? No   Who do you live with? Spouse   If you need help, do you have trouble finding someone available to you? No   Have you been bothered in the last four weeks by sexual problems? No   Do you have difficulty concentrating, remembering or making decisions? No       Age-appropriate Screening Schedule:  Refer to the list below for future screening recommendations based on patient's age, sex and/or medical conditions. Orders for these recommended tests are listed in the plan section. The patient has been provided with a written plan.    Health Maintenance   Topic Date Due    DXA SCAN  12/04/2020    INFLUENZA VACCINE  03/31/2024 (Originally 8/1/2023)    COVID-19 Vaccine (5 - 2023-24 season) 05/20/2024 (Originally 9/1/2023)    COLORECTAL CANCER SCREENING  07/01/2024 (Originally 1945)    RSV Vaccine - Adults (1 - 1-dose 60+ series) 02/28/2025 (Originally 5/25/2007)    TDAP/TD VACCINES (1 - Tdap) 02/28/2025 (Originally 5/25/1964)    DIABETIC EYE EXAM  04/25/2024    HEMOGLOBIN A1C  08/15/2024    BMI FOLLOWUP  01/14/2025     LIPID PANEL  02/15/2025    URINE MICROALBUMIN  02/15/2025    ANNUAL WELLNESS VISIT  02/28/2025    HEPATITIS C SCREENING  Completed    Pneumococcal Vaccine 65+  Completed    ZOSTER VACCINE  Completed                  CMS Preventative Services Quick Reference  Risk Factors Identified During Encounter:    Dental Screening Recommended  Vision Screening Recommended    The above risks/problems have been discussed with the patient.  Pertinent information has been shared with the patient in the After Visit Summary.    Diagnoses and all orders for this visit:    1. Medicare annual wellness visit, subsequent (Primary)    2. Cellulitis of lower extremity, unspecified laterality  Comments:  right post, left medial ankle  Orders:  -     mupirocin (BACTROBAN) 2 % ointment; Apply 1 Application topically to the appropriate area as directed 2 (Two) Times a Day for 10 days.  Dispense: 30 g; Refill: 0  -     sulfamethoxazole-trimethoprim (Bactrim DS) 800-160 MG per tablet; Take 1 tablet by mouth 2 (Two) Times a Day for 10 days.  Dispense: 20 tablet; Refill: 0  -     Ambulatory Referral to Wound Clinic    3. Chronic venous insufficiency  -     Ambulatory Referral to Wound Clinic    4. Chronic diastolic CHF (congestive heart failure)  -     furosemide (Lasix) 40 MG tablet; Take 1 tablet by mouth Daily.  Dispense: 90 tablet; Refill: 0    5. Type 2 diabetes mellitus with hyperglycemia, without long-term current use of insulin    6. At moderate risk for fall    7. Encounter for osteoporosis screening in asymptomatic postmenopausal patient  -     DEXA Bone Density Axial; Future    Cellulitis and chronic venous insuff.: bactrim bid x 10 days and mupirocin oint. Prescribed; referring to wound care clinic for further eval/tx.    Chronic diastolic CHF: refilling furosemide; follow up with Dr. Romo, cardiologist    Diabetes: HgA1c 7.2 follow up with konrad Martin.    Fall risk-moderate: Patient already seeing physical therapy at this  time.      Follow Up:   Next Medicare Wellness visit to be scheduled in 1 year.      An After Visit Summary and PPPS were made available to the patient.      Patient c/o lower leg drainage; has used Vidya boot melanie for 2 weeks, twice a day, Tuesday and Friday vidya boot off for physical therapy. Patient c/o post right leg with large blister and small blister to left ankle.     Patient admitting she is not taking her lasix at this time. Patient agreeing today to start taking her lasix. Patient has an appt. with Dr. Wiley on 3/15; last cardiologist appt on 2/2 with Mercedes SEGUNDO

## 2024-02-29 ENCOUNTER — OFFICE VISIT (OUTPATIENT)
Dept: FAMILY MEDICINE CLINIC | Facility: CLINIC | Age: 79
End: 2024-02-29
Payer: MEDICARE

## 2024-02-29 VITALS
WEIGHT: 190 LBS | SYSTOLIC BLOOD PRESSURE: 140 MMHG | HEIGHT: 63 IN | HEART RATE: 82 BPM | RESPIRATION RATE: 16 BRPM | TEMPERATURE: 98.1 F | DIASTOLIC BLOOD PRESSURE: 70 MMHG | BODY MASS INDEX: 33.66 KG/M2 | OXYGEN SATURATION: 98 %

## 2024-02-29 DIAGNOSIS — I50.32 CHRONIC DIASTOLIC CHF (CONGESTIVE HEART FAILURE): ICD-10-CM

## 2024-02-29 DIAGNOSIS — Z13.820 ENCOUNTER FOR OSTEOPOROSIS SCREENING IN ASYMPTOMATIC POSTMENOPAUSAL PATIENT: ICD-10-CM

## 2024-02-29 DIAGNOSIS — E11.65 TYPE 2 DIABETES MELLITUS WITH HYPERGLYCEMIA, WITHOUT LONG-TERM CURRENT USE OF INSULIN: ICD-10-CM

## 2024-02-29 DIAGNOSIS — L03.119 CELLULITIS OF LOWER EXTREMITY, UNSPECIFIED LATERALITY: ICD-10-CM

## 2024-02-29 DIAGNOSIS — Z91.81 AT MODERATE RISK FOR FALL: ICD-10-CM

## 2024-02-29 DIAGNOSIS — Z78.0 ENCOUNTER FOR OSTEOPOROSIS SCREENING IN ASYMPTOMATIC POSTMENOPAUSAL PATIENT: ICD-10-CM

## 2024-02-29 DIAGNOSIS — Z00.00 MEDICARE ANNUAL WELLNESS VISIT, SUBSEQUENT: Primary | ICD-10-CM

## 2024-02-29 DIAGNOSIS — I87.2 CHRONIC VENOUS INSUFFICIENCY: ICD-10-CM

## 2024-02-29 RX ORDER — FUROSEMIDE 40 MG/1
40 TABLET ORAL DAILY
Qty: 90 TABLET | Refills: 0 | Status: SHIPPED | OUTPATIENT
Start: 2024-02-29

## 2024-02-29 RX ORDER — SULFAMETHOXAZOLE AND TRIMETHOPRIM 800; 160 MG/1; MG/1
1 TABLET ORAL 2 TIMES DAILY
Qty: 20 TABLET | Refills: 0 | Status: SHIPPED | OUTPATIENT
Start: 2024-02-29 | End: 2024-03-10

## 2024-03-08 ENCOUNTER — APPOINTMENT (OUTPATIENT)
Dept: WOUND CARE | Facility: HOSPITAL | Age: 79
End: 2024-03-08
Payer: MEDICARE

## 2024-03-08 PROCEDURE — G0463 HOSPITAL OUTPT CLINIC VISIT: HCPCS

## 2024-03-11 ENCOUNTER — TELEPHONE (OUTPATIENT)
Dept: FAMILY MEDICINE CLINIC | Facility: CLINIC | Age: 79
End: 2024-03-11
Payer: MEDICARE

## 2024-03-11 NOTE — TELEPHONE ENCOUNTER
, with [facility] did not have anything noted about wound care, but sent over orders.    Blanca is asking since she saw last saw Mya, if she would be willing to approve and write wound care and nursing orders.    Please call her back with what you prefer, thanks    LISE [phone], [employer] NURSE MANAGER

## 2024-03-14 NOTE — PROGRESS NOTES
Subjective:     Encounter Date:03/15/2024      Patient ID: Michelle Espinoza is a 78 y.o. female.    Chief Complaint:  History of Present Illness    This is a 76-year-old with diabetes mellitus type 2, hyperparathyroidism, chronic kidney disease, hyperlipidemia, spinal stenosis, coronary artery disease status post inferior myocardial infarction and drug-eluting stent placement of the proximal, mid, and distal right coronary artery, resolved ischemic cardiomyopathy, chronic venous stasis, PVCs, who presents for follow-up.     In 4/2022 she indicated that she had gained a significant amount of weight since the start of the year.  She was complaining of issues with myalgias and felt that this was due to statin use.  Recommended that she decrease her dosage to 20 mg daily.  Since that office visit she was seen by RATNA Hercules in 10/2022.  At that time she indicated that she was tolerating the lower dose of statin better.  For some reason she was off of both aspirin and clopidogrel.  She reported some atypical chest pain.  It was recommended that she resume aspirin 81 mg and an echocardiogram was recommended.  This was performed on 10/21/2022 and showed normal left ventricular systolic function wall motion with EF of 57%, grade 1A diastolic dysfunction and no significant valvular disease.     I saw the patient last in 1/2023.  At that time she was having issues with her ankles was unable to ambulate.  As result she was in a motorized wheelchair.  She was noted to have ventricular trigeminy incidentally on her EKG but was asymptomatic.  We decided to watch this.    The patient was seen in follow-up by RATNA Michel in 4/2023 at that time she was doing well.  No changes were made to her management.  She return in 6/2023 complaining that she had a sensation like she could not take a deep breath.  Her symptoms sound suspicious for an arrhythmia such as PVCs as a cause.  A Holter monitor was recommended.  BNP  was also checked.  proBNP was normal.  Unfortunately her Holter monitor was never performed.  She return in 10/2023 complaining of similar symptoms.  At that time a Holter monitor was placed.  This showed a 20% ventricular ectopy burden but no significant episodes of nonsustained ventricular tachycardia.  She subsequently underwent an echocardiogram later that month which showed normal left ventricular systolic function and wall motion with an EF of 51%, grade 1 diastolic dysfunction, mild mitral valve regurgitation and normal right ventricular systolic pressure.    She was admitted in 1/2024 with lower extremity swelling.  This was felt to be due to chronic stasis dermatitis.  She was treated with diuresis with improvement in her symptoms.  Heart rates and blood pressures also noted to be elevated so her Coreg was increased.  She saw RATNA Michel in follow-up in 2/2024.  At that time she was still struggling with significant bilateral lower extremity swelling resulting in blistering.  She admitted that she was not good about taking her furosemide as instructed.  She was having a hard time getting to the bathroom with the diuretics.  She also admitted to eating salty foods.  She was given a dose of IV furosemide in the office and instructed to try and stay compliant with taking the furosemide as directed.  Also recommended elevating her legs when possible.    She returns today for follow-up.  She is now being followed in the wound care clinic where they are wrapping her legs.  There has been improvement in her blisters and the swelling.  She is also being better about keeping her legs elevated.  She is continues to take the furosemide once a day.  She denies any chest pain, palpitations, orthopnea, near-syncope or syncope.  Her mobility is severely limited by her joint issues although she continues to work with physical therapy and is hopeful that when her swelling improves that she will be able to work on  increasing her activity.  She does continue to have episodes where she feels like she has to take a deep breath.  She does not really feel like this is changed much.     Prior History:  I saw the patient initially when she came to the emergency room on 3/27/2021 with an inferior myocardial infarction.  She had presented to the emergency room after a fall.  She reported that she had become suddenly weak after going to the bathroom.  Following her arrival to the emergency room an EKG was performed showing inferolateral ST elevations system with an inferior myocardial infarction.  She is brought emergently to the cardiac catheterization laboratory where she is found to have an occluded proximal right coronary artery.  After reestablishing flow in the vessel was noted that she had severe stenosis in her proximal, mid, and distal right coronary artery for which she underwent 3 separate drug-eluting stent placements.  The procedure was complicated by distal embolization of thrombus in her posterior descending branch.  She was treated with Integrilin infusion for period of time before complaining of a headache.  Work-up of this headache including a CT of the head was unremarkable.  She also had some issues with bradycardia and hypotension during the procedure that required treatment with dopamine and phenylephrine but both of these had improved by the end of the procedure and she no longer required pressor support.     Following a cardiac catheterization she did well from a cardiac standpoint.  She did have an echocardiogram performed on 3/28/2021 that showed mildly depressed left ventricular systolic function with an EF of 41%, inferior wall motion abnormalities, grade 1 diastolic dysfunction, and no significant valvular disease.     She did have some confusion during the remainder of her hospitalization that was concerning for underlying dementia.  She was seen by neurology who recommended pursuing an MRI but she was  unable to tolerate this due to her anxiety.  Is recommended that she disco be performed as an outpatient.  Medicine also evaluated the patient for diabetes management and her noncardiac issues.  She was noted to have elevated liver transaminases and her statin therapy was briefly held.  Right upper quadrant ultrasound was performed and was unremarkable.     Following her discharge she was seen by RATNA Kat on 4/15/2021 at that time she was doing well overall.  Her atorvastatin was restarted at 40 mg daily.  Her carvedilol dosage was increased.     In 5/2021 the patient injured her left tricep resulting in a tear.  She ended up requiring surgical repair by Dr. Arun Cho on 5/25/2021.  Fortunately she only had to hold her clopidogrel for about 1 day before.        The patient called with complaints of a cough with the lisinopril.  Ended up switching her to losartan but she reported that she continued to have issues with significant cough.  This was discontinued and hydralazine was added in its place.  Fortunately with this change her blood pressures remain well controlled.       Following an office visit she underwent a repeat echocardiogram which is performed on 8/17/2021 and showed akinetic inferior wall and basal to mid posterior wall with mildly decreased left ventricular systolic function with EF of 46 to 50%.    Review of Systems   Constitutional: Negative for malaise/fatigue.   HENT:  Negative for hearing loss, hoarse voice, nosebleeds and sore throat.    Eyes:  Negative for pain.   Cardiovascular:  Positive for leg swelling. Negative for chest pain, claudication, cyanosis, dyspnea on exertion, irregular heartbeat, near-syncope, orthopnea, palpitations, paroxysmal nocturnal dyspnea and syncope.   Respiratory:  Negative for shortness of breath and snoring.    Endocrine: Negative for cold intolerance, heat intolerance, polydipsia, polyphagia and polyuria.   Skin:  Negative for itching and rash.    Musculoskeletal:  Positive for arthritis, joint pain and joint swelling. Negative for falls, muscle cramps, muscle weakness and myalgias.   Gastrointestinal:  Negative for constipation, diarrhea, dysphagia, heartburn, hematemesis, hematochezia, melena, nausea and vomiting.   Genitourinary:  Negative for frequency, hematuria and hesitancy.   Neurological:  Negative for excessive daytime sleepiness, dizziness, headaches, light-headedness, numbness and weakness.   Psychiatric/Behavioral:  Negative for depression. The patient is not nervous/anxious.          Current Outpatient Medications:     aspirin 81 MG chewable tablet, Chew 1 tablet Daily., Disp: , Rfl:     atorvastatin (LIPITOR) 20 MG tablet, TAKE ONE TABLET BY MOUTH DAILY, Disp: 90 tablet, Rfl: 3    Baclofen (LIORESAL) 5 MG tablet, TAKE 1 TABLET BY MOUTH DAILY AS NEEDED FOR MUSCLE SPASM IN LEGS, Disp: 30 tablet, Rfl: 0    carvedilol (COREG) 12.5 MG tablet, Take 1 tablet by mouth 2 (Two) Times a Day With Meals., Disp: 60 tablet, Rfl: 0    Cholecalciferol (VITAMIN D3) 5000 UNITS capsule capsule, Take 2 capsules by mouth Daily., Disp: , Rfl:     COLLAGEN PO, Take 3 tablets by mouth Daily., Disp: , Rfl:     furosemide (Lasix) 40 MG tablet, Take 1 tablet by mouth Daily., Disp: 90 tablet, Rfl: 0    glimepiride (AMARYL) 4 MG tablet, Take 1 tablet by mouth 2 (Two) Times a Day., Disp: 180 tablet, Rfl: 1    Glucos-Chondroit-Hyaluron-MSM (GLUCOSAMINE CHONDROITIN JOINT PO), Take  by mouth 2 (Two) Times a Day. 2000-250mg, Disp: , Rfl:     glucose blood (OneTouch Ultra) test strip, Dx code E11.59 testing bs 4 x day, Disp: 400 each, Rfl: 3    hydrALAZINE (APRESOLINE) 10 MG tablet, Take 1 tablet by mouth 2 (Two) Times a Day., Disp: 180 tablet, Rfl: 1    magnesium 30 MG tablet, Take 3 tablets by mouth Daily., Disp: , Rfl:     Misc Natural Products (NEURIVA PO), Take  by mouth., Disp: , Rfl:     ofloxacin (OCUFLOX) 0.3 % ophthalmic solution, , Disp: , Rfl:     Probiotic Product  (PROBIOTIC PO), Take 1 tablet by mouth Daily., Disp: , Rfl:     solifenacin (VESICARE) 10 MG tablet, Take 1 tablet by mouth Daily., Disp: , Rfl:     sulfaSALAzine (AZULFIDINE) 500 MG tablet, Take 1 tablet by mouth 4 (Four) Times a Day., Disp: , Rfl:     Synjardy 5-1000 MG tablet, Take 1 tablet by mouth Daily., Disp: , Rfl:     Past Medical History:   Diagnosis Date    Anesthesia complication     STRONG GAG REFLEX    Benign colonic polyp     Chronic back pain     Claustrophobia     Coronary artery disease 03/27/2021    PTCA WITH PLACEMENT OF STENT. PLAVIX ONLY TO BE HELD ONE DAY    DDD (degenerative disc disease), cervical     DDD (degenerative disc disease), lumbosacral     Diabetes mellitus     TYPE 2    Essential hypertension, benign 1/12/2024    Frequent UTI     History of MI (myocardial infarction) 03/27/2021    History of pyelonephritis 06/2014    History of sepsis 2014    Hypercholesteremia     Left knee pain     DRAINED  9/21/21    Leg muscle spasm     Neuropathy     LEFT FOOT. AS RESULT OF BACK PROBLEMS    Osteoarthritis     Osteoporosis     Overactive bladder     Type 2 diabetes mellitus     Ureteral calculus, left     Weakness     LOWER EXTREMITES RELATED FROM NERVE DAMAGE FROM BACK       Past Surgical History:   Procedure Laterality Date    APPENDECTOMY      BACK SURGERY      MULTIPLE. WITH HARDWARE    CARDIAC CATHETERIZATION N/A 03/27/2021    Procedure: Left Heart Cath;  Surgeon: Leydi Romo MD;  Location:  [facility];  Service: Cardiovascular;  Laterality: N/A;    CARDIAC CATHETERIZATION  03/27/2021    Procedure: Percutaneous Manual Thrombectomy;  Surgeon: Leydi Romo MD;  Location:  [facility];  Service: Cardiovascular;;    CARDIAC CATHETERIZATION N/A 03/27/2021    Procedure: Percutaneous Coronary Intervention;  Surgeon: Leydi Romo MD;  Location:  [facility];  Service: Cardiovascular;  Laterality: N/A;    CARDIAC CATHETERIZATION N/A  2021    Procedure: Coronary angiography;  Surgeon: Leydi Romo MD;  Location:  [facility];  Service: Cardiovascular;  Laterality: N/A;    CARPAL TUNNEL RELEASE      LEFT X2 RIGHT     CATARACT EXTRACTION WITH INTRAOCULAR LENS IMPLANT      LEFT AND RIGHT    CERVICAL SPINE SURGERY      MULTIPLE C3-4 C6-7     SECTION      x 2    COLONOSCOPY  2018    CORONARY STENT PLACEMENT  2021    PARATHYROIDECTOMY Left 2019    Procedure: PARATHYROIDECTOMY LEFT INFERIOR;  Surgeon: Mason Prather MD;  Location:  [facility];  Service: ENT    THYROID SURGERY      TRICEP TENDON REPAIR Left 2021    Procedure: LEFT OPEN TRICEPS REPAIR;  Surgeon: Luis Cho II, MD;  Location:  [facility];  Service: Orthopedics;  Laterality: Left;    URETEROSCOPY LASER LITHOTRIPSY WITH STENT INSERTION Left 2021    Procedure: LEFT URETEROSCOPY LASER LITHOTRIPSY, STONE BASKET EXTRACTION STENT;  Surgeon: Jose Luis Nelson Jr., MD;  Location[facility];  Service: Urology;  Laterality: Left;       Family History   Problem Relation Age of Onset    Stroke Mother     Heart disease Mother     Hypertension Mother             Clotting disorder Father     Hypertension Father             Diabetes Father     Aneurysm Father     Hypertension Sister             Diabetes Sister     Cervical cancer Sister     Obesity Sister     Diabetes Sister     Obesity Sister     Cervical cancer Maternal Grandmother 72    Diabetes Grandchild     Asthma Sister     Hypertension Sister         Dead    Diabetes Sister     Malig Hyperthermia Neg Hx        Social History     Tobacco Use    Smoking status: Former     Current packs/day: 0.00     Average packs/day: 1 pack/day for 27.0 years (27.0 ttl pk-yrs)     Types: Cigarettes     Start date: 1965     Quit date: 1992     Years since quittin.2    Smokeless tobacco: Never    Tobacco comments:     CAFFEINE USE: ICE TEA  "OCC.   Vaping Use    Vaping status: Never Used   Substance Use Topics    Alcohol use: Not Currently     Comment: Socially.    Drug use: Yes     Types: Hydrocodone         ECG 12 Lead    Date/Time: 3/15/2024 11:02 AM  Performed by: Leydi Romo MD    Authorized by: Leydi Romo MD  Rhythm: sinus rhythm  Ectopy: unifocal PVCs  Q waves: III and aVF    Other findings: poor R wave progression             Objective:     Visit Vitals  /70 (BP Location: Right arm, Patient Position: Sitting, Cuff Size: Adult)   Pulse 90   Ht 160 cm (63\")   Wt 86.2 kg (190 lb)   LMP  (LMP Unknown)   SpO2 97%   BMI 33.66 kg/m²         Constitutional:       Appearance: Normal appearance. Well-developed.   Eyes:      General: Lids are normal.      Conjunctiva/sclera: Conjunctivae normal.      Pupils: Pupils are equal, round, and reactive to light.   HENT:      Head: Normocephalic and atraumatic.   Neck:      Vascular: No carotid bruit or JVD.      Lymphadenopathy: No cervical adenopathy.   Pulmonary:      Effort: Pulmonary effort is normal.      Breath sounds: Normal breath sounds.   Cardiovascular:      Normal rate. Occasional ectopic beats. Regular rhythm.      No gallop.    Pulses:     Radial: 2+ bilaterally.  Edema:     Peripheral edema absent.   Abdominal:      Palpations: Abdomen is soft.   Musculoskeletal:      Cervical back: Full passive range of motion without pain, normal range of motion and neck supple. Skin:     General: Skin is warm and dry.   Neurological:      Mental Status: Alert and oriented to person, place, and time.             Assessment:          Diagnosis Plan   1. Coronary artery disease involving native coronary artery of native heart without angina pectoris        2. Chronic diastolic CHF (congestive heart failure)        3. Chronic venous insufficiency        4. Chronic venous stasis        5. Essential hypertension, benign        6. History of inferior wall myocardial infarction        7. Hyperlipidemia, " unspecified hyperlipidemia type        8. History of ischemic cardiomyopathy        9. Peripheral vascular disease        10. Status post coronary artery stent placement        11. Type 2 diabetes mellitus with other circulatory complications        12. Stage 2 chronic kidney disease        13. PVC's (premature ventricular contractions)               Plan:       1.  Bilateral lower extremity swelling.  Suspect this is more due to chronic venous insufficiency and stasis rather than heart failure.  She is responding well with wound clinic treatment and oral furosemide.  Encouraged her to continue the furosemide and keep her legs elevated is much as possible.  2.  PVCs.  Present on her EKG today.  Also noted on exam.  She continues to have episodes where she feels like she has to take a deep breath.  Suspect this is related to the PVCs.  Overall her symptoms are stable.  Continue management with carvedilol.  3.  Coronary artery disease.  No symptoms of angina at this time.  EKG otherwise appears stable.  Continue current medical management including aspirin and atorvastatin.  4.  Hypertension.  Well-controlled on current regimen medications.  Continue the same.  5.  Hyperlipidemia.  On atorvastatin for goal LDL of 70 or below.  Last lipid panel in 2/2024 showed that her LDL was at goal at 68.  6.  Chronic diastolic congestive heart failure.  Continue furosemide.  No other evidence of volume overload.  7.  Peripheral vascular disease  8.  Chronic kidney disease  9.  Diabetes mellitus type 2    Will plan on seeing the patient back again in 3 months.

## 2024-03-15 ENCOUNTER — OFFICE VISIT (OUTPATIENT)
Age: 79
End: 2024-03-15
Payer: MEDICARE

## 2024-03-15 VITALS
WEIGHT: 190 LBS | HEART RATE: 90 BPM | BODY MASS INDEX: 33.66 KG/M2 | HEIGHT: 63 IN | DIASTOLIC BLOOD PRESSURE: 70 MMHG | SYSTOLIC BLOOD PRESSURE: 122 MMHG | OXYGEN SATURATION: 97 %

## 2024-03-15 DIAGNOSIS — I87.8 CHRONIC VENOUS STASIS: ICD-10-CM

## 2024-03-15 DIAGNOSIS — Z95.5 STATUS POST CORONARY ARTERY STENT PLACEMENT: ICD-10-CM

## 2024-03-15 DIAGNOSIS — I73.9 PERIPHERAL VASCULAR DISEASE: ICD-10-CM

## 2024-03-15 DIAGNOSIS — I10 ESSENTIAL HYPERTENSION, BENIGN: ICD-10-CM

## 2024-03-15 DIAGNOSIS — I87.2 CHRONIC VENOUS INSUFFICIENCY: ICD-10-CM

## 2024-03-15 DIAGNOSIS — I49.3 PVC'S (PREMATURE VENTRICULAR CONTRACTIONS): ICD-10-CM

## 2024-03-15 DIAGNOSIS — E78.5 HYPERLIPIDEMIA, UNSPECIFIED HYPERLIPIDEMIA TYPE: ICD-10-CM

## 2024-03-15 DIAGNOSIS — N18.2 STAGE 2 CHRONIC KIDNEY DISEASE: ICD-10-CM

## 2024-03-15 DIAGNOSIS — Z86.79 HISTORY OF ISCHEMIC CARDIOMYOPATHY: ICD-10-CM

## 2024-03-15 DIAGNOSIS — I25.2 HISTORY OF INFERIOR WALL MYOCARDIAL INFARCTION: ICD-10-CM

## 2024-03-15 DIAGNOSIS — I25.10 CORONARY ARTERY DISEASE INVOLVING NATIVE CORONARY ARTERY OF NATIVE HEART WITHOUT ANGINA PECTORIS: Primary | ICD-10-CM

## 2024-03-15 DIAGNOSIS — E11.59 TYPE 2 DIABETES MELLITUS WITH OTHER CIRCULATORY COMPLICATIONS: ICD-10-CM

## 2024-03-15 DIAGNOSIS — I50.32 CHRONIC DIASTOLIC CHF (CONGESTIVE HEART FAILURE): ICD-10-CM

## 2024-03-15 NOTE — LETTER
March 15, 2024       No Recipients    Patient: Michelle Espinoza   YOB: 1945   Date of Visit: 3/15/2024       Dear RATNA Borrego,    Michelle Espinoza was in my office today. Below are the relevant portions of my assessment and plan of care.           If you have questions, please do not hesitate to call me. I look forward to following Michelle along with you.         Sincerely,        Leydi Romo MD        CC:   No Recipients

## 2024-03-21 RX ORDER — CARVEDILOL 12.5 MG/1
12.5 TABLET ORAL 2 TIMES DAILY WITH MEALS
Qty: 60 TABLET | Refills: 0 | Status: SHIPPED | OUTPATIENT
Start: 2024-03-21

## 2024-03-27 DIAGNOSIS — M62.838 MUSCLE SPASMS OF BOTH LOWER EXTREMITIES: ICD-10-CM

## 2024-03-28 RX ORDER — BACLOFEN 5 MG/1
TABLET ORAL
Qty: 30 TABLET | Refills: 0 | Status: SHIPPED | OUTPATIENT
Start: 2024-03-28

## 2024-03-28 NOTE — TELEPHONE ENCOUNTER
Rx Refill Note  Requested Prescriptions     Pending Prescriptions Disp Refills    Baclofen (LIORESAL) 5 MG tablet [Pharmacy Med Name: BACLOFEN 5 MG TABLET] 30 tablet 0     Sig: TAKE 1 TABLET BY MOUTH DAILY AS NEEDED FOR MUSCLE SPASMS IN LEGS      Last office visit with prescribing clinician: 2/29/2024   Last telemedicine visit with prescribing clinician: Visit date not found   Next office visit with prescribing clinician: 9/5/2024                         Would you like a call back once the refill request has been completed: [] Yes [] No    If the office needs to give you a call back, can they leave a voicemail: [] Yes [] No    Samantha Burden MA  03/28/24, 09:16 EDT

## 2024-03-29 ENCOUNTER — APPOINTMENT (OUTPATIENT)
Dept: WOUND CARE | Facility: HOSPITAL | Age: 79
End: 2024-03-29
Payer: MEDICARE

## 2024-04-16 RX ORDER — CARVEDILOL 12.5 MG/1
12.5 TABLET ORAL 2 TIMES DAILY WITH MEALS
Qty: 60 TABLET | Refills: 0 | Status: SHIPPED | OUTPATIENT
Start: 2024-04-16

## 2024-04-19 ENCOUNTER — APPOINTMENT (OUTPATIENT)
Dept: WOUND CARE | Facility: HOSPITAL | Age: 79
End: 2024-04-19
Payer: MEDICARE

## 2024-05-08 ENCOUNTER — TELEPHONE (OUTPATIENT)
Dept: CARDIOLOGY | Facility: CLINIC | Age: 79
End: 2024-05-08
Payer: MEDICARE

## 2024-05-08 DIAGNOSIS — M62.838 MUSCLE SPASMS OF BOTH LOWER EXTREMITIES: ICD-10-CM

## 2024-05-08 RX ORDER — BACLOFEN 5 MG/1
TABLET ORAL
Qty: 30 TABLET | Refills: 0 | OUTPATIENT
Start: 2024-05-08

## 2024-05-10 ENCOUNTER — APPOINTMENT (OUTPATIENT)
Dept: WOUND CARE | Facility: HOSPITAL | Age: 79
End: 2024-05-10
Payer: MEDICARE

## 2024-05-10 ENCOUNTER — OFFICE VISIT (OUTPATIENT)
Dept: FAMILY MEDICINE CLINIC | Facility: CLINIC | Age: 79
End: 2024-05-10
Payer: MEDICARE

## 2024-05-10 VITALS
DIASTOLIC BLOOD PRESSURE: 76 MMHG | BODY MASS INDEX: 33.66 KG/M2 | HEIGHT: 63 IN | OXYGEN SATURATION: 97 % | SYSTOLIC BLOOD PRESSURE: 138 MMHG | TEMPERATURE: 98.1 F | HEART RATE: 86 BPM | WEIGHT: 190 LBS | RESPIRATION RATE: 16 BRPM

## 2024-05-10 DIAGNOSIS — G25.81 RESTLESS LEG SYNDROME: ICD-10-CM

## 2024-05-10 DIAGNOSIS — I73.9 PERIPHERAL VASCULAR DISEASE: ICD-10-CM

## 2024-05-10 DIAGNOSIS — M62.838 MUSCLE SPASMS OF BOTH LOWER EXTREMITIES: Primary | ICD-10-CM

## 2024-05-10 PROCEDURE — 1160F RVW MEDS BY RX/DR IN RCRD: CPT | Performed by: NURSE PRACTITIONER

## 2024-05-10 PROCEDURE — 3075F SYST BP GE 130 - 139MM HG: CPT | Performed by: NURSE PRACTITIONER

## 2024-05-10 PROCEDURE — 3078F DIAST BP <80 MM HG: CPT | Performed by: NURSE PRACTITIONER

## 2024-05-10 PROCEDURE — 99213 OFFICE O/P EST LOW 20 MIN: CPT | Performed by: NURSE PRACTITIONER

## 2024-05-10 PROCEDURE — 1159F MED LIST DOCD IN RCRD: CPT | Performed by: NURSE PRACTITIONER

## 2024-05-10 PROCEDURE — 1126F AMNT PAIN NOTED NONE PRSNT: CPT | Performed by: NURSE PRACTITIONER

## 2024-05-10 RX ORDER — BACLOFEN 5 MG/1
5 TABLET ORAL NIGHTLY PRN
Qty: 30 TABLET | Refills: 2 | Status: SHIPPED | OUTPATIENT
Start: 2024-05-10

## 2024-05-14 RX ORDER — CARVEDILOL 12.5 MG/1
12.5 TABLET ORAL 2 TIMES DAILY WITH MEALS
Qty: 60 TABLET | Refills: 0 | Status: SHIPPED | OUTPATIENT
Start: 2024-05-14

## 2024-05-26 DIAGNOSIS — I50.32 CHRONIC DIASTOLIC CHF (CONGESTIVE HEART FAILURE): ICD-10-CM

## 2024-05-28 RX ORDER — FUROSEMIDE 40 MG/1
40 TABLET ORAL DAILY
Qty: 90 TABLET | Refills: 0 | Status: SHIPPED | OUTPATIENT
Start: 2024-05-28

## 2024-05-31 ENCOUNTER — APPOINTMENT (OUTPATIENT)
Dept: WOUND CARE | Facility: HOSPITAL | Age: 79
End: 2024-05-31
Payer: MEDICARE

## 2024-06-04 ENCOUNTER — OFFICE VISIT (OUTPATIENT)
Dept: FAMILY MEDICINE CLINIC | Facility: CLINIC | Age: 79
End: 2024-06-04
Payer: MEDICARE

## 2024-06-04 VITALS
WEIGHT: 195 LBS | HEART RATE: 67 BPM | SYSTOLIC BLOOD PRESSURE: 150 MMHG | DIASTOLIC BLOOD PRESSURE: 76 MMHG | HEIGHT: 63 IN | BODY MASS INDEX: 34.55 KG/M2 | RESPIRATION RATE: 16 BRPM | TEMPERATURE: 96.8 F | OXYGEN SATURATION: 94 %

## 2024-06-04 DIAGNOSIS — M25.551 RIGHT HIP PAIN: ICD-10-CM

## 2024-06-04 DIAGNOSIS — R20.0 BILATERAL LEG NUMBNESS: ICD-10-CM

## 2024-06-04 DIAGNOSIS — N32.81 OAB (OVERACTIVE BLADDER): ICD-10-CM

## 2024-06-04 DIAGNOSIS — I73.9 PVD (PERIPHERAL VASCULAR DISEASE): ICD-10-CM

## 2024-06-04 DIAGNOSIS — R35.0 URINARY FREQUENCY: ICD-10-CM
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
## 2024-06-04 LAB
BILIRUB BLD-MCNC: NEGATIVE MG/DL
CLARITY, POC: ABNORMAL
COLOR UR: ABNORMAL
GLUCOSE UR STRIP-MCNC: ABNORMAL MG/DL
KETONES UR QL: NEGATIVE
LEUKOCYTE EST, POC: NEGATIVE
NITRITE UR-MCNC: NEGATIVE MG/ML
PH UR: 6 [PH] (ref 5–8)
PROT UR STRIP-MCNC: NEGATIVE MG/DL
RBC # UR STRIP: ABNORMAL /UL
SP GR UR: 1.02 (ref 1–1.03)
UROBILINOGEN UR QL: ABNORMAL

## 2024-06-04 PROCEDURE — 3078F DIAST BP <80 MM HG: CPT | Performed by: NURSE PRACTITIONER

## 2024-06-04 PROCEDURE — 81002 URINALYSIS NONAUTO W/O SCOPE: CPT | Performed by: NURSE PRACTITIONER

## 2024-06-04 PROCEDURE — 3077F SYST BP >= 140 MM HG: CPT | Performed by: NURSE PRACTITIONER

## 2024-06-04 PROCEDURE — 1126F AMNT PAIN NOTED NONE PRSNT: CPT | Performed by: NURSE PRACTITIONER

## 2024-06-04 PROCEDURE — 99213 OFFICE O/P EST LOW 20 MIN: CPT | Performed by: NURSE PRACTITIONER

## 2024-06-04 NOTE — PROGRESS NOTES
Subjective     Michelle Espinoza is a 79 y.o.. female.     Patient here today with c/o leg weakness ongoing, feeling of loss of sensation in legs, feelings like her legs are not there. Patient denies back pain before and/or during leg issues. Patient stating she fell 3 weeks ago when getting up inside house and had to call the paramedics to get her up. Patient stating that several days after that she fell again while in garage, hit concrete floor, and hit right side of back/buttocks. Patient denies calling paramedics or going to have any medical provider see her after that fall. Patient stating she is still going to wound care to get unna boots on lower legs. Patient stating she is still going to Physical therapy twice a week and they are working with her on her legs. Patient stating she is seeing a massage therapist that has recommended her to drink a mineral supplement drink.        Leg Pain     Fall  Pertinent negatives include no fever.   Urinary Frequency   Associated symptoms include frequency.       The following portions of the patient's history were reviewed and updated as appropriate: allergies, current medications, past family history, past medical history, past social history, past surgical history and problem list.    Past Medical History:   Diagnosis Date    Anesthesia complication     STRONG GAG REFLEX    Benign colonic polyp     Chronic back pain     Claustrophobia     Coronary artery disease 03/27/2021    PTCA WITH PLACEMENT OF STENT. PLAVIX ONLY TO BE HELD ONE DAY    DDD (degenerative disc disease), cervical     DDD (degenerative disc disease), lumbosacral     Diabetes mellitus     TYPE 2    Essential hypertension, benign 1/12/2024    Frequent UTI     History of MI (myocardial infarction) 03/27/2021    History of pyelonephritis 06/2014    History of sepsis 2014    Hypercholesteremia     Left knee pain     DRAINED  9/21/21    Leg muscle spasm     Neuropathy     LEFT FOOT. AS RESULT OF BACK PROBLEMS     Osteoarthritis     Osteoporosis     Overactive bladder     Type 2 diabetes mellitus     Ureteral calculus, left     Weakness     LOWER EXTREMITES RELATED FROM NERVE DAMAGE FROM BACK       Past Surgical History:   Procedure Laterality Date    APPENDECTOMY      BACK SURGERY      MULTIPLE. WITH HARDWARE    CARDIAC CATHETERIZATION N/A 2021    Procedure: Left Heart Cath;  Surgeon: Leydi Romo MD;  Location:  [facility];  Service: Cardiovascular;  Laterality: N/A;    CARDIAC CATHETERIZATION  2021    Procedure: Percutaneous Manual Thrombectomy;  Surgeon: Leydi Romo MD;  Location:  [facility];  Service: Cardiovascular;;    CARDIAC CATHETERIZATION N/A 2021    Procedure: Percutaneous Coronary Intervention;  Surgeon: Leydi Romo MD;  Location:  [facility];  Service: Cardiovascular;  Laterality: N/A;    CARDIAC CATHETERIZATION N/A 2021    Procedure: Coronary angiography;  Surgeon: Leydi Romo MD;  Location:  [facility];  Service: Cardiovascular;  Laterality: N/A;    CARPAL TUNNEL RELEASE      LEFT X2 RIGHT     CATARACT EXTRACTION WITH INTRAOCULAR LENS IMPLANT      LEFT AND RIGHT    CERVICAL SPINE SURGERY      MULTIPLE C3-4 C6-7     SECTION      x 2    COLONOSCOPY  2018    CORONARY STENT PLACEMENT  2021    PARATHYROIDECTOMY Left 2019    Procedure: PARATHYROIDECTOMY LEFT INFERIOR;  Surgeon: Mason Prather MD;  Location: [facility];  Service: ENT    THYROID SURGERY      TRICEP TENDON REPAIR Left 2021    Procedure: LEFT OPEN TRICEPS REPAIR;  Surgeon: Luis Cho II, MD;  Location: [facility];  Service: Orthopedics;  Laterality: Left;    URETEROSCOPY LASER LITHOTRIPSY WITH STENT INSERTION Left 2021    Procedure: LEFT URETEROSCOPY LASER LITHOTRIPSY, STONE BASKET EXTRACTION STENT;  Surgeon: Jose Luis Nelson Jr., MD;  Location: [facility];  Service:  "Urology;  Laterality: Left;       Review of Systems   Constitutional:  Negative for fever.   Respiratory: Negative.     Cardiovascular:  Positive for leg swelling. Negative for chest pain and palpitations.   Gastrointestinal: Negative.    Genitourinary:  Positive for frequency.   Musculoskeletal:  Positive for arthralgias, back pain (right side) and gait problem.   Neurological:  Positive for weakness.        Loss of sensation to legs at times   Psychiatric/Behavioral:  The patient is nervous/anxious.        Allergies   Allergen Reactions    Other Nausea And Vomiting     The mercury in [place]    Penicillins Hives    Statins Myalgia    Latex Rash    Nickel Rash       Objective     Vitals:    06/04/24 1320   BP: 150/76   Pulse: 67   Resp: 16   Temp: 96.8 °F (36 °C)   TempSrc: Temporal   SpO2: 94%   Weight: 88.5 kg (195 lb)   Height: 160 cm (62.99\")     Body mass index is 34.55 kg/m².    Physical Exam  Vitals reviewed.   HENT:      Head: Normocephalic.   Eyes:      Pupils: Pupils are equal, round, and reactive to light.   Cardiovascular:      Rate and Rhythm: Normal rate and regular rhythm.   Pulmonary:      Effort: Pulmonary effort is normal.      Breath sounds: Normal breath sounds.   Musculoskeletal:      Comments: In wheel chair   Skin:     Comments: Mehdi. Lower extremities in unna boots  Patient able to wiggle toes, has good cap refills to toes  Toe nails thick and needing trimming--recommending follow up with podiatrist     Neurological:      Mental Status: She is alert.           Current Outpatient Medications:     aspirin 81 MG chewable tablet, Chew 1 tablet Daily., Disp: , Rfl:     atorvastatin (LIPITOR) 20 MG tablet, TAKE ONE TABLET BY MOUTH DAILY, Disp: 90 tablet, Rfl: 3    Baclofen (LIORESAL) 5 MG tablet, Take 1 tablet by mouth At Night As Needed (muscle spasms to lower legs/RLS)., Disp: 30 tablet, Rfl: 2    carvedilol (COREG) 12.5 MG tablet, TAKE 1 TABLET BY MOUTH TWICE A DAY WITH A MEAL, Disp: 60 tablet, " Rfl: 0    Cholecalciferol (VITAMIN D3) 5000 UNITS capsule capsule, Take 2 capsules by mouth Daily., Disp: , Rfl:     COLLAGEN PO, Take 3 tablets by mouth Daily., Disp: , Rfl:     furosemide (LASIX) 40 MG tablet, TAKE 1 TABLET BY MOUTH DAILY, Disp: 90 tablet, Rfl: 0    glimepiride (AMARYL) 4 MG tablet, Take 1 tablet by mouth 2 (Two) Times a Day., Disp: 180 tablet, Rfl: 1    Glucos-Chondroit-Hyaluron-MSM (GLUCOSAMINE CHONDROITIN JOINT PO), Take  by mouth 2 (Two) Times a Day. 2000-250mg, Disp: , Rfl:     glucose blood (OneTouch Ultra) test strip, Dx code E11.59 testing bs 4 x day, Disp: 400 each, Rfl: 3    hydrALAZINE (APRESOLINE) 10 MG tablet, Take 1 tablet by mouth 2 (Two) Times a Day., Disp: 180 tablet, Rfl: 1    magnesium 30 MG tablet, Take 3 tablets by mouth Daily., Disp: , Rfl:     Misc Natural Products (NEURIVA PO), Take  by mouth., Disp: , Rfl:     ofloxacin (OCUFLOX) 0.3 % ophthalmic solution, , Disp: , Rfl:     Probiotic Product (PROBIOTIC PO), Take 1 tablet by mouth Daily., Disp: , Rfl:     solifenacin (VESICARE) 10 MG tablet, Take 1 tablet by mouth Daily., Disp: , Rfl:     Synjardy 5-1000 MG tablet, Take 1 tablet by mouth Daily., Disp: , Rfl:     Recent Results (from the past 2016 hour(s))   POC Urinalysis Dipstick    Collection Time: 06/04/24  1:54 PM    Specimen: Urine   Result Value Ref Range    Color Rosa Yellow, Straw, Dark Yellow, Rosa    Clarity, UA Cloudy (A) Clear    Glucose, UA 1000 mg/dL (3+) Negative mg/dL    Bilirubin Negative Negative    Ketones, UA Negative Negative    Specific Gravity  1.020 1.005 - 1.030    Blood, UA 2+ (A) Negative    pH, Urine 6.0 5.0 - 8.0    Protein, POC Negative Negative mg/dL    Urobilinogen, UA 0.2 E.U./dL Normal, 0.2 E.U./dL    Leukocytes Negative Negative    Nitrite, UA Negative Negative       Adult Transthoracic Echo Complete w/ Color, Spectral and Contrast if Necessary Per Protocol    Left ventricular systolic function is normal. Calculated left   ventricular  EF = 51.4% Normal left ventricular cavity size noted. Left   ventricular wall thickness is consistent with borderline concentric   hypertrophy. All left ventricular wall segments contract normally. Left   ventricular diastolic function is consistent with (grade I) impaired   relaxation.    No aortic valve regurgitation or stenosis is present. The aortic valve   is abnormal in structure. There is mild thickening of the right coronary   cusp(s) of the aortic valve.    Mild mitral valve regurgitation is present. No significant mitral valve   stenosis is present.    Trace tricuspid valve regurgitation is present. Estimated right   ventricular systolic pressure from tricuspid regurgitation is normal (<35   mmHg). Calculated right ventricular systolic pressure from tricuspid   regurgitation is 20.5 mmHg.        Diagnoses and all orders for this visit:    1. Fall, initial encounter (Primary)  -     CT lumbar spine wo contrast; Future    2. Bilateral leg numbness  -     CT lumbar spine wo contrast; Future    3. PVD (peripheral vascular disease)    4. Right hip pain  -     CT lumbar spine wo contrast; Future    5. Urinary frequency  -     POC Urinalysis Dipstick    6. OAB (overactive bladder)        Patient Instructions   Fall/melanie. Leg numbness/PVD/Right hip pain: ordering CT lumbar spine for further eval. Patient already seeing physical therapist at this time. Discussed possible referral to neurology. Awaiting results for CT for further treatment plan. Discussed fall precautions at home and to continue to use walker at home. If symptoms worsen Patient to call/rto. Patient verb. Understanding.     Urinary frequency/OAB: negative infection, hematuria noted along with higher specific gravity, recommend increasing water intake and following up with Dr. Henson, urology.      Return if symptoms worsen or fail to improve.    Answers submitted by the patient for this visit:  Other (Submitted on 6/1/2024)  Please describe your  symptoms.: Weakness on legs  Have you had these symptoms before?: No  How long have you been having these symptoms?: 1-2 weeks  Primary Reason for Visit (Submitted on 6/1/2024)  What is the primary reason for your visit?: Other

## 2024-06-07 NOTE — PATIENT INSTRUCTIONS
Fall/melanie. Leg numbness/PVD/Right hip pain: ordering CT lumbar spine for further eval. Patient already seeing physical therapist at this time. Discussed possible referral to neurology. Awaiting results for CT for further treatment plan. Discussed fall precautions at home and to continue to use walker at home. If symptoms worsen Patient to call/rto. Patient verb. Understanding.     Urinary frequency/OAB: negative infection, hematuria noted along with higher specific gravity, recommend increasing water intake and following up with Dr. Henson, urology.

## 2024-06-11 RX ORDER — CARVEDILOL 12.5 MG/1
12.5 TABLET ORAL 2 TIMES DAILY WITH MEALS
Qty: 60 TABLET | Refills: 0 | Status: SHIPPED | OUTPATIENT
Start: 2024-06-11

## 2024-06-12 ENCOUNTER — HOSPITAL ENCOUNTER (OUTPATIENT)
Dept: CT IMAGING | Facility: HOSPITAL | Age: 79
Discharge: HOME | End: 2024-06-12
Admitting: NURSE PRACTITIONER
Payer: MEDICARE

## 2024-06-12 DIAGNOSIS — M25.551 RIGHT HIP PAIN: ICD-10-CM

## 2024-06-12 DIAGNOSIS — R20.0 BILATERAL LEG NUMBNESS: ICD-10-CM
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
## 2024-06-12 PROCEDURE — 72131 CT LUMBAR SPINE W/O DYE: CPT

## 2024-06-14 ENCOUNTER — TELEPHONE (OUTPATIENT)
Dept: FAMILY MEDICINE CLINIC | Facility: CLINIC | Age: 79
End: 2024-06-14
Payer: MEDICARE

## 2024-06-14 ENCOUNTER — APPOINTMENT (OUTPATIENT)
Dept: WOUND CARE | Facility: HOSPITAL | Age: 79
End: 2024-06-14
Payer: MEDICARE

## 2024-06-14 PROCEDURE — G0463 HOSPITAL OUTPT CLINIC VISIT: HCPCS

## 2024-06-14 NOTE — TELEPHONE ENCOUNTER
Called Patient and discussed imaging results; going to complete physical therapy and then if no improvement with refer to neurosurgery [facility] (Patient has seen previously and had surgery).

## 2024-06-17 RX ORDER — HYDRALAZINE HYDROCHLORIDE 10 MG/1
10 TABLET, FILM COATED ORAL 2 TIMES DAILY
Qty: 180 TABLET | Refills: 1 | Status: SHIPPED | OUTPATIENT
Start: 2024-06-17

## 2024-06-18 ENCOUNTER — OFFICE VISIT (OUTPATIENT)
Dept: CARDIOLOGY | Facility: CLINIC | Age: 79
End: 2024-06-18
Payer: MEDICARE

## 2024-06-18 VITALS
SYSTOLIC BLOOD PRESSURE: 140 MMHG | DIASTOLIC BLOOD PRESSURE: 80 MMHG | HEART RATE: 70 BPM | BODY MASS INDEX: 36.8 KG/M2 | WEIGHT: 200 LBS | HEIGHT: 62 IN

## 2024-06-18 DIAGNOSIS — I50.32 CHRONIC DIASTOLIC CHF (CONGESTIVE HEART FAILURE): ICD-10-CM

## 2024-06-18 DIAGNOSIS — I25.2 HISTORY OF INFERIOR WALL MYOCARDIAL INFARCTION: ICD-10-CM

## 2024-06-18 DIAGNOSIS — E78.5 HYPERLIPIDEMIA, UNSPECIFIED HYPERLIPIDEMIA TYPE: ICD-10-CM

## 2024-06-18 DIAGNOSIS — Z95.5 STATUS POST CORONARY ARTERY STENT PLACEMENT: ICD-10-CM

## 2024-06-18 DIAGNOSIS — I10 ESSENTIAL HYPERTENSION, BENIGN: ICD-10-CM

## 2024-06-18 DIAGNOSIS — Z86.79 HISTORY OF ISCHEMIC CARDIOMYOPATHY: ICD-10-CM

## 2024-06-18 DIAGNOSIS — I25.10 CORONARY ARTERY DISEASE INVOLVING NATIVE CORONARY ARTERY OF NATIVE HEART WITHOUT ANGINA PECTORIS: ICD-10-CM

## 2024-06-18 DIAGNOSIS — I87.8 CHRONIC VENOUS STASIS: ICD-10-CM

## 2024-06-18 DIAGNOSIS — I65.21 STENOSIS OF RIGHT CAROTID ARTERY: ICD-10-CM

## 2024-06-18 DIAGNOSIS — I49.3 PVC'S (PREMATURE VENTRICULAR CONTRACTIONS): ICD-10-CM

## 2024-06-18 DIAGNOSIS — I65.22 ATHEROSCLEROSIS OF LEFT CAROTID ARTERY: Primary | ICD-10-CM

## 2024-06-18 PROCEDURE — 93000 ELECTROCARDIOGRAM COMPLETE: CPT | Performed by: NURSE PRACTITIONER

## 2024-06-18 PROCEDURE — 1160F RVW MEDS BY RX/DR IN RCRD: CPT | Performed by: NURSE PRACTITIONER

## 2024-06-18 PROCEDURE — 99214 OFFICE O/P EST MOD 30 MIN: CPT | Performed by: NURSE PRACTITIONER

## 2024-06-18 PROCEDURE — 3077F SYST BP >= 140 MM HG: CPT | Performed by: NURSE PRACTITIONER

## 2024-06-18 PROCEDURE — 3079F DIAST BP 80-89 MM HG: CPT | Performed by: NURSE PRACTITIONER

## 2024-06-18 PROCEDURE — 1159F MED LIST DOCD IN RCRD: CPT | Performed by: NURSE PRACTITIONER

## 2024-06-18 NOTE — PROGRESS NOTES
Subjective:     Encounter Date:06/18/2024      Patient ID: Michelle Espinoza is a 79 y.o. female.    Chief Complaint:follow up CAD, ICM  History of Present Illness  This is a 80 y/o female who follows with Dr. Romo and is known to me.  She has a pmhx of coronary artery disease s/p PCI with stent placement to the proximal, mid, and distal right coronary artery following an inferior MI, ischemic cardiomyopathy with an EF of 45-50%, hypertension, hyperlipidemia, CKD, and diabetes.     She is here today for follow-up visit.  She is now out of the Unna boots and swelling has improved in her legs.  She continues to have them wrapped.  She is still going to physical therapy and has been for almost 2 years now.  Her mobility is still severely limited which is not helping her swelling.  The plan is for her to do a little more physical therapy and if there is no improvement then she will go to see a neurologist.  She says she has not seen a neurologist since her last back surgery about 15 years ago.  From a heart standpoint she is feeling okay with no chest pain, shortness of breath, palpitations, dizziness or syncope.  No reported orthopnea or PND.  Her blood pressure has been well-controlled at home.    Prior history:  Dr. Romo began following the patient when she came to the emergency room on 3/27/2021 with an inferior myocardial infarction. Following her arrival to the emergency room an EKG was performed showing inferolateral ST elevations system with an inferior myocardial infarction.  She was brought emergently to the cardiac catheterization laboratory where she is found to have an occluded proximal right coronary artery.  After reestablishing flow in the vessel was noted that she had severe stenosis in her proximal, mid, and distal right coronary artery for which she underwent 3 separate drug-eluting stent placements.  The procedure was complicated by distal embolization of thrombus in her posterior descending  keron.  She was treated with Integrilin infusion for period of time before complaining of a headache.  Work-up of this headache including a CT of the head was unremarkable.  She also had some issues with bradycardia and hypotension during the procedure that required treatment with dopamine and phenylephrine but both of these had improved by the end of the procedure and she no longer required pressor support.     Following a cardiac catheterization she did well from a cardiac standpoint.  She did have an echocardiogram performed on 3/28/2021 that showed mildly depressed left ventricular systolic function with an EF of 41%, inferior wall motion abnormalities, grade 1 diastolic dysfunction, and no significant valvular disease.    She was seen in the office in April 2022. At that time, it was decided to stop clopidogrel since it had been over a year since her stent placement. Statin therapy was reduced to see if it would help with her myalgias. She had also gained a significant amount of weight. Otherwise, she had been doing fairly well.    I then saw her in October 2022 and she reported a dull ache in the left side of her chest that lasts about 20 mins and resolves on its own. She had also been having pounding in her throat a couple of times a week that did not occur when the chest discomfort occurs. Her EKG was stable with no ischemic changes so we opted to proceed with an echocardiogram. The echocardiogram was unremarkable and no changes were made.     She was last seen by Dr. Romo in January 2023 and she was feeling ok from a cardiac standpoint. She was noted to be in ventricular trigeminy. She was asymptomatic with this. She had recently had an echocardiogram at that time that was unremarkable so it was decided to monitor. She was instructed to return in 3 months and if PVCs were persistent, could consider a 24 hour Holter.    I then saw her in June 2023 at which time she was having complaints of feeling like she  couldn't take a deep breath and was struggling with progressive weakness. She would have episodes where she would not be doing anything and would have a brief moment of shortness of breath, she would yawn and it would resolve. She was quite concerned about arrhythmia as her sister has afib. I had a proBNP drawn in office that day that was normal. I also had ordered a 24 holter monitor but unfortunately, this was not placed. I also asked for the patient to be contacted to return for placement and she was never contacted    I saw her in October and she was doing about the same. She was frustrated with how much her health had declined over the last few years. She was also having worsening shortness of breath. I placed a 24 hour Holter to assess her rhythm and PVC burden. This showed a 20% burden of ventricular ectopy. I then obtained an echocardiogram that showed a normal LV systolic function, EF 51.4%, grade 1 diastolic dysfunction, mild mitral regurgitation.     She was admitted in January 2024 with lower extremity swelling felt to be due to chronic stasis dermatitis. She was IV diuresed and carvedilol was increase. At follow up with me in February, she was having blistering on her legs due to the significant swelling. She admitted she was not great about taking her furosemide because she was having a hard time getting to the bathroom. She was given of IV Lasix in [facility] and instructed to try to stay compliant with taking her furosemide as directed.    She then saw Dr. Romo in March 2024 and was being followed by the wound care clinic where they were wrapping her legs.  There had been some improvement in her blisters and the swelling.  She was staying compliant with taking her furosemide and was trying to keep her legs elevated is much as possible.  No changes were made at that visit.    I have reviewed and updated as appropriate allergies, current medications, past family history, past medical history, past surgical  history and problem list.    Review of Systems   Constitutional: Positive for malaise/fatigue. Negative for fever, weight gain and weight loss.   HENT:  Negative for congestion, hoarse voice and sore throat.    Eyes:  Negative for blurred vision and double vision.   Cardiovascular:  Positive for leg swelling. Negative for chest pain, dyspnea on exertion, orthopnea, palpitations and syncope.   Respiratory:  Negative for cough, shortness of breath and wheezing.    Gastrointestinal:  Negative for abdominal pain, hematemesis, hematochezia and melena.   Genitourinary:  Negative for dysuria and hematuria.   Neurological:  Negative for dizziness, headaches, light-headedness, loss of balance, numbness and weakness.   Psychiatric/Behavioral:  Negative for depression. The patient is not nervous/anxious.          Current Outpatient Medications:     aspirin 81 MG chewable tablet, Chew 1 tablet Daily., Disp: , Rfl:     atorvastatin (LIPITOR) 20 MG tablet, TAKE ONE TABLET BY MOUTH DAILY, Disp: 90 tablet, Rfl: 3    Baclofen (LIORESAL) 5 MG tablet, Take 1 tablet by mouth At Night As Needed (muscle spasms to lower legs/RLS)., Disp: 30 tablet, Rfl: 2    carvedilol (COREG) 12.5 MG tablet, TAKE 1 TABLET BY MOUTH TWICE A DAY WITH A MEAL, Disp: 60 tablet, Rfl: 0    Cholecalciferol (VITAMIN D3) 5000 UNITS capsule capsule, Take 2 capsules by mouth Daily., Disp: , Rfl:     COLLAGEN PO, Take 3 tablets by mouth Daily., Disp: , Rfl:     furosemide (LASIX) 40 MG tablet, TAKE 1 TABLET BY MOUTH DAILY, Disp: 90 tablet, Rfl: 0    glimepiride (AMARYL) 4 MG tablet, Take 1 tablet by mouth 2 (Two) Times a Day., Disp: 180 tablet, Rfl: 1    Glucos-Chondroit-Hyaluron-MSM (GLUCOSAMINE CHONDROITIN JOINT PO), Take  by mouth 2 (Two) Times a Day. 2000-250mg, Disp: , Rfl:     glucose blood (OneTouch Ultra) test strip, Dx code E11.59 testing bs 4 x day, Disp: 400 each, Rfl: 3    hydrALAZINE (APRESOLINE) 10 MG tablet, TAKE 1 TABLET BY MOUTH TWICE A DAY, Disp: 180  tablet, Rfl: 1    magnesium 30 MG tablet, Take 3 tablets by mouth Daily., Disp: , Rfl:     Misc Natural Products (NEURIVA PO), Take  by mouth., Disp: , Rfl:     ofloxacin (OCUFLOX) 0.3 % ophthalmic solution, , Disp: , Rfl:     Probiotic Product (PROBIOTIC PO), Take 1 tablet by mouth Daily., Disp: , Rfl:     solifenacin (VESICARE) 10 MG tablet, Take 1 tablet by mouth Daily., Disp: , Rfl:     Synjardy 5-1000 MG tablet, Take 1 tablet by mouth Daily., Disp: , Rfl:     Past Medical History:   Diagnosis Date    Anesthesia complication     STRONG GAG REFLEX    Benign colonic polyp     Chronic back pain     Claustrophobia     Coronary artery disease 03/27/2021    PTCA WITH PLACEMENT OF STENT. PLAVIX ONLY TO BE HELD ONE DAY    DDD (degenerative disc disease), cervical     DDD (degenerative disc disease), lumbosacral     Diabetes mellitus     TYPE 2    Essential hypertension, benign 1/12/2024    Frequent UTI     History of MI (myocardial infarction) 03/27/2021    History of pyelonephritis 06/2014    History of sepsis 2014    Hypercholesteremia     Left knee pain     DRAINED  9/21/21    Leg muscle spasm     Neuropathy     LEFT FOOT. AS RESULT OF BACK PROBLEMS    Osteoarthritis     Osteoporosis     Overactive bladder     Type 2 diabetes mellitus     Ureteral calculus, left     Weakness     LOWER EXTREMITES RELATED FROM NERVE DAMAGE FROM BACK       Past Surgical History:   Procedure Laterality Date    APPENDECTOMY      BACK SURGERY      MULTIPLE. WITH HARDWARE    CARDIAC CATHETERIZATION N/A 03/27/2021    Procedure: Left Heart Cath;  Surgeon: Leydi Romo MD;  Location:  [facility];  Service: Cardiovascular;  Laterality: N/A;    CARDIAC CATHETERIZATION  03/27/2021    Procedure: Percutaneous Manual Thrombectomy;  Surgeon: Leydi Romo MD;  Location:  [facility];  Service: Cardiovascular;;    CARDIAC CATHETERIZATION N/A 03/27/2021    Procedure: Percutaneous Coronary Intervention;   Surgeon: Leydi Romo MD;  Location:  [facility];  Service: Cardiovascular;  Laterality: N/A;    CARDIAC CATHETERIZATION N/A 2021    Procedure: Coronary angiography;  Surgeon: Leydi Romo MD;  Location:  [facility];  Service: Cardiovascular;  Laterality: N/A;    CARPAL TUNNEL RELEASE      LEFT X2 RIGHT     CATARACT EXTRACTION WITH INTRAOCULAR LENS IMPLANT      LEFT AND RIGHT    CERVICAL SPINE SURGERY      MULTIPLE C3-4 C6-7     SECTION      x 2    COLONOSCOPY  2018    CORONARY STENT PLACEMENT  2021    PARATHYROIDECTOMY Left 2019    Procedure: PARATHYROIDECTOMY LEFT INFERIOR;  Surgeon: Mason Prather MD;  Location:  [facility];  Service: ENT    THYROID SURGERY      TRICEP TENDON REPAIR Left 2021    Procedure: LEFT OPEN TRICEPS REPAIR;  Surgeon: Luis Cho II, MD;  [facility];  Service: Orthopedics;  Laterality: Left;    URETEROSCOPY LASER LITHOTRIPSY WITH STENT INSERTION Left 2021    Procedure: LEFT URETEROSCOPY LASER LITHOTRIPSY, STONE BASKET EXTRACTION STENT;  Surgeon: Jose Luis Nelson Jr., MD;  Location:  [facility];  Service: Urology;  Laterality: Left;       Family History   Problem Relation Age of Onset    Stroke Mother     Heart disease Mother     Hypertension Mother             Clotting disorder Father     Hypertension Father             Diabetes Father     Aneurysm Father     Hypertension Sister             Diabetes Sister     Cervical cancer Sister     Obesity Sister     Diabetes Sister     Obesity Sister     Cervical cancer Maternal Grandmother 72    Diabetes Grandchild     Asthma Sister     Hypertension Sister         Dead    Diabetes Sister     Malig Hyperthermia Neg Hx        Social History     Tobacco Use    Smoking status: Former     Current packs/day: 0.00     Average packs/day: 1 pack/day for 27.0 years (27.0 ttl pk-yrs)     Types: Cigarettes     Start  "date: 1965     Quit date: 1992     Years since quittin.4    Smokeless tobacco: Never    Tobacco comments:     CAFFEINE USE: ICE TEA OCC.   Vaping Use    Vaping status: Never Used   Substance Use Topics    Alcohol use: Not Currently     Comment: Socially.    Drug use: Yes     Types: Hydrocodone         ECG 12 Lead    Date/Time: 2024 1:27 PM  Performed by: Mercedes Thompson APRN    Authorized by: Mercedes Thompson APRN  Comparison: compared with previous ECG from 3/15/2024  Similar to previous ECG  Rhythm: sinus rhythm             Objective:     Visit Vitals  /80   Pulse 70   Ht 157.5 cm (62\")   Wt 90.7 kg (200 lb)   LMP  (LMP Unknown)   BMI 36.58 kg/m²             Physical Exam  Constitutional:       Appearance: Normal appearance. She is obese.   HENT:      Head: Normocephalic.   Neck:      Vascular: No carotid bruit.   Cardiovascular:      Rate and Rhythm: Normal rate and regular rhythm.      Chest Wall: PMI is not displaced.      Pulses: Normal pulses.           Radial pulses are 2+ on the right side and 2+ on the left side.        Posterior tibial pulses are 2+ on the right side and 2+ on the left side.      Heart sounds: Normal heart sounds. No murmur heard.     No friction rub. No gallop.   Pulmonary:      Effort: Pulmonary effort is normal.      Breath sounds: Normal breath sounds.   Abdominal:      General: Bowel sounds are normal. There is no distension.      Palpations: Abdomen is soft.   Musculoskeletal:      Right lower le+ Edema present.      Left lower le+ Edema present.   Skin:     General: Skin is warm and dry.      Capillary Refill: Capillary refill takes less than 2 seconds.   Neurological:      Mental Status: She is alert and oriented to person, place, and time.   Psychiatric:         Mood and Affect: Mood normal.         Behavior: Behavior normal.         Thought Content: Thought content normal.          Lab Review:   Lipid Panel          2/15/2024    09:56   Lipid Panel "   Total Cholesterol 148    Triglycerides 95    HDL Cholesterol 62    VLDL Cholesterol 18    LDL Cholesterol  68    LDL/HDL Ratio 1.08          Cardiac Procedures:   Echocardiogram 3/27/21:  Calculated left ventricular EF = 41.3% Estimated left ventricular EF was in agreement with the calculated left ventricular EF. Left ventricular systolic function is moderately decreased.  The following left ventricular wall segments are hypokinetic: basal inferolateral, mid inferolateral, apical inferior, mid inferior and basal inferior.  Left ventricular diastolic function is consistent with (grade I) impaired relaxation.  There is calcification of the aortic valve.  There is no significant valular stenosis or regurgatation    Cardiac catheterization 3/27/21:  Left Main   Large-caliber vessel with 0% stenosis. The vessel bifurcates into left anterior descending and left circumflex arteries.   Left Anterior Descending   Large-caliber vessel with 30% proximal stenosis. Is a 50% mid stenosis. The mid vessel then tapers to a small caliber with 20 to 30% mid stenosis. The distal vessel is severely tortuous and has no significant disease.   Left Circumflex   Moderate caliber vessel with 0% proximal stenosis. Proximal vessel gives rise to a moderate caliber, severely tortuous first obtuse marginal branch with no significant disease. The distal continuation of the circumflex vessel tapers to small caliber with 0% stenosis.   Right Coronary Artery   Large-caliber vessel with severe proximal calcification and 100% thrombotic occlusion.   Prox RCA lesion is 100% stenosed. Culprit lesion. JOVANNY flow is 0. The lesion is type B2.   1.  Inferior myocardial infarction status post drug-eluting stent placement of the proximal, mid, and distal right coronary artery  2.  Coronary artery disease        Assessment:         Diagnoses and all orders for this visit:    1. Atherosclerosis of left carotid artery (Primary)    2. Chronic diastolic CHF  (congestive heart failure)    3. Chronic venous stasis    4. Coronary artery disease involving native coronary artery of native heart without angina pectoris    5. History of inferior wall myocardial infarction    6. Essential hypertension, benign    7. History of ischemic cardiomyopathy    8. Hyperlipidemia, unspecified hyperlipidemia type    9. PVC's (premature ventricular contractions)    10. Stenosis of right carotid artery    11. Status post coronary artery stent placement    Other orders  -     ECG 12 Lead              Plan:       Bilateral lower extremity swelling: secondary to venous insufficiency and stasis. Swelling has improved since she has been going to wound care for wrapping. She is taking furosemide and wearing compression stockings. Continue current treatment plan.  CAD: s/p PCI with stent x 3 to RCA. EKG is stable with no ischemic changes noted. On aspirin, statin and beta blocker. Continue current management.  PVCs: 20% burden on monitor, now well controlled on increased dose of carvedilol.   HTN: blood pressure has been well controlled on current regimen. No changes  HLD: on lower dose statin. Unable to tolerate high dose due to leg pain. Goal LDL < 70  History of ischemic cardiomyopathy: normalization of left ventricular function on recent echocardiogram.    Thank you for allowing me to participate in this patient's care. Please call with any questions or concerns. Ms. Espinoza will follow up with Dr. Romo in 6 months.          Your medication list            Accurate as of June 18, 2024  1:27 PM. If you have any questions, ask your nurse or doctor.                CONTINUE taking these medications        Instructions Last Dose Given Next Dose Due   aspirin 81 MG chewable tablet      Chew 1 tablet Daily.       atorvastatin 20 MG tablet  Commonly known as: LIPITOR      TAKE ONE TABLET BY MOUTH DAILY       Baclofen 5 MG tablet  Commonly known as: LIORESAL      Take 1 tablet by mouth At Night As  Needed (muscle spasms to lower legs/RLS).       carvedilol 12.5 MG tablet  Commonly known as: COREG      TAKE 1 TABLET BY MOUTH TWICE A DAY WITH A MEAL       COLLAGEN PO      Take 3 tablets by mouth Daily.       furosemide 40 MG tablet  Commonly known as: LASIX      TAKE 1 TABLET BY MOUTH DAILY       glimepiride 4 MG tablet  Commonly known as: AMARYL      Take 1 tablet by mouth 2 (Two) Times a Day.       GLUCOSAMINE CHONDROITIN JOINT PO      Take  by mouth 2 (Two) Times a Day. 2000-250mg       hydrALAZINE 10 MG tablet  Commonly known as: APRESOLINE      TAKE 1 TABLET BY MOUTH TWICE A DAY       magnesium 30 MG tablet      Take 3 tablets by mouth Daily.       NEURIVA PO      Take  by mouth.       ofloxacin 0.3 % ophthalmic solution  Commonly known as: OCUFLOX           OneTouch Ultra test strip  Generic drug: glucose blood      Dx code E11.59 testing bs 4 x day       PROBIOTIC PO      Take 1 tablet by mouth Daily.       solifenacin 10 MG tablet  Commonly known as: VESICARE      Take 1 tablet by mouth Daily.       Synjardy 5-1000 MG tablet  Generic drug: Empagliflozin-metFORMIN HCl      Take 1 tablet by mouth Daily.       vitamin D3 125 MCG (5000 UT) capsule capsule      Take 2 capsules by mouth Daily.                  RATNA Fonseca  06/18/24  10:33 AM EDT

## 2024-06-28 ENCOUNTER — TELEPHONE (OUTPATIENT)
Dept: FAMILY MEDICINE CLINIC | Facility: CLINIC | Age: 79
End: 2024-06-28

## 2024-06-28 NOTE — TELEPHONE ENCOUNTER
Caller: Michelle Espinoza    Relationship: Self    Best call back number: [phone]     What is the medical concern/diagnosis: SUGGESTED BY AIDAN DAY    What specialty or service is being requested: NEUROLOGY

## 2024-07-01 ENCOUNTER — OFFICE VISIT (OUTPATIENT)
Dept: FAMILY MEDICINE CLINIC | Facility: CLINIC | Age: 79
End: 2024-07-01
Payer: MEDICARE

## 2024-07-01 VITALS
BODY MASS INDEX: 36.57 KG/M2 | RESPIRATION RATE: 16 BRPM | SYSTOLIC BLOOD PRESSURE: 122 MMHG | HEART RATE: 66 BPM | TEMPERATURE: 97 F | HEIGHT: 62 IN | DIASTOLIC BLOOD PRESSURE: 76 MMHG | OXYGEN SATURATION: 97 %

## 2024-07-01 DIAGNOSIS — I87.2 CHRONIC VENOUS INSUFFICIENCY: Primary | ICD-10-CM

## 2024-07-01 DIAGNOSIS — L98.499 ULCER OF EXTREMITY DUE TO CHRONIC VENOUS INSUFFICIENCY: ICD-10-CM

## 2024-07-01 DIAGNOSIS — I87.2 ULCER OF EXTREMITY DUE TO CHRONIC VENOUS INSUFFICIENCY: ICD-10-CM

## 2024-07-01 DIAGNOSIS — G60.9 HEREDITARY AND IDIOPATHIC NEUROPATHY, UNSPECIFIED: ICD-10-CM

## 2024-07-01 DIAGNOSIS — M48.04 THORACIC SPINAL STENOSIS: ICD-10-CM

## 2024-07-01 DIAGNOSIS — M51.36 DEGENERATIVE DISC DISEASE, LUMBAR: ICD-10-CM

## 2024-07-01 DIAGNOSIS — M41.9 SCOLIOSIS OF LUMBAR SPINE, UNSPECIFIED SCOLIOSIS TYPE: ICD-10-CM

## 2024-07-01 PROCEDURE — 99214 OFFICE O/P EST MOD 30 MIN: CPT | Performed by: NURSE PRACTITIONER

## 2024-07-01 PROCEDURE — 1160F RVW MEDS BY RX/DR IN RCRD: CPT | Performed by: NURSE PRACTITIONER

## 2024-07-01 PROCEDURE — 3078F DIAST BP <80 MM HG: CPT | Performed by: NURSE PRACTITIONER

## 2024-07-01 PROCEDURE — 1126F AMNT PAIN NOTED NONE PRSNT: CPT | Performed by: NURSE PRACTITIONER

## 2024-07-01 PROCEDURE — 1159F MED LIST DOCD IN RCRD: CPT | Performed by: NURSE PRACTITIONER

## 2024-07-01 PROCEDURE — 3074F SYST BP LT 130 MM HG: CPT | Performed by: NURSE PRACTITIONER

## 2024-07-01 NOTE — PROGRESS NOTES
Subjective     Michelle Espinoza is a 79 y.o.. female.     Patient stating she was discharged from wound care on June 14 and had a new ulceration on June 27. Patient admits to not taking her furosemide. Patient states she occasionally takes but not daily as prescribed.         The following portions of the patient's history were reviewed and updated as appropriate: allergies, current medications, past family history, past medical history, past social history, past surgical history and problem list.    Past Medical History:   Diagnosis Date    Anesthesia complication     STRONG GAG REFLEX    Benign colonic polyp     Chronic back pain     Claustrophobia     Coronary artery disease 03/27/2021    PTCA WITH PLACEMENT OF STENT. PLAVIX ONLY TO BE HELD ONE DAY    DDD (degenerative disc disease), cervical     DDD (degenerative disc disease), lumbosacral     Diabetes mellitus     TYPE 2    Essential hypertension, benign 1/12/2024    Frequent UTI     History of MI (myocardial infarction) 03/27/2021    History of pyelonephritis 06/2014    History of sepsis 2014    Hypercholesteremia     Left knee pain     DRAINED  9/21/21    Leg muscle spasm     Neuropathy     LEFT FOOT. AS RESULT OF BACK PROBLEMS    Osteoarthritis     Osteoporosis     Overactive bladder     Type 2 diabetes mellitus     Ureteral calculus, left     Weakness     LOWER EXTREMITES RELATED FROM NERVE DAMAGE FROM BACK       Past Surgical History:   Procedure Laterality Date    APPENDECTOMY      BACK SURGERY      MULTIPLE. WITH HARDWARE    CARDIAC CATHETERIZATION N/A 03/27/2021    Procedure: Left Heart Cath;  Surgeon: Leydi Romo MD;  Location:  [facility];  Service: Cardiovascular;  Laterality: N/A;    CARDIAC CATHETERIZATION  03/27/2021    Procedure: Percutaneous Manual Thrombectomy;  Surgeon: Leydi Romo MD;  Location:  [facility];  Service: Cardiovascular;;    CARDIAC CATHETERIZATION N/A 03/27/2021    Procedure:  "Percutaneous Coronary Intervention;  Surgeon: Leydi Romo MD;  Location:  [facility];  Service: Cardiovascular;  Laterality: N/A;    CARDIAC CATHETERIZATION N/A 2021    Procedure: Coronary angiography;  Surgeon: Leydi Romo MD;  Location:  [facility];  Service: Cardiovascular;  Laterality: N/A;    CARPAL TUNNEL RELEASE      LEFT X2 RIGHT     CATARACT EXTRACTION WITH INTRAOCULAR LENS IMPLANT      LEFT AND RIGHT    CERVICAL SPINE SURGERY      MULTIPLE C3-4 C6-7     SECTION      x 2    COLONOSCOPY  2018    CORONARY STENT PLACEMENT  2021    PARATHYROIDECTOMY Left 2019    Procedure: PARATHYROIDECTOMY LEFT INFERIOR;  Surgeon: Mason Prather MD;  Location: [facility];  Service: ENT    THYROID SURGERY      TRICEP TENDON REPAIR Left 2021    Procedure: LEFT OPEN TRICEPS REPAIR;  Surgeon: Luis Cho II, MD;  Location: [facility];  Service: Orthopedics;  Laterality: Left;    URETEROSCOPY LASER LITHOTRIPSY WITH STENT INSERTION Left 2021    Procedure: LEFT URETEROSCOPY LASER LITHOTRIPSY, STONE BASKET EXTRACTION STENT;  Surgeon: Jose Luis Nelson Jr., MD;  Location: [facility];  Service: Urology;  Laterality: Left;       Review of Systems   Constitutional:  Negative for fever.   Respiratory: Negative.     Cardiovascular: Negative.    Musculoskeletal:  Positive for arthralgias (leg) and back pain.   Skin:  Positive for wound.   Neurological:  Positive for weakness (to lower extremities) and numbness (of lower extremities).       Allergies   Allergen Reactions    Other Nausea And Vomiting     The mercury in Scallops    Penicillins Hives    Statins Myalgia    Latex Rash    Nickel Rash       Objective     Vitals:    24 1107   BP: 122/76   BP Location: Left arm   Patient Position: Sitting   Cuff Size: Adult   Pulse: 66   Resp: 16   Temp: 97 °F (36.1 °C)   TempSrc: Temporal   SpO2: 97%   Height: 157.5 cm (62.01\") "     Body mass index is 36.57 kg/m².    Physical Exam  Vitals reviewed.   HENT:      Head: Normocephalic.   Eyes:      Pupils: Pupils are equal, round, and reactive to light.   Cardiovascular:      Rate and Rhythm: Normal rate and regular rhythm.      Pulses: Normal pulses.   Pulmonary:      Effort: Pulmonary effort is normal.      Breath sounds: Normal breath sounds.   Musculoskeletal:         General: Normal range of motion.      Right lower leg: Edema (trace) present.      Left lower leg: Edema (+1 with non pitting edema) present.      Comments: In wheelchair   Skin:     Comments: Left leg: 2x 2 cm, 1 x 1 cm, 1 x 1 cm ulcerations, skin thick with reddish coloration   Neurological:      Mental Status: She is alert and oriented to person, place, and time.   Psychiatric:         Behavior: Behavior normal.           Current Outpatient Medications:     aspirin 81 MG chewable tablet, Chew 1 tablet Daily., Disp: , Rfl:     atorvastatin (LIPITOR) 20 MG tablet, TAKE ONE TABLET BY MOUTH DAILY, Disp: 90 tablet, Rfl: 3    Baclofen (LIORESAL) 5 MG tablet, Take 1 tablet by mouth At Night As Needed (muscle spasms to lower legs/RLS)., Disp: 30 tablet, Rfl: 2    carvedilol (COREG) 12.5 MG tablet, TAKE 1 TABLET BY MOUTH TWICE A DAY WITH A MEAL, Disp: 60 tablet, Rfl: 0    Cholecalciferol (VITAMIN D3) 5000 UNITS capsule capsule, Take 2 capsules by mouth Daily., Disp: , Rfl:     COLLAGEN PO, Take 3 tablets by mouth Daily., Disp: , Rfl:     furosemide (LASIX) 40 MG tablet, TAKE 1 TABLET BY MOUTH DAILY, Disp: 90 tablet, Rfl: 0    glimepiride (AMARYL) 4 MG tablet, Take 1 tablet by mouth 2 (Two) Times a Day., Disp: 180 tablet, Rfl: 1    Glucos-Chondroit-Hyaluron-MSM (GLUCOSAMINE CHONDROITIN JOINT PO), Take  by mouth 2 (Two) Times a Day. 2000-250mg, Disp: , Rfl:     glucose blood (OneTouch Ultra) test strip, Dx code E11.59 testing bs 4 x day, Disp: 400 each, Rfl: 3    hydrALAZINE (APRESOLINE) 10 MG tablet, TAKE 1 TABLET BY MOUTH TWICE A  DAY, Disp: 180 tablet, Rfl: 1    magnesium 30 MG tablet, Take 3 tablets by mouth Daily., Disp: , Rfl:     Misc Natural Products (NEURIVA PO), Take  by mouth., Disp: , Rfl:     ofloxacin (OCUFLOX) 0.3 % ophthalmic solution, , Disp: , Rfl:     Probiotic Product (PROBIOTIC PO), Take 1 tablet by mouth Daily., Disp: , Rfl:     solifenacin (VESICARE) 10 MG tablet, Take 1 tablet by mouth Daily., Disp: , Rfl:       CT Lumbar Spine Without Contrast  Narrative: CT LUMBAR SPINE WO CONTRAST    Date of Exam: 6/12/2024 5:53 PM EDT    Indication: fall.    Comparison: 2016 CT    Technique: Axial CT images were obtained of the lumbar spine without contrast administration.  Sagittal and coronal reconstructions were performed.  Automated exposure control and iterative reconstruction methods were used.    Findings:  Bilateral posterior fusion hardware extending from L2-S1. There is sparing of the L5 vertebral body. There is no evidence of hardware fracture or significant malalignment. It is difficult to evaluate for periprosthetic lucency on this exam given degree   of patient's osteopenia. There is S-shaped scoliosis of the thoracolumbar spine. Grade 1 anterolisthesis of L3 on L4 and L5 on S1. At least partial bony fusion at L3-4 and L4-5. Multilevel posterior decompression.    There is no evidence for acute fracture. Unchanged compression deformity of L3. Stable since 2016. Scattered endplate osteophyte formations and multilevel facet arthropathy. Degenerative disc disease with disc height loss most severe at T12-L1. There is   mild canal narrowing at T10-11. Moderate right bony neural foraminal narrowing at this level. There is also mild right bony neural foraminal narrowing at L1-2.    Paraspinal soft tissues show no acute abnormality. Atrophy of the bilateral kidneys with nonobstructing renal stones noted. Dense atherosclerotic calcifications of the aorta and branch vessels. Right lower quadrant kidney transplant with  nephrolithiasis.   No hydronephrosis appreciated. Atrophy of the posterior paraspinal soft tissues..  Impression: Impression:  Significantly limited exam given patient's degree of osteopenia as well as metallic artifact from fusion hardware.    No definite acute abnormality. Unchanged chronic mild compression deformity of the L3 vertebral body.    Bilateral posterior fusion extending from L2-S1 with sparing of L5. Multilevel posterior decompression noted. No evidence of hardware fracture or malalignment. No acute periprosthetic fracture appreciated.    Superimposed degenerative changes as above with disc height loss greatest at T12-L1. There is mild canal narrowing at T11-12, as well as moderate right T11-12 and mild right L1-2 bony neural foraminal narrowing.    Electronically Signed: Tomi Castillo MD    6/13/2024 9:17 AM EDT    Workstation ID: [id number]        Diagnoses and all orders for this visit:    1. Chronic venous insufficiency (Primary)  -     Vascular Screening (Bundle) CAR; Future  -     Ambulatory Referral to Wound Clinic    2. Ulcer of extremity due to chronic venous insufficiency  -     Ambulatory Referral to Wound Clinic    3. Scoliosis of lumbar spine, unspecified scoliosis type  -     Ambulatory Referral to Neurosurgery    4. Degenerative disc disease, lumbar  -     Ambulatory Referral to Neurosurgery    5. Hereditary and idiopathic neuropathy, unspecified  -     Ambulatory Referral to Neurosurgery    6. T7 and T10-11 Thoracic spinal stenosis  -     Ambulatory Referral to Neurosurgery           Patient Instructions   Chronic venous insufficiency with ulceration: referring back to wound care clinic. Ordering vascular screening for further eval.     Scoliosis of lumbar spine/DDD/neuropathy/thoracic spinal stenosis: Patient still stating she is having periods of time where she feels a loss of sensation in legs, feelings like her legs are not there. CT lumbar spine done, Patient still in physical  therapy, will refer to neurosurgery to see if anything else can be done for Patient at this point.       Return if symptoms worsen or fail to improve, for keep upcoming lab appt and follow up in september.

## 2024-07-01 NOTE — PATIENT INSTRUCTIONS
Chronic venous insufficiency with ulceration: referring back to wound care clinic. Ordering vascular screening for further eval.     Scoliosis of lumbar spine/DDD/neuropathy/thoracic spinal stenosis: Patient still stating she is having periods of time where she feels a loss of sensation in legs, feelings like her legs are not there. CT lumbar spine done, Patient still in physical therapy, will refer to neurosurgery to see if anything else can be done for Patient at this point.

## 2024-07-09 ENCOUNTER — OFFICE VISIT (OUTPATIENT)
Dept: WOUND CARE | Facility: HOSPITAL | Age: 79
End: 2024-07-09
Payer: MEDICARE

## 2024-07-09 ENCOUNTER — HOSPITAL ENCOUNTER (EMERGENCY)
Facility: HOSPITAL | Age: 79
Discharge: HOME | End: 2024-07-09
Attending: EMERGENCY MEDICINE
Payer: MEDICARE

## 2024-07-09 VITALS
DIASTOLIC BLOOD PRESSURE: 98 MMHG | WEIGHT: 189 LBS | SYSTOLIC BLOOD PRESSURE: 172 MMHG | HEIGHT: 63 IN | OXYGEN SATURATION: 98 % | RESPIRATION RATE: 16 BRPM | TEMPERATURE: 97.9 F | HEART RATE: 70 BPM | BODY MASS INDEX: 33.49 KG/M2

## 2024-07-09 DIAGNOSIS — R82.71 BACTERIURIA: ICD-10-CM

## 2024-07-09 DIAGNOSIS — R73.9 HYPERGLYCEMIA: Primary | ICD-10-CM

## 2024-07-09 LAB
ALBUMIN SERPL-MCNC: 4 G/DL (ref 3.5–5.2)
ALBUMIN/GLOB SERPL: 0.9 G/DL
ALP SERPL-CCNC: 138 U/L (ref 39–117)
ALT SERPL W P-5'-P-CCNC: 24 U/L (ref 1–33)
ANION GAP SERPL CALCULATED.3IONS-SCNC: 15.4 MMOL/L (ref 5–15)
AST SERPL-CCNC: 24 U/L (ref 1–32)
BACTERIA UR QL AUTO: ABNORMAL /HPF
BASOPHILS # BLD AUTO: 0.04 10*3/MM3 (ref 0–0.2)
BASOPHILS NFR BLD AUTO: 0.4 % (ref 0–1.5)
BILIRUB SERPL-MCNC: 0.5 MG/DL (ref 0–1.2)
BILIRUB UR QL STRIP: NEGATIVE
BUN SERPL-MCNC: 30 MG/DL (ref 8–23)
BUN/CREAT SERPL: 28.8 (ref 7–25)
CALCIUM SPEC-SCNC: 10.6 MG/DL (ref 8.6–10.5)
CHLORIDE SERPL-SCNC: 95 MMOL/L (ref 98–107)
CLARITY UR: ABNORMAL
CO2 SERPL-SCNC: 23.6 MMOL/L (ref 22–29)
COLOR UR: YELLOW
CREAT SERPL-MCNC: 1.04 MG/DL (ref 0.57–1)
DEPRECATED RDW RBC AUTO: 44.1 FL (ref 37–54)
EGFRCR SERPLBLD CKD-EPI 2021: 55.5 ML/MIN/1.73
EOSINOPHIL # BLD AUTO: 0.08 10*3/MM3 (ref 0–0.4)
EOSINOPHIL NFR BLD AUTO: 0.8 % (ref 0.3–6.2)
ERYTHROCYTE [DISTWIDTH] IN BLOOD BY AUTOMATED COUNT: 13.5 % (ref 12.3–15.4)
GLOBULIN UR ELPH-MCNC: 4.4 GM/DL
GLUCOSE BLDC GLUCOMTR-MCNC: 272 MG/DL (ref 70–130)
GLUCOSE BLDC GLUCOMTR-MCNC: 401 MG/DL (ref 70–130)
GLUCOSE BLDC GLUCOMTR-MCNC: 465 MG/DL (ref 70–130)
GLUCOSE BLDC GLUCOMTR-MCNC: 476 MG/DL (ref 70–130)
GLUCOSE SERPL-MCNC: 489 MG/DL (ref 65–99)
GLUCOSE UR STRIP-MCNC: ABNORMAL MG/DL
HCT VFR BLD AUTO: 45.7 % (ref 34–46.6)
HGB BLD-MCNC: 15.3 G/DL (ref 12–15.9)
HGB UR QL STRIP.AUTO: ABNORMAL
HOLD SPECIMEN: NORMAL
HOLD SPECIMEN: NORMAL
HYALINE CASTS UR QL AUTO: ABNORMAL /LPF
IMM GRANULOCYTES # BLD AUTO: 0.05 10*3/MM3 (ref 0–0.05)
IMM GRANULOCYTES NFR BLD AUTO: 0.5 % (ref 0–0.5)
KETONES UR QL STRIP: ABNORMAL
LEUKOCYTE ESTERASE UR QL STRIP.AUTO: ABNORMAL
LYMPHOCYTES # BLD AUTO: 1.43 10*3/MM3 (ref 0.7–3.1)
LYMPHOCYTES NFR BLD AUTO: 14.5 % (ref 19.6–45.3)
MCH RBC QN AUTO: 30.3 PG (ref 26.6–33)
MCHC RBC AUTO-ENTMCNC: 33.5 G/DL (ref 31.5–35.7)
MCV RBC AUTO: 90.5 FL (ref 79–97)
MONOCYTES # BLD AUTO: 0.82 10*3/MM3 (ref 0.1–0.9)
MONOCYTES NFR BLD AUTO: 8.3 % (ref 5–12)
NEUTROPHILS NFR BLD AUTO: 7.43 10*3/MM3 (ref 1.7–7)
NEUTROPHILS NFR BLD AUTO: 75.5 % (ref 42.7–76)
NITRITE UR QL STRIP: NEGATIVE
NRBC BLD AUTO-RTO: 0 /100 WBC (ref 0–0.2)
PH UR STRIP.AUTO: 6 [PH] (ref 4.5–8)
PLATELET # BLD AUTO: 252 10*3/MM3 (ref 140–450)
PMV BLD AUTO: 11.5 FL (ref 6–12)
POTASSIUM SERPL-SCNC: 4.8 MMOL/L (ref 3.5–5.2)
PROT SERPL-MCNC: 8.4 G/DL (ref 6–8.5)
PROT UR QL STRIP: ABNORMAL
QT INTERVAL: 424 MS
QTC INTERVAL: 444 MS
RBC # BLD AUTO: 5.05 10*6/MM3 (ref 3.77–5.28)
RBC # UR STRIP: ABNORMAL /HPF
REF LAB TEST METHOD: ABNORMAL
SODIUM SERPL-SCNC: 134 MMOL/L (ref 136–145)
SP GR UR STRIP: 1.01 (ref 1–1.03)
SQUAMOUS #/AREA URNS HPF: ABNORMAL /HPF
UROBILINOGEN UR QL STRIP: ABNORMAL
WBC # UR STRIP: ABNORMAL /HPF
WBC NRBC COR # BLD AUTO: 9.85 10*3/MM3 (ref 3.4–10.8)
WHOLE BLOOD HOLD COAG: NORMAL
WHOLE BLOOD HOLD SPECIMEN: NORMAL

## 2024-07-09 PROCEDURE — 81001 URINALYSIS AUTO W/SCOPE: CPT | Performed by: EMERGENCY MEDICINE

## 2024-07-09 PROCEDURE — 93010 ELECTROCARDIOGRAM REPORT: CPT | Performed by: INTERNAL MEDICINE

## 2024-07-09 PROCEDURE — 25810000003 SODIUM CHLORIDE 0.9 % SOLUTION: Performed by: EMERGENCY MEDICINE

## 2024-07-09 PROCEDURE — 63710000001 INSULIN REGULAR HUMAN PER 5 UNITS: Performed by: EMERGENCY MEDICINE

## 2024-07-09 PROCEDURE — 99283 EMERGENCY DEPT VISIT LOW MDM: CPT

## 2024-07-09 PROCEDURE — 87086 URINE CULTURE/COLONY COUNT: CPT | Performed by: EMERGENCY MEDICINE

## 2024-07-09 PROCEDURE — 82948 REAGENT STRIP/BLOOD GLUCOSE: CPT | Performed by: NURSE PRACTITIONER

## 2024-07-09 PROCEDURE — 93005 ELECTROCARDIOGRAM TRACING: CPT | Performed by: EMERGENCY MEDICINE

## 2024-07-09 PROCEDURE — 80053 COMPREHEN METABOLIC PANEL: CPT | Performed by: EMERGENCY MEDICINE

## 2024-07-09 PROCEDURE — 82948 REAGENT STRIP/BLOOD GLUCOSE: CPT

## 2024-07-09 PROCEDURE — 85025 COMPLETE CBC W/AUTO DIFF WBC: CPT | Performed by: EMERGENCY MEDICINE

## 2024-07-09 PROCEDURE — G0463 HOSPITAL OUTPT CLINIC VISIT: HCPCS

## 2024-07-09 RX ORDER — SODIUM CHLORIDE 0.9 % (FLUSH) 0.9 %
10 SYRINGE (ML) INJECTION AS NEEDED
Status: DISCONTINUED | OUTPATIENT
Start: 2024-07-09 | End: 2024-07-09 | Stop reason: HOSPADM

## 2024-07-09 RX ORDER — NITROFURANTOIN 25; 75 MG/1; MG/1
100 CAPSULE ORAL EVERY 12 HOURS SCHEDULED
Status: COMPLETED | OUTPATIENT
Start: 2024-07-09 | End: 2024-07-09

## 2024-07-09 RX ORDER — NITROFURANTOIN 25; 75 MG/1; MG/1
100 CAPSULE ORAL 2 TIMES DAILY
Qty: 10 CAPSULE | Refills: 0 | Status: SHIPPED | OUTPATIENT
Start: 2024-07-09 | End: 2024-07-14

## 2024-07-09 RX ADMIN — INSULIN HUMAN 5 UNITS: 100 INJECTION, SOLUTION PARENTERAL at 17:09

## 2024-07-09 RX ADMIN — INSULIN HUMAN 5 UNITS: 100 INJECTION, SOLUTION PARENTERAL at 19:00

## 2024-07-09 RX ADMIN — NITROFURANTOIN MONOHYDRATE/MACROCRYSTALS 100 MG: 75; 25 CAPSULE ORAL at 17:09

## 2024-07-09 RX ADMIN — SODIUM CHLORIDE 1000 ML: 9 INJECTION, SOLUTION INTRAVENOUS at 17:10

## 2024-07-10 ENCOUNTER — PATIENT OUTREACH (OUTPATIENT)
Dept: CASE MANAGEMENT | Facility: OTHER | Age: 79
End: 2024-07-10
Payer: MEDICARE

## 2024-07-10 NOTE — ED PROVIDER NOTES
Subjective   History of Present Illness  78 yo female presents w hyperglycemia, checked her BG was 600s at home. Reports dehydrationn, increased thirst and polyuria. She denies any other symptoms. She is on glimepiride does not nor has not ever been on insulin. No new medications, and no steroid usage. She has no fevers, chills, nausea, vomiting, chest pain, or dyspnea. Denies any weakness or fatigue. No appetite changes.         Review of Systems   All other systems reviewed and are negative.      Past Medical History:   Diagnosis Date    Anesthesia complication     STRONG GAG REFLEX    Benign colonic polyp     Chronic back pain     Claustrophobia     Coronary artery disease 03/27/2021    PTCA WITH PLACEMENT OF STENT. PLAVIX ONLY TO BE HELD ONE DAY    DDD (degenerative disc disease), cervical     DDD (degenerative disc disease), lumbosacral     Diabetes mellitus     TYPE 2    Essential hypertension, benign 1/12/2024    Frequent UTI     History of MI (myocardial infarction) 03/27/2021    History of pyelonephritis 06/2014    History of sepsis 2014    Hypercholesteremia     Left knee pain     DRAINED  9/21/21    Leg muscle spasm     Neuropathy     LEFT FOOT. AS RESULT OF BACK PROBLEMS    Osteoarthritis     Osteoporosis     Overactive bladder     Type 2 diabetes mellitus     Ureteral calculus, left     Weakness     LOWER EXTREMITES RELATED FROM NERVE DAMAGE FROM BACK       Allergies   Allergen Reactions    Other Nausea And Vomiting     The mercury in Scallops    Penicillins Hives    Statins Myalgia    Latex Rash    Nickel Rash       Past Surgical History:   Procedure Laterality Date    APPENDECTOMY      BACK SURGERY      MULTIPLE. WITH HARDWARE    CARDIAC CATHETERIZATION N/A 03/27/2021    Procedure: Left Heart Cath;  Surgeon: Leydi Romo MD;  Location: [facility] LOC[facility];  Service: Cardiovascular;  Laterality: N/A;    CARDIAC CATHETERIZATION  03/27/2021    Procedure: Percutaneous Manual Thrombectomy;   Surgeon: Leydi Romo MD;  Location:  [facility];  Service: Cardiovascular;;    CARDIAC CATHETERIZATION N/A 2021    Procedure: Percutaneous Coronary Intervention;  Surgeon: Leydi Romo MD;  Location:  [facility];  Service: Cardiovascular;  Laterality: N/A;    CARDIAC CATHETERIZATION N/A 2021    Procedure: Coronary angiography;  Surgeon: Leydi Romo MD;  Location:  [facility];  Service: Cardiovascular;  Laterality: N/A;    CARPAL TUNNEL RELEASE      LEFT X2 RIGHT     CATARACT EXTRACTION WITH INTRAOCULAR LENS IMPLANT      LEFT AND RIGHT    CERVICAL SPINE SURGERY      MULTIPLE C3-4 C6-7     SECTION      x 2    COLONOSCOPY  2018    CORONARY STENT PLACEMENT  2021    PARATHYROIDECTOMY Left 2019    Procedure: PARATHYROIDECTOMY LEFT INFERIOR;  Surgeon: Mason Prather MD;  Location:  [facility];  Service: ENT    THYROID SURGERY      TRICEP TENDON REPAIR Left 2021    Procedure: LEFT OPEN TRICEPS REPAIR;  Surgeon: Luis Cho II, MD;  [facility];  Service: Orthopedics;  Laterality: Left;    URETEROSCOPY LASER LITHOTRIPSY WITH STENT INSERTION Left 2021    Procedure: LEFT URETEROSCOPY LASER LITHOTRIPSY, STONE BASKET EXTRACTION STENT;  Surgeon: Jose Luis Nelson Jr., MD;  [facility];  Service: Urology;  Laterality: Left;       Family History   Problem Relation Age of Onset    Stroke Mother     Heart disease Mother     Hypertension Mother             Clotting disorder Father     Hypertension Father             Diabetes Father     Aneurysm Father     Hypertension Sister             Diabetes Sister     Cervical cancer Sister     Obesity Sister     Diabetes Sister     Obesity Sister     Cervical cancer Maternal Grandmother 72    Diabetes Grandchild     Asthma Sister     Hypertension Sister         Dead    Diabetes Sister     Malig Hyperthermia Neg  Hx        Social History     Socioeconomic History    Marital status:    Tobacco Use    Smoking status: Former     Current packs/day: 0.00     Average packs/day: 1 pack/day for 27.0 years (27.0 ttl pk-yrs)     Types: Cigarettes     Start date: 1965     Quit date: 1992     Years since quittin.5    Smokeless tobacco: Never    Tobacco comments:     CAFFEINE USE: ICE TEA OCC.   Vaping Use    Vaping status: Never Used   Substance and Sexual Activity    Alcohol use: Not Currently     Comment: Socially.    Drug use: Yes     Types: Hydrocodone    Sexual activity: Not Currently     Partners: Male     Birth control/protection: Post-menopausal           Objective   Physical Exam  Vitals and nursing note reviewed.   Constitutional:       General: She is not in acute distress.     Appearance: Normal appearance. She is not toxic-appearing.   HENT:      Head: Normocephalic.      Nose: Nose normal.      Mouth/Throat:      Mouth: Mucous membranes are moist.   Eyes:      Pupils: Pupils are equal, round, and reactive to light.   Cardiovascular:      Rate and Rhythm: Normal rate and regular rhythm.      Pulses: Normal pulses.      Heart sounds: Normal heart sounds.   Pulmonary:      Effort: Pulmonary effort is normal. No respiratory distress.      Breath sounds: Normal breath sounds. No wheezing or rales.   Abdominal:      General: There is no distension.      Palpations: Abdomen is soft.      Tenderness: There is no abdominal tenderness.   Musculoskeletal:         General: No swelling, tenderness or deformity. Normal range of motion.      Cervical back: Normal range of motion.   Skin:     General: Skin is warm and dry.   Neurological:      General: No focal deficit present.      Mental Status: She is alert and oriented to person, place, and time. Mental status is at baseline.   Psychiatric:         Mood and Affect: Mood normal.         Procedures           ED Course                                              Medical Decision Making  78 yo female h/o DM w hyperglycemia  -normal exam, stable vitals  -reports polyuria and polydypsia.   -serum bicarb normal, no VBG checked, pt not tachypneic or tachycardic.   -Glucose 440. Initiated IVF, SC insulin 5 units, followed by another 5 Units IV   -she has UTI, which was treated in the ED, she is otherwise stable.   -glucose improved to 220, will discharge home to f/u with PCP.     Problems Addressed:  Bacteriuria: complicated acute illness or injury  Hyperglycemia: complicated acute illness or injury    Amount and/or Complexity of Data Reviewed  Labs: ordered.  ECG/medicine tests: ordered.    Risk  OTC drugs.  Prescription drug management.        Final diagnoses:   Hyperglycemia   Bacteriuria       ED Disposition  ED Disposition       ED Disposition   Discharge    Condition   Stable    Comment   Gluose improve to 232                May Pineda, APRMINO  [address] [facility] [address] [phone]    In 1 day      [facility] EMERGENCY DEPARTMENT  [address] [facility] [address] [phone]    If symptoms worsen         Medication List        New Prescriptions      nitrofurantoin (macrocrystal-monohydrate) 100 MG capsule  Commonly known as: MACROBID  Take 1 capsule by mouth 2 (Two) Times a Day for 5 days.               Where to Get Your Medications        These medications were sent to [facility] [id number] - [facility] [address] - [phone]  - [phone]   [address] [facility] [place] [address]      Phone: [phone]   nitrofurantoin (macrocrystal-monohydrate) 100 MG capsule            Edin Parsons MD  07/10/24 0566

## 2024-07-10 NOTE — OUTREACH NOTE
AMBULATORY CASE MANAGEMENT NOTE    Names and Relationships of Patient/Support Persons: Contact: Michelle Espinoza; Relationship: Self -     Patient Outreach  RN-ACM outreach with patient. Discussed 7/9/24 ED visit regarding hyperglycemia and bacteriuria. Patient treated and discharged.  Patient states to be compliant with ED recommendations; voiced intent to obtain Macrobid; and states have 7/10/24 endocrinology appointment and 7/11/24 PCP appointment for ED follow up and recommendations. Patient states 7/10/24 blood sugar 422; continues with episodes of being thirsty and dry mouth. Patient  states to have episodes of polyuria and no difficulty with fever; chest pain; SOB; appetite or sleeping. Patient states to  ambulate little; states to have difficulty with ambulation due to arthritis; using wheelchair and receiving outpatient physical therapy. Patient states to attend wound care clinic due to wound to leg, Patient states to be compliant with medical appointments and medications. Reviewed with patient ED AVS recommendations; education; role of RN-ACM and HRCM case management services. Patient verbalized understanding. Patient states to appreciate outreach and declines needs for further outreach at this time. No further questions voiced at this time.   Adult Patient Profile  Questions/Answers      Flowsheet Row Most Recent Value   Symptoms/Conditions Managed at Home diabetes, type 2, genitourinary   Diabetes Management Strategies blood glucose testing, medication therapy, other (see comments)  [Physician follow up]   Genitourinary Symptoms/Conditions other (see comments)  [UTI]   Genitourinary Management Strategies medication therapy, other (see comments)  [Physician follow up]   Barriers to Taking Medication as Prescribed none   Equipment Currently Used at Home bp cuff, glucometer, wheelchair   Primary Source of Support/Comfort spouse, child(mariana)   People in Home spouse        Social Work  Assessment  Questions/Answers      Flowsheet Row Most Recent Value   People in Home spouse   Functional Status Comments Patient states to receive assistance with ADL's,  transportation and using wheelchair.   Equipment Currently Used at Home bp cuff, glucometer, wheelchair        Send Education  Questions/Answers      Flowsheet Row Most Recent Value   Annual Wellness Visit:  Patient Has Completed   Other Patient Education/Resources  24/7 [facility] Nurse Call Line, [facility]   24/7 Nurse Call Line Education Method Verbal   Advanced Directives: Patient Has        SDOH updated and reviewed with the patient during this program:  --     Food Insecurity: No Food Insecurity (7/10/2024)    Hunger Vital Sign     Worried About Running Out of Food in the Last Year: Never true     Ran Out of Food in the Last Year: Never true      --     Transportation Needs: No Transportation Needs (7/10/2024)    PRAPARE - Transportation     Lack of Transportation (Medical): No     Lack of Transportation (Non-Medical): No       Education Documentation  Hyperglycemia Identification and Treatment, taught by Shira Cox RN at 7/10/2024 12:41 PM.  Learner: Patient  Readiness: Acceptance  Method: Explanation  Response: Verbalizes Understanding    Unresolved/Worsening Symptoms, taught by Shira Cox RN at 7/10/2024 12:37 PM.  Learner: Patient  Readiness: Acceptance  Method: Explanation  Response: Verbalizes Understanding    Provider Follow-Up, taught by Shira Cox RN at 7/10/2024 12:37 PM.  Learner: Patient  Readiness: Acceptance  Method: Explanation  Response: Verbalizes Understanding    Medication Management, taught by Shira Cox RN at 7/10/2024 12:37 PM.  Learner: Patient  Readiness: Acceptance  Method: Explanation  Response: Verbalizes Understanding    Fluid Intake, taught by Shira Cox RN at 7/10/2024 12:37 PM.  Learner: Patient  Readiness: Acceptance  Method: Explanation  Response: Verbalizes  Understanding    Signs/Symptoms, taught by Shira Cox RN at 7/10/2024 12:37 PM.  Learner: Patient  Readiness: Acceptance  Method: Explanation  Response: Verbalizes Understanding    Unresolved/Worsening Symptoms, taught by Shira Cox RN at 7/10/2024 12:37 PM.  Learner: Patient  Readiness: Acceptance  Method: Explanation  Response: Verbalizes Understanding    Follow-Up Care, taught by Shira Cox RN at 7/10/2024 12:37 PM.  Learner: Patient  Readiness: Acceptance  Method: Explanation  Response: Verbalizes Understanding    Blood Glucose Monitoring, taught by Shira Cox RN at 7/10/2024 12:37 PM.  Learner: Patient  Readiness: Acceptance  Method: Explanation  Response: Verbalizes Understanding    Unresolved/Worsening Symptoms, taught by Shira Cox RN at 7/10/2024 12:37 PM.  Learner: Patient  Readiness: Acceptance  Method: Explanation  Response: Verbalizes Understanding    Wound Care, taught by Shira Cox RN at 7/10/2024 12:37 PM.  Learner: Patient  Readiness: Acceptance  Method: Explanation  Response: Verbalizes Understanding    Signs/Symptoms, taught by Shira Cox RN at 7/10/2024 12:37 PM.  Learner: Patient  Readiness: Acceptance  Method: Explanation  Response: Verbalizes Understanding          Shira BLACK  Ambulatory Case Management    7/10/2024, 12:41 EDT

## 2024-07-11 ENCOUNTER — TELEPHONE (OUTPATIENT)
Dept: FAMILY MEDICINE CLINIC | Facility: CLINIC | Age: 79
End: 2024-07-11

## 2024-07-11 LAB — BACTERIA SPEC AEROBE CULT: NORMAL

## 2024-07-11 NOTE — TELEPHONE ENCOUNTER
Caller: [facility]    Relationship: [facility]    Best call back number: [phone]    What specialty or service is being requested: HOME HEALTH CARE NURSE     What is the provider, practice or medical service name: [facility]       Any additional details: VERBAL REFFERAL FOR A NURSE TO SEE PATIENT 1X WEEKLY WITH 2PRN VISIT WITH A NURSE.

## 2024-07-11 NOTE — TELEPHONE ENCOUNTER
Caller: L[facility]    Relationship: Home Health    Best call back number: [phone]    What is the best time to reach you: ANYTIME     Who are you requesting to speak with (clinical staff, provider,  specific staff member): CLINICAL       What was the call regarding: PATIENTS MEDICATION, HOME HEALTHCARE NURSE WOULD LIKE A CALLBACK TO GO OVER PATIENT MEDICATION.

## 2024-07-12 ENCOUNTER — TELEPHONE (OUTPATIENT)
Dept: FAMILY MEDICINE CLINIC | Facility: CLINIC | Age: 79
End: 2024-07-12

## 2024-07-12 NOTE — TELEPHONE ENCOUNTER
Name: Michelle Espinoza    Relationship: Self    Best Callback Number: [phone]    HUB PROVIDED THE RELAY MESSAGE FROM THE OFFICE   PATIENT VOICED UNDERSTANDING AND HAS NO FURTHER QUESTIONS AT THIS TIME    ADDITIONAL INFORMATION:

## 2024-07-15 RX ORDER — CARVEDILOL 12.5 MG/1
12.5 TABLET ORAL 2 TIMES DAILY WITH MEALS
Qty: 60 TABLET | Refills: 1 | Status: SHIPPED | OUTPATIENT
Start: 2024-07-15

## 2024-07-16 ENCOUNTER — APPOINTMENT (OUTPATIENT)
Dept: WOUND CARE | Facility: HOSPITAL | Age: 79
End: 2024-07-16
Payer: MEDICARE

## 2024-07-23 ENCOUNTER — OFFICE VISIT (OUTPATIENT)
Dept: FAMILY MEDICINE CLINIC | Facility: CLINIC | Age: 79
End: 2024-07-23
Payer: MEDICARE

## 2024-07-23 VITALS
TEMPERATURE: 97.1 F | OXYGEN SATURATION: 95 % | BODY MASS INDEX: 33.49 KG/M2 | HEART RATE: 73 BPM | SYSTOLIC BLOOD PRESSURE: 132 MMHG | HEIGHT: 63 IN | DIASTOLIC BLOOD PRESSURE: 70 MMHG | RESPIRATION RATE: 16 BRPM

## 2024-07-23 DIAGNOSIS — Z12.11 COLON CANCER SCREENING: ICD-10-CM

## 2024-07-23 DIAGNOSIS — I10 ESSENTIAL HYPERTENSION, BENIGN: Primary | ICD-10-CM

## 2024-07-23 DIAGNOSIS — N18.31 STAGE 3A CHRONIC KIDNEY DISEASE: ICD-10-CM

## 2024-07-23 DIAGNOSIS — I87.2 CHRONIC VENOUS INSUFFICIENCY: ICD-10-CM

## 2024-07-23 DIAGNOSIS — E78.5 HYPERLIPIDEMIA, UNSPECIFIED HYPERLIPIDEMIA TYPE: ICD-10-CM

## 2024-07-23 DIAGNOSIS — E11.59 TYPE 2 DIABETES MELLITUS WITH OTHER CIRCULATORY COMPLICATIONS: ICD-10-CM

## 2024-07-23 PROBLEM — M17.11 OSTEOARTHRITIS OF RIGHT KNEE: Status: ACTIVE | Noted: 2024-07-23

## 2024-07-23 PROCEDURE — 99214 OFFICE O/P EST MOD 30 MIN: CPT | Performed by: NURSE PRACTITIONER

## 2024-07-23 PROCEDURE — 3078F DIAST BP <80 MM HG: CPT | Performed by: NURSE PRACTITIONER

## 2024-07-23 PROCEDURE — 1160F RVW MEDS BY RX/DR IN RCRD: CPT | Performed by: NURSE PRACTITIONER

## 2024-07-23 PROCEDURE — 1111F DSCHRG MED/CURRENT MED MERGE: CPT | Performed by: NURSE PRACTITIONER

## 2024-07-23 PROCEDURE — 3075F SYST BP GE 130 - 139MM HG: CPT | Performed by: NURSE PRACTITIONER

## 2024-07-23 PROCEDURE — 1126F AMNT PAIN NOTED NONE PRSNT: CPT | Performed by: NURSE PRACTITIONER

## 2024-07-23 PROCEDURE — 1159F MED LIST DOCD IN RCRD: CPT | Performed by: NURSE PRACTITIONER

## 2024-07-23 RX ORDER — INSULIN DEGLUDEC 100 U/ML
30 INJECTION, SOLUTION SUBCUTANEOUS DAILY
COMMUNITY
Start: 2024-07-10 | End: 2024-10-08

## 2024-07-23 NOTE — PROGRESS NOTES
Subjective   Michelle Espinoza is a 79 y.o. female. Patient is here today for   Chief Complaint   Patient presents with    Hospital Follow Up Visit    Diabetes    Hyperlipidemia    Hypothyroidism          Vitals:    24 1319   BP: 132/70   Pulse: 73   Resp: 16   Temp: 97.1 °F (36.2 °C)   SpO2: 95%     The following portions of the patient's history were reviewed and updated as appropriate: allergies, current medications, past family history, past medical history, past social history, past surgical history and problem list.    Past Medical History:   Diagnosis Date    Anesthesia complication     STRONG GAG REFLEX    Benign colonic polyp     Chronic back pain     Claustrophobia     Coronary artery disease 2021    PTCA WITH PLACEMENT OF STENT. PLAVIX ONLY TO BE HELD ONE DAY    DDD (degenerative disc disease), cervical     DDD (degenerative disc disease), lumbosacral     Diabetes mellitus     TYPE 2    Essential hypertension, benign 2024    Frequent UTI     History of MI (myocardial infarction) 2021    History of pyelonephritis 2014    History of sepsis     Hypercholesteremia     Left knee pain     DRAINED  21    Leg muscle spasm     Neuropathy     LEFT FOOT. AS RESULT OF BACK PROBLEMS    Osteoarthritis     Osteoporosis     Overactive bladder     Type 2 diabetes mellitus     Ureteral calculus, left     Weakness     LOWER EXTREMITES RELATED FROM NERVE DAMAGE FROM BACK      Allergies   Allergen Reactions    Other Nausea And Vomiting     The mercury in Scallops    Penicillins Hives    Statins Myalgia    Latex Rash    Nickel Rash      Social History     Socioeconomic History    Marital status:    Tobacco Use    Smoking status: Former     Current packs/day: 0.00     Average packs/day: 1 pack/day for 27.0 years (27.0 ttl pk-yrs)     Types: Cigarettes     Start date: 1965     Quit date: 1992     Years since quittin.5     Passive exposure: Past    Smokeless tobacco: Never     Tobacco comments:     CAFFEINE USE: ICE TEA OCC.   Vaping Use    Vaping status: Never Used   Substance and Sexual Activity    Alcohol use: Not Currently     Comment: Socially.    Drug use: Yes     Types: Hydrocodone    Sexual activity: Not Currently     Partners: Male     Birth control/protection: Post-menopausal        Current Outpatient Medications:     aspirin 81 MG chewable tablet, Chew 1 tablet Daily., Disp: , Rfl:     atorvastatin (LIPITOR) 20 MG tablet, TAKE ONE TABLET BY MOUTH DAILY, Disp: 90 tablet, Rfl: 3    Baclofen (LIORESAL) 5 MG tablet, Take 1 tablet by mouth At Night As Needed (muscle spasms to lower legs/RLS)., Disp: 30 tablet, Rfl: 2    carvedilol (COREG) 12.5 MG tablet, TAKE 1 TABLET BY MOUTH TWICE A DAY WITH A MEAL, Disp: 60 tablet, Rfl: 1    Cholecalciferol (VITAMIN D3) 5000 UNITS capsule capsule, Take 2 capsules by mouth Daily., Disp: , Rfl:     COLLAGEN PO, Take 3 tablets by mouth Daily., Disp: , Rfl:     furosemide (LASIX) 40 MG tablet, TAKE 1 TABLET BY MOUTH DAILY, Disp: 90 tablet, Rfl: 0    glimepiride (AMARYL) 4 MG tablet, Take 1 tablet by mouth 2 (Two) Times a Day., Disp: 180 tablet, Rfl: 1    Glucos-Chondroit-Hyaluron-MSM (GLUCOSAMINE CHONDROITIN JOINT PO), Take  by mouth 2 (Two) Times a Day. 2000-250mg, Disp: , Rfl:     glucose blood (OneTouch Ultra) test strip, Dx code E11.59 testing bs 4 x day, Disp: 400 each, Rfl: 3    hydrALAZINE (APRESOLINE) 10 MG tablet, TAKE 1 TABLET BY MOUTH TWICE A DAY, Disp: 180 tablet, Rfl: 1    Insulin Degludec (TRESIBA) 100 UNIT/ML solution injection, Inject 30 Units under the skin into the appropriate area as directed Daily., Disp: , Rfl:     magnesium 30 MG tablet, Take 3 tablets by mouth Daily., Disp: , Rfl:     Misc Natural Products (NEURIVA PO), Take  by mouth., Disp: , Rfl:     ofloxacin (OCUFLOX) 0.3 % ophthalmic solution, , Disp: , Rfl:     Probiotic Product (PROBIOTIC PO), Take 1 tablet by mouth Daily., Disp: , Rfl:      Objective     History of  Present Illness  Patient here today for hospital follow up from 7/9/24 at [facility] for c/o hyperglycemia with home blood sugar results of 600. Patient also c/o polyuria and polydipsia. Patient denied any weakness or fatigue.  Patient having blood glucose of 440, started on SC insulin. Patient's u/a positive for infection. Patient's glucose improved to 220. Patient d/c'd home on nitrofurantoin 100 mg 1 capsule by mouth 2 times a day for 5 days. Patient stating she took antibiotic as prescribed. Patient stating she is currently on Tresiba 30 units in am and continues with glimepiride 4 mg 1 tab twice a day at this time. Patient is supposed to be on Humalog insulin 6 units before meals but unable to get insulin at this time. Patient stating that once she gets the Humalog she is to stop glimepiride. Patient stating pharmacist says it will be middle/end of August.  Patient stating she is feeling well today. Patient has an appt with Dr. Montano, endocrinology on 7/25.     Patient brought bag of medication with her today. Patient does not have furosemide with her and states she is not taking her furosemide. Patient stating it made her urinate too much.    Patient's supplements: Neuriva supplement, Glucosamine/chondrotin, Vitamin D3 10,000 IU, Vitamin b complex, Probiotics, Collagen complex, Magnesium threonate 2000 mg, Vitamin c liquid, B12 liquid       Review of Systems   Constitutional: Negative.    Respiratory:  Negative for shortness of breath.    Cardiovascular:  Negative for chest pain.   Gastrointestinal:  Negative for nausea and vomiting.   Neurological:  Negative for dizziness and headaches.       Physical Exam  Vitals reviewed.   HENT:      Head: Normocephalic.   Eyes:      Pupils: Pupils are equal, round, and reactive to light.   Cardiovascular:      Rate and Rhythm: Normal rate and regular rhythm.      Pulses: Normal pulses.   Pulmonary:      Effort: Pulmonary effort is normal.      Breath sounds: Normal  breath sounds.   Musculoskeletal:      Comments: In wheel chair   Skin:     Comments: Lower legs in compression stockings   Neurological:      Mental Status: She is alert.         ASSESSMENT     Problems Addressed this Visit          Cardiac and Vasculature    Chronic venous insufficiency    HLD (hyperlipidemia)    Essential hypertension, benign - Primary       Endocrine and Metabolic    Type 2 diabetes mellitus with other circulatory complications    Relevant Medications    Insulin Degludec (TRESIBA) 100 UNIT/ML solution injection       Genitourinary and Reproductive     CKD (chronic kidney disease)     Other Visit Diagnoses       Colon cancer screening        Relevant Orders    Cologuard - Stool, Per Rectum          Diagnoses         Codes Comments    Essential hypertension, benign    -  Primary ICD-10-CM: I10  ICD-9-CM: 401.1     Hyperlipidemia, unspecified hyperlipidemia type     ICD-10-CM: E78.5  ICD-9-CM: 272.4     Chronic venous insufficiency     ICD-10-CM: I87.2  ICD-9-CM: 459.81     Stage 3a chronic kidney disease     ICD-10-CM: N18.31  ICD-9-CM: 585.3     Type 2 diabetes mellitus with other circulatory complications     ICD-10-CM: E11.59  ICD-9-CM: 250.70     Colon cancer screening     ICD-10-CM: Z12.11  ICD-9-CM: V76.51                 Current outpatient and discharge medications have been reconciled for the patient.  Reviewed by: RATNA Borrego      PLAN    Patient Instructions   Hypertension: borderline, continue coreg 12.5 mg 1 tab twice a day    Hyperlipidemia: stable as of February; continue atorvastatin 20 mg 1 tab daily    Chronic venous insuff: continue aspirin 81 mg 1 tab daily, continue with wound care clinic for unna boot    CKD: recommend Patient to drink plenty of fluids    Diabetes: follow up with Dr. Montano as directed/as recommended  Return for keep upcoming lab appt and follow up.

## 2024-07-24 ENCOUNTER — TELEPHONE (OUTPATIENT)
Dept: FAMILY MEDICINE CLINIC | Facility: CLINIC | Age: 79
End: 2024-07-24

## 2024-07-24 NOTE — TELEPHONE ENCOUNTER
Caller: Michelle Espinoza    Relationship: Self    Best call back number: [phone]     What is the medical concern/diagnosis:      What specialty or service is being requested: NEUROSURGEON    What is the provider, practice or medical service name: DR MIMS    What is the office location: [address] [place] [address]    What is the office phone number: [phone]    Any additional details: PATIENT CALLED STATED SHE SEEN RATNA WARE YESTERDAY 7/23/24 CALLING BACK TO GIVE HER THE NEUROSURGEON THAT SHE WANTS TO GO TO.  SHE WANTS TO SEE DR MIMS.

## 2024-07-24 NOTE — PATIENT INSTRUCTIONS
Hypertension: borderline, continue coreg 12.5 mg 1 tab twice a day    Hyperlipidemia: stable as of February; continue atorvastatin 20 mg 1 tab daily    Chronic venous insuff: continue aspirin 81 mg 1 tab daily, continue with wound care clinic for unna boot    CKD: recommend Patient to drink plenty of fluids    Diabetes: follow up with Dr. Montano as directed/as recommended

## 2024-07-30 ENCOUNTER — APPOINTMENT (OUTPATIENT)
Dept: WOUND CARE | Facility: HOSPITAL | Age: 79
End: 2024-07-30
Payer: MEDICARE

## 2024-07-30 PROCEDURE — G0463 HOSPITAL OUTPT CLINIC VISIT: HCPCS

## 2024-08-01 ENCOUNTER — TELEPHONE (OUTPATIENT)
Dept: FAMILY MEDICINE CLINIC | Facility: CLINIC | Age: 79
End: 2024-08-01

## 2024-08-01 NOTE — TELEPHONE ENCOUNTER
Caller: Michelle Espinoza    Relationship: Self    Best call back number: [phone]    What specialty or service is being requested: UROLOGY     Any additional details: PATIENT STATES THAT THE LAST UROLOGIST SHE SAW WAS DR HURD AT [facility].     PATIENT WOULD LIKE TO SEE DR HURD AGAIN IF HE IS STILL PRACTICING, AND IF NOT, SHE WOULD LIKE TO SEE ANOTHER PROVIDER IN THE [facility] SYSTEM.

## 2024-08-01 NOTE — TELEPHONE ENCOUNTER
THERE IS NO UROLOGY REFERRAL IN THE SYSTEM. [facility] OR MAYBE ANOTHER ONE OF THE PROVIDERS WILL PUT THE REFERRAL IN BEING [facility] IS OUT.

## 2024-08-02 NOTE — TELEPHONE ENCOUNTER
SPOKE TO PT AND SHE SAID SHE ALREADY HAS HER UROLOGY APPT WITH DR RUBALCAVA SET UP AT THE END OF AUGUST AND DOES NOT NEED ANYTHING MORE FROM US. [place]

## 2024-08-06 ENCOUNTER — APPOINTMENT (OUTPATIENT)
Dept: WOUND CARE | Facility: HOSPITAL | Age: 79
End: 2024-08-06
Payer: MEDICARE

## 2024-08-06 PROCEDURE — G0463 HOSPITAL OUTPT CLINIC VISIT: HCPCS

## 2024-08-13 ENCOUNTER — APPOINTMENT (OUTPATIENT)
Dept: WOUND CARE | Facility: HOSPITAL | Age: 79
End: 2024-08-13
Payer: MEDICARE

## 2024-08-13 PROCEDURE — G0463 HOSPITAL OUTPT CLINIC VISIT: HCPCS

## 2024-08-14 ENCOUNTER — TELEPHONE (OUTPATIENT)
Dept: FAMILY MEDICINE CLINIC | Facility: CLINIC | Age: 79
End: 2024-08-14
Payer: MEDICARE

## 2024-08-14 NOTE — TELEPHONE ENCOUNTER
Patient called and informed of positive results. Patient informed of possibilities, discussed concerns, Patient declining referral for colonoscopy, discussed adverse effects of not having colonoscopy done. Will continue to follow up with colonoscopy need and to see if Patient changes her mind.

## 2024-08-26 DIAGNOSIS — I50.32 CHRONIC DIASTOLIC CHF (CONGESTIVE HEART FAILURE): ICD-10-CM

## 2024-08-26 RX ORDER — FUROSEMIDE 40 MG
40 TABLET ORAL DAILY
Qty: 90 TABLET | Refills: 0 | Status: SHIPPED | OUTPATIENT
Start: 2024-08-26

## 2024-09-06 ENCOUNTER — OFFICE VISIT (OUTPATIENT)
Dept: FAMILY MEDICINE CLINIC | Facility: CLINIC | Age: 79
End: 2024-09-06
Payer: MEDICARE

## 2024-09-06 VITALS
HEART RATE: 66 BPM | BODY MASS INDEX: 33.49 KG/M2 | RESPIRATION RATE: 15 BRPM | WEIGHT: 189 LBS | SYSTOLIC BLOOD PRESSURE: 134 MMHG | OXYGEN SATURATION: 94 % | DIASTOLIC BLOOD PRESSURE: 70 MMHG | TEMPERATURE: 98.6 F | HEIGHT: 63 IN

## 2024-09-06 DIAGNOSIS — E78.5 HYPERLIPIDEMIA, UNSPECIFIED HYPERLIPIDEMIA TYPE: ICD-10-CM

## 2024-09-06 DIAGNOSIS — I10 ESSENTIAL HYPERTENSION, BENIGN: Primary | ICD-10-CM

## 2024-09-06 DIAGNOSIS — M81.0 OSTEOPOROSIS, UNSPECIFIED OSTEOPOROSIS TYPE, UNSPECIFIED PATHOLOGICAL FRACTURE PRESENCE: ICD-10-CM

## 2024-09-06 DIAGNOSIS — N18.2 STAGE 2 CHRONIC KIDNEY DISEASE: ICD-10-CM

## 2024-09-06 DIAGNOSIS — E11.59 TYPE 2 DIABETES MELLITUS WITH OTHER CIRCULATORY COMPLICATIONS: ICD-10-CM

## 2024-09-06 PROCEDURE — 1126F AMNT PAIN NOTED NONE PRSNT: CPT | Performed by: NURSE PRACTITIONER

## 2024-09-06 PROCEDURE — 1159F MED LIST DOCD IN RCRD: CPT | Performed by: NURSE PRACTITIONER

## 2024-09-06 PROCEDURE — 1160F RVW MEDS BY RX/DR IN RCRD: CPT | Performed by: NURSE PRACTITIONER

## 2024-09-06 PROCEDURE — 99213 OFFICE O/P EST LOW 20 MIN: CPT | Performed by: NURSE PRACTITIONER

## 2024-09-06 PROCEDURE — 3075F SYST BP GE 130 - 139MM HG: CPT | Performed by: NURSE PRACTITIONER

## 2024-09-06 PROCEDURE — 3078F DIAST BP <80 MM HG: CPT | Performed by: NURSE PRACTITIONER

## 2024-09-06 RX ORDER — INSULIN DEGLUDEC 100 U/ML
34 INJECTION, SOLUTION SUBCUTANEOUS DAILY
COMMUNITY
Start: 2024-08-21 | End: 2024-11-19

## 2024-09-06 RX ORDER — SOLIFENACIN SUCCINATE 10 MG/1
TABLET, FILM COATED ORAL
COMMUNITY
Start: 2024-09-04

## 2024-09-06 RX ORDER — GLUCAGON INJECTION, SOLUTION 1 MG/.2ML
1 INJECTION, SOLUTION SUBCUTANEOUS
COMMUNITY
Start: 2024-08-21

## 2024-09-06 RX ORDER — INSULIN LISPRO 100 [IU]/ML
8 INJECTION, SOLUTION INTRAVENOUS; SUBCUTANEOUS 3 TIMES DAILY
COMMUNITY
Start: 2024-08-21 | End: 2024-11-19

## 2024-09-06 NOTE — PROGRESS NOTES
Subjective     Michelle Espinoza is a 79 y.o.. female.     Patient here today for follow up on hypertension, hyperlipidemia, diabetes, Chronic Venous Insufficiency, and CKD.     Patient stating she is eating ok and drinking water throughout day.    Patient seeing Dr. Montano, endocrinology; last office visit Tresiba increased to 34 units and Humalog 8 units qid; Dr. Crow, neurology and next appt 10/28/24; Patient stating she has an appt for a knee joint injection in one week and then physical therapy.          The following portions of the patient's history were reviewed and updated as appropriate: allergies, current medications, past family history, past medical history, past social history, past surgical history and problem list.    Past Medical History:   Diagnosis Date    Anesthesia complication     STRONG GAG REFLEX    Benign colonic polyp     Chronic back pain     Claustrophobia     Coronary artery disease 03/27/2021    PTCA WITH PLACEMENT OF STENT. PLAVIX ONLY TO BE HELD ONE DAY    DDD (degenerative disc disease), cervical     DDD (degenerative disc disease), lumbosacral     Diabetes mellitus     TYPE 2    Essential hypertension, benign 1/12/2024    Frequent UTI     History of MI (myocardial infarction) 03/27/2021    History of pyelonephritis 06/2014    History of sepsis 2014    Hypercholesteremia     Left knee pain     DRAINED  9/21/21    Leg muscle spasm     Neuropathy     LEFT FOOT. AS RESULT OF BACK PROBLEMS    Osteoarthritis     Osteoporosis     Overactive bladder     Type 2 diabetes mellitus     Ureteral calculus, left     Weakness     LOWER EXTREMITES RELATED FROM NERVE DAMAGE FROM BACK       Past Surgical History:   Procedure Laterality Date    APPENDECTOMY      BACK SURGERY      MULTIPLE. WITH HARDWARE    CARDIAC CATHETERIZATION N/A 03/27/2021    Procedure: Left Heart Cath;  Surgeon: Leydi Romo MD;  Location: [facility] CATH INVASIVE LOCATION;  Service: Cardiovascular;  Laterality: N/A;    CARDIAC  CATHETERIZATION  2021    Procedure: Percutaneous Manual Thrombectomy;  Surgeon: Leydi Romo MD;  Location[facility] CATH INVASIVE LOC[facility];  Service: Cardiovascular;;    CARDIAC CATHETERIZATION N/A 2021    Procedure: Percutaneous Coronary Intervention;  Surgeon: Leydi Romo MD;  Location:  [facility] CATH INVASIVE LOCATION;  Service: Cardiovascular;  Laterality: N/A;    CARDIAC CATHETERIZATION N/A 2021    Procedure: Coronary angiography;  Surgeon: Leydi Romo MD;  [facility];  Service: Cardiovascular;  Laterality: N/A;    CARPAL TUNNEL RELEASE      LEFT X2 RIGHT     CATARACT EXTRACTION WITH INTRAOCULAR LENS IMPLANT      LEFT AND RIGHT    CERVICAL SPINE SURGERY      MULTIPLE C3-4 C6-7     SECTION      x 2    COLONOSCOPY  2018    CORONARY STENT PLACEMENT  2021    PARATHYROIDECTOMY Left 2019    Procedure: PARATHYROIDECTOMY LEFT INFERIOR;  Surgeon: Mason Prather MD;  Location: [facility];  Service: ENT    THYROID SURGERY      TRICEP TENDON REPAIR Left 2021    Procedure: LEFT OPEN TRICEPS REPAIR;  Surgeon: Luis Cho II, MD;  Location: [facility] OR;  Service: Orthopedics;  Laterality: Left;    URETEROSCOPY LASER LITHOTRIPSY WITH STENT INSERTION Left 2021    Procedure: LEFT URETEROSCOPY LASER LITHOTRIPSY, STONE BASKET EXTRACTION STENT;  Surgeon: Jose Luis Nelson Jr., MD;  Location: [facility] OR;  Service: Urology;  Laterality: Left;       Review of Systems   Constitutional: Negative.    Respiratory: Negative.     Cardiovascular: Negative.    Musculoskeletal:  Positive for arthralgias (legs, knee).       Allergies   Allergen Reactions    Other Nausea And Vomiting     The mercury in Scallops    Penicillins Hives    Statins Myalgia    Latex Rash    Nickel Rash       Objective     Vitals:    24 1349   BP: 134/70   BP Location: Left arm   Patient Position: Sitting   Cuff Size: Adult   Pulse: 66   Resp:  "15   Temp: 98.6 °F (37 °C)   TempSrc: Temporal   SpO2: 94%   Weight: 85.7 kg (189 lb)   Height: 160 cm (62.99\")     Body mass index is 33.49 kg/m².    Physical Exam  Vitals reviewed.   HENT:      Head: Normocephalic.   Eyes:      Pupils: Pupils are equal, round, and reactive to light.   Cardiovascular:      Rate and Rhythm: Normal rate and regular rhythm.      Pulses: Normal pulses.   Pulmonary:      Effort: Pulmonary effort is normal.      Breath sounds: Normal breath sounds.   Musculoskeletal:      Right lower leg: No edema.      Left lower leg: No edema.      Comments: In wheel chair in office   Skin:     General: Skin is warm and dry.   Neurological:      Mental Status: She is alert and oriented to person, place, and time.   Psychiatric:         Behavior: Behavior normal.           Current Outpatient Medications:     aspirin 81 MG chewable tablet, Chew 1 tablet Daily., Disp: , Rfl:     atorvastatin (LIPITOR) 20 MG tablet, TAKE ONE TABLET BY MOUTH DAILY, Disp: 90 tablet, Rfl: 3    Baclofen (LIORESAL) 5 MG tablet, Take 1 tablet by mouth At Night As Needed (muscle spasms to lower legs/RLS)., Disp: 30 tablet, Rfl: 2    carvedilol (COREG) 12.5 MG tablet, TAKE 1 TABLET BY MOUTH TWICE A DAY WITH A MEAL, Disp: 60 tablet, Rfl: 1    Cholecalciferol (VITAMIN D3) 5000 UNITS capsule capsule, Take 2 capsules by mouth Daily., Disp: , Rfl:     COLLAGEN PO, Take 3 tablets by mouth Daily., Disp: , Rfl:     furosemide (LASIX) 40 MG tablet, TAKE 1 TABLET BY MOUTH DAILY, Disp: 90 tablet, Rfl: 0    glimepiride (AMARYL) 4 MG tablet, Take 1 tablet by mouth 2 (Two) Times a Day., Disp: 180 tablet, Rfl: 1    Glucos-Chondroit-Hyaluron-MSM (GLUCOSAMINE CHONDROITIN JOINT PO), Take  by mouth 2 (Two) Times a Day. 2000-250mg, Disp: , Rfl:     glucose blood (OneTouch Ultra) test strip, Dx code E11.59 testing bs 4 x day, Disp: 400 each, Rfl: 3    Gvoke HypoPen 2-Pack 1 MG/0.2ML solution auto-injector, Inject 1 each under the skin into the " appropriate area as directed., Disp: , Rfl:     hydrALAZINE (APRESOLINE) 10 MG tablet, TAKE 1 TABLET BY MOUTH TWICE A DAY, Disp: 180 tablet, Rfl: 1    Insulin Degludec (TRESIBA) 100 UNIT/ML solution injection, Inject 34 Units under the skin into the appropriate area as directed Daily., Disp: , Rfl:     Insulin Lispro, 1 Unit Dial, (HumaLOG KwikPen) 100 UNIT/ML solution pen-injector, Inject 8 Units under the skin into the appropriate area as directed 3 (Three) Times a Day., Disp: , Rfl:     magnesium 30 MG tablet, Take 3 tablets by mouth Daily., Disp: , Rfl:     ofloxacin (OCUFLOX) 0.3 % ophthalmic solution, , Disp: , Rfl:     Probiotic Product (PROBIOTIC PO), Take 1 tablet by mouth Daily., Disp: , Rfl:     solifenacin (VESICARE) 10 MG tablet, , Disp: , Rfl:     Recent Results (from the past 672 hour(s))   CBC w AUTO Differential    Collection Time: 08/29/24 10:01 AM    Specimen: Blood    BLOOD   Result Value Ref Range    WBC 8.6 3.4 - 10.8 x10E3/uL    RBC 4.23 3.77 - 5.28 x10E6/uL    Hemoglobin 12.6 11.1 - 15.9 g/dL    Hematocrit 40.4 34.0 - 46.6 %    MCV 96 79 - 97 fL    MCH 29.8 26.6 - 33.0 pg    MCHC 31.2 (L) 31.5 - 35.7 g/dL    RDW 12.2 11.7 - 15.4 %    Platelets 210 150 - 450 x10E3/uL    Neutrophil Rel % 72 Not Estab. %    Lymphocyte Rel % 16 Not Estab. %    Monocyte Rel % 9 Not Estab. %    Eosinophil Rel % 2 Not Estab. %    Basophil Rel % 1 Not Estab. %    Neutrophils Absolute 6.2 1.4 - 7.0 x10E3/uL    Lymphocytes Absolute 1.3 0.7 - 3.1 x10E3/uL    Monocytes Absolute 0.8 0.1 - 0.9 x10E3/uL    Eosinophils Absolute 0.2 0.0 - 0.4 x10E3/uL    Basophils Absolute 0.0 0.0 - 0.2 x10E3/uL    Immature Granulocyte Rel % 0 Not Estab. %    Immature Grans Absolute 0.0 0.0 - 0.1 x10E3/uL   Vitamin B12    Collection Time: 08/29/24 10:01 AM    Specimen: Blood    BLOOD   Result Value Ref Range    Vitamin B-12 >2000 (H) 232 - 1245 pg/mL   Folate    Collection Time: 08/29/24 10:01 AM    Specimen: Blood    BLOOD   Result Value Ref  Range    Folate >20.0 >3.0 ng/mL   TSH    Collection Time: 08/29/24 10:01 AM    Specimen: Blood    BLOOD   Result Value Ref Range    TSH 1.520 0.450 - 4.500 uIU/mL   T4, free    Collection Time: 08/29/24 10:01 AM    Specimen: Blood    BLOOD   Result Value Ref Range    Free T4 1.01 0.82 - 1.77 ng/dL   Hemoglobin A1c    Collection Time: 08/29/24 10:01 AM    Specimen: Blood    BLOOD   Result Value Ref Range    Hemoglobin A1C 11.2 (H) 4.8 - 5.6 %   Comprehensive metabolic panel    Collection Time: 08/29/24 10:01 AM    Specimen: Blood    BLOOD   Result Value Ref Range    Glucose 142 (H) 70 - 99 mg/dL    BUN 35 (H) 8 - 27 mg/dL    Creatinine 0.97 0.57 - 1.00 mg/dL    EGFR Result 60 >59 mL/min/1.73    BUN/Creatinine Ratio 36 (H) 12 - 28    Sodium 141 134 - 144 mmol/L    Potassium 4.8 3.5 - 5.2 mmol/L    Chloride 107 (H) 96 - 106 mmol/L    Total CO2 22 20 - 29 mmol/L    Calcium 9.1 8.7 - 10.3 mg/dL    Total Protein 6.5 6.0 - 8.5 g/dL    Albumin 3.8 3.8 - 4.8 g/dL    Globulin 2.7 1.5 - 4.5 g/dL    Total Bilirubin 0.3 0.0 - 1.2 mg/dL    Alkaline Phosphatase 86 44 - 121 IU/L    AST (SGOT) 17 0 - 40 IU/L    ALT (SGPT) 19 0 - 32 IU/L   Lipid Panel With LDL/HDL Ratio    Collection Time: 08/29/24 10:01 AM    Specimen: Blood    BLOOD   Result Value Ref Range    Total Cholesterol 118 100 - 199 mg/dL    Triglycerides 75 0 - 149 mg/dL    HDL Cholesterol 58 >39 mg/dL    VLDL Cholesterol Bruno 15 5 - 40 mg/dL    LDL Chol Calc (NIH) 45 0 - 99 mg/dL    LDL/HDL RATIO 0.8 0.0 - 3.2 ratio   PTH, Intact & Calcium    Collection Time: 08/29/24 10:01 AM    Specimen: Blood    BLOOD   Result Value Ref Range    PTH, Intact 26 15 - 65 pg/mL    PTH, Intact Comment        CT Lumbar Spine Without Contrast  Narrative: CT LUMBAR SPINE WO CONTRAST    Date of Exam: 6/12/2024 5:53 PM EDT    Indication: fall.    Comparison: 2016 CT    Technique: Axial CT images were obtained of the lumbar spine without contrast administration.  Sagittal and coronal  reconstructions were performed.  Automated exposure control and iterative reconstruction methods were used.    Findings:  Bilateral posterior fusion hardware extending from L2-S1. There is sparing of the L5 vertebral body. There is no evidence of hardware fracture or significant malalignment. It is difficult to evaluate for periprosthetic lucency on this exam given degree   of patient's osteopenia. There is S-shaped scoliosis of the thoracolumbar spine. Grade 1 anterolisthesis of L3 on L4 and L5 on S1. At least partial bony fusion at L3-4 and L4-5. Multilevel posterior decompression.    There is no evidence for acute fracture. Unchanged compression deformity of L3. Stable since 2016. Scattered endplate osteophyte formations and multilevel facet arthropathy. Degenerative disc disease with disc height loss most severe at T12-L1. There is   mild canal narrowing at T10-11. Moderate right bony neural foraminal narrowing at this level. There is also mild right bony neural foraminal narrowing at L1-2.    Paraspinal soft tissues show no acute abnormality. Atrophy of the bilateral kidneys with nonobstructing renal stones noted. Dense atherosclerotic calcifications of the aorta and branch vessels. 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   No hydronephrosis appreciated. Atrophy of the posterior paraspinal soft tissues..  Impression: Impression:  Significantly limited exam given patient's degree of osteopenia as well as metallic artifact from fusion hardware.    No definite acute abnormality. Unchanged chronic mild compression deformity of the L3 vertebral body.    Bilateral posterior fusion extending from L2-S1 with sparing of L5. Multilevel posterior decompression noted. No evidence of hardware fracture or malalignment. No acute periprosthetic fracture appreciated.    Superimposed degenerative changes as above with disc height loss greatest at T12-L1. There is mild canal narrowing at T11-12, as well as  moderate right T11-12 and mild right L1-2 bony neural foraminal narrowing.    Electronically Signed: Tomi Castillo MD    6/13/2024 9:17 AM EDT    Workstation ID: [id number]        Diagnoses and all orders for this visit:    1. Essential hypertension, benign (Primary)    2. Hyperlipidemia, unspecified hyperlipidemia type    3. Type 2 diabetes mellitus with other circulatory complications    4. Stage 2 chronic kidney disease    5. Osteoporosis, unspecified osteoporosis type, unspecified pathological fracture presence        Patient Instructions   Hypertension: borderline/stable, continue coreg 12.5 mg 1 tab twice a day    Hyperlipidemia: stable, atorvastatin 20 mg 1 tab daily    Diabetes: HgA1c 11.2, continue to follow up with Dr. Montano, endocrinology    CKD: stable, continue to monitor    Osteoporosis: Patient declined dexa scan    Return for fasting labs in 6 months, [employer] Well[facility].

## 2024-09-11 NOTE — PATIENT INSTRUCTIONS
Hypertension: borderline/stable, continue coreg 12.5 mg 1 tab twice a day    Hyperlipidemia: stable, atorvastatin 20 mg 1 tab daily    Diabetes: HgA1c 11.2, continue to follow up with Dr. Montano, endocrinology    CKD: stable, continue to monitor    Osteoporosis: Patient declined dexa scan

## 2024-09-12 RX ORDER — CARVEDILOL 12.5 MG/1
12.5 TABLET ORAL 2 TIMES DAILY WITH MEALS
Qty: 60 TABLET | Refills: 2 | Status: SHIPPED | OUTPATIENT
Start: 2024-09-12

## 2024-09-17 ENCOUNTER — TELEPHONE (OUTPATIENT)
Dept: FAMILY MEDICINE CLINIC | Facility: CLINIC | Age: 79
End: 2024-09-17

## 2024-09-17 DIAGNOSIS — M62.838 MUSCLE SPASMS OF BOTH LOWER EXTREMITIES: ICD-10-CM

## 2024-09-17 DIAGNOSIS — R29.898 WEAKNESS OF BOTH LOWER EXTREMITIES: ICD-10-CM

## 2024-09-17 DIAGNOSIS — G60.9 IDIOPATHIC NEUROPATHY: Primary | ICD-10-CM

## 2024-09-17 NOTE — TELEPHONE ENCOUNTER
Caller: Michelle Espinoza    Relationship: Self    Best call back number: [phone]     Who are you requesting to speak with (clinical staff, provider,  specific staff member): SASCHA     What was the call regarding: PATIENT STATED THAT SHE RECEIVED SASCHA'S MESSAGE AND SHE WANTED TO THANK HER FOR THE INFORMATION.

## 2024-10-25 DIAGNOSIS — M62.838 MUSCLE SPASMS OF BOTH LOWER EXTREMITIES: ICD-10-CM

## 2024-10-25 RX ORDER — BACLOFEN 5 MG/1
TABLET ORAL
Qty: 30 TABLET | Refills: 2 | Status: SHIPPED | OUTPATIENT
Start: 2024-10-25

## 2024-11-22 ENCOUNTER — HOSPITAL ENCOUNTER (OUTPATIENT)
Dept: CARDIOLOGY | Facility: HOSPITAL | Age: 79
Discharge: HOME | End: 2024-11-22

## 2024-11-22 DIAGNOSIS — I87.2 CHRONIC VENOUS INSUFFICIENCY: ICD-10-CM

## 2024-11-22 LAB
BH CV ECHO MEAS - DIST AO DIAM: NORMAL CM
BH CV VAS BP LEFT ARM: NORMAL MMHG
BH CV VAS BP RIGHT ARM: NORMAL MMHG
BH CV VAS SCREENING CAROTID CCA LEFT: NORMAL CM/SEC
BH CV VAS SCREENING CAROTID CCA RIGHT: NORMAL CM/SEC
BH CV VAS SCREENING CAROTID ICA LEFT: NORMAL CM/SEC
BH CV VAS SCREENING CAROTID ICA RIGHT: NORMAL CM/SEC
BH CV XLRA MEAS - MID AO DIAM: NORMAL CM
BH CV XLRA MEAS - PAD LEFT ABI DP: 0.86
BH CV XLRA MEAS - PAD LEFT ABI PT: 0.88
BH CV XLRA MEAS - PAD LEFT ARM: 148 MMHG
BH CV XLRA MEAS - PAD LEFT LEG DP: 132 MMHG
BH CV XLRA MEAS - PAD LEFT LEG PT: 135 MMHG
BH CV XLRA MEAS - PAD RIGHT ABI DP: 0.86
BH CV XLRA MEAS - PAD RIGHT ABI PT: 0.89
BH CV XLRA MEAS - PAD RIGHT ARM: 153 MMHG
BH CV XLRA MEAS - PAD RIGHT LEG DP: 131 MMHG
BH CV XLRA MEAS - PAD RIGHT LEG PT: 136 MMHG
BH CV XLRA MEAS - PROX AO DIAM: 2.2 CM
BH CV XLRA MEAS LEFT ICA/CCA RATIO: 0.69
BH CV XLRA MEAS LEFT PROX ECA PSV: NORMAL CM/SEC
BH CV XLRA MEAS RIGHT ICA/CCA RATIO: 1.08
BH CV XLRA MEAS RIGHT PROX ECA PSV: NORMAL CM/SEC
MAXIMAL PREDICTED HEART RATE: 143 BPM
STRESS TARGET HR: 122 BPM

## 2024-11-22 PROCEDURE — 93799 UNLISTED CV SVC/PROCEDURE: CPT

## 2024-12-03 ENCOUNTER — TELEPHONE (OUTPATIENT)
Dept: FAMILY MEDICINE CLINIC | Facility: CLINIC | Age: 79
End: 2024-12-03

## 2024-12-03 NOTE — TELEPHONE ENCOUNTER
Caller: Michelle Espinoza    Relationship: Self    Best call back number: [phone]     What was the call regarding: PATIENT STATED THAT SHE HAD A URINE TEST DONE BY HER ENDOCRINOLOGIST, AND THE RESULTS WERE SENT TO [facility]. PATIENT IS CALLING TO SEE IF THERE WERE ANY NEXT STEPS REGARDING THE RESULTS. PLEASE ADVISE.

## 2024-12-11 NOTE — PROGRESS NOTES
Subjective:     Encounter Date:12/12/2024      Patient ID: Michelle Espinoza is a 79 y.o. female.    Chief Complaint:  History of Present Illness    This is a 79-year-old with diabetes mellitus type 2, hyperparathyroidism, chronic kidney disease, hyperlipidemia, spinal stenosis, coronary artery disease status post inferior myocardial infarction and drug-eluting stent placement of the proximal, mid, and distal right coronary artery, resolved ischemic cardiomyopathy, chronic venous stasis, PVCs, who presents for follow-up.     She was admitted in 1/2024 with lower extremity swelling.  This was felt to be due to chronic stasis dermatitis.  She was treated with diuresis with improvement in her symptoms.  Heart rates and blood pressures also noted to be elevated so her Coreg was increased.  She saw RATNA Michel in follow-up in 2/2024.  At that time she was still struggling with significant bilateral lower extremity swelling resulting in blistering.  She admitted that she was not good about taking her furosemide as instructed.  She was having a hard time getting to the bathroom with the diuretics.  She also admitted to eating salty foods.  She was given a dose of IV furosemide in the office and instructed to try and stay compliant with taking the furosemide as directed.  Also recommended elevating her legs when possible.     In 3/2024 the patient reported that she was being followed by the wound care clinic, wrapping her legs.  She was also taking the furosemide.  She was last seen in 6/2024 by RATNA Michel.  At that time she was doing fairly well without any significant issues.  Her swelling was still well-controlled with wrapping and furosemide.    She presents back today for follow-up.  She has been discharged from the wound care clinic.  She still wears compression stockings and uses her furosemide daily which seems to control her swelling.  She denies any palpitations, shortness of breath, orthopnea,  near-syncope or syncope.  She reports brief episodes of a sharp pain that only lasts a second time but nothing prolonged or significant.  She remains pretty sedentary.     Prior History:  I saw the patient initially when she came to the emergency room on 3/27/2021 with an inferior myocardial infarction.  She had presented to the emergency room after a fall.  She reported that she had become suddenly weak after going to the bathroom.  Following her arrival to the emergency room an EKG was performed showing inferolateral ST elevations system with an inferior myocardial infarction.  She is brought emergently to the cardiac catheterization laboratory where she is found to have an occluded proximal right coronary artery.  After reestablishing flow in the vessel was noted that she had severe stenosis in her proximal, mid, and distal right coronary artery for which she underwent 3 separate drug-eluting stent placements.  The procedure was complicated by distal embolization of thrombus in her posterior descending branch.  She was treated with Integrilin infusion for period of time before complaining of a headache.  Work-up of this headache including a CT of the head was unremarkable.  She also had some issues with bradycardia and hypotension during the procedure that required treatment with dopamine and phenylephrine but both of these had improved by the end of the procedure and she no longer required pressor support.     Following a cardiac catheterization she did well from a cardiac standpoint.  She did have an echocardiogram performed on 3/28/2021 that showed mildly depressed left ventricular systolic function with an EF of 41%, inferior wall motion abnormalities, grade 1 diastolic dysfunction, and no significant valvular disease.     She did have some confusion during the remainder of her hospitalization that was concerning for underlying dementia.  She was seen by neurology who recommended pursuing an MRI but she was  unable to tolerate this due to her anxiety.  Is recommended that she disco be performed as an outpatient.  Medicine also evaluated the patient for diabetes management and her noncardiac issues.  She was noted to have elevated liver transaminases and her statin therapy was briefly held.  Right upper quadrant ultrasound was performed and was unremarkable.     Following her discharge she was seen by RATNA Kat on 4/15/2021 at that time she was doing well overall.  Her atorvastatin was restarted at 40 mg daily.  Her carvedilol dosage was increased.     In 5/2021 the patient injured her left tricep resulting in a tear.  She ended up requiring surgical repair by Dr. Arun Cho on 5/25/2021.  Fortunately she only had to hold her clopidogrel for about 1 day before.        The patient called with complaints of a cough with the lisinopril.  Ended up switching her to losartan but she reported that she continued to have issues with significant cough.  This was discontinued and hydralazine was added in its place.  Fortunately with this change her blood pressures remain well controlled.       Following an office visit she underwent a repeat echocardiogram which is performed on 8/17/2021 and showed akinetic inferior wall and basal to mid posterior wall with mildly decreased left ventricular systolic function with EF of 46 to 50%.    In 4/2022 she indicated that she had gained a significant amount of weight since the start of the year.  She was complaining of issues with myalgias and felt that this was due to statin use.  Recommended that she decrease her dosage to 20 mg daily.  Since that office visit she was seen by RATNA Hercules in 10/2022.  At that time she indicated that she was tolerating the lower dose of statin better.  For some reason she was off of both aspirin and clopidogrel.  She reported some atypical chest pain.  It was recommended that she resume aspirin 81 mg and an echocardiogram was recommended.   This was performed on 10/21/2022 and showed normal left ventricular systolic function wall motion with EF of 57%, grade 1A diastolic dysfunction and no significant valvular disease.     In 6/2023 she complained that she had a sensation like she could not take a deep breath.  Her symptoms sound suspicious for an arrhythmia such as PVCs as a cause.  A Holter monitor was recommended.  BNP was also checked.  proBNP was normal.  Unfortunately her Holter monitor was never performed.  She return in 10/2023 complaining of similar symptoms.  At that time a Holter monitor was placed.  This showed a 20% ventricular ectopy burden but no significant episodes of nonsustained ventricular tachycardia.  She subsequently underwent an echocardiogram later that month which showed normal left ventricular systolic function and wall motion with an EF of 51%, grade 1 diastolic dysfunction, mild mitral valve regurgitation and normal right ventricular systolic pressure.    Review of Systems   Constitutional: Negative for malaise/fatigue.   HENT:  Negative for hearing loss, hoarse voice, nosebleeds and sore throat.    Eyes:  Negative for pain.   Cardiovascular:  Negative for chest pain, claudication, cyanosis, dyspnea on exertion, irregular heartbeat, leg swelling, near-syncope, orthopnea, palpitations, paroxysmal nocturnal dyspnea and syncope.   Respiratory:  Negative for shortness of breath and snoring.    Endocrine: Negative for cold intolerance, heat intolerance, polydipsia, polyphagia and polyuria.   Skin:  Negative for itching and rash.   Musculoskeletal:  Negative for arthritis, falls, joint pain, joint swelling, muscle cramps, muscle weakness and myalgias.   Gastrointestinal:  Negative for constipation, diarrhea, dysphagia, heartburn, hematemesis, hematochezia, melena, nausea and vomiting.   Genitourinary:  Negative for frequency, hematuria and hesitancy.   Neurological:  Negative for excessive daytime sleepiness, dizziness,  headaches, light-headedness, numbness and weakness.   Psychiatric/Behavioral:  Negative for depression. The patient is not nervous/anxious.          Current Outpatient Medications:     Ascorbic Acid 500 MG chewable tablet, Chew 500 mg Daily., Disp: , Rfl:     aspirin 81 MG chewable tablet, Chew 1 tablet Daily., Disp: , Rfl:     atorvastatin (LIPITOR) 20 MG tablet, TAKE ONE TABLET BY MOUTH DAILY, Disp: 90 tablet, Rfl: 3    Baclofen (LIORESAL) 5 MG tablet, TAKE ONE TABLET BY MOUTH ONCE NIGHTLY AS NEEDED FOR MUSCLE SPASMS TO LOWER LEGS (RESTLESS LEG SYNDROME), Disp: 30 tablet, Rfl: 2    BD Pen Needle Micro U/F 32G X 6 MM misc, , Disp: , Rfl:     carvedilol (COREG) 12.5 MG tablet, TAKE 1 TABLET BY MOUTH TWICE A DAY WITH A MEAL, Disp: 60 tablet, Rfl: 2    Cholecalciferol (VITAMIN D3) 5000 UNITS capsule capsule, Take 2 capsules by mouth Daily., Disp: , Rfl:     COLLAGEN PO, Take 3 tablets by mouth Daily., Disp: , Rfl:     cyanocobalamin (VITAMIN B-12) 1000 MCG tablet, Take 1 tablet by mouth Daily., Disp: , Rfl:     furosemide (LASIX) 40 MG tablet, TAKE 1 TABLET BY MOUTH DAILY, Disp: 90 tablet, Rfl: 0    glimepiride (AMARYL) 4 MG tablet, Take 1 tablet by mouth 2 (Two) Times a Day., Disp: 180 tablet, Rfl: 1    Glucos-Chondroit-Hyaluron-MSM (GLUCOSAMINE CHONDROITIN JOINT PO), Take  by mouth 2 (Two) Times a Day. 2000-250mg, Disp: , Rfl:     glucose blood (OneTouch Ultra) test strip, Dx code E11.59 testing bs 4 x day, Disp: 400 each, Rfl: 3    Gvoke HypoPen 2-Pack 1 MG/0.2ML solution auto-injector, Inject 1 each under the skin into the appropriate area as directed., Disp: , Rfl:     hydrALAZINE (APRESOLINE) 10 MG tablet, TAKE 1 TABLET BY MOUTH TWICE A DAY, Disp: 180 tablet, Rfl: 1    Insulin Lispro (humaLOG) 100 UNIT/ML injection, Inject 8 Units under the skin into the appropriate area as directed 3 (Three) Times a Day., Disp: , Rfl:     Insulin Lispro, 1 Unit Dial, (HumaLOG KwikPen) 100 UNIT/ML solution pen-injector, , Disp:  , Rfl:     magnesium 30 MG tablet, Take 3 tablets by mouth Daily., Disp: , Rfl:     nystatin (MYCOSTATIN) 100,000 unit/mL suspension, , Disp: , Rfl:     ofloxacin (OCUFLOX) 0.3 % ophthalmic solution, , Disp: , Rfl:     Probiotic Product (Probiotic Daily) capsule, Take  by mouth., Disp: , Rfl:     Probiotic Product (PROBIOTIC PO), Take 1 tablet by mouth Daily., Disp: , Rfl:     solifenacin (VESICARE) 10 MG tablet, , Disp: , Rfl:     Tresiba FlexTouch 100 UNIT/ML solution pen-injector injection, Inject 40 Units under the skin into the appropriate area as directed Daily., Disp: , Rfl:     Past Medical History:   Diagnosis Date    Anesthesia complication     STRONG GAG REFLEX    Benign colonic polyp     Chronic back pain     Claustrophobia     Coronary artery disease 03/27/2021    PTCA WITH PLACEMENT OF STENT. PLAVIX ONLY TO BE HELD ONE DAY    DDD (degenerative disc disease), cervical     DDD (degenerative disc disease), lumbosacral     Diabetes mellitus     TYPE 2    Essential hypertension, benign 1/12/2024    Frequent UTI     History of MI (myocardial infarction) 03/27/2021    History of pyelonephritis 06/2014    History of sepsis 2014    Hypercholesteremia     Left knee pain     DRAINED  9/21/21    Leg muscle spasm     Neuropathy     LEFT FOOT. AS RESULT OF BACK PROBLEMS    Osteoarthritis     Osteoporosis     Overactive bladder     Type 2 diabetes mellitus     Ureteral calculus, left     Weakness     LOWER EXTREMITES RELATED FROM NERVE DAMAGE FROM BACK       Past Surgical History:   Procedure Laterality Date    APPENDECTOMY      BACK SURGERY      MULTIPLE. WITH HARDWARE    CARDIAC CATHETERIZATION N/A 03/27/2021    Procedure: Left Heart Cath;  Surgeon: Leydi Romo MD;  Location:  [facility];  Service: Cardiovascular;  Laterality: N/A;    CARDIAC CATHETERIZATION  03/27/2021    Procedure: Percutaneous Manual Thrombectomy;  Surgeon: Leydi Romo MD;  Location:  [facility];   Service: Cardiovascular;;    CARDIAC CATHETERIZATION N/A 2021    Procedure: Percutaneous Coronary Intervention;  Surgeon: Leydi Romo MD;  Location:  [facility];  Service: Cardiovascular;  Laterality: N/A;    CARDIAC CATHETERIZATION N/A 2021    Procedure: Coronary angiography;  Surgeon: Leydi Romo MD;  Location:  [facility];  Service: Cardiovascular;  Laterality: N/A;    CARPAL TUNNEL RELEASE      LEFT X2 RIGHT     CATARACT EXTRACTION WITH INTRAOCULAR LENS IMPLANT      LEFT AND RIGHT    CERVICAL SPINE SURGERY      MULTIPLE C3-4 C6-7     SECTION      x 2    COLONOSCOPY  2018    CORONARY STENT PLACEMENT  2021    PARATHYROIDECTOMY Left 2019    Procedure: PARATHYROIDECTOMY LEFT INFERIOR;  Surgeon: Mason Prather MD;  Location:  [facility];  Service: ENT    THYROID SURGERY      TRICEP TENDON REPAIR Left 2021    Procedure: LEFT OPEN TRICEPS REPAIR;  Surgeon: Luis Cho II, MD;  Location:  [facility];  Service: Orthopedics;  Laterality: Left;    URETEROSCOPY LASER LITHOTRIPSY WITH STENT INSERTION Left 2021    Procedure: LEFT URETEROSCOPY LASER LITHOTRIPSY, STONE BASKET EXTRACTION STENT;  Surgeon: Jose Luis Nelson Jr., MD;  Location:  [facility];  Service: Urology;  Laterality: Left;       Family History   Problem Relation Age of Onset    Stroke Mother     Heart disease Mother     Hypertension Mother             Clotting disorder Father     Hypertension Father             Diabetes Father     Aneurysm Father     Hypertension Sister             Diabetes Sister     Cervical cancer Sister     Obesity Sister     Diabetes Sister     Obesity Sister     Cervical cancer Maternal Grandmother 72    Diabetes Grandchild     Asthma Sister     Hypertension Sister         Dead    Diabetes Sister     Malig Hyperthermia Neg Hx        Social History     Tobacco Use    Smoking status: Former      "Current packs/day: 0.00     Average packs/day: 1 pack/day for 27.0 years (27.0 ttl pk-yrs)     Types: Cigarettes     Start date: 1965     Quit date: 1992     Years since quittin.9     Passive exposure: Past    Smokeless tobacco: Never    Tobacco comments:     CAFFEINE USE: ICE TEA OCC.   Vaping Use    Vaping status: Never Used   Substance Use Topics    Alcohol use: Not Currently     Comment: Socially.    Drug use: Yes     Types: Hydrocodone         ECG 12 Lead    Date/Time: 2024 2:59 PM  Performed by: Leydi Romo MD    Authorized by: Leydi Romo MD  Comparison: compared with previous ECG   Similar to previous ECG  Rhythm: sinus rhythm  Ectopy: unifocal PVCs  Q waves: V1, V2, V3, III and aVF               Objective:     Visit Vitals  /70 (BP Location: Left arm, Patient Position: Sitting, Cuff Size: Adult)   Pulse 74   Ht 160 cm (63\")   Wt 83.9 kg (185 lb)   LMP  (LMP Unknown)   SpO2 97%   BMI 32.77 kg/m²         Constitutional:       Appearance: Normal appearance. Well-developed.   Eyes:      General: Lids are normal.      Conjunctiva/sclera: Conjunctivae normal.      Pupils: Pupils are equal, round, and reactive to light.   HENT:      Head: Normocephalic and atraumatic.   Neck:      Vascular: No carotid bruit or JVD.      Lymphadenopathy: No cervical adenopathy.   Pulmonary:      Effort: Pulmonary effort is normal.      Breath sounds: Normal breath sounds.   Cardiovascular:      Normal rate. Regular rhythm.      No gallop.    Pulses:     Radial: 2+ bilaterally.  Edema:     Peripheral edema absent.   Abdominal:      Palpations: Abdomen is soft.   Musculoskeletal:      Cervical back: Full passive range of motion without pain, normal range of motion and neck supple. Skin:     General: Skin is warm and dry.   Neurological:      Mental Status: Alert and oriented to person, place, and time.             Assessment:          Diagnosis Plan   1. Coronary artery disease involving native " coronary artery of native heart without angina pectoris        2. History of inferior wall myocardial infarction        3. History of ischemic cardiomyopathy        4. Status post coronary artery stent placement        5. PVC's (premature ventricular contractions)        6. Peripheral vascular disease        7. Hyperlipidemia, unspecified hyperlipidemia type        8. Essential hypertension, benign        9. Chronic venous stasis        10. Chronic diastolic CHF (congestive heart failure)        11. Type 2 diabetes mellitus with other circulatory complications        12. Stage 2 chronic kidney disease               Plan:       1.  Coronary artery disease.  No symptoms concerning for angina.  Her EKG shows no significant changes.  Continue current medical management.  2.  Hypertension.  Well-controlled on her current regimen medications.  Continue the same.  3.  Chronic heart failure with preserved ejection fraction.  Volume status appears to be well-controlled on current dosage of furosemide.  4.  Hyperlipidemia.  On atorvastatin for goal LDL of 70 or below.  Her most recent lipid panel showed that her LDL was at goal at 50.  Continue current management.  5.  PVCs.  Present on her EKG today.  Remains asymptomatic.  Continue carvedilol.  6.  Chronic venous insufficiency.  Well-controlled with compression stockings and furosemide.  7.  Diabetes mellitus type 2  8.  Chronic kidney disease    Will plan on seeing the patient back again in 6 months.

## 2024-12-12 ENCOUNTER — OFFICE VISIT (OUTPATIENT)
Age: 79
End: 2024-12-12
Payer: MEDICARE

## 2024-12-12 VITALS
HEART RATE: 74 BPM | SYSTOLIC BLOOD PRESSURE: 110 MMHG | OXYGEN SATURATION: 97 % | WEIGHT: 185 LBS | DIASTOLIC BLOOD PRESSURE: 70 MMHG | HEIGHT: 63 IN | BODY MASS INDEX: 32.78 KG/M2

## 2024-12-12 DIAGNOSIS — I49.3 PVC'S (PREMATURE VENTRICULAR CONTRACTIONS): ICD-10-CM

## 2024-12-12 DIAGNOSIS — E78.5 HYPERLIPIDEMIA, UNSPECIFIED HYPERLIPIDEMIA TYPE: ICD-10-CM

## 2024-12-12 DIAGNOSIS — Z95.5 STATUS POST CORONARY ARTERY STENT PLACEMENT: ICD-10-CM

## 2024-12-12 DIAGNOSIS — E11.59 TYPE 2 DIABETES MELLITUS WITH OTHER CIRCULATORY COMPLICATIONS: ICD-10-CM

## 2024-12-12 DIAGNOSIS — I73.9 PERIPHERAL VASCULAR DISEASE: ICD-10-CM

## 2024-12-12 DIAGNOSIS — Z86.79 HISTORY OF ISCHEMIC CARDIOMYOPATHY: ICD-10-CM

## 2024-12-12 DIAGNOSIS — I87.8 CHRONIC VENOUS STASIS: ICD-10-CM

## 2024-12-12 DIAGNOSIS — I25.2 HISTORY OF INFERIOR WALL MYOCARDIAL INFARCTION: ICD-10-CM

## 2024-12-12 DIAGNOSIS — N18.2 STAGE 2 CHRONIC KIDNEY DISEASE: ICD-10-CM

## 2024-12-12 DIAGNOSIS — I10 ESSENTIAL HYPERTENSION, BENIGN: ICD-10-CM

## 2024-12-12 DIAGNOSIS — I25.10 CORONARY ARTERY DISEASE INVOLVING NATIVE CORONARY ARTERY OF NATIVE HEART WITHOUT ANGINA PECTORIS: Primary | ICD-10-CM

## 2024-12-12 DIAGNOSIS — I50.32 CHRONIC DIASTOLIC CHF (CONGESTIVE HEART FAILURE): ICD-10-CM

## 2024-12-12 RX ORDER — PEN NEEDLE, DIABETIC 32 GX 1/4"
NEEDLE, DISPOSABLE MISCELLANEOUS
COMMUNITY
Start: 2024-09-30

## 2024-12-12 RX ORDER — NYSTATIN 100000 [USP'U]/ML
SUSPENSION ORAL
COMMUNITY

## 2024-12-12 RX ORDER — INSULIN LISPRO 100 [IU]/ML
INJECTION, SOLUTION INTRAVENOUS; SUBCUTANEOUS
COMMUNITY
Start: 2024-08-21

## 2024-12-12 RX ORDER — ZINC OXIDE 13 %
CREAM (GRAM) TOPICAL
COMMUNITY

## 2024-12-12 RX ORDER — INSULIN DEGLUDEC 100 U/ML
40 INJECTION, SOLUTION SUBCUTANEOUS DAILY
COMMUNITY
Start: 2024-11-26 | End: 2025-02-24

## 2024-12-12 RX ORDER — INSULIN LISPRO 100 [IU]/ML
8 INJECTION, SOLUTION INTRAVENOUS; SUBCUTANEOUS 3 TIMES DAILY
COMMUNITY
Start: 2024-09-26 | End: 2025-09-21

## 2024-12-12 NOTE — LETTER
December 12, 2024     RATNA Borrego  [address]  [place] [address]    Patient: Michelle Espinoza   YOB: 1945   Date of Visit: 12/12/2024       Dear RATNA Borrego,    Michelle Espinoza was in my office today. Below are the relevant portions of my assessment and plan of care.           If you have questions, please do not hesitate to call me. I look forward to following Michelle along with you.         Sincerely,        Leydi Romo MD        CC: No Recipients

## 2024-12-16 RX ORDER — CARVEDILOL 12.5 MG/1
12.5 TABLET ORAL 2 TIMES DAILY WITH MEALS
Qty: 60 TABLET | Refills: 2 | Status: SHIPPED | OUTPATIENT
Start: 2024-12-16

## 2024-12-16 RX ORDER — HYDRALAZINE HYDROCHLORIDE 10 MG/1
10 TABLET, FILM COATED ORAL 2 TIMES DAILY
Qty: 180 TABLET | Refills: 1 | Status: SHIPPED | OUTPATIENT
Start: 2024-12-16

## 2025-01-09 DIAGNOSIS — I65.22 ATHEROSCLEROSIS OF LEFT CAROTID ARTERY: Primary | ICD-10-CM

## 2025-01-09 DIAGNOSIS — I10 ESSENTIAL HYPERTENSION, BENIGN: ICD-10-CM

## 2025-01-09 RX ORDER — CARVEDILOL 12.5 MG/1
12.5 TABLET ORAL 2 TIMES DAILY WITH MEALS
Qty: 180 TABLET | Refills: 1 | Status: SHIPPED | OUTPATIENT
Start: 2025-01-09

## 2025-01-28 ENCOUNTER — OFFICE VISIT (OUTPATIENT)
Dept: FAMILY MEDICINE CLINIC | Facility: CLINIC | Age: 80
End: 2025-01-28
Payer: MEDICARE

## 2025-01-28 VITALS
RESPIRATION RATE: 17 BRPM | HEIGHT: 63 IN | SYSTOLIC BLOOD PRESSURE: 130 MMHG | BODY MASS INDEX: 32.78 KG/M2 | DIASTOLIC BLOOD PRESSURE: 80 MMHG | HEART RATE: 73 BPM | TEMPERATURE: 98.1 F | OXYGEN SATURATION: 98 % | WEIGHT: 185 LBS

## 2025-01-28 DIAGNOSIS — M79.642 PAIN IN BOTH HANDS: ICD-10-CM

## 2025-01-28 DIAGNOSIS — F32.89 OTHER DEPRESSION: ICD-10-CM

## 2025-01-28 DIAGNOSIS — M79.641 PAIN IN BOTH HANDS: ICD-10-CM

## 2025-01-28 DIAGNOSIS — M19.042 PRIMARY OSTEOARTHRITIS OF BOTH HANDS: Primary | ICD-10-CM

## 2025-01-28 DIAGNOSIS — M19.041 PRIMARY OSTEOARTHRITIS OF BOTH HANDS: Primary | ICD-10-CM

## 2025-01-28 PROCEDURE — 3079F DIAST BP 80-89 MM HG: CPT | Performed by: NURSE PRACTITIONER

## 2025-01-28 PROCEDURE — 3075F SYST BP GE 130 - 139MM HG: CPT | Performed by: NURSE PRACTITIONER

## 2025-01-28 PROCEDURE — 1160F RVW MEDS BY RX/DR IN RCRD: CPT | Performed by: NURSE PRACTITIONER

## 2025-01-28 PROCEDURE — 1125F AMNT PAIN NOTED PAIN PRSNT: CPT | Performed by: NURSE PRACTITIONER

## 2025-01-28 PROCEDURE — 99214 OFFICE O/P EST MOD 30 MIN: CPT | Performed by: NURSE PRACTITIONER

## 2025-01-28 PROCEDURE — 1159F MED LIST DOCD IN RCRD: CPT | Performed by: NURSE PRACTITIONER

## 2025-01-28 RX ORDER — DULOXETIN HYDROCHLORIDE 20 MG/1
20 CAPSULE, DELAYED RELEASE ORAL DAILY
Qty: 90 CAPSULE | Refills: 0 | Status: SHIPPED | OUTPATIENT
Start: 2025-01-28

## 2025-01-28 NOTE — PROGRESS NOTES
Subjective     Michelle Espinoza is a 79 y.o.. female.     Patient has seen [facility] rheumatology in past with labs and imaging that ruled out RA, dx with OA of hands. Patient stating she has been seen by hand specialist in past as well.     Extremity Pain   The pain is present in the right hand and right fingers. The problem has been unchanged. The quality of the pain is described as aching, burning, cramping, shooting and stabbing. The pain is at a severity of 9/10. Associated symptoms include numbness, tingling and difficulty holding things.   Hand Pain   Associated symptoms include numbness and tingling.     PHQ-9 Depression Screening  Little interest or pleasure in doing things? Not at all   Feeling down, depressed, or hopeless? Not at all   PHQ-2 Total Score 0   Trouble falling or staying asleep, or sleeping too much?     Feeling tired or having little energy?     Poor appetite or overeating?     Feeling bad about yourself - or that you are a failure or have let yourself or your family down?     Trouble concentrating on things, such as reading the newspaper or watching television?     Moving or speaking so slowly that other people could have noticed? Or the opposite - being so fidgety or restless that you have been moving around a lot more than usual?     Thoughts that you would be better off dead, or of hurting yourself in some way?     PHQ-9 Total Score     If you checked off any problems, how difficult have these problems made it for you to do your work, take care of things at home, or get along with other people? Not difficult at all        The following portions of the patient's history were reviewed and updated as appropriate: allergies, current medications, past family history, past medical history, past social history, past surgical history and problem list.    Past Medical History:   Diagnosis Date    Anesthesia complication     STRONG GAG REFLEX    Benign colonic polyp     Chronic back pain      Claustrophobia     Coronary artery disease 2021    PTCA WITH PLACEMENT OF STENT. PLAVIX ONLY TO BE HELD ONE DAY    DDD (degenerative disc disease), cervical     DDD (degenerative disc disease), lumbosacral     Diabetes mellitus     TYPE 2    Essential hypertension, benign 2024    Frequent UTI     History of MI (myocardial infarction) 2021    History of pyelonephritis 2014    History of sepsis     Hypercholesteremia     Left knee pain     DRAINED  21    Leg muscle spasm     Neuropathy     LEFT FOOT. AS RESULT OF BACK PROBLEMS    Osteoarthritis     Osteoporosis     Overactive bladder     Type 2 diabetes mellitus     Ureteral calculus, left     Weakness     LOWER EXTREMITES RELATED FROM NERVE DAMAGE FROM BACK       Past Surgical History:   Procedure Laterality Date    APPENDECTOMY      BACK SURGERY      MULTIPLE. WITH HARDWARE    CARDIAC CATHETERIZATION N/A 2021    Procedure: Left Heart Cath;  Surgeon: Leydi Romo MD;  Location:  [facility];  Service: Cardiovascular;  Laterality: N/A;    CARDIAC CATHETERIZATION  2021    Procedure: Percutaneous Manual Thrombectomy;  Surgeon: Leydi Romo MD;  Location:  [facility];  Service: Cardiovascular;;    CARDIAC CATHETERIZATION N/A 2021    Procedure: Percutaneous Coronary Intervention;  Surgeon: Leydi Romo MD;  Location:  [facility];  Service: Cardiovascular;  Laterality: N/A;    CARDIAC CATHETERIZATION N/A 2021    Procedure: Coronary angiography;  Surgeon: Leydi Romo MD;  Location:  [facility];  Service: Cardiovascular;  Laterality: N/A;    CARPAL TUNNEL RELEASE      LEFT X2 RIGHT     CATARACT EXTRACTION WITH INTRAOCULAR LENS IMPLANT      LEFT AND RIGHT    CERVICAL SPINE SURGERY      MULTIPLE C3-4 C6-7     SECTION      x 2    COLONOSCOPY  2018    CORONARY STENT PLACEMENT  2021    PARATHYROIDECTOMY Left 2019     "Procedure: PARATHYROIDECTOMY LEFT INFERIOR;  Surgeon: Mason Prather MD;  Location: [facility];  Service: ENT    THYROID SURGERY      TRICEP TENDON REPAIR Left 05/25/2021    Procedure: LEFT OPEN TRICEPS REPAIR;  Surgeon: Luis Cho II, MD;  Location: [facility];  Service: Orthopedics;  Laterality: Left;    URETEROSCOPY LASER LITHOTRIPSY WITH STENT INSERTION Left 09/28/2021    Procedure: LEFT URETEROSCOPY LASER LITHOTRIPSY, STONE BASKET EXTRACTION STENT;  Surgeon: Jose Luis Nelson Jr., MD;  Location: [facility];  Service: Urology;  Laterality: Left;       Review of Systems   Constitutional: Negative.    Respiratory: Negative.     Cardiovascular: Negative.    Neurological:  Positive for tingling and numbness.   Psychiatric/Behavioral:  Positive for agitation (anger due to hands and legs) and dysphoric mood. Negative for self-injury, sleep disturbance and suicidal ideas. The patient is not nervous/anxious.        Allergies   Allergen Reactions    Other Nausea And Vomiting     The mercury in [place]    Penicillins Hives    Statins Myalgia    Latex Rash    Nickel Rash       Objective     Vitals:    01/28/25 1344 01/28/25 1413   BP: 144/88 130/80   BP Location: Right arm    Patient Position: Sitting    Cuff Size: Adult    Pulse: 73    Resp: 17    Temp: 98.1 °F (36.7 °C)    TempSrc: Temporal    SpO2: 98%    Weight: 83.9 kg (185 lb)    Height: 160 cm (62.99\")      Body mass index is 32.78 kg/m².    Physical Exam  Vitals reviewed.   HENT:      Head: Normocephalic.   Eyes:      Pupils: Pupils are equal, round, and reactive to light.   Cardiovascular:      Rate and Rhythm: Normal rate and regular rhythm.   Pulmonary:      Effort: Pulmonary effort is normal.      Breath sounds: Normal breath sounds.   Musculoskeletal:      Right hand: Deformity (finger contractures) and tenderness present. No swelling. Decreased range of motion. Decreased strength. Normal pulse.      Left hand: Deformity (finger " contractures) and tenderness present. No swelling. Decreased range of motion. Decreased strength. Normal pulse.      Comments: Wheel chair   Skin:     General: Skin is warm and dry.   Neurological:      Mental Status: She is alert and oriented to person, place, and time.      Cranial Nerves: No dysarthria or facial asymmetry.   Psychiatric:         Attention and Perception: Attention normal.         Mood and Affect: Mood normal.         Speech: Speech normal.         Behavior: Behavior is cooperative.         Thought Content: Thought content normal.         Cognition and Memory: Cognition is not impaired. Memory is not impaired. She does not exhibit impaired recent memory or impaired remote memory.         Judgment: Judgment normal.           Current Outpatient Medications:     Ascorbic Acid 500 MG chewable tablet, Chew 500 mg Daily., Disp: , Rfl:     aspirin 81 MG chewable tablet, Chew 1 tablet Daily., Disp: , Rfl:     atorvastatin (LIPITOR) 20 MG tablet, TAKE ONE TABLET BY MOUTH DAILY, Disp: 90 tablet, Rfl: 3    Baclofen (LIORESAL) 5 MG tablet, TAKE ONE TABLET BY MOUTH ONCE NIGHTLY AS NEEDED FOR MUSCLE SPASMS TO LOWER LEGS (RESTLESS LEG SYNDROME), Disp: 30 tablet, Rfl: 2    BD Pen Needle Micro U/F 32G X 6 MM misc, , Disp: , Rfl:     carvedilol (COREG) 12.5 MG tablet, Take 1 tablet by mouth 2 (Two) Times a Day With Meals., Disp: 180 tablet, Rfl: 1    Cholecalciferol (VITAMIN D3) 5000 UNITS capsule capsule, Take 2 capsules by mouth Daily., Disp: , Rfl:     COLLAGEN PO, Take 3 tablets by mouth Daily., Disp: , Rfl:     cyanocobalamin (VITAMIN B-12) 1000 MCG tablet, Take 1 tablet by mouth Daily., Disp: , Rfl:     Glucos-Chondroit-Hyaluron-MSM (GLUCOSAMINE CHONDROITIN JOINT PO), Take  by mouth 2 (Two) Times a Day. 2000-250mg, Disp: , Rfl:     glucose blood (OneTouch Ultra) test strip, Dx code E11.59 testing bs 4 x day, Disp: 400 each, Rfl: 3    Gvoke HypoPen 2-Pack 1 MG/0.2ML solution auto-injector, Inject 1 each under  the skin into the appropriate area as directed., Disp: , Rfl:     hydrALAZINE (APRESOLINE) 10 MG tablet, TAKE 1 TABLET BY MOUTH 2 TIMES A DAY, Disp: 180 tablet, Rfl: 1    Insulin Lispro (humaLOG) 100 UNIT/ML injection, Inject 8 Units under the skin into the appropriate area as directed 3 (Three) Times a Day., Disp: , Rfl:     Insulin Lispro, 1 Unit Dial, (HumaLOG KwikPen) 100 UNIT/ML solution pen-injector, , Disp: , Rfl:     magnesium 30 MG tablet, Take 3 tablets by mouth Daily., Disp: , Rfl:     ofloxacin (OCUFLOX) 0.3 % ophthalmic solution, , Disp: , Rfl:     Probiotic Product (Probiotic Daily) capsule, Take  by mouth., Disp: , Rfl:     Tresiba FlexTouch 100 UNIT/ML solution pen-injector injection, Inject 40 Units under the skin into the appropriate area as directed Daily., Disp: , Rfl:     DULoxetine (Cymbalta) 20 MG capsule, Take 1 capsule by mouth Daily., Disp: 90 capsule, Rfl: 0    furosemide (LASIX) 40 MG tablet, TAKE 1 TABLET BY MOUTH DAILY (Patient not taking: Reported on 1/28/2025), Disp: 90 tablet, Rfl: 0    glimepiride (AMARYL) 4 MG tablet, Take 1 tablet by mouth 2 (Two) Times a Day. (Patient not taking: Reported on 1/28/2025), Disp: 180 tablet, Rfl: 1    nystatin (MYCOSTATIN) 100,000 unit/mL suspension, , Disp: , Rfl:     Probiotic Product (PROBIOTIC PO), Take 1 tablet by mouth Daily., Disp: , Rfl:     solifenacin (VESICARE) 10 MG tablet, , Disp: , Rfl:       Diagnoses and all orders for this visit:    1. Primary osteoarthritis of both hands (Primary)  -     Ambulatory Referral to Physical Therapy for Evaluation & Treatment    2. Other depression  -     DULoxetine (Cymbalta) 20 MG capsule; Take 1 capsule by mouth Daily.  Dispense: 90 capsule; Refill: 0    3. Pain in both hands  -     DULoxetine (Cymbalta) 20 MG capsule; Take 1 capsule by mouth Daily.  Dispense: 90 capsule; Refill: 0        Patient Instructions   OA of hands/pain in hands: Discussed osteoarthritis, recommend ROM exercises as able daily.  Keep areas warm to prevent discomfort. May use compression gloves prn and use otc voltaren gel prn to help with discomfort as well. Discussed pain management vs rheumatology vs hand specialist vs physical therapy. Patient agreeing with referral to physical therapy. Patient declined other management options. Discussed further discussing dry needling with physical therapy.     Depression: some depression noted when discussing hands and mobility; Patient was once on cymbalta, taken off due to kidney function; Patient's last labs in 8/2024 showed kidney function wnl, will start low dose cymbalta and follow up in March.       Return if symptoms worsen or fail to improve, for fasting labs in 2 months, recheck in 2 months.    Answers submitted by the patient for this visit:  Lower Extremity Injury Questionnaire (Submitted on 1/25/2025)  Chief Complaint: Extremity pain  Injury: No

## 2025-01-28 NOTE — PATIENT INSTRUCTIONS
OA of hands/pain in hands: Discussed osteoarthritis, recommend ROM exercises as able daily. Keep areas warm to prevent discomfort. May use compression gloves prn and use otc voltaren gel prn to help with discomfort as well. Discussed pain management vs rheumatology vs hand specialist vs physical therapy. Patient agreeing with referral to physical therapy. Patient declined other management options. Discussed further discussing dry needling with physical therapy.     Depression: some depression noted when discussing hands and mobility; Patient was once on cymbalta, taken off due to kidney function; Patient's last labs in 8/2024 showed kidney function wnl, will start low dose cymbalta and follow up in March.

## 2025-02-24 RX ORDER — ATORVASTATIN CALCIUM 20 MG/1
20 TABLET, FILM COATED ORAL DAILY
Qty: 90 TABLET | Refills: 3 | Status: SHIPPED | OUTPATIENT
Start: 2025-02-24

## 2025-03-16 DIAGNOSIS — M62.838 MUSCLE SPASMS OF BOTH LOWER EXTREMITIES: ICD-10-CM

## 2025-03-17 RX ORDER — BACLOFEN 5 MG/1
TABLET ORAL
Qty: 30 TABLET | Refills: 2 | Status: SHIPPED | OUTPATIENT
Start: 2025-03-17

## 2025-03-20 ENCOUNTER — LAB (OUTPATIENT)
Dept: FAMILY MEDICINE CLINIC | Facility: CLINIC | Age: 80
End: 2025-03-20
Payer: MEDICARE

## 2025-03-20 DIAGNOSIS — E11.42 POLYNEUROPATHY DUE TO TYPE 2 DIABETES MELLITUS: ICD-10-CM

## 2025-03-20 DIAGNOSIS — R35.0 URINARY FREQUENCY: ICD-10-CM

## 2025-03-20 DIAGNOSIS — N18.31 STAGE 3A CHRONIC KIDNEY DISEASE: ICD-10-CM

## 2025-03-20 DIAGNOSIS — I50.32 CHRONIC DIASTOLIC CHF (CONGESTIVE HEART FAILURE): ICD-10-CM

## 2025-03-20 DIAGNOSIS — I10 ESSENTIAL HYPERTENSION, BENIGN: Primary | ICD-10-CM

## 2025-03-20 DIAGNOSIS — E78.5 HYPERLIPIDEMIA, UNSPECIFIED HYPERLIPIDEMIA TYPE: ICD-10-CM

## 2025-03-20 DIAGNOSIS — E11.59 TYPE 2 DIABETES MELLITUS WITH OTHER CIRCULATORY COMPLICATIONS: ICD-10-CM

## 2025-03-21 LAB
ALBUMIN SERPL-MCNC: 4.1 G/DL (ref 3.8–4.8)
ALP SERPL-CCNC: 103 IU/L (ref 44–121)
ALT SERPL-CCNC: 17 IU/L (ref 0–32)
AST SERPL-CCNC: 20 IU/L (ref 0–40)
BASOPHILS # BLD AUTO: 0 X10E3/UL (ref 0–0.2)
BASOPHILS NFR BLD AUTO: 0 %
BILIRUB SERPL-MCNC: 0.5 MG/DL (ref 0–1.2)
BUN SERPL-MCNC: 24 MG/DL (ref 8–27)
BUN/CREAT SERPL: 28 (ref 12–28)
CALCIUM SERPL-MCNC: 10 MG/DL (ref 8.7–10.3)
CHLORIDE SERPL-SCNC: 104 MMOL/L (ref 96–106)
CHOLEST SERPL-MCNC: 112 MG/DL (ref 100–199)
CO2 SERPL-SCNC: 23 MMOL/L (ref 20–29)
CREAT SERPL-MCNC: 0.87 MG/DL (ref 0.57–1)
EGFRCR SERPLBLD CKD-EPI 2021: 69 ML/MIN/1.73
EOSINOPHIL # BLD AUTO: 0.1 X10E3/UL (ref 0–0.4)
EOSINOPHIL NFR BLD AUTO: 1 %
ERYTHROCYTE [DISTWIDTH] IN BLOOD BY AUTOMATED COUNT: 12.9 % (ref 11.7–15.4)
GLOBULIN SER CALC-MCNC: 3.1 G/DL (ref 1.5–4.5)
GLUCOSE SERPL-MCNC: 121 MG/DL (ref 70–99)
HBA1C MFR BLD: 7.7 % (ref 4.8–5.6)
HCT VFR BLD AUTO: 43.6 % (ref 34–46.6)
HDLC SERPL-MCNC: 47 MG/DL
HGB BLD-MCNC: 13.8 G/DL (ref 11.1–15.9)
IMM GRANULOCYTES # BLD AUTO: 0 X10E3/UL (ref 0–0.1)
IMM GRANULOCYTES NFR BLD AUTO: 0 %
LDLC SERPL CALC-MCNC: 44 MG/DL (ref 0–99)
LDLC/HDLC SERPL: 0.9 RATIO (ref 0–3.2)
LYMPHOCYTES # BLD AUTO: 1.2 X10E3/UL (ref 0.7–3.1)
LYMPHOCYTES NFR BLD AUTO: 14 %
MCH RBC QN AUTO: 28.3 PG (ref 26.6–33)
MCHC RBC AUTO-ENTMCNC: 31.7 G/DL (ref 31.5–35.7)
MCV RBC AUTO: 89 FL (ref 79–97)
MONOCYTES # BLD AUTO: 0.8 X10E3/UL (ref 0.1–0.9)
MONOCYTES NFR BLD AUTO: 9 %
NEUTROPHILS # BLD AUTO: 6.3 X10E3/UL (ref 1.4–7)
NEUTROPHILS NFR BLD AUTO: 76 %
PLATELET # BLD AUTO: 236 X10E3/UL (ref 150–450)
POTASSIUM SERPL-SCNC: 5.1 MMOL/L (ref 3.5–5.2)
PROT SERPL-MCNC: 7.2 G/DL (ref 6–8.5)
RBC # BLD AUTO: 4.88 X10E6/UL (ref 3.77–5.28)
SODIUM SERPL-SCNC: 140 MMOL/L (ref 134–144)
TRIGL SERPL-MCNC: 119 MG/DL (ref 0–149)
TSH SERPL DL<=0.005 MIU/L-ACNC: 1.9 UIU/ML (ref 0.45–4.5)
UNABLE TO VOID: NORMAL
VLDLC SERPL CALC-MCNC: 21 MG/DL (ref 5–40)
WBC # BLD AUTO: 8.4 X10E3/UL (ref 3.4–10.8)

## 2025-03-25 ENCOUNTER — OFFICE VISIT (OUTPATIENT)
Dept: FAMILY MEDICINE CLINIC | Facility: CLINIC | Age: 80
End: 2025-03-25
Payer: MEDICARE

## 2025-03-25 VITALS
HEART RATE: 76 BPM | OXYGEN SATURATION: 95 % | BODY MASS INDEX: 32.78 KG/M2 | RESPIRATION RATE: 16 BRPM | HEIGHT: 63 IN | SYSTOLIC BLOOD PRESSURE: 124 MMHG | TEMPERATURE: 98.4 F | DIASTOLIC BLOOD PRESSURE: 80 MMHG

## 2025-03-25 DIAGNOSIS — G25.81 RESTLESS LEG SYNDROME: ICD-10-CM

## 2025-03-25 DIAGNOSIS — E55.9 VITAMIN D DEFICIENCY: ICD-10-CM

## 2025-03-25 DIAGNOSIS — E66.09 CLASS 1 OBESITY DUE TO EXCESS CALORIES WITH SERIOUS COMORBIDITY AND BODY MASS INDEX (BMI) OF 32.0 TO 32.9 IN ADULT: ICD-10-CM

## 2025-03-25 DIAGNOSIS — N18.2 STAGE 2 CHRONIC KIDNEY DISEASE: ICD-10-CM

## 2025-03-25 DIAGNOSIS — Z00.00 MEDICARE ANNUAL WELLNESS VISIT, SUBSEQUENT: Primary | ICD-10-CM

## 2025-03-25 DIAGNOSIS — E78.5 HYPERLIPIDEMIA, UNSPECIFIED HYPERLIPIDEMIA TYPE: ICD-10-CM

## 2025-03-25 DIAGNOSIS — Z91.81 AT MODERATE RISK FOR FALL: ICD-10-CM

## 2025-03-25 DIAGNOSIS — E66.811 CLASS 1 OBESITY DUE TO EXCESS CALORIES WITH SERIOUS COMORBIDITY AND BODY MASS INDEX (BMI) OF 32.0 TO 32.9 IN ADULT: ICD-10-CM

## 2025-03-25 DIAGNOSIS — M17.0 PRIMARY OSTEOARTHRITIS OF BOTH KNEES: ICD-10-CM

## 2025-03-25 DIAGNOSIS — M62.838 MUSCLE SPASMS OF BOTH LOWER EXTREMITIES: ICD-10-CM

## 2025-03-25 DIAGNOSIS — E53.8 B12 DEFICIENCY: ICD-10-CM

## 2025-03-25 DIAGNOSIS — I10 ESSENTIAL HYPERTENSION, BENIGN: ICD-10-CM

## 2025-03-25 DIAGNOSIS — E11.59 TYPE 2 DIABETES MELLITUS WITH OTHER CIRCULATORY COMPLICATIONS: ICD-10-CM

## 2025-03-25 RX ORDER — BACLOFEN 5 MG/1
10 TABLET ORAL NIGHTLY PRN
Qty: 30 TABLET | Refills: 2 | Status: SHIPPED | OUTPATIENT
Start: 2025-03-25

## 2025-03-25 RX ORDER — INSULIN DEGLUDEC 100 U/ML
40 INJECTION, SOLUTION SUBCUTANEOUS DAILY
COMMUNITY
Start: 2025-03-17

## 2025-03-25 RX ORDER — CHOLECALCIFEROL (VITAMIN D3) 25 MCG
CAPSULE ORAL
COMMUNITY

## 2025-03-25 RX ORDER — CARVEDILOL 6.25 MG/1
TABLET ORAL
COMMUNITY

## 2025-03-25 NOTE — ASSESSMENT & PLAN NOTE
Patient stating her RLS/muscle spasms to legs are still giving her issues at night, will increase bactrim to 10 mg 1 tab nightly prn, if no improvement will switch to new drug class. Patient verb. Understanding.

## 2025-03-25 NOTE — ASSESSMENT & PLAN NOTE
HgA1c 7.7, Patient sees konrad Martin; continue maintenance medications as prescribed, follow up with endocrinology, continue to work on eating a heart healthy low carb low calorie diet    Orders:    Hemoglobin A1c; Future    Microalbumin / Creatinine Urine Ratio - Urine, Clean Catch; Future

## 2025-03-25 NOTE — ASSESSMENT & PLAN NOTE
Orders:    Baclofen (LIORESAL) 5 MG tablet; Take 2 tablets by mouth At Night As Needed (muscle spasms to lower legs).    Magnesium; Future

## 2025-03-25 NOTE — ASSESSMENT & PLAN NOTE
Need to recheck at next lab draw; continue supplements as current  Orders:    Vitamin D,25-Hydroxy; Future

## 2025-03-25 NOTE — ASSESSMENT & PLAN NOTE
Stable, lipid panel looks good, continue maintenance medication as current    Orders:    Lipid Panel With LDL / HDL Ratio; Future

## 2025-03-25 NOTE — ASSESSMENT & PLAN NOTE
Patient's (Body mass index is 32.78 kg/m².) indicates that they are obese (BMI >30) with health conditions that include hypertension, coronary heart disease, diabetes mellitus, dyslipidemias, peripheral vascular disease, and osteoarthritis . Weight is unchanged. BMI  is above average; BMI management plan is completed. We discussed low calorie, low carb based diet program, portion control, increasing exercise, and continue with physical therapy.

## 2025-03-25 NOTE — ASSESSMENT & PLAN NOTE
Stable, continue to monitor, continue to drink plenty of water throughout the day    Orders:    Comprehensive Metabolic Panel; Future

## 2025-03-25 NOTE — PROGRESS NOTES
Subjective   The ABCs of the Annual Wellness Visit  Medicare Wellness Visit      Michelle Espinoza is a 79 y.o. patient who presents for a Medicare Wellness Visit.    The following portions of the patient's history were reviewed and   updated as appropriate: allergies, current medications, past family history, past medical history, past social history, past surgical history, and problem list.    Compared to one year ago, the patient's physical   health is the same.  Compared to one year ago, the patient's mental   health is the same.    Recent Hospitalizations:  She was not admitted to the hospital during the last year.     Current Medical Providers:  Patient Care Team:  Mya Pineda APRN as PCP - General (Family Medicine)  Jose Luis Nelson Jr., MD as Consulting Physician (Urology)  Bon Gardiner MD as Consulting Physician (Hand Surgery)  Milly Crow MD as Consulting Physician (Neurology)  Sacha Garcia APRN as Nurse Practitioner (Neurology)  Johnnie Soni MD as Consulting Physician (Neurology)  Mahamed Massey MD as Consulting Physician (Obstetrics and Gynecology)  Clement Tierney MD as Surgeon (Neurosurgery)  Donna Olsen MA (Inactive) as Medical Assistant  Mahamed Massey MD as Consulting Physician (Obstetrics and Gynecology)  Milly Crow MD as Consulting Physician (Neurology)  Jeffry Bhagat MD as Consulting Physician (Ophthalmology)  Leydi Romo MD as Consulting Physician (Cardiology)  Sangeeta Raman MD as Consulting Physician (Rheumatology)    Outpatient Medications Prior to Visit   Medication Sig Dispense Refill    Ascorbic Acid 500 MG chewable tablet Chew 500 mg Daily.      aspirin 81 MG chewable tablet Chew 1 tablet Daily.      atorvastatin (LIPITOR) 20 MG tablet TAKE 1 TABLET BY MOUTH DAILY 90 tablet 3    BD Pen Needle Micro U/F 32G X 6 MM misc       carvedilol (COREG) 12.5 MG tablet Take 1 tablet by mouth 2 (Two) Times a Day With Meals. 180 tablet 1    Cholecalciferol (VITAMIN  D3) 5000 UNITS capsule capsule Take 2 capsules by mouth Daily.      COLLAGEN PO Take 3 tablets by mouth Daily.      cyanocobalamin (VITAMIN B-12) 1000 MCG tablet Take 1 tablet by mouth Daily.      furosemide (LASIX) 40 MG tablet TAKE 1 TABLET BY MOUTH DAILY 90 tablet 0    glimepiride (AMARYL) 4 MG tablet Take 1 tablet by mouth 2 (Two) Times a Day. 180 tablet 1    Glucos-Chondroit-Hyaluron-MSM (GLUCOSAMINE CHONDROITIN JOINT PO) Take  by mouth 2 (Two) Times a Day. 2000-250mg      glucose blood (OneTouch Ultra) test strip Dx code E11.59 testing bs 4 x day 400 each 3    Gvoke HypoPen 2-Pack 1 MG/0.2ML solution auto-injector Inject 1 each under the skin into the appropriate area as directed.      hydrALAZINE (APRESOLINE) 10 MG tablet TAKE 1 TABLET BY MOUTH 2 TIMES A  tablet 1    Insulin Lispro (humaLOG) 100 UNIT/ML injection Inject 8 Units under the skin into the appropriate area as directed 3 (Three) Times a Day.      Insulin Lispro, 1 Unit Dial, (HumaLOG KwikPen) 100 UNIT/ML solution pen-injector       magnesium 30 MG tablet Take 3 tablets by mouth Daily.      nystatin (MYCOSTATIN) 100,000 unit/mL suspension       ofloxacin (OCUFLOX) 0.3 % ophthalmic solution       Probiotic Product (Probiotic Daily) capsule Take  by mouth.      Probiotic Product (PROBIOTIC PO) Take 1 tablet by mouth Daily.      Tresiba FlexTouch 100 UNIT/ML solution pen-injector injection 40 Units Daily.      Baclofen (LIORESAL) 5 MG tablet TAKE ONE TABLET BY MOUTH ONCE NIGHTLY AS NEEDED FOR MUSCLE SPASMS TO LOWER LEGS (RESTLESS LEG SYNDROME) 30 tablet 2    carvedilol (COREG) 6.25 MG tablet       Cholecalciferol (Vitamin D-3) 25 MCG (1000 UT) capsule Take  by mouth.      DULoxetine (Cymbalta) 20 MG capsule Take 1 capsule by mouth Daily. 90 capsule 0    solifenacin (VESICARE) 10 MG tablet  (Patient not taking: Reported on 1/28/2025)       No facility-administered medications prior to visit.     No opioid medication identified on active  medication list. I have reviewed chart for other potential  high risk medication/s and harmful drug interactions in the elderly.      Aspirin is on active medication list. Aspirin use is indicated based on review of current medical condition/s. Pros and cons of this therapy have been discussed today. Benefits of this medication outweigh potential harm.  Patient has been encouraged to continue taking this medication.  .      Patient Active Problem List   Diagnosis    Abnormal weight gain    Arm pain    Arthritis    Atonic neurogenic bladder    Benign colonic polyp    Carotid atherosclerosis    Fatigue    Hypercalcemia    HLD (hyperlipidemia)    Weakness of lower extremity    Nephrolithiasis    Spinal stenosis    Urinary tract infection    Chronic venous insufficiency    B12 deficiency    Vitamin D deficiency    Degenerative disc disease, lumbar    T7 and T10-11 Thoracic spinal stenosis    Weakness    CKD (chronic kidney disease)    MINERVA (acute kidney injury)    Stenosis of right carotid artery    Hyperparathyroidism    Type 2 diabetes mellitus with other circulatory complications    Osteoporosis    History of inferior wall myocardial infarction    Coronary artery disease involving native coronary artery of native heart without angina pectoris    History of ischemic cardiomyopathy    PVC's (premature ventricular contractions)    Status post coronary artery stent placement    Acquired hammer toe of left foot    Hereditary and idiopathic neuropathy, unspecified    Onychomycosis    Peripheral vascular disease    Polyneuropathy due to type 2 diabetes mellitus    Ulcer of toe of left foot    Pain in limb    Osteoarthritis    Cellulitis of right lower extremity    Chronic diastolic CHF (congestive heart failure)    Essential hypertension, benign    MINERVA (acute kidney injury)    Cellulitis    Arthralgia of right knee    Bilateral carpal tunnel syndrome    Cervical radiculopathy    Polyneuropathy    Muscle spasms of both lower  "extremities    Chronic venous stasis    Restless leg syndrome    Ulcer of extremity due to chronic venous insufficiency    Scoliosis of lumbar spine    Osteoarthritis of right knee    Class 1 obesity due to excess calories with serious comorbidity and body mass index (BMI) of 32.0 to 32.9 in adult     Advance Care Planning Advance Directive is on file.  ACP discussion was held with the patient during this visit. Patient has an advance directive in EMR which is still valid.             Objective   Vitals:    25 1507 25 1531   BP: 124/80    BP Location: Left arm    Patient Position: Sitting    Cuff Size: Adult    Pulse: (!) 42 76   Resp: 16    Temp: 98.4 °F (36.9 °C)    TempSrc: Oral    SpO2: 95%    Height: 160 cm (62.99\")    PainSc: 0-No pain        Estimated body mass index is 32.78 kg/m² as calculated from the following:    Height as of this encounter: 160 cm (62.99\").    Weight as of 25: 83.9 kg (185 lb).                Does the patient have evidence of cognitive impairment? No  Lab Results   Component Value Date    CHLPL 112 2025    TRIG 119 2025    HDL 47 2025    LDL 44 2025    VLDL 21 2025    HGBA1C 7.7 (H) 2025                                                                                                Health  Risk Assessment    Smoking Status:  Social History     Tobacco Use   Smoking Status Former    Current packs/day: 0.00    Average packs/day: 1 pack/day for 27.0 years (27.0 ttl pk-yrs)    Types: Cigarettes    Start date: 1965    Quit date: 1992    Years since quittin.2    Passive exposure: Past   Smokeless Tobacco Never   Tobacco Comments    CAFFEINE USE: ICE TEA OCC.     Alcohol Consumption:  Social History     Substance and Sexual Activity   Alcohol Use Not Currently    Comment: Socially.       Fall Risk Screen  STEADI Fall Risk Assessment was completed, and patient is at MODERATE risk for falls. Assessment completed " on:3/25/2025    Depression Screening   Little interest or pleasure in doing things? Not at all   Feeling down, depressed, or hopeless? Not at all   PHQ-2 Total Score 0      Health Habits and Functional and Cognitive Screening:      3/25/2025     3:05 PM   Functional & Cognitive Status   Do you have difficulty bathing yourself, getting dressed or grooming yourself? No   Do you have difficulty using the toilet? No   Do you have difficulty moving around from place to place? No   Do you have trouble with steps or getting out of a bed or a chair? Yes   Current Diet Well Balanced Diet   Dental Exam Up to date   Eye Exam Up to date   Exercise (times per week) 3 times per week   Current Exercises Include Walking   Do you need help using the phone?  No   Are you deaf or do you have serious difficulty hearing?  No   Do you need help to go to places out of walking distance? Yes   Do you need help shopping? Yes   Do you need help preparing meals?  Yes   Do you need help with housework?  Yes   Do you need help with laundry? Yes   Do you need help taking your medications? No   Do you need help managing money? No   Do you ever drive or ride in a car without wearing a seat belt? No   Have you felt unusual stress, anger or loneliness in the last month? No   Who do you live with? Spouse   If you need help, do you have trouble finding someone available to you? Yes   Have you been bothered in the last four weeks by sexual problems? No   Do you have difficulty concentrating, remembering or making decisions? No           Age-appropriate Screening Schedule:  Refer to the list below for future screening recommendations based on patient's age, sex and/or medical conditions. Orders for these recommended tests are listed in the plan section. The patient has been provided with a written plan.    Health Maintenance List  Health Maintenance   Topic Date Due    URINE MICROALBUMIN-CREATININE RATIO (uACR)  Never done    TDAP/TD VACCINES (1 - Tdap)  "Never done    RSV Vaccine - Adults (1 - 1-dose 75+ series) Never done    INFLUENZA VACCINE  03/31/2025 (Originally 7/1/2024)    COVID-19 Vaccine (5 - 2024-25 season) 04/19/2025 (Originally 9/1/2024)    DXA SCAN  06/02/2025 (Originally 12/4/2020)    COLORECTAL CANCER SCREENING  06/02/2025 (Originally 5/25/1992)    HEMOGLOBIN A1C  09/20/2025    DIABETIC EYE EXAM  11/12/2025    LIPID PANEL  03/20/2026    ANNUAL WELLNESS VISIT  03/25/2026    HEPATITIS C SCREENING  Completed    Pneumococcal Vaccine 50+  Completed    ZOSTER VACCINE  Completed    MAMMOGRAM  Discontinued                                                                                                                                                CMS Preventative Services Quick Reference  Risk Factors Identified During Encounter  Fall Risk-High or Moderate:  Patient currently in physical therapy  Immunizations Discussed/Encouraged: Tdap and RSV (Respiratory Syncytial Virus)  Inactivity/Sedentary: Patient was advised to exercise at least 150 minutes a week per CDC recommendations.  Dental Screening Recommended  Vision Screening Recommended    The above risks/problems have been discussed with the patient.  Pertinent information has been shared with the patient in the After Visit Summary.  An After Visit Summary and PPPS were made available to the patient.    Follow Up:   Next Medicare Wellness visit to be scheduled in 1 year.         Additional E&M Note during same encounter follows:  Patient has additional, significant, and separately identifiable condition(s)/problem(s) that require work above and beyond the Medicare Wellness Visit     Chief Complaint  Medicare Wellness-subsequent    Subjective   HPI  Michelle is also being seen today for additional medical problem/s.                Objective   Vital Signs:  /80 (BP Location: Left arm, Patient Position: Sitting, Cuff Size: Adult)   Pulse 76   Temp 98.4 °F (36.9 °C) (Oral)   Resp 16   Ht 160 cm (62.99\") "   SpO2 95%   BMI 32.78 kg/m²   Physical Exam  Vitals reviewed.   HENT:      Head: Normocephalic.   Eyes:      Pupils: Pupils are equal, round, and reactive to light.   Neck:      Vascular: No carotid bruit.   Cardiovascular:      Rate and Rhythm: Normal rate and regular rhythm.      Pulses: Normal pulses.   Pulmonary:      Effort: Pulmonary effort is normal. No respiratory distress.      Breath sounds: Normal breath sounds. No stridor. No wheezing, rhonchi or rales.   Musculoskeletal:      Right lower leg: No edema.      Left lower leg: No edema.      Comments: Wearing compression stockings  In wheel chair   Skin:     General: Skin is warm and dry.   Neurological:      Mental Status: She is alert and oriented to person, place, and time.   Psychiatric:         Behavior: Behavior normal.         The following data was reviewed by: RATNA Borrego on 03/25/2025:  Data reviewed : labs         Assessment and Plan      Medicare annual wellness visit, subsequent         At moderate risk for fall         Essential hypertension, benign  Stable, continue maintenance medications as current    Orders:    CBC & Differential; Future    Hyperlipidemia, unspecified hyperlipidemia type   Stable, lipid panel looks good, continue maintenance medication as current    Orders:    Lipid Panel With LDL / HDL Ratio; Future    Type 2 diabetes mellitus with other circulatory complications  HgA1c 7.7, Patient sees konrad Martin; continue maintenance medications as prescribed, follow up with endocrinology, continue to work on eating a heart healthy low carb low calorie diet    Orders:    Hemoglobin A1c; Future    Microalbumin / Creatinine Urine Ratio - Urine, Clean Catch; Future    B12 deficiency  Need to recheck at next lab draw; continue supplements as current  Orders:    Vitamin B12; Future    Vitamin D deficiency  Need to recheck at next lab draw; continue supplements as current  Orders:    Vitamin D,25-Hydroxy; Future    Stage 2  chronic kidney disease  Stable, continue to monitor, continue to drink plenty of water throughout the day    Orders:    Comprehensive Metabolic Panel; Future    Primary osteoarthritis of both knees  Patient sees Dr. Cho, ortho, having gel injections in a couple of months, has had done previously, continue to follow up with orthopedic       Restless leg syndrome  Patient stating her RLS/muscle spasms to legs are still giving her issues at night, will increase bactrim to 10 mg 1 tab nightly prn, if no improvement will switch to new drug class. Patient verb. Understanding.        Muscle spasms of both lower extremities    Orders:    Baclofen (LIORESAL) 5 MG tablet; Take 2 tablets by mouth At Night As Needed (muscle spasms to lower legs).    Magnesium; Future    Class 1 obesity due to excess calories with serious comorbidity and body mass index (BMI) of 32.0 to 32.9 in adult  Patient's (Body mass index is 32.78 kg/m².) indicates that they are obese (BMI >30) with health conditions that include hypertension, coronary heart disease, diabetes mellitus, dyslipidemias, peripheral vascular disease, and osteoarthritis . Weight is unchanged. BMI  is above average; BMI management plan is completed. We discussed low calorie, low carb based diet program, portion control, increasing exercise, and continue with physical therapy .                  Follow Up   No follow-ups on file.  Patient was given instructions and counseling regarding her condition or for health maintenance advice. Please see specific information pulled into the AVS if appropriate.

## 2025-03-25 NOTE — ASSESSMENT & PLAN NOTE
Patient sees Dr. Cho, ortho, having gel injections in a couple of months, has had done previously, continue to follow up with orthopedic

## 2025-03-25 NOTE — ASSESSMENT & PLAN NOTE
Need to recheck at next lab draw; continue supplements as current  Orders:    Vitamin B12; Future

## 2025-04-02 ENCOUNTER — TELEPHONE (OUTPATIENT)
Dept: FAMILY MEDICINE CLINIC | Facility: CLINIC | Age: 80
End: 2025-04-02

## 2025-04-02 NOTE — TELEPHONE ENCOUNTER
[facility] CALLED STATING THEY'VE FAXED OVER A POC ON PT TO BE SIGNED TWO TIMES IN MARCH IN THE PAST COUPLE WEEKS BUT HAVE STILL NOT RECEIVED IT. I DID NOT SEE THIS IN MEDIA OR IN PT'S CHART. THEY'RE ASKING FOR THIS TO BE SENT OVER AS SOON AS POSSIBLE.    THANK YOU

## 2025-04-22 DIAGNOSIS — F32.89 OTHER DEPRESSION: ICD-10-CM

## 2025-04-22 DIAGNOSIS — M79.642 PAIN IN BOTH HANDS: ICD-10-CM

## 2025-04-22 DIAGNOSIS — M79.641 PAIN IN BOTH HANDS: ICD-10-CM

## 2025-04-22 RX ORDER — DULOXETIN HYDROCHLORIDE 20 MG/1
20 CAPSULE, DELAYED RELEASE ORAL DAILY
Qty: 90 CAPSULE | Refills: 0 | OUTPATIENT
Start: 2025-04-22

## 2025-05-14 ENCOUNTER — TELEPHONE (OUTPATIENT)
Dept: FAMILY MEDICINE CLINIC | Facility: CLINIC | Age: 80
End: 2025-05-14
Payer: MEDICARE

## 2025-05-14 NOTE — TELEPHONE ENCOUNTER
Caller: [facility]    Relationship to patient: Other    Best call back number: [phone]     Patient is needing: FRANCISCO FROM [facility] IS CALLING TO FOLLOW UP ON A PLAN OF CARE THAT WAS FAXED FOR A DATE OF SERVICE OF 04/11/2025.     PLEASE CALL TO DISCUSS.

## 2025-05-23 DIAGNOSIS — M62.838 MUSCLE SPASMS OF BOTH LOWER EXTREMITIES: ICD-10-CM

## 2025-05-27 RX ORDER — BACLOFEN 5 MG/1
10 TABLET ORAL NIGHTLY PRN
Qty: 30 TABLET | Refills: 2 | Status: SHIPPED | OUTPATIENT
Start: 2025-05-27

## 2025-06-10 NOTE — PROGRESS NOTES
"Chief Complaint  Diabetes (6 mo follow up on DM PT is using the one touch ultra but did not bring meter. PT has been feeling well. No changes. Eye exam last Friday. )    Subjective        Michelle Espinoza presents to [facility] END[facility]OLOG[facility]  History of Present Illness     Pmh: Status post parathyroid surgery in March 2019 ( on both vitamin d and calcium supplements)    Type 2 dm   on glimepiride 4 mg po bid with meals  On statin  AM   Denies diabetic complications   Lab Results   Component Value Date    HGBA1C 6.9 (H) 02/16/2023       Objective   Vital Signs:  /74   Pulse 60   Temp 97.7 °F (36.5 °C) (Temporal)   Ht 162.6 cm (64\")   Wt 90.7 kg (200 lb) Comment: pt reported unable to get on scale today  SpO2 100%   BMI 34.33 kg/m²   Estimated body mass index is 34.33 kg/m² as calculated from the following:    Height as of this encounter: 162.6 cm (64\").    Weight as of this encounter: 90.7 kg (200 lb).             Physical Exam  Vitals reviewed.   Constitutional:       General: She is not in acute distress.  HENT:      Head: Normocephalic and atraumatic.   Eyes:      General:         Right eye: No discharge.         Left eye: No discharge.   Pulmonary:      Effort: Pulmonary effort is normal. No respiratory distress.   Musculoskeletal:         General: No signs of injury. Normal range of motion.      Cervical back: Normal range of motion and neck supple.   Skin:     General: Skin is warm and dry.   Neurological:      Mental Status: She is alert and oriented to person, place, and time. Mental status is at baseline.   Psychiatric:         Mood and Affect: Mood normal.         Behavior: Behavior normal.         Thought Content: Thought content normal.         Judgment: Judgment normal.        Result Review :  The following data was reviewed by: RATNA Garcia on 03/02/2023:  Common labs    Common Labs 3/17/22 3/17/22 3/17/22 11/3/22 11/3/22 11/3/22 2/16/23 2/16/23 2/16/23    " 1000 1000 1000 1059 1059 1059 1100 1100 1100   Glucose 148 (A)   127 (A)   186 (A)     BUN 31 (A)   36 (A)   25     Creatinine 1.14 (A)   0.99   1.09 (A)     Sodium 143   139   138     Potassium 5.6 (A)   4.6   5.1     Chloride 106   106   102     Calcium 9.7   9.7   9.8     Total Protein       6.3     Albumin       3.9     Total Bilirubin       0.3     Alkaline Phosphatase       84     AST (SGOT)       23     ALT (SGPT)       20     Total Cholesterol   114   185  164    Triglycerides   162 (A)   263 (A)  208 (A)    HDL Cholesterol   50   53  53    LDL Cholesterol    37   88  77    Hemoglobin A1C  7.5 (A)   6.70 (A)    6.9 (A)   (A) Abnormal value       Comments are available for some flowsheets but are not being displayed.                        Assessment and Plan   Diagnoses and all orders for this visit:    1. Type 2 diabetes mellitus with hyperglycemia, without long-term current use of insulin (HCC) (Primary)  -     Hemoglobin A1c; Future    2. Hyperlipidemia associated with type 2 diabetes mellitus (HCC)             Follow Up   Return in about 3 months (around 6/2/2023).     a1c stable, no additional changes today  Continue statin   Check BS during the day as well, notify me for am bs >200 and afternoon > 160    Patient was given instructions and counseling regarding her condition or for health maintenance advice. Please see specific information pulled into the AVS if appropriate.     Saskia Calderon, APRN     in a doctor's office, in your home, or at a hospital. The goal is to find out if you are having any problems that could make it hard to care for yourself or that make it unsafe for you to be on your own.  To measure your ADLs, your doctor will ask how hard it is for you to do routine tasks. Your doctor may also want to know if you have changed the way you do a task because of a health problem. Your doctor may watch how you:  Walk back and forth.  Keep your balance while you stand or walk.  Move from sitting to standing or from a bed to a chair.  Button or unbutton a shirt or sweater.  Remove and put on your shoes.  It's common to feel a little worried or anxious if you find you can't do all the things you used to be able to do. Talking with your doctor about ADLs is a way to make sure you're as safe as possible and able to care for yourself as well as you can. You may want to bring a caregiver, friend, or family member to your checkup. They can help you talk to your doctor.  Follow-up care is a key part of your treatment and safety. Be sure to make and go to all appointments, and call your doctor if you are having problems. It's also a good idea to know your test results and keep a list of the medicines you take.  Current as of: October 24, 2024  Content Version: 14.5  © 1102-9287 [employer].   Care instructions adapted under license by [employer]. If you have questions about a medical condition or this instruction, always ask your healthcare professional. [employer] disclaims any warranty or liability for your use of this information.         Starting a Weight-Loss Plan: Care Instructions  Overview    It can be a challenge to lose weight. But your doctor can help you make a weight-loss plan that meets your needs.  You don't have to make a lot of big changes at once. A better idea might be to focus on small changes and stick with them. When those changes become habit, you can add a few

## 2025-06-12 ENCOUNTER — OFFICE VISIT (OUTPATIENT)
Age: 80
End: 2025-06-12
Payer: MEDICARE

## 2025-06-12 VITALS
DIASTOLIC BLOOD PRESSURE: 76 MMHG | BODY MASS INDEX: 34.41 KG/M2 | WEIGHT: 187 LBS | HEART RATE: 57 BPM | HEIGHT: 62 IN | SYSTOLIC BLOOD PRESSURE: 124 MMHG

## 2025-06-12 DIAGNOSIS — I25.10 CORONARY ARTERY DISEASE INVOLVING NATIVE CORONARY ARTERY OF NATIVE HEART WITHOUT ANGINA PECTORIS: ICD-10-CM

## 2025-06-12 DIAGNOSIS — I87.2 CHRONIC VENOUS INSUFFICIENCY: ICD-10-CM

## 2025-06-12 DIAGNOSIS — I25.2 HISTORY OF INFERIOR WALL MYOCARDIAL INFARCTION: ICD-10-CM

## 2025-06-12 DIAGNOSIS — I65.21 STENOSIS OF RIGHT CAROTID ARTERY: Primary | ICD-10-CM

## 2025-06-12 DIAGNOSIS — Z86.79 HISTORY OF ISCHEMIC CARDIOMYOPATHY: ICD-10-CM

## 2025-06-12 DIAGNOSIS — I10 ESSENTIAL HYPERTENSION, BENIGN: ICD-10-CM

## 2025-06-12 DIAGNOSIS — E78.5 HYPERLIPIDEMIA, UNSPECIFIED HYPERLIPIDEMIA TYPE: ICD-10-CM

## 2025-06-12 DIAGNOSIS — I49.3 PVC'S (PREMATURE VENTRICULAR CONTRACTIONS): ICD-10-CM

## 2025-06-12 DIAGNOSIS — I50.32 CHRONIC DIASTOLIC CHF (CONGESTIVE HEART FAILURE): ICD-10-CM

## 2025-06-12 NOTE — PROGRESS NOTES
Subjective:     Encounter Date:06/13/2025      Patient ID: Michelle Espinoza is a 80 y.o. adult.    Chief Complaint:follow up CAD, ICM  History of Present Illness  This is a 79 y/o female who follows with Dr. Romo and is known to me.  She has a pmhx of coronary artery disease s/p PCI with stent placement to the proximal, mid, and distal right coronary artery following an inferior MI, ischemic cardiomyopathy with an EF of 45-50%, hypertension, hyperlipidemia, CKD, and diabetes.     She is here today for follow-up visit.  She continues to feel okay from a cardiac standpoint.  She says she still working on getting some of her strength back but this has been a slow process.  She says she can walk very short distances but that is all.  She says her legs just give out.  Her breathing has improved.  No significant complaints of chest pain, palpitations, dizziness or syncope.  Blood pressure has been stable.    Prior history:  Dr. Romo began following the patient when she came to the emergency room on 3/27/2021 with an inferior myocardial infarction. Following her arrival to the emergency room an EKG was performed showing inferolateral ST elevations system with an inferior myocardial infarction.  She was brought emergently to the cardiac catheterization laboratory where she is found to have an occluded proximal right coronary artery.  After reestablishing flow in the vessel was noted that she had severe stenosis in her proximal, mid, and distal right coronary artery for which she underwent 3 separate drug-eluting stent placements.  The procedure was complicated by distal embolization of thrombus in her posterior descending branch.  She was treated with Integrilin infusion for period of time before complaining of a headache.  Work-up of this headache including a CT of the head was unremarkable.  She also had some issues with bradycardia and hypotension during the procedure that required treatment with dopamine and  phenylephrine but both of these had improved by the end of the procedure and she no longer required pressor support.     Following a cardiac catheterization she did well from a cardiac standpoint.  She did have an echocardiogram performed on 3/28/2021 that showed mildly depressed left ventricular systolic function with an EF of 41%, inferior wall motion abnormalities, grade 1 diastolic dysfunction, and no significant valvular disease.    She was seen in the office in April 2022. At that time, it was decided to stop clopidogrel since it had been over a year since her stent placement. Statin therapy was reduced to see if it would help with her myalgias. She had also gained a significant amount of weight. Otherwise, she had been doing fairly well.    I then saw her in October 2022 and she reported a dull ache in the left side of her chest that lasts about 20 mins and resolves on its own. She had also been having pounding in her throat a couple of times a week that did not occur when the chest discomfort occurs. Her EKG was stable with no ischemic changes so we opted to proceed with an echocardiogram. The echocardiogram was unremarkable and no changes were made.     She was last seen by Dr. Romo in January 2023 and she was feeling ok from a cardiac standpoint. She was noted to be in ventricular trigeminy. She was asymptomatic with this. She had recently had an echocardiogram at that time that was unremarkable so it was decided to monitor. She was instructed to return in 3 months and if PVCs were persistent, could consider a 24 hour Holter.    I then saw her in June 2023 at which time she was having complaints of feeling like she couldn't take a deep breath and was struggling with progressive weakness. She would have episodes where she would not be doing anything and would have a brief moment of shortness of breath, she would yawn and it would resolve. She was quite concerned about arrhythmia as her sister has afib. I  had a proBNP drawn in office that day that was normal. I also had ordered a 24 holter monitor but unfortunately, this was not placed. I also asked for the patient to be contacted to return for placement and she was never contacted    I saw her in October and she was doing about the same. She was frustrated with how much her health had declined over the last few years. She was also having worsening shortness of breath. I placed a 24 hour Holter to assess her rhythm and PVC burden. This showed a 20% burden of ventricular ectopy. I then obtained an echocardiogram that showed a normal LV systolic function, EF 51.4%, grade 1 diastolic dysfunction, mild mitral regurgitation.     She was admitted in January 2024 with lower extremity swelling felt to be due to chronic stasis dermatitis. She was IV diuresed and carvedilol was increase. At follow up with me in February, she was having blistering on her legs due to the significant swelling. She admitted she was not great about taking her furosemide because she was having a hard time getting to the bathroom. She was given of IV Lasix in [facility] and instructed to try to stay compliant with taking her furosemide as directed.    She then saw Dr. Romo in March 2024 and was being followed by the wound care clinic where they were wrapping her legs.  There had been some improvement in her blisters and the swelling.  She was staying compliant with taking her furosemide and was trying to keep her legs elevated is much as possible.  No changes were made at that visit.    I saw her in June and she was doing fairly well without any significant issues.  They were continuing to wrap her legs which was helping with her swelling in addition to taking furosemide.    She then saw Dr. Romo in December and had been discharged from the wound care clinic.  She was compression still and for be helping.  No changes were made.  I have reviewed and updated as appropriate allergies, current medications,  past family history, past medical history, past surgical history and problem list.    Review of Systems   Constitutional: Negative for fever, malaise/fatigue, weight gain and weight loss.   HENT:  Negative for congestion, hoarse voice and sore throat.    Eyes:  Negative for blurred vision and double vision.   Cardiovascular:  Negative for chest pain, dyspnea on exertion, leg swelling, orthopnea, palpitations and syncope.   Respiratory:  Negative for cough, shortness of breath and wheezing.    Gastrointestinal:  Negative for abdominal pain, hematemesis, hematochezia and melena.   Genitourinary:  Negative for dysuria and hematuria.   Neurological:  Negative for dizziness, headaches, light-headedness, loss of balance, numbness and weakness.   Psychiatric/Behavioral:  Negative for depression. The patient is not nervous/anxious.          Current Outpatient Medications:     Ascorbic Acid 500 MG chewable tablet, Chew 500 mg Daily., Disp: , Rfl:     aspirin 81 MG chewable tablet, Chew 1 tablet Daily., Disp: , Rfl:     atorvastatin (LIPITOR) 20 MG tablet, TAKE 1 TABLET BY MOUTH DAILY, Disp: 90 tablet, Rfl: 3    Baclofen (LIORESAL) 5 MG tablet, TAKE 2 TABLETS BY MOUTH AT NIGHT AS NEEDED (MUSCLE SPASMS TO LOWER LEGS), Disp: 30 tablet, Rfl: 2    BD Pen Needle Micro U/F 32G X 6 MM misc, , Disp: , Rfl:     carvedilol (COREG) 12.5 MG tablet, Take 1 tablet by mouth 2 (Two) Times a Day With Meals., Disp: 180 tablet, Rfl: 1    carvedilol (COREG) 6.25 MG tablet, , Disp: , Rfl:     Cholecalciferol (Vitamin D-3) 25 MCG (1000 UT) capsule, Take  by mouth., Disp: , Rfl:     Cholecalciferol (VITAMIN D3) 5000 UNITS capsule capsule, Take 2 capsules by mouth Daily., Disp: , Rfl:     COLLAGEN PO, Take 3 tablets by mouth Daily., Disp: , Rfl:     cyanocobalamin (VITAMIN B-12) 1000 MCG tablet, Take 1 tablet by mouth Daily., Disp: , Rfl:     furosemide (LASIX) 40 MG tablet, TAKE 1 TABLET BY MOUTH DAILY, Disp: 90 tablet, Rfl: 0    glimepiride  (AMARYL) 4 MG tablet, Take 1 tablet by mouth 2 (Two) Times a Day., Disp: 180 tablet, Rfl: 1    Glucos-Chondroit-Hyaluron-MSM (GLUCOSAMINE CHONDROITIN JOINT PO), Take  by mouth 2 (Two) Times a Day. 2000-250mg, Disp: , Rfl:     glucose blood (OneTouch Ultra) test strip, Dx code E11.59 testing bs 4 x day, Disp: 400 each, Rfl: 3    Gvoke HypoPen 2-Pack 1 MG/0.2ML solution auto-injector, Inject 1 each under the skin into the appropriate area as directed., Disp: , Rfl:     hydrALAZINE (APRESOLINE) 10 MG tablet, TAKE 1 TABLET BY MOUTH 2 TIMES A DAY, Disp: 180 tablet, Rfl: 1    Insulin Lispro (humaLOG) 100 UNIT/ML injection, Inject 8 Units under the skin into the appropriate area as directed 3 (Three) Times a Day., Disp: , Rfl:     Insulin Lispro, 1 Unit Dial, (HumaLOG KwikPen) 100 UNIT/ML solution pen-injector, , Disp: , Rfl:     magnesium 30 MG tablet, Take 3 tablets by mouth Daily., Disp: , Rfl:     nystatin (MYCOSTATIN) 100,000 unit/mL suspension, , Disp: , Rfl:     ofloxacin (OCUFLOX) 0.3 % ophthalmic solution, , Disp: , Rfl:     Probiotic Product (Probiotic Daily) capsule, Take  by mouth., Disp: , Rfl:     Probiotic Product (PROBIOTIC PO), Take 1 tablet by mouth Daily., Disp: , Rfl:     Tresiba FlexTouch 100 UNIT/ML solution pen-injector injection, 40 Units Daily., Disp: , Rfl:     Past Medical History:   Diagnosis Date    Anesthesia complication     STRONG GAG REFLEX    Benign colonic polyp     Chronic back pain     Claustrophobia     Coronary artery disease 03/27/2021    PTCA WITH PLACEMENT OF STENT. PLAVIX ONLY TO BE HELD ONE DAY    DDD (degenerative disc disease), cervical     DDD (degenerative disc disease), lumbosacral     Diabetes mellitus     TYPE 2    Essential hypertension, benign 1/12/2024    Frequent UTI     History of MI (myocardial infarction) 03/27/2021    History of pyelonephritis 06/2014    History of sepsis 2014    Hypercholesteremia     Left knee pain     DRAINED  9/21/21    Leg muscle spasm      Neuropathy     LEFT FOOT. AS RESULT OF BACK PROBLEMS    Osteoarthritis     Osteoporosis     Overactive bladder     Type 2 diabetes mellitus     Ureteral calculus, left     Weakness     LOWER EXTREMITES RELATED FROM NERVE DAMAGE FROM BACK       Past Surgical History:   Procedure Laterality Date    APPENDECTOMY      BACK SURGERY      MULTIPLE. WITH HARDWARE    CARDIAC CATHETERIZATION N/A 2021    Procedure: Left Heart Cath;  Surgeon: Leydi Romo MD;  Location:  [facility];  Service: Cardiovascular;  Laterality: N/A;    CARDIAC CATHETERIZATION  2021    Procedure: Percutaneous Manual Thrombectomy;  Surgeon: Leydi Romo MD;  Location:  [facility];  Service: Cardiovascular;;    CARDIAC CATHETERIZATION N/A 2021    Procedure: Percutaneous Coronary Intervention;  Surgeon: eLydi Romo MD;  Location:  [facility];  Service: Cardiovascular;  Laterality: N/A;    CARDIAC CATHETERIZATION N/A 2021    Procedure: Coronary angiography;  Surgeon: Leydi Romo MD;  Location:  [facility];  Service: Cardiovascular;  Laterality: N/A;    CARPAL TUNNEL RELEASE      LEFT X2 RIGHT     CATARACT EXTRACTION WITH INTRAOCULAR LENS IMPLANT      LEFT AND RIGHT    CERVICAL SPINE SURGERY      MULTIPLE C3-4 C6-7     SECTION      x 2    COLONOSCOPY  2018    CORONARY STENT PLACEMENT  2021    PARATHYROIDECTOMY Left 2019    Procedure: PARATHYROIDECTOMY LEFT INFERIOR;  Surgeon: Mason Prather MD;  Location: [facility];  Service: ENT    THYROID SURGERY      TRICEP TENDON REPAIR Left 2021    Procedure: LEFT OPEN TRICEPS REPAIR;  Surgeon: Luis Cho II, MD;  Location: [facility] MAIN OR;  Service: Orthopedics;  Laterality: Left;    URETEROSCOPY LASER LITHOTRIPSY WITH STENT INSERTION Left 2021    Procedure: LEFT URETEROSCOPY LASER LITHOTRIPSY, STONE BASKET EXTRACTION STENT;  Surgeon: Jose Luis Nelson  "CATRACHO Self MD;  Location: [facility] OR;  Service: Urology;  Laterality: Left;       Family History   Problem Relation Age of Onset    Stroke Mother     Heart disease Mother     Hypertension Mother             Clotting disorder Father     Hypertension Father             Diabetes Father     Aneurysm Father     Hypertension Sister             Diabetes Sister     Cervical cancer Sister     Obesity Sister     Diabetes Sister     Obesity Sister     Cervical cancer Maternal Grandmother 72    Diabetes Grandchild     Asthma Sister     Hypertension Sister         Dead    Diabetes Sister     Malig Hyperthermia Neg Hx        Social History     Tobacco Use    Smoking status: Former     Current packs/day: 0.00     Average packs/day: 1 pack/day for 27.0 years (27.0 ttl pk-yrs)     Types: Cigarettes     Start date: 1965     Quit date: 1992     Years since quittin.4     Passive exposure: Past    Smokeless tobacco: Never    Tobacco comments:     CAFFEINE USE: ICE TEA OCC.   Vaping Use    Vaping status: Never Used   Substance Use Topics    Alcohol use: Not Currently     Comment: Socially.    Drug use: Yes     Types: Hydrocodone         ECG 12 Lead    Date/Time: 2025 9:00 AM  Performed by: Mercedes Thompson APRN    Authorized by: Mercedes Thompson APRN  Comparison: compared with previous ECG from 2024  Similar to previous ECG  Rhythm: sinus rhythm  Conduction: non-specific intraventricular conduction delay             Objective:     Visit Vitals  /76 (BP Location: Right arm, Patient Position: Sitting, Cuff Size: Adult)   Pulse 57   Ht 157.5 cm (62\")   Wt 84.8 kg (187 lb)   LMP  (LMP Unknown)   BMI 34.20 kg/m²             Physical Exam  Constitutional:       Appearance: Normal appearance. She is obese.   HENT:      Head: Normocephalic.   Neck:      Vascular: No carotid bruit.   Cardiovascular:      Rate and Rhythm: Normal rate and regular rhythm.      Chest Wall: PMI is not displaced.     "  Pulses: Normal pulses.           Radial pulses are 2+ on the right side and 2+ on the left side.        Posterior tibial pulses are 2+ on the right side and 2+ on the left side.      Heart sounds: Normal heart sounds. No murmur heard.     No friction rub. No gallop.   Pulmonary:      Effort: Pulmonary effort is normal.      Breath sounds: Normal breath sounds.   Abdominal:      General: Bowel sounds are normal. There is no distension.      Palpations: Abdomen is soft.   Musculoskeletal:      Right lower leg: No edema.      Left lower leg: No edema.   Skin:     General: Skin is warm and dry.      Capillary Refill: Capillary refill takes less than 2 seconds.   Neurological:      Mental Status: She is alert and oriented to person, place, and time.   Psychiatric:         Mood and Affect: Mood normal.         Behavior: Behavior normal.         Thought Content: Thought content normal.          Lab Review:   Lipid Panel          8/29/2024    10:01 3/20/2025    11:21   Lipid Panel   Total Cholesterol 118  112    Triglycerides 75  119    HDL Cholesterol 58  47    VLDL Cholesterol 15  21    LDL Cholesterol  45  44    LDL/HDL Ratio 0.8  0.9          Cardiac Procedures:   Echocardiogram 3/27/21:  Calculated left ventricular EF = 41.3% Estimated left ventricular EF was in agreement with the calculated left ventricular EF. Left ventricular systolic function is moderately decreased.  The following left ventricular wall segments are hypokinetic: basal inferolateral, mid inferolateral, apical inferior, mid inferior and basal inferior.  Left ventricular diastolic function is consistent with (grade I) impaired relaxation.  There is calcification of the aortic valve.  There is no significant valular stenosis or regurgatation    Cardiac catheterization 3/27/21:  Left Main   Large-caliber vessel with 0% stenosis. The vessel bifurcates into left anterior descending and left circumflex arteries.   Left Anterior Descending   Large-caliber  vessel with 30% proximal stenosis. Is a 50% mid stenosis. The mid vessel then tapers to a small caliber with 20 to 30% mid stenosis. The distal vessel is severely tortuous and has no significant disease.   Left Circumflex   Moderate caliber vessel with 0% proximal stenosis. Proximal vessel gives rise to a moderate caliber, severely tortuous first obtuse marginal branch with no significant disease. The distal continuation of the circumflex vessel tapers to small caliber with 0% stenosis.   Right Coronary Artery   Large-caliber vessel with severe proximal calcification and 100% thrombotic occlusion.   Prox RCA lesion is 100% stenosed. Culprit lesion. JOVANNY flow is 0. The lesion is type B2.   1.  Inferior myocardial infarction status post drug-eluting stent placement of the proximal, mid, and distal right coronary artery  2.  Coronary artery disease        Assessment:         Diagnoses and all orders for this visit:    1. Stenosis of right carotid artery (Primary)    2. PVC's (premature ventricular contractions)    3. Hyperlipidemia, unspecified hyperlipidemia type    4. History of ischemic cardiomyopathy    5. History of inferior wall myocardial infarction    6. Essential hypertension, benign    7. Coronary artery disease involving native coronary artery of native heart without angina pectoris    8. Chronic venous insufficiency    9. Chronic diastolic CHF (congestive heart failure)                Plan:       CAD: s/p PCI with stent x 3 to RCA. EKG is stable with no ischemic changes noted. On aspirin, statin and beta blocker. Continue current management.  PVCs: 20% burden on monitor, now well controlled on increased dose of carvedilol. No PVCs noted on EKG today.  HTN: blood pressure has been well controlled on current regimen. No changes  HLD: on lower dose statin. Unable to tolerate high dose due to leg pain. Goal LDL < 70. Most recent was 45.  History of ischemic cardiomyopathy: normalization of left ventricular  function on recent echocardiogram. Appears compensated on exam  Chronic venous insufficiency: well controlled with compression stockings and furosemide.    Thank you for allowing me to participate in this patient's care. Please call with any questions or concerns. Ms. Espinoza will follow up with Dr. Romo in 6 months.          Your medication list            Accurate as of June 12, 2025 11:59 PM. If you have any questions, ask your nurse or doctor.                CONTINUE taking these medications        Instructions Last Dose Given Next Dose Due   Ascorbic Acid 500 MG chewable tablet      Chew 500 mg Daily.       aspirin 81 MG chewable tablet      Chew 1 tablet Daily.       atorvastatin 20 MG tablet  Commonly known as: LIPITOR      TAKE 1 TABLET BY MOUTH DAILY       Baclofen 5 MG tablet  Commonly known as: LIORESAL      TAKE 2 TABLETS BY MOUTH AT NIGHT AS NEEDED (MUSCLE SPASMS TO LOWER LEGS)       BD Pen Needle Micro U/F 32G X 6 MM misc  Generic drug: Insulin Pen Needle           carvedilol 12.5 MG tablet  Commonly known as: COREG      Take 1 tablet by mouth 2 (Two) Times a Day With Meals.       carvedilol 6.25 MG tablet  Commonly known as: COREG           COLLAGEN PO      Take 3 tablets by mouth Daily.       cyanocobalamin 1000 MCG tablet  Commonly known as: VITAMIN B-12      Take 1 tablet by mouth Daily.       furosemide 40 MG tablet  Commonly known as: LASIX      TAKE 1 TABLET BY MOUTH DAILY       glimepiride 4 MG tablet  Commonly known as: AMARYL      Take 1 tablet by mouth 2 (Two) Times a Day.       GLUCOSAMINE CHONDROITIN JOINT PO      Take  by mouth 2 (Two) Times a Day. 2000-250mg       Gvoke HypoPen 2-Pack 1 MG/0.2ML solution auto-injector  Generic drug: Glucagon      Inject 1 each under the skin into the appropriate area as directed.       hydrALAZINE 10 MG tablet  Commonly known as: APRESOLINE      TAKE 1 TABLET BY MOUTH 2 TIMES A DAY       HumaLOG KwikPen 100 UNIT/ML solution pen-injector  Generic drug:  Insulin Lispro (1 Unit Dial)           Insulin Lispro 100 UNIT/ML injection  Commonly known as: humaLOG      Inject 8 Units under the skin into the appropriate area as directed 3 (Three) Times a Day.       magnesium 30 MG tablet      Take 3 tablets by mouth Daily.       nystatin 100,000 unit/mL suspension  Commonly known as: MYCOSTATIN           ofloxacin 0.3 % ophthalmic solution  Commonly known as: OCUFLOX           OneTouch Ultra test strip  Generic drug: glucose blood      Dx code E11.59 testing bs 4 x day       Probiotic Daily capsule      Take  by mouth.       PROBIOTIC PO      Take 1 tablet by mouth Daily.       Tresiba FlexTouch 100 UNIT/ML solution pen-injector injection  Generic drug: insulin degludec      40 Units Daily.       vitamin D3 125 MCG (5000 UT) capsule capsule      Take 2 capsules by mouth Daily.       Vitamin D-3 25 MCG (1000 UT) capsule      Take  by mouth.                  Mercedes Thompson, RATNA  06/13/25  10:33 AM EDT

## 2025-06-16 RX ORDER — HYDRALAZINE HYDROCHLORIDE 10 MG/1
10 TABLET, FILM COATED ORAL 2 TIMES DAILY
Qty: 180 TABLET | Refills: 1 | Status: SHIPPED | OUTPATIENT
Start: 2025-06-16

## 2025-07-16 DIAGNOSIS — I10 ESSENTIAL HYPERTENSION, BENIGN: ICD-10-CM

## 2025-07-16 DIAGNOSIS — I65.22 ATHEROSCLEROSIS OF LEFT CAROTID ARTERY: ICD-10-CM

## 2025-07-16 RX ORDER — CARVEDILOL 12.5 MG/1
12.5 TABLET ORAL 2 TIMES DAILY WITH MEALS
Qty: 180 TABLET | Refills: 0 | Status: SHIPPED | OUTPATIENT
Start: 2025-07-16

## 2025-07-21 DIAGNOSIS — M62.838 MUSCLE SPASMS OF BOTH LOWER EXTREMITIES: ICD-10-CM

## 2025-07-22 RX ORDER — BACLOFEN 5 MG/1
TABLET ORAL
Qty: 60 TABLET | Refills: 2 | Status: SHIPPED | OUTPATIENT
Start: 2025-07-22

## 2025-08-26 ENCOUNTER — APPOINTMENT (OUTPATIENT)
Dept: WOUND CARE | Facility: HOSPITAL | Age: 80
End: 2025-08-26
Payer: MEDICARE

## 2025-08-26 PROCEDURE — G0463 HOSPITAL OUTPT CLINIC VISIT: HCPCS

## (undated) DEVICE — SUT VIC 3/0 SH 27IN J416H

## (undated) DEVICE — JACKSON-PRATT 100CC BULB RESERVOIR: Brand: CARDINAL HEALTH

## (undated) DEVICE — BNDG ELAS ELITE V/CLOSE 6IN 5YD LF STRL

## (undated) DEVICE — GW EMR FIX EXCHG J STD .035 3MM 260CM

## (undated) DEVICE — STPLR SKIN VISISTAT WD 35CT

## (undated) DEVICE — 3M™ STERI-DRAPE™ INSTRUMENT POUCH 1018: Brand: STERI-DRAPE™

## (undated) DEVICE — NC TREK CORONARY DILATATION CATHETER 3.0 MM X 15 MM / RAPID-EXCHANGE: Brand: NC TREK

## (undated) DEVICE — BASKT RETRV STN NITNL 1.9F

## (undated) DEVICE — BNDG ELAS ELITE V/CLOSE 4IN 5YD LF STRL

## (undated) DEVICE — UNDERCAST PADDING: Brand: DEROYAL

## (undated) DEVICE — T-DRAPE,EXTREMITY,STERILE: Brand: MEDLINE

## (undated) DEVICE — CATH ASPIR EXPORTADVANCE 6F .014IN 140CM

## (undated) DEVICE — PK CATH CARD 40

## (undated) DEVICE — GLV SURG SENSICARE PI MIC PF SZ7 LF STRL

## (undated) DEVICE — SKIN PREP TRAY W/CHG: Brand: MEDLINE INDUSTRIES, INC.

## (undated) DEVICE — GLIDESHEATH BASIC HYDROPHILIC COATED INTRODUCER SHEATH: Brand: GLIDESHEATH

## (undated) DEVICE — NC TREK CORONARY DILATATION CATHETER 4.0 MM X 12 MM / RAPID-EXCHANGE: Brand: NC TREK

## (undated) DEVICE — DRSNG WND GZ CURAD OIL EMULSION 3X3IN STRL

## (undated) DEVICE — PK ORTHO MAJ 40

## (undated) DEVICE — SUT SILK 3/0 FS1 18IN 684G

## (undated) DEVICE — STANDARD HYPODERMIC NEEDLE,POLYPROPYLENE HUB: Brand: MONOJECT

## (undated) DEVICE — IRRIGATOR BULB ASEPTO 60CC STRL

## (undated) DEVICE — VITAL SIGNS™ ADULT ANESTHESIA BREATHING CIRCUIT: Brand: VITAL SIGNS™

## (undated) DEVICE — NDL HYPO PRECISIONGLIDE REG 25G 1 1/2

## (undated) DEVICE — BND PRESS RADL COMFRT 14IN STRL

## (undated) DEVICE — PK ENT 40

## (undated) DEVICE — MAGNETIC DRAPE: Brand: DEVON

## (undated) DEVICE — KT MANIFLD CARDIAC

## (undated) DEVICE — INTENT TO BE USED WITH SUTURE MATERIAL FOR TISSUE CLOSURE: Brand: RICHARD-ALLAN® NEEDLE STRAIGHT CUTTING

## (undated) DEVICE — MINI TREK CORONARY DILATATION CATHETER 2.0 MM X 12 MM / RAPID-EXCHANGE: Brand: MINI TREK

## (undated) DEVICE — GAUZE,SPONGE,4"X4",16PLY,XRAY,STRL,LF: Brand: MEDLINE

## (undated) DEVICE — NC TREK CORONARY DILATATION CATHETER 3.5 MM X 8 MM / RAPID-EXCHANGE: Brand: NC TREK

## (undated) DEVICE — DISPOSABLE BIPOLAR CABLE 12FT. (3.6M): Brand: KIRWAN

## (undated) DEVICE — APPL CHLORAPREP HI/LITE 26ML ORNG

## (undated) DEVICE — CATH DIAG IMPULSE FR4 5F 100CM

## (undated) DEVICE — FIBR LASR HOLMIUM ACCUMAX 200 1PT USE

## (undated) DEVICE — TRAP FLD MINIVAC MEGADYNE 100ML

## (undated) DEVICE — HS FOCUS 17 CM PLUS ADAPTIVE: Brand: HARMONIC

## (undated) DEVICE — PK URETSCP 40

## (undated) DEVICE — DRN JP RND NO TROC SIL 10F 1/8IN

## (undated) DEVICE — UNDYED BRAIDED (POLYGLACTIN 910), SYNTHETIC ABSORBABLE SUTURE: Brand: COATED VICRYL

## (undated) DEVICE — ADAPT Y ROT GATEWAY PLS

## (undated) DEVICE — SUT ETHLN 5/0 PS2 18IN 1666H

## (undated) DEVICE — 6F .070 JR 4 100CM: Brand: CORDIS

## (undated) DEVICE — SOL ISO/ALC RUB 70PCT 4OZ

## (undated) DEVICE — TOWEL,OR,DSP,ST,BLUE,STD,4/PK,20PK/CS: Brand: MEDLINE

## (undated) DEVICE — TREK CORONARY DILATATION CATHETER 3.0 MM X 12 MM / RAPID-EXCHANGE: Brand: TREK

## (undated) DEVICE — ARM SLING: Brand: DEROYAL

## (undated) DEVICE — RUNTHROUGH NS EXTRA FLOPPY PTCA GUIDEWIRE: Brand: RUNTHROUGH

## (undated) DEVICE — CATH VENT MIV RADL PIG ST TIP 5F 110CM

## (undated) DEVICE — PENCL E/S ULTRAVAC TELESCP NOSE HOLSTR 10FT

## (undated) DEVICE — GLV SURG BIOGEL LTX PF 8 1/2

## (undated) DEVICE — GLV SURG BIOGEL LTX PF 7

## (undated) DEVICE — CATH DIAG IMPULSE FL3.5 5F 100CM

## (undated) DEVICE — TIDISHIELD UROLOGY DRAIN BAGS FROSTY VINYL STERILE FITS SIEMENS UROSKOP ACCESS 20 PER CASE: Brand: TIDISHIELD

## (undated) DEVICE — URETERAL DILATATION SYSTEM

## (undated) DEVICE — GLV SURG BIOGEL LTX PF 7 1/2

## (undated) DEVICE — HEWSON SUTURE RETRIEVER: Brand: HEWSON SUTURE RETRIEVER

## (undated) DEVICE — INTENDED FOR TISSUE SEPARATION, AND OTHER PROCEDURES THAT REQUIRE A SHARP SURGICAL BLADE TO PUNCTURE OR CUT.: Brand: BARD-PARKER ® CARBON RIB-BACK BLADES

## (undated) DEVICE — DEV INDEFLATOR P/N 580289

## (undated) DEVICE — GLV SURG SIGNATURE ESSENTIAL PF LTX SZ8.5